# Patient Record
Sex: MALE | Race: WHITE | NOT HISPANIC OR LATINO | Employment: OTHER | URBAN - METROPOLITAN AREA
[De-identification: names, ages, dates, MRNs, and addresses within clinical notes are randomized per-mention and may not be internally consistent; named-entity substitution may affect disease eponyms.]

---

## 2017-05-16 ENCOUNTER — OFFICE VISIT (OUTPATIENT)
Dept: RADIATION THERAPY | Facility: OUTPATIENT CENTER | Age: 77
End: 2017-05-16

## 2017-05-16 VITALS
RESPIRATION RATE: 18 BRPM | WEIGHT: 162.6 LBS | SYSTOLIC BLOOD PRESSURE: 119 MMHG | DIASTOLIC BLOOD PRESSURE: 65 MMHG | HEART RATE: 57 BPM | BODY MASS INDEX: 23.33 KG/M2 | OXYGEN SATURATION: 96 %

## 2017-05-16 DIAGNOSIS — C01 CANCER OF BASE OF TONGUE (H): Primary | ICD-10-CM

## 2017-05-16 ASSESSMENT — PAIN SCALES - GENERAL: PAINLEVEL: NO PAIN (0)

## 2017-05-16 NOTE — MR AVS SNAPSHOT
After Visit Summary   5/16/2017    Hieu Hughes    MRN: [id number]           Patient Information     Date Of Birth          1940        Visit Information        Provider Department      5/16/2017 2:30 PM Amita Bolton MD [facility]         Follow-ups after your visit        Your next 10 appointments already scheduled     May 15, 2018  2:30 PM CDT   Return Visit with Amita Bolton MD   [facility] ([facility])    [address], [address]  [place] [facility] [place] [facility] [place] [address]   [phone]              Who to contact     Please call your clinic at [phone] to:    Ask questions about your health    Make or cancel appointments    Discuss your medicines    Learn about your test results    Speak to your doctor   If you have compliments or concerns about an experience at your clinic, or if you wish to file a complaint, please contact [facility] Patient Relations at [phone] or email us at [email or web address]         Additional Information About Your Visit        Care EveryWhere ID     This is your Care EveryWhere ID. This could be used by other organizations to access your [place] medical records  [id number]        Your Vitals Were     Pulse Respirations Pulse Oximetry BMI (Body Mass Index)          57 18 96% 23.33 kg/m2         Blood Pressure from Last 3 Encounters:   05/16/17 119/65   05/17/16 128/76   05/18/15 139/68    Weight from Last 3 Encounters:   05/16/17 73.8 kg (162 lb 9.6 oz)   05/17/16 75.3 kg (166 lb)   05/18/15 76.2 kg (168 lb)              Today, you had the following     No orders found for display         Today's Medication Changes          These changes are accurate as of: 5/16/17  2:59 PM.  If you have any questions, ask your nurse or doctor.               These medicines have changed or have updated prescriptions.        Dose/Directions    SYNTHROID PO   This may have changed:  Another medication with the same  name was removed. Continue taking this medication, and follow the directions you see here.   Changed by:  Amita Bolton MD        Dose:  100 mcg   Take 100 mcg by mouth daily   Refills:  0                Primary Care Provider Office Phone # Fax #    Chase Manning [phone]       [address] [address]        Thank you!     Thank you for choosing [facility]  for your care. Our goal is always to provide you with excellent care. Hearing back from our patients is one way we can continue to improve our services. Please take a few minutes to complete the written survey that you may receive in the mail after your visit with us. Thank you!             Your Updated Medication List - Protect others around you: Learn how to safely use, store and throw away your medicines at www.disposemymeds.org.          This list is accurate as of: 5/16/17  2:59 PM.  Always use your most recent med list.                   Brand Name Dispense Instructions for use    SYNTHROID PO      Take 100 mcg by mouth daily

## 2017-05-16 NOTE — NURSING NOTE
FOLLOW-UP VISIT    Patient Name: Hieu Hughes      : 1940     Age: 76 year old        ______________________________________________________________________________     Chief Complaint   Patient presents with     Radiation Therapy     follow up     /65  Pulse 57  Resp 18  Wt 73.8 kg (162 lb 9.6 oz)  SpO2 96%  BMI 23.33 kg/m2     Date Radiation Completed: 4/15/2009 7400 cGy in 37 fxns with concurrent cisplatin    Pain  Denies    Meds  Current Med List Reviewed: Yes  Medication Note:     Labs  TSH   Date Value Ref Range Status   2011 5.85 (H) 0.4 - 5.0 mU/L Final   ]    Imaging  None    Skin:Warm  Dry  Intact  Oral Products: water is enough per pt  Mucositis: No  Dry mouth: No, a little at night but does well during the day  Dental evaluation: No - has full dentures  PEG tube: No  Speech therapy: No  Lymphedema: No  Energy Level:low, wants to start exercising/walking more to regain some strength; feels he lost some through the winter months  Appetite: normal  Intake: can only do shakes/pureed/thin liquids - has found ways to keep calories up with this type of diet - ensure, supplements  Weight:  Wt Readings from Last 5 Encounters:   17 73.8 kg (162 lb 9.6 oz)   16 75.3 kg (166 lb)   05/18/15 76.2 kg (168 lb)   14 75.1 kg (165 lb 9.6 oz)   01/15/14 74.5 kg (164 lb 3 oz)       Appointments:     DATE  Oncologist: no follow up recently    ENT: no follow up recently    Primary:      Other Notes:

## 2017-05-16 NOTE — LETTER
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 He received a total dose of 7400 cGy in 37 fractions via an integrative boost technique with concurrent cisplatin, completing treatment on 4/15/2009. He tolerated radiation moderately well although did develop significant swallowing difficulties following treatment. He presents to clinic today for his 8 year follow-up visit.     Since his last visit with us, he reports that not much has changed in regards to his swallowing function. He continues to eat pureed foods, which is baseline for him now. He has some baseline xerostomia, but nothing too concerning. He has not followed up with Dr. Dunn in ENT since January 2014, nor has he followed up with any other ENT specialist. He had a diagnosis of prostate cancer (unsure of stage, but sounds like early stage) and was treated with a course of definitive external beam radiotherapy in [place], completing treatment on 03/27/2015 over the course of 43 fractions. He did not have any hormone therapy. He is doing well in regards to his urinary symptoms and denies any other pressing issues or complaints today.  He has been taking Synthroid and monitored by his PCP.     Objective:  /65  Pulse 57  Resp 18  Wt 73.8 kg (162 lb 9.6 oz)  SpO2 96%  BMI 23.33 kg/m2    Wt Readings from Last 4 Encounters:   05/16/17 73.8 kg (162 lb 9.6 oz)   05/17/16 75.3 kg (166 lb)   05/18/15 76.2 kg (168 lb)   02/03/14 75.1 kg (165 lb 9.6 oz)     Gen:  Alert, oriented, and in NAD  HEENT: NCAT, PERRL, EOMI, moderately MMM, no lesions or masses in oral cavity  Neck: Mild fibrosis, but relatively soft, no palpable lymphadenopathy or lymphedema  Pulm: CTAB, no wheezes/rales/rhonchi, good aeration  CV: RRR, no murmurs/rubs/gallops  Musculoskeletal: Normal bulk and tone   Skin: Normal color and turgor    Labs and Imaging:  No new imaging.    Assessment: 75yo gentleman with stage MARY, cT2 N2b M0, squamous cell carcinoma of the right base of tongue s/p definitive chemoradiation. He is 8 years out from the completion of treatment with no clinical evidence of disease recurrence. He does continue to have a moderate amount of treatment-related toxicity with persistent dysphagia likely due to a combination of his radiation-induced toxicities and sequelae of post-polio syndrome.    Plan:  1. RTC in 1 year  2. Continue routine oral and skin cares including, mouth and neck stretching exercises  3. Recommend continued followup with ENT and his PCP near his home town.      I spent approximately 30 minutes today with the patient and 80% time was used for counseling    Amita Bolton M.D., Ph.D.      Radiation Oncologist   [facility]   [facility]   Phone: [phone]    CC  Patient Care Team:  Chase Manning as PCP - General  Franco Aburto MD, Vidhya Lopez MD as MD (Hematology)  Liam Joya MD (ENT)

## 2017-05-16 NOTE — PROGRESS NOTES
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 He received a total dose of 7400 cGy in 37 fractions via an integrative boost technique with concurrent cisplatin, completing treatment on 4/15/2009. He tolerated radiation moderately well although did develop significant swallowing difficulties following treatment. He presents to clinic today for his 8 year follow-up visit.     Since his last visit with us, he reports that not much has changed in regards to his swallowing function. He continues to eat pureed foods, which is baseline for him now. He has some baseline xerostomia, but nothing too concerning. He has not followed up with Dr. Dunn in ENT since January 2014, nor has he followed up with any other ENT specialist. He had a diagnosis of prostate cancer (unsure of stage, but sounds like early stage) and was treated with a course of definitive external beam radiotherapy in [place], completing treatment on 03/27/2015 over the course of 43 fractions. He did not have any hormone therapy. He is doing well in regards to his urinary symptoms and denies any other pressing issues or complaints today.  He has been taking Synthroid and monitored by his PCP.     Objective:  /65  Pulse 57  Resp 18  Wt 73.8 kg (162 lb 9.6 oz)  SpO2 96%  BMI 23.33 kg/m2    Wt Readings from Last 4 Encounters:   05/16/17 73.8 kg (162 lb 9.6 oz)   05/17/16 75.3 kg (166 lb)   05/18/15 76.2 kg (168 lb)   02/03/14 75.1 kg (165 lb 9.6 oz)     Gen: Alert, oriented, and in NAD  HEENT: NCAT, PERRL, EOMI, moderately MMM, no  lesions or masses in oral cavity  Neck: Mild fibrosis, but relatively soft, no palpable lymphadenopathy or lymphedema  Pulm: CTAB, no wheezes/rales/rhonchi, good aeration  CV: RRR, no murmurs/rubs/gallops  Musculoskeletal: Normal bulk and tone   Skin: Normal color and turgor    Labs and Imaging:  No new imaging.    Assessment: 77yo gentleman with stage MARY, cT2 N2b M0, squamous cell carcinoma of the right base of tongue s/p definitive chemoradiation. He is 8 years out from the completion of treatment with no clinical evidence of disease recurrence. He does continue to have a moderate amount of treatment-related toxicity with persistent dysphagia likely due to a combination of his radiation-induced toxicities and sequelae of post-polio syndrome.    Plan:  1. RTC in 1 year  2. Continue routine oral and skin cares including, mouth and neck stretching exercises  3. Recommend continued followup with ENT and his PCP near his home town.      I spent approximately 30 minutes today with the patient and 80% time was used for counseling    Amita Bolton M.D., Ph.D.      Radiation Oncologist   [facility]   [facility]   Phone: [phone]    CC  Patient Care Team:  Chase Manning as PCP - General  Franco Aburto MD, Siddhartha Lopez MD as MD (Hematology)  Liam Joya MD (ENT)  Amita Bolton MD (Radiation Oncology)  SIDDHARTHA TAPIA

## 2018-05-15 ENCOUNTER — OFFICE VISIT (OUTPATIENT)
Dept: RADIATION THERAPY | Facility: OUTPATIENT CENTER | Age: 78
End: 2018-05-15
Payer: MEDICARE

## 2018-05-15 VITALS
BODY MASS INDEX: 23.82 KG/M2 | DIASTOLIC BLOOD PRESSURE: 73 MMHG | OXYGEN SATURATION: 95 % | SYSTOLIC BLOOD PRESSURE: 143 MMHG | HEART RATE: 72 BPM | RESPIRATION RATE: 18 BRPM | WEIGHT: 166 LBS

## 2018-05-15 DIAGNOSIS — C01 MALIGNANT NEOPLASM OF BASE OF TONGUE (H): Primary | ICD-10-CM

## 2018-05-15 DIAGNOSIS — T66.XXXS LATE EFFECT OF RADIATION: ICD-10-CM

## 2018-05-15 ASSESSMENT — PAIN SCALES - GENERAL: PAINLEVEL: NO PAIN (0)

## 2018-05-15 NOTE — NURSING NOTE
FOLLOW-UP VISIT    Patient Name: Hieu Hughes      : 1940     Age: 77 year old        ______________________________________________________________________________     Chief Complaint   Patient presents with     Radiation Therapy     follow up     /73  Pulse 72  Resp 18  Wt 75.3 kg (166 lb)  SpO2 95%  BMI 23.82 kg/m2     Date Radiation Completed: 4/15/2009  7400 cGy to H&N    Pain  Denies    Meds  Current Med List Reviewed: Yes  Medication Note:     Labs  TSH   Date Value Ref Range Status   2011 5.85 (H) 0.4 - 5.0 mU/L Final   ]    Imaging  None    Skin:Warm  Dry  Intact  Oral Products: water  Mucositis: No  Dry mouth: yes  Dental evaluation: has full denture  PEG tube: No  Speech therapy: reports he feels he is having more trouble with talking clearly/speech - consider speech therapy referal  Lymphedema: No  Energy Level:normal  Appetite: fair  Intake: all food needs to be blended to a certain consistency. No spices.  Weight:  Wt Readings from Last 5 Encounters:   05/15/18 75.3 kg (166 lb)   17 73.8 kg (162 lb 9.6 oz)   16 75.3 kg (166 lb)   05/18/15 76.2 kg (168 lb)   14 75.1 kg (165 lb 9.6 oz)       Appointments:     DATE  Oncologist:     ENT: Dr. Dunn    Primary: Chase Manning  annual visits     Other Notes: may need speech referral

## 2018-05-15 NOTE — LETTER
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 He received a total dose of 7400 cGy in 37 fractions via an integrative boost technique with concurrent cisplatin, completing treatment on 4/15/2009. He tolerated radiation moderately well although did develop significant swallowing difficulties following treatment. He presents to clinic today for his 9 year follow-up visit.      Since his last visit with us, he reports that not much has changed in regards to his swallowing function. He continues to eat pureed foods, which is baseline for him now. He has some baseline xerostomia, but nothing too concerning. He has not followed up with Dr. Dunn in ENT since January 2014, nor has he followed up with any other ENT specialist. He had a diagnosis of prostate cancer (stage not available to me at the time of visit) treated with EBRT in [place], completed on 03/27/2015 . He did not have any hormone therapy. He is doing well in regards to his urinary symptoms and denies any other pressing issues or complaints today. Weight is relatively stable. He does have hoarseness of voice which is chronic but stable. He denies any n/v/f/c/d/c/cp/sob/ha/new neuro symptoms/bone or joint pain.     Physical Exam:  /73  Pulse 72  Resp 18  Wt 166 lb  SpO2 95%  BMI 23.82 kg/m2    Wt Readings from Last 4 Encounters:   05/15/18 166 lb   05/16/17 162 lb 9.6 oz   05/17/16 166 lb   05/18/15 168 lb     Gen: Alert, oriented, and in  NAD  HEENT: NCAT, PERRL, EOMI, moderately MMM, no lesions or masses in oral cavity. Edentulous. BOT and FOM are soft.  Neck: Mild fibrosis, but relatively soft, no palpable lymphadenopathy or lymphedema  Lymph: No cervical, supraclavicular, or infraclavicular lymphadenopathy appreciated  Pulm: CTAB  CV: RRR, no murmurs/rubs/gallops  Musculoskeletal: Normal bulk and tone   Skin: Normal color and turgor  Neuro: CN II-XII intact. 5/5 strength in bilateral upper and lower extremities.     LABS/IMAGING:  10/22/17 chest CT   Lung Parenchyma:  There is infiltrate in the right posterior lung with both superior, middle lobe and inferior lobe involvement, scattered airspace disease.  No fluid collections.  No discrete associated masses. There is left posterior basilar lung consolidated lung infiltrate with no fluid collection to suggest abscess.  Air bronchograms with some calcifications likely granulomatous present.  The remainder of the lungs fields are relatively clear.    Mediastinum, Shae, Great Vessels:  Vascular calcification.  No enlarged lymph nodes or masses.  No pericardial effusion.    Chest Wall:  No enlarged axillary or subclavian lymph nodes.  No lytic or blastic bony changes.  No rib or vertebral body fractures.  Multi-endplate spurs.     10/22/17: TSH 1.91.    ASSESSMENT: 77yo gentleman with stage MARY, cT2 N2b M0, squamous cell carcinoma of the right base of tongue s/p definitive chemoradiation. He is 9 years out from the completion of treatment with no clinical evidence of disease recurrence. PE is unremarkable.  Last Fall he had pneumonia which warranted a chest CT (above) that showed no discrete masses or lymphadenopathy.  He does continue to have a moderate amount of treatment-related toxicity with persistent dysphagia likely due to a combination of his radiation-induced toxicities and sequelae of post-polio syndrome.  He had polio as a child and therefore his pretreatment swallowing function was already  compromised to some degree.  However XRT exacerbated these symptoms.  His weight however is relatively stable on pureed diet.  He also has hoarseness of voice which is stable but more pronounced when he doesn't talk for prolonged periods of time.     PLAN:  1. Dysphagia/PO intake: Pt given information to follow up with ENT again in order to be evaluated for esophageal dilatation.  He will call and make an appt on his own.     2. Refer back to Speech and Swallow to help with hoarseness of voice.  3. RTC in 6 months for follow up discussion regarding side effect management with [facility] provider.  4. Continue routine oral and skin cares including, mouth and neck stretching exercises  5. Thyroid function tests per PCP.     There was ample time for questions and all were answered to the patients satisfaction.      Shawanda Pacheco M.D.   Adjunct    Radiation Oncologist   [facility]

## 2018-05-15 NOTE — PROGRESS NOTES
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 He received a total dose of 7400 cGy in 37 fractions via an integrative boost technique with concurrent cisplatin, completing treatment on 4/15/2009. He tolerated radiation moderately well although did develop significant swallowing difficulties following treatment. He presents to clinic today for his 9 year follow-up visit.      Since his last visit with us, he reports that not much has changed in regards to his swallowing function. He continues to eat pureed foods, which is baseline for him now. He has some baseline xerostomia, but nothing too concerning. He has not followed up with Dr. Dunn in ENT since January 2014, nor has he followed up with any other ENT specialist. He had a diagnosis of prostate cancer (stage not available to me at the time of visit) treated with EBRT in [place], completed on 03/27/2015 . He did not have any hormone therapy. He is doing well in regards to his urinary symptoms and denies any other pressing issues or complaints today. Weight is relatively stable. He does have hoarseness of voice which is chronic but stable. He denies any n/v/f/c/d/c/cp/sob/ha/new neuro symptoms/bone or joint pain.     Physical Exam:  /73  Pulse 72  Resp 18  Wt 166 lb  SpO2 95%  BMI 23.82 kg/m2    Wt Readings from Last 4 Encounters:   05/15/18 166 lb   05/16/17 162 lb 9.6 oz   05/17/16 166 lb   05/18/15 168 lb     Gen: Alert, oriented, and in NAD  HEENT: NCAT, PERRL, EOMI, moderately MMM, no lesions or masses in oral cavity.  Edentulous. BOT and FOM are soft.  Neck: Mild fibrosis, but relatively soft, no palpable lymphadenopathy or lymphedema  Lymph: No cervical, supraclavicular, or infraclavicular lymphadenopathy appreciated  Pulm: CTAB  CV: RRR, no murmurs/rubs/gallops  Musculoskeletal: Normal bulk and tone   Skin: Normal color and turgor  Neuro: CN II-XII intact. 5/5 strength in bilateral upper and lower extremities.     LABS/IMAGING: 10/22/17 chest CT   Lung Parenchyma:  There is infiltrate in the right posterior lung with both superior, middle lobe and inferior lobe involvement, scattered airspace disease.  No fluid collections.  No discrete associated masses. There is left posterior basilar lung consolidated lung infiltrate with no fluid collection to suggest abscess.  Air bronchograms with some calcifications likely granulomatous present.  The remainder of the lungs fields are relatively clear.    Mediastinum, Shae, Great Vessels:  Vascular calcification.  No enlarged lymph nodes or masses.  No pericardial effusion.    Chest Wall:  No enlarged axillary or subclavian lymph nodes.  No lytic or blastic bony changes.  No rib or vertebral body fractures.  Multi-endplate spurs.     10/22/17: TSH 1.91.    ASSESSMENT: 75yo gentleman with stage MARY, cT2 N2b M0, squamous cell carcinoma of the right base of tongue s/p definitive chemoradiation. He is 9 years out from the completion of treatment with no clinical evidence of disease recurrence. PE is unremarkable.  Last Fall he had pneumonia which warranted a chest CT (above) that showed no discrete masses or lymphadenopathy.  He does continue to have a moderate amount of treatment-related toxicity with persistent dysphagia likely due to a combination of his radiation-induced toxicities and sequelae of post-polio syndrome.  He had polio as a child and therefore his pretreatment swallowing function was already compromised to some degree.  However XRT exacerbated these symptoms.  His weight  however is relatively stable on pureed diet.  He also has hoarseness of voice which is stable but more pronounced when he doesn't talk for prolonged periods of time.     PLAN:  1. Dysphagia/PO intake: Pt given information to follow up with ENT again in order to be evaluated for esophageal dilatation.  He will call and make an appt on his own.     2. Refer back to Speech and Swallow to help with hoarseness of voice.  3. RTC in 6 months for follow up discussion regarding side effect management with [facility] provider.  4. Continue routine oral and skin cares including, mouth and neck stretching exercises  5. Thyroid function tests per PCP.     There was ample time for questions and all were answered to the patients satisfaction.      Shawanda Pacheco M.D.   Adjunct    Radiation Oncologist   [facility]

## 2018-05-15 NOTE — MR AVS SNAPSHOT
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 *     [facility] provides Speech Therapy evaluation and treatment and many specialty services across the [place] system.  If requesting a specialty program, please choose from the list below.  If you have not heard from the scheduling office within 2 business days, please call [phone] for all locations, with the exception of [place], please call [phone] and [facility], please call [phone]      Treatment: Evaluation & Treatment  Speech Treatment Diagnosis: Problems With Voice Production  Special Instructions: please schedule at [facility].  of H&N radiation/chemo 9 yrs ago.  Special Programs: Voice     Please be aware that coverage of these services is subject to the terms and limitations of your health insurance plan.  Call member services at your health plan with any benefit or coverage questions.      **Note to Provider:  If you are referring outside of [place] for the therapy appointment, please list the name of the location in the \"special instructions\" above, print the referral and give to the patient to schedule the appointment.                  Your next 10 appointments already scheduled     Nov 05, 2018  3:00 PM CST   Return Visit with [facility] Provider   [facility] ([facility])    [address], [address]  [facility] [place] [address]   [phone]              Who to contact     Please call your clinic at [phone] to:    Ask questions about your health    Make or cancel appointments    Discuss your medicines    Learn about your test results    Speak to your doctor            Additional Information About Your Visit        Care EveryWhere ID     This is your Care EveryWhere ID. This could be used by other organizations to access your [place] medical records  [id number]        Your Vitals Were     Pulse Respirations Pulse Oximetry BMI (Body Mass Index)          72 18 95% 23.82 kg/m2         Blood Pressure from Last 3 Encounters:   05/15/18 143/73   05/16/17 119/65   05/17/16 128/76    Weight from Last 3 Encounters:   05/15/18 75.3 kg (166 lb)   05/16/17 73.8 kg (162 lb 9.6 oz)   05/17/16 75.3 kg (166 lb)              We Performed the Following     SPEECH THERAPY REFERRAL        Primary Care Provider Office Phone # Fax #    Chase MONTES Nigel [phone]       [place] [id number]        Equal Access to Services     HEIDI JEAN : Hadii noemí ku callieo Sorody, waaxda luqadaha, qaybta kaalmada adeguerlineyada, garrett johnson . [facility] [phone].    ATENCIÓN: Si habla español, tiene a henson disposición servicios gratuitos de asistencia lingüística. [facility] [phone].    We comply with applicable federal civil rights laws and [place] laws. We do not discriminate on the basis of race, color, national origin, age, disability, sex, sexual orientation, or gender identity.            Thank you!     Thank you for choosing [facility]  for your care. Our goal is always to provide you with excellent care. Hearing back from our patients is one way we can continue to improve our services. Please take a few minutes to complete the written survey that you may receive in the mail after your  visit with us. Thank you!             Your Updated Medication List - Protect others around you: Learn how to safely use, store and throw away your medicines at www.disposemymeds.org.          This list is accurate as of 5/15/18  3:21 PM.  Always use your most recent med list.                   Brand Name Dispense Instructions for use Diagnosis    SYNTHROID PO      Take 100 mcg by mouth daily

## 2018-05-23 ENCOUNTER — HOSPITAL ENCOUNTER (OUTPATIENT)
Dept: SPEECH THERAPY | Facility: CLINIC | Age: 78
Setting detail: THERAPIES SERIES
End: 2018-05-23
Attending: RADIOLOGY
Payer: MEDICARE

## 2018-05-23 PROCEDURE — 92524 BEHAVRAL QUALIT ANALYS VOICE: CPT | Mod: GN | Performed by: SPEECH-LANGUAGE PATHOLOGIST

## 2018-05-23 PROCEDURE — 92507 TX SP LANG VOICE COMM INDIV: CPT | Mod: GN | Performed by: SPEECH-LANGUAGE PATHOLOGIST

## 2018-05-23 PROCEDURE — 40000211 ZZHC STATISTIC SLP  DEPARTMENT VISIT: Performed by: SPEECH-LANGUAGE PATHOLOGIST

## 2018-05-23 PROCEDURE — G9172 VOICE GOAL STATUS: HCPCS | Mod: GN,CK | Performed by: SPEECH-LANGUAGE PATHOLOGIST

## 2018-05-23 PROCEDURE — G9171 VOICE CURRENT STATUS: HCPCS | Mod: GN,CL | Performed by: SPEECH-LANGUAGE PATHOLOGIST

## 2018-05-24 NOTE — PROGRESS NOTES
[facility]          OUTPATIENT SPEECH LANGUAGE PATHOLOGY VOICE EVALUATION  PLAN OF TREATMENT FOR OUTPATIENT REHABILITATION  (COMPLETE FOR INITIAL CLAIMS ONLY)    Patient's Last Name, First Name, M.I.  YOB: 1940  Hieu Hughes                        Provider s Name: [facility] Medical Record No.  [id number]     Onset Date:  5/15/18 order date    Start of Care Date:  5/23/18   Type:     ___PT  __OT   _X_SLP    Medical Diagnosis: Malignant neoplasm of base of tongue; late effects of radiation   Speech Language Pathology Diagnosis:   dysphonia    Visits from SOC: 1      _________________________________________________________________________________  Plan of Treatment/Functional Goals:  Voice         Goals     1. Goal Identifier: strengthening       Goal Description: Pt will perform vocal strengtheing exercises (ah, pitch, sustain 15 sec) with 90% acc 2x/day       Target Date: 06/22/18   2. Goal Identifier: volume       Goal Description: Pt will produce an appropriate volume for voice in words then phrases (70 dB) 85% of the time.       Target Date: 08/21/18   3. Goal Identifier: vocal quality       Goal Description: Pt will  voice 8/10  (10=normal) in phrases 75% of the time.       Target Date: 07/22/18   4. Goal Identifier: VHI       Goal Description: Pt will complete the voice handicapp index and inprove by 8 points by discharge       Target Date: 08/21/18   5. Goal Identifier: vocal hygiene       Goal Description: Pt will follow vocal hygiene/GERD strategies 85% of the time.       Target Date: 07/22/18                         Kathy Galvez MA,CCC/ SLP       I CERTIFY THE NEED FOR THESE SERVICES FURNISHED UNDER        THIS PLAN OF TREATMENT AND WHILE UNDER MY CARE     (Physician co-signature of this document indicates review and certification of the therapy plan).               Valeri Pacheco MD    Certification Date From:  05/23/18  Certification Date To:  08/21/18    ST 1x/wk x 12 week               Initial Assessment        See Epic Evaluation Start of Care

## 2018-05-29 NOTE — TELEPHONE ENCOUNTER
FUTURE VISIT INFORMATION      FUTURE VISIT INFORMATION:    Date: 6/13/18    Time: 730AM    Location: [facility]  REFERRAL INFORMATION:    Referring provider:  Valeri Pacheco MD    Referring providers clinic: [facility]    Reason for visit/diagnosis  to be evaluated for esophageal dilatation - BOT (H)    RECORDS REQUESTED FROM:       Clinic name Comments Records Status Imaging Status   [facility] 5/15/18 visit notes with Dr Tara RYAN    [facility] 1/15/14 visit notes with Dr Mona RYAN    [facility] 10/2012 Video Speech  8/1/12 NM PET  3/14/12 CT Neck   [facility] PACS                       RECORDS STATUS

## 2018-06-06 ENCOUNTER — OFFICE VISIT (OUTPATIENT)
Dept: OTOLARYNGOLOGY | Facility: CLINIC | Age: 78
End: 2018-06-06
Payer: MEDICARE

## 2018-06-06 VITALS
DIASTOLIC BLOOD PRESSURE: 72 MMHG | HEIGHT: 70 IN | WEIGHT: 160 LBS | SYSTOLIC BLOOD PRESSURE: 136 MMHG | BODY MASS INDEX: 22.9 KG/M2

## 2018-06-06 DIAGNOSIS — R13.10 DYSPHAGIA, UNSPECIFIED TYPE: ICD-10-CM

## 2018-06-06 DIAGNOSIS — R49.0 VOICE HOARSENESS: ICD-10-CM

## 2018-06-06 DIAGNOSIS — K22.2 ESOPHAGEAL STRICTURE: ICD-10-CM

## 2018-06-06 DIAGNOSIS — C01 CANCER OF BASE OF TONGUE (H): Primary | ICD-10-CM

## 2018-06-06 ASSESSMENT — PAIN SCALES - GENERAL: PAINLEVEL: NO PAIN (0)
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
## 2018-06-06 NOTE — NURSING NOTE
Chief Complaint   Patient presents with     RECHECK     wanted to have his swallowing evaluated. voice also having some troubles.      Odell Gaston, EMT
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
## 2018-06-06 NOTE — MR AVS SNAPSHOT
After Visit Summary   6/6/2018    Hieu Hughes    MRN: [id number]           Patient Information     Date Of Birth          1940        Visit Information        Provider Department      6/6/2018 10:00 AM Nahomy Adams PA-C [facility] Ear Nose and Throat        Today's Diagnoses     Cancer of base of tongue (H)    -  1    Esophageal stricture        Voice hoarseness        Dysphagia, unspecified type          Care Instructions    Please schedule a f/u appointment with Nahomy Adams PA-C in 3 months  Schedule CT Neck and chest same day as appointment  Schedule video swallow study at [place]           Follow-ups after your visit        Additional Services     Speech Therapy Referral [8710]       *This order will print in the [facility] Central Scheduling Office*    [facility] provides Speech Therapy evaluation and treatment and many specialty services across the [facility].  If requesting a specialty program, please choose from the list below.    Call [phone] to schedule [facility] at all locations, with the exception of [facility], please call [phone].     Treatment: Evaluation & Treatment  Speech Treatment Diagnosis: Dysphagia  Special Instructions: none  Special Programs: Video Swallow Study    Please be aware that coverage of these services is subject to the terms and limitations of your health insurance plan.  Call member services at your health plan with any benefit or coverage questions.      **Note to Provider** To refer patients to therapy outside of the location list, change the order class to External Referral in the order composer.                  Your next 10 appointments already scheduled     Sep 07, 2018  9:20 AM CDT   CT CHEST W CONTRAST with UCCT1   [facility] Imaging Center CT ([facility] Clinics [facility] Surgery Center)    [address]  [facility] [address]    [phone]           Please bring any scans or X-rays taken at other hospitals, if similar tests were done. Also bring a list of your medicines, including vitamins, minerals and over-the-counter drugs. It is safest to leave personal items at home.  Be sure to tell your doctor:   If you have any allergies.   If there s any chance you are pregnant.   If you are breastfeeding.    If you have diabetes as your medication may need to be adjusted for this exam.  You will have contrast for this exam. To prepare:   Do not eat or drink for 2 hours before your exam. If you need to take medicine, you may take it with small sips of water. (We may ask you to take liquid medicine as well.)   The day before your exam, drink extra fluids at least six 8-ounce glasses (unless your doctor tells you to restrict your fluids).  Patients over 70 or patients with diabetes or kidney problems:   If you haven t had a blood test (creatinine test) within the last 30 days, the Cardiologist/Radiologist may require you to get this test prior to your exam.  Please wear loose clothing, such as a sweat suit or jogging clothes. Avoid snaps, zippers and other metal. We may ask you to undress and put on a hospital gown.  If you have any questions, please call the Imaging Department where you will have your exam.            Sep 07, 2018  9:40 AM CDT   CT SOFT TISSUE NECK W CONTRAST with UCCT1   [facility] Imaging Center CT ([facility])    [address]  [facility] [address] [phone]           Please bring any scans or X-rays taken at other hospitals, if similar tests were done. Also bring a list of your medicines, including vitamins, minerals and over-the-counter drugs. It is safest to leave personal items at home.  Be sure to tell your doctor:   If you have any allergies.   If there s any chance you are pregnant.   If you are breastfeeding.    If you have diabetes as your medication may need to be  adjusted for this exam.  You will have contrast for this exam. To prepare:   Do not eat or drink for 2 hours before your exam. If you need to take medicine, you may take it with small sips of water. (We may ask you to take liquid medicine as well.)   The day before your exam, drink extra fluids at least six 8-ounce glasses (unless your doctor tells you to restrict your fluids).  Patients over 70 or patients with diabetes or kidney problems:   If you haven t had a blood test (creatinine test) within the last 30 days, the Cardiologist/Radiologist may require you to get this test prior to your exam.  Please wear loose clothing, such as a sweat suit or jogging clothes. Avoid snaps, zippers and other metal. We may ask you to undress and put on a hospital gown.  If you have any questions, please call the Imaging Department where you will have your exam.            Sep 07, 2018 11:30 AM CDT   (Arrive by 11:15 AM)   Return Visit with Nahomy Adams PA-C   [facility] Ear Nose and Throat ([facility])    [address]  [address] [facility] [address] [phone]            Nov 05, 2018  3:00 PM CST   Return Visit with [facility] Provider   Radiation Therapy Center ([facility])    [address] [place] [address], [facility] [address]  [facility] [address] [phone]              Future tests that were ordered for you today     Open Future Orders        Priority Expected Expires Ordered    XR Video Swallow w Esophagram - Order with Speech Therapy Referral Routine 6/6/2018 6/6/2019 6/6/2018    CT Chest w contrast* Routine  6/6/2019 6/6/2018    CT Soft tissue neck w contrast Routine  6/6/2019 6/6/2018            Who to contact     Please call your clinic at [phone] to:    Ask questions about your health    Make or cancel appointments    Discuss your medicines    Learn about your test results    Speak to your doctor            Additional Information About Your Visit        Care  "EveryWhere ID     This is your Care EveryWhere ID. This could be used by other organizations to access your [facility] medical records  [id number]        Your Vitals Were     Height BMI (Body Mass Index)                1.778 m (5' 10\") 22.96 kg/m2           Blood Pressure from Last 3 Encounters:   06/06/18 136/72   05/15/18 143/73   05/16/17 119/65    Weight from Last 3 Encounters:   06/06/18 72.6 kg (160 lb)   05/15/18 75.3 kg (166 lb)   05/16/17 73.8 kg (162 lb 9.6 oz)              We Performed the Following     IMAGESTREAM RECORDING ORDER     IMAGESTREAM RECORDING ORDER     Speech Therapy Referral [[id number]]        Primary Care Provider Office Phone # Fax #    Chase Manning [phone]       [address] [address]        Equal Access to Services     HUSSEIN JEAN : Hadii aad ku hadasho Soomaali, waaxda luqadaha, qaybta kaalmada adeegyada, waxay sara haytrentn cy johnson . So [facility] [phone].    ATENCIÓN: Si habla español, tiene a henson disposición servicios gratuitos de asistencia lingüística. [facility] al [phone].    We comply with applicable federal civil rights laws and [place] laws. We do not discriminate on the basis of race, color, national origin, age, disability, sex, sexual orientation, or gender identity.            Thank you!     Thank you for choosing [facility] EAR NOSE AND THROAT  for your care. Our goal is always to provide you with excellent care. Hearing back from our patients is one way we can continue to improve our services. Please take a few minutes to complete the written survey that you may receive in the mail after your visit with us. Thank you!             Your Updated Medication List - Protect others around you: Learn how to safely use, store and throw away your medicines at www.disposemymeds.org.          This list is accurate as of 6/6/18 10:43 AM.  Always use your most recent med list.                   Brand Name Dispense " Instructions for use Diagnosis    SYNTHROID PO      Take 100 mcg by mouth daily

## 2018-06-06 NOTE — PATIENT INSTRUCTIONS
Please schedule a f/u appointment with Nahomy Adams PA-C in 3 months  Schedule CT Neck and chest same day as appointment  Schedule video swallow study at [place]

## 2018-06-13 ENCOUNTER — PRE VISIT (OUTPATIENT)
Dept: OTOLARYNGOLOGY | Facility: CLINIC | Age: 78
End: 2018-06-13

## 2018-08-06 ENCOUNTER — HOSPITAL ENCOUNTER (OUTPATIENT)
Dept: GENERAL RADIOLOGY | Facility: CLINIC | Age: 78
Discharge: HOME | End: 2018-08-06
Attending: PHYSICIAN ASSISTANT | Admitting: PHYSICIAN ASSISTANT
Payer: MEDICARE

## 2018-08-06 ENCOUNTER — HOSPITAL ENCOUNTER (OUTPATIENT)
Dept: SPEECH THERAPY | Facility: CLINIC | Age: 78
Setting detail: THERAPIES SERIES
End: 2018-08-06
Attending: PHYSICIAN ASSISTANT
Payer: MEDICARE

## 2018-08-06 DIAGNOSIS — R13.10 DYSPHAGIA, UNSPECIFIED TYPE: ICD-10-CM

## 2018-08-06 PROCEDURE — 40000211 ZZHC STATISTIC SLP  DEPARTMENT VISIT

## 2018-08-06 PROCEDURE — G8997 SWALLOW GOAL STATUS: HCPCS | Mod: GN,CI

## 2018-08-06 PROCEDURE — G8996 SWALLOW CURRENT STATUS: HCPCS | Mod: GN,CJ

## 2018-08-06 PROCEDURE — 74230 X-RAY XM SWLNG FUNCJ C+: CPT

## 2018-08-06 PROCEDURE — 92611 MOTION FLUOROSCOPY/SWALLOW: CPT | Mod: GN

## 2018-08-13 NOTE — ADDENDUM NOTE
Encounter addended by: Arlin Hastings, SLP on: 8/13/2018 12:47 PM<BR>     Actions taken: Flowsheet accepted, Charge Capture section accepted

## 2018-08-13 NOTE — ADDENDUM NOTE
Encounter addended by: Arlin Hastings, SLP on: 8/13/2018 10:49 AM<BR>     Actions taken: Sign clinical note, Flowsheet accepted

## 2018-08-13 NOTE — ADDENDUM NOTE
Encounter addended by: Arlin Hastings, SLP on: 8/13/2018 12:44 PM<BR>     Actions taken: Sign clinical note, Document created

## 2018-08-13 NOTE — PROGRESS NOTES
Impressions: The patient demonstrated gross penetration with thin, nectar, and honey w/o use of strategies. With use of head turn to right the patient was able to tolerate nectar and honey without penetration. Pudding and solids had difficult AP transfer. The patient had residue in pharyngeal cavity across all trials. Recommended for therapy 1 time per week. Recommended for nectar w/ head turn to right. Recommended the patient trial solids in home and attempt to increase solid intake.      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 Patient reports he started noticing his symptoms 2-3 years ago. He continues to only eat a full liquid diet as this is all he can tolerate since his treatment. His weight has been stable. He reports that his hoarseness worsens with vocal rest and improves with talking. He was evaluated by speech therapy 3 weeks ago at another [facility] regarding these issues and was given various exercises and goals.   Respiratory Status Room air   Prior Level Of Function Swallowing   Patient Role/employment History Retired   Living Environment [place]   General Observations The patient is eager to discuss past medical hx. He is fearful of eating, and has increased anxiety when solids are discussed.    Patient/family Goals determine safety of diet.    Pain Assessment   Pain Reported No   Fall Risk Screen   Fall screen completed by SLP   Have you fallen 2 or more times in the past  year? No   Have you fallen and had an injury in the past year? No   Is patient a fall risk? No   Clinical Swallow Evaluation   Oral Musculature generally intact   Dentition upper and lower dentures  (Poorly fitting)   Mucosal Quality good   Mandibular Strength and Mobility impaired  (reduced ROM. Adequate for oral feeding. )   Oral Labial Strength and Mobility WFL   Lingual Strength and Mobility impaired left lateral movement;impaired right lateral movement;impaired protrusion   Velar Elevation intact   Buccal Strength and Mobility intact   Laryngeal Function Cough;Throat clear;Swallow;Voicing initiated;Dry swallow palpated   VFSS Evaluation   VFSS Additional Documentation Yes   VFSS Eval: Radiology   Radiologist NAE JOVEL MD   Views Taken left lateral   Physical Location of Procedure [facility]   VFSS Eval: Thin Liquid Texture Trial   Mode of Presentation, Thin Liquid cup;self-fed   Order of Presentation 1, 2, 5   Preparatory Phase WFL   Oral Phase, Thin Liquid WFL   Pharyngeal Phase, Thin Liquid Delayed swallow reflex;Residue in valleculae;Residue in pyriform sinus   Rosenbek's Penetration Aspiration Scale: Thin Liquid Trial Results 5 - contrast contacts vocal cords, visible residue remains (penetration)   Successful Strategies Trialed During Procedure, Thin Liquid head turn to the right   Diagnostic Statement The patient trialed thins with gross penetration with single sips. Chin tuck trialed with gross penetration and residue remaining. Head turn to right trialed with trace flash penetration or aspiration. Residue coating pharyngeal cavity across all trials.    VFSS Eval: Nectar Thick Liquid Texture Trial   Mode of Presentation, Nectar cup;self-fed   Order of Presentation 3, 4   Preparatory Phase WFL   Oral Phase, Nectar WFL   Pharyngeal Phase, Nectar Delayed swallow reflex;Pharyngeal wall coating;Residue in valleculae;Residue in pyriform sinus   Rosenbek's Penetration Aspiration Scale: Nectar-Thick  Liquid Trial Results 5 - contrast contacts vocal cords, visible residue remains (penetration)   Successful Strategies Trialed During Procedure, Nectar head turn to the right   Diagnostic Statement The patient trialed nectar w/ and w/o use of compensatory strategies. W/o strategies the patient presented with gross penetration with residue remaining. With head turn to right no penetration is noted. Pharyngeal residue coated entire pharyngeal cavity.    VFSS Eval: Honey Thick Texture Trial   Mode of Presentation, Honey cup;self-fed   Order of Presentation 5, 6   Preparatory Phase WFL   Oral Phase, Honey WFL   Pharyngeal Phase, Honey Delayed swallow reflex;Pharyngeal wall coating;Residue in valleculae;Residue in pyriform sinus   Rosenbek's Penetration Aspiration Scale: Honey Trial Results 5 - contrast contacts vocal cords, visible residue remains (penetration)   Successful Strategies Trialed During Procedure, Honey head turn to the right   Diagnostic Statement The patient trialed honey thick w/ and w/o use of compensatory strategies of head turn to right. W/o strategies the patient presented with mild penetration with residue remaining. With head turn to right no penetration is noted. Pharyngeal residue coated entire pharyngeal cavity.    VFSS Eval: Pudding Thick Liquid Texture Trial   Mode of Presentation, Pudding spoon   Order of Presentation 7   Preparatory Phase Insufficient mastication;Poor bolus control   Oral Phase, Pudding Premature pharyngeal entry   Pharyngeal Phase, Pudding Delayed swallow reflex;Residue in valleculae;Residue in pyriform sinus   Rosenbek's Penetration Aspiration Scale: Pudding-Thick Liquid Trial Results 1 - no aspiration, contrast does not enter airway   Diagnostic Statement The patient trialed pudding thick w/o use of compensatory strategy. No penetration noted. Pharyngeal residue coated entire pharyngeal cavity. The patient required 5 swallow attempts to clear bolus. Difficulty with AP  transfer in oral cavity.    VFSS Eval: Solid Food Texture Trial   Mode of Presentation, Solid self-fed   Order of Presentation 8   Preparatory Phase Poor bolus control   Oral Phase, Solid Poor AP movement   Pharyngeal Phase, Solid Delayed swallow reflex;Residue in valleculae;Residue in pyriform sinus;Pharyngeal wall coating   Rosenbek's Penetration Aspiration Scale: Solid Food Trial Results 1 - no aspiration, contrast does not enter airway   Diagnostic Statement The patient trialed pudding thick w/o use of compensatory strategy. No penetration noted. Pharyngeal residue coated entire pharyngeal cavity. The patient required multiple swallow attempts to clear very small bolus. Difficulty with AP transfer in oral cavity.    Esophageal Phase of Swallow   Patient reports or presents with symptoms of esophageal dysphagia Yes   Esophageal sweep performed during today s vidofluoroscopic exam  Yes;Please refer to radiologist's report for details   General Therapy Interventions   Planned Therapy Interventions Dysphagia Treatment   Dysphagia treatment Oropharyngeal exercise training;Modified diet education   Swallow Eval: Clinical Impressions   Skilled Criteria for Therapy Intervention Skilled criteria met.  Treatment indicated.   Functional Assessment Scale (FAS) 4   Dysphagia Outcome Severity Scale (ALEXA) Level 4 - ALEXA   Diet texture recommendations Nectar thick liquids;Dysphagia diet level 2   Recommended Feeding/Eating Techniques alternate between small bites and sips of food/liquid;no straws;turn head right during every swallow   Rehab Potential fair, will monitor progress closely   Therapy Frequency other (see comments)  (1 time per week. )   Predicted Duration of Therapy Intervention (days/wks) 6 weeks   Anticipated Discharge Disposition home   Risks and Benefits of Treatment have been explained. Yes   Patient, family and/or staff in agreement with Plan of Care Yes   Swallow Goals   SLP Swallow Goals 1;2   Swallow Goal  1   Goal Identifier diet/ strategies   Goal Description The patient will verbalize and demonstrate understanding of compensatory strategies and diet recommendations with 100% accuracy.    Target Date 09/17/18   Swallow Goal 2   Goal Identifier oropharyngeal exercises.   Goal Description The patient will be able to demonstrate understanding of oropharyngeal exercises with 100% accuracy with use of visual aid in structured setting.    Target Date 09/17/18   Total Session Time   Total Session Time 0   Total Evaluation Time 60   Therapy Certification   Certification date from 08/06/18   Certification date to 09/17/18   Medical Diagnosis Dysphagia   Certification I certify the need for these services furnished under this plan of treatment and while under my care.  (Physician co-signature of this document indicates review and certification of the therapy plan).

## 2018-09-07 ENCOUNTER — RADIANT APPOINTMENT (OUTPATIENT)
Dept: CT IMAGING | Facility: CLINIC | Age: 78
End: 2018-09-07
Payer: MEDICARE

## 2018-09-07 ENCOUNTER — OFFICE VISIT (OUTPATIENT)
Dept: OTOLARYNGOLOGY | Facility: CLINIC | Age: 78
End: 2018-09-07
Payer: MEDICARE

## 2018-09-07 VITALS — WEIGHT: 164.8 LBS | BODY MASS INDEX: 23.65 KG/M2

## 2018-09-07 DIAGNOSIS — C01 CANCER OF BASE OF TONGUE (H): ICD-10-CM

## 2018-09-07 DIAGNOSIS — R13.10 DYSPHAGIA, UNSPECIFIED TYPE: Primary | ICD-10-CM

## 2018-09-07 LAB
CREAT BLD-MCNC: 0.9 MG/DL (ref 0.66–1.25)
GFR SERPL CREATININE-BSD FRML MDRD: 82 ML/MIN/1.7M2

## 2018-09-07 RX ORDER — IOPAMIDOL 755 MG/ML
79 INJECTION, SOLUTION INTRAVASCULAR ONCE
Status: COMPLETED | OUTPATIENT
Start: 2018-09-07 | End: 2018-09-07

## 2018-09-07 RX ADMIN — IOPAMIDOL 79 ML: 755 INJECTION, SOLUTION INTRAVASCULAR at 09:31

## 2018-09-07 ASSESSMENT — PAIN SCALES - GENERAL: PAINLEVEL: NO PAIN (0)

## 2018-09-07 NOTE — MR AVS SNAPSHOT
After Visit Summary   9/7/2018    Hieu Hughes    MRN: [id number]           Patient Information     Date Of Birth          1940        Visit Information        Provider Department      9/7/2018 11:30 AM Nahomy Adams PA-C [facility]        Today's Diagnoses     Dysphagia, unspecified type    -  1       Follow-ups after your visit        Your next 10 appointments already scheduled     Nov 05, 2018  3:00 PM CST   Return Visit with [facility] Provider   [facility] ([facility])    [address], [address]  [place] [facility] [place] [address]   [phone]              Who to contact     Please call your clinic at [phone] to:    Ask questions about your health    Make or cancel appointments    Discuss your medicines    Learn about your test results    Speak to your doctor            Additional Information About Your Visit        Care EveryWhere ID     This is your Care EveryWhere ID. This could be used by other organizations to access your [facility] medical records  [id number]        Your Vitals Were     BMI (Body Mass Index)                   23.65 kg/m2            Blood Pressure from Last 3 Encounters:   06/06/18 136/72   05/15/18 143/73   05/16/17 119/65    Weight from Last 3 Encounters:   09/07/18 74.8 kg (164 lb 12.8 oz)   06/06/18 72.6 kg (160 lb)   05/15/18 75.3 kg (166 lb)              Today, you had the following     No orders found for display       Primary Care Provider Office Phone # Fax #    Chase Manning [phone]       [place] [address]        Equal Access to Services     HEIDI JEAN AH: Hadii aad ku hadasho Soomaali, waaxda luqadaha, qaybta kaalmada adeegyamaryann, garrett johnson . So [facility] [phone].    ATENCIÓN: Si habla español, tiene a henson disposición servicios gratuitos de asistencia lingüística. Llame al [phone].    We comply with applicable  federal civil rights laws and [place] laws. We do not discriminate on the basis of race, color, national origin, age, disability, sex, sexual orientation, or gender identity.            Thank you!     Thank you for choosing [facility]  for your care. Our goal is always to provide you with excellent care. Hearing back from our patients is one way we can continue to improve our services. Please take a few minutes to complete the written survey that you may receive in the mail after your visit with us. Thank you!             Your Updated Medication List - Protect others around you: Learn how to safely use, store and throw away your medicines at www.disposemymeds.org.          This list is accurate as of 9/7/18  5:29 PM.  Always use your most recent med list.                   Brand Name Dispense Instructions for use Diagnosis    SYNTHROID PO      Take 100 mcg by mouth daily

## 2018-09-07 NOTE — DISCHARGE INSTRUCTIONS

## 2018-09-07 NOTE — LETTER
"9/7/2018       RE: Hieu Hughes  [address]  [place] [address]     Dear Colleague,    Thank you for referring your patient, Hieu Hughes, to the [facility] at [facility]. Please see a copy of my visit note below.    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 Patient was treated with chemoradiation therapy (7400 cGy with concurrent cisplatin) which he completed 4/15/2009. Patient has done well and has had no recurrence since 2009.     HISTORY OF PRESENT ILLNESS:  Mr. Hughes has returned for follow-up regarding his dysphagia. Although patient has been doing well oncologically, he has a history of multiple difficulties since his treatment back in 2009. 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 In addition, patient has a history of polio which could have had some component due to residual symptoms that limits his ability to breath. Second was he developed esophageal strictures and under went a series of dilations by Dr. Dexter Dunn from [id number].    Mr. Hughes has been doing well and reports his dysphagia has improved since our last visit. He had his swallow study done at [facility] on 8/6/18. Study showed \"gross penetration with thin, nectar, and honey without use of strategies. Pudding and solids had difficult AP transfer. The patient has residue in pharyngeal cavity across all trials.\" Patient did not follow-up with speech therapy as he thought they only dealt with voice/speech and not swallowing. He has been turning his head to the right when he swallows per speech therapy recommendations and this does help. He continues to have fluctuating hoarseness that can be worse with vocal rest, worse with talking, and better with talking. Patient is still on a liquid puree " diet and weight is stable. He denies any unexpected weight loss, referred otalgia, odynophagia, dysphagia, hemoptysis, pain, bleeding, and recurrent lumps or bumps.    REVIEW OF SYSTEMS:   ENT ROS 9/7/2018   Ears, Nose, Throat Trouble swallowing   10 point ROS neg other than the symptoms noted above in the HPI.    PHYSICAL EXAMINATION:    Wt 74.8 kg (164 lb 12.8 oz)  BMI 23.65 kg/m2. 4 lbs up from last visit.  Constitutional:  The patient was accompanied by his wife Petty, well-groomed, and in no acute distress.      Communication:  Hoarse.   Respiratory: Breathing comfortably without stridor or exertion of accessory muscles.    Nose: Anterior rhinoscopy revealed deviated septum to the left and absence of purulence or polyps. Right vestibule had some dried blood along the septum.     Oral Cavity: Edentulous. Normal tongue, floor of mouth, buccal mucosa, and palate.  No lesions or masses on inspection or palpation. Anterior alveolar ridge along the posterior surface has a 3 mm exophytic mass that was soft and non-tender to palpation. Not concerning.   Oropharynx: Normal mucosa, palate symmetric with normal elevation. No abnormal lymph tissue in the oropharynx.    Neck: Firm secondary to radiation changes.   Lymphatic: There is no palpable lymphadenopathy in the neck.      IMAGING:  CT chest:  FINDINGS:  Small eccentric filling defect within the bronchus intermedius on the  right. Bronchial wall thickening in both lung bases. Mild scattered  peribronchovascular groundglass opacity in both lung bases with airway  distribution.     Calcified granulomas left lung base. 4 mm subpleural pulmonary nodule  in the right upper lobe on series 5 image 84, also seen on 8/1/2012. 4  mm pulmonary nodule in the right lung apex does not appear  significantly changed from 2012. Other scattered tiny centrilobular  part solid/groundglass nodules in the lung apices related to smoking.     Mild pleural thickening of the lung bases  bilaterally, as well as over  the lingula with associated regions of mild subjacent parenchymal  scarring in the lung.     The heart size is normal. No pericardial effusion. No large or central  pulmonary embolism. Normal sized thoracic aorta. Mild calcifications  of the coronary arteries and aortic annulus. No suspiciously enlarged  lymph nodes in the mediastinum.     10 mm lymph node at the GE junction. Fluid distention of the stomach.  Ossified granulomas spleen. Stable hypoattenuating lesions in the dome  of the liver since thousand 12 favoring cysts. Normal adrenal glands.  Symmetric enhancement of both kidneys. Decompressed gallbladder.  Possible stone in the gallbladder fundus.         IMPRESSION:   1. Evidence of aspiration including filling defect in the bronchus  intermedius and left mainstem bronchus. Scattered mild groundglass  opacity in the lower lobes, favored to be due to aspiration. Bronchial  wall thickening which may be seen with bronchitis and/or aspiration.  2. No new suspicious pulmonary nodules since 8/1/2012.  3. Part solid/groundglass centrilobular nodules in the lung apices are  smoking-related.    CT neck:  Findings:      Visualized brain parenchyma is normal. Scattered inflammatory changes  affecting bilateral ethmoid air cells. Maxillary sinuses, frontal  sinus, sphenoid sinuses and mastoid air cells are clear.  There is atrophy of the tonsillar tissue, right parotid gland, right  submandibular gland reflecting prior postradiation changes. Thyroid  gland is atrophic. Left submandibular gland is small in size without  mass. Left parotid gland is small in size without mass.   There is no lymphadenopathy.   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 At the most affected level in the right common carotid artery,  the stenosis is about 50%.   There are degenerative changes throughout the  cervical spine without  aggressive bone lesions.   Lung apices are clear.         IMPRESSION: Extensive postradiation changes in the neck with no  evidence of recurrence.    ASSESSMENT AND PLAN:  Hieu Hughes is a 78 year old male with a history of a cT2 N2b basaloid SCC of the right base of tongue s/p chemoradiation completed in 2009 who presents today for a re-evaluation of his dysphagia. Oncologically patient has been doing well and there is WERO. CT scans were done today for oncologic surveillance. CT neck was clean with no signs of recurrence or metastasis. CT chest revealed evidence of aspiration in the bronchus but no new pulmonary nodules compared to 8/2012. The patient's swallow study was reviewed by Paige Jefferson, SLP with me today. She noted penetration during all trials, posterior pharyngeal weakness, base of tongue weakness, and no movement of the epiglottis. Patient is silently aspirating. No obvious strictures noted. Cause of dysphagia and silent aspiration most likely due to radiation changes as well as piror history of polio. Advised patient to pursue swallow therapy with speech for 6 weeks at [facility]. Educated the patient about his risk for developing aspiration pneumonia and strongly emphasized continued diligent swallowing exercises, use of swallowing strategies, as well as good oral hygiene. I will follow-up with the patient in 2 months to see how he feels about his progress. If at that time he chooses to pursue another dilation, I will schedule him to follow-up with Dr. Dunn. Patient and his wife were very agreeable to the above stated plan.    Nahomy Adams PA-C  Otolaryngology - Head & Neck Surgery  [phone]    CC:                   Dexter Dunn MD  Otolaryngology/Head & Neck Surgery  [facility] [address]     Tone Aguilar DDS   [facility]   [address], [address]   [place]   [address]       Raymundo Meredith MD   [address] [place]   [address]       Billie Cooper MD   [facility]   [address] [place]   [address]       Vidhya Pulido MD   [facility]   [address]   [place]   [address]       Liam Joya MD   [facility]   [address] [place]  [address]        Amita Bolton MD    [facility]   [address], [address]   [place]   [address]      Shawanda Pacheco MD  [facility]   Radiation Oncologist  [facility]

## 2018-09-07 NOTE — DISCHARGE INSTRUCTIONS

## 2018-09-07 NOTE — PROGRESS NOTES
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 Patient was treated with chemoradiation therapy (7400 cGy with concurrent cisplatin) which he completed 4/15/2009. Patient has done well and has had no recurrence since 2009.     HISTORY OF PRESENT ILLNESS:  Mr. Hughes has returned for follow-up regarding his dysphagia. Although patient has been doing well oncologically, he has a history of multiple difficulties since his treatment back in 2009. 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 In addition, patient has a history of polio which could have had some component due to residual symptoms that limits his ability to breath. Second was he developed esophageal strictures and under went a series of dilations by Dr. Dexter Dunn from [id number].    Mr. Hughes has been doing well and reports his dysphagia has improved since our last visit. He had his swallow study done at [facility] on 8/6/18. Study showed \"gross penetration with thin, nectar, and honey without use of strategies. Pudding and solids had difficult AP transfer. The patient has residue in pharyngeal cavity across all trials.\" Patient did not follow-up with speech therapy as he thought they only dealt with voice/speech and not swallowing. He has been turning his head to the right when he swallows per speech therapy recommendations and this does help. He continues to have fluctuating hoarseness that can be worse with vocal rest, worse with talking, and better with talking. Patient is still on a liquid puree diet and weight is stable. He denies any unexpected weight loss, referred otalgia, odynophagia, dysphagia, hemoptysis, pain, bleeding, and recurrent lumps or bumps.    REVIEW OF SYSTEMS:   ENT ROS 9/7/2018   Ears, Nose, Throat Trouble swallowing   10 point ROS neg other than " the symptoms noted above in the HPI.    PHYSICAL EXAMINATION:    Wt 74.8 kg (164 lb 12.8 oz)  BMI 23.65 kg/m2. 4 lbs up from last visit.  Constitutional:  The patient was accompanied by his wife Petty, well-groomed, and in no acute distress.      Communication:  Hoarse.   Respiratory: Breathing comfortably without stridor or exertion of accessory muscles.    Nose: Anterior rhinoscopy revealed deviated septum to the left and absence of purulence or polyps. Right vestibule had some dried blood along the septum.     Oral Cavity: Edentulous. Normal tongue, floor of mouth, buccal mucosa, and palate.  No lesions or masses on inspection or palpation. Anterior alveolar ridge along the posterior surface has a 3 mm exophytic mass that was soft and non-tender to palpation. Not concerning.   Oropharynx: Normal mucosa, palate symmetric with normal elevation. No abnormal lymph tissue in the oropharynx.    Neck: Firm secondary to radiation changes.   Lymphatic: There is no palpable lymphadenopathy in the neck.      IMAGING:  CT chest:  FINDINGS:  Small eccentric filling defect within the bronchus intermedius on the  right. Bronchial wall thickening in both lung bases. Mild scattered  peribronchovascular groundglass opacity in both lung bases with airway  distribution.     Calcified granulomas left lung base. 4 mm subpleural pulmonary nodule  in the right upper lobe on series 5 image 84, also seen on 8/1/2012. 4  mm pulmonary nodule in the right lung apex does not appear  significantly changed from 2012. Other scattered tiny centrilobular  part solid/groundglass nodules in the lung apices related to smoking.     Mild pleural thickening of the lung bases bilaterally, as well as over  the lingula with associated regions of mild subjacent parenchymal  scarring in the lung.     The heart size is normal. No pericardial effusion. No large or central  pulmonary embolism. Normal sized thoracic aorta. Mild calcifications  of the coronary  arteries and aortic annulus. No suspiciously enlarged  lymph nodes in the mediastinum.     10 mm lymph node at the GE junction. Fluid distention of the stomach.  Ossified granulomas spleen. Stable hypoattenuating lesions in the dome  of the liver since thousand 12 favoring cysts. Normal adrenal glands.  Symmetric enhancement of both kidneys. Decompressed gallbladder.  Possible stone in the gallbladder fundus.         IMPRESSION:   1. Evidence of aspiration including filling defect in the bronchus  intermedius and left mainstem bronchus. Scattered mild groundglass  opacity in the lower lobes, favored to be due to aspiration. Bronchial  wall thickening which may be seen with bronchitis and/or aspiration.  2. No new suspicious pulmonary nodules since 8/1/2012.  3. Part solid/groundglass centrilobular nodules in the lung apices are  smoking-related.    CT neck:  Findings:      Visualized brain parenchyma is normal. Scattered inflammatory changes  affecting bilateral ethmoid air cells. Maxillary sinuses, frontal  sinus, sphenoid sinuses and mastoid air cells are clear.  There is atrophy of the tonsillar tissue, right parotid gland, right  submandibular gland reflecting prior postradiation changes. Thyroid  gland is atrophic. Left submandibular gland is small in size without  mass. Left parotid gland is small in size without mass.   There is no lymphadenopathy.   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 At the most affected level in the right common carotid artery,  the stenosis is about 50%.   There are degenerative changes throughout the cervical spine without  aggressive bone lesions.   Lung apices are clear.         IMPRESSION: Extensive postradiation changes in the neck with no  evidence of recurrence.    ASSESSMENT AND PLAN:  Hieu Hughes is a 78 year old male with a history of a cT2 N2b basaloid SCC of the right  base of tongue s/p chemoradiation completed in 2009 who presents today for a re-evaluation of his dysphagia. Oncologically patient has been doing well and there is WERO. CT scans were done today for oncologic surveillance. CT neck was clean with no signs of recurrence or metastasis. CT chest revealed evidence of aspiration in the bronchus but no new pulmonary nodules compared to 8/2012. The patient's swallow study was reviewed by Paige Jefferson, SLP with me today. She noted penetration during all trials, posterior pharyngeal weakness, base of tongue weakness, and no movement of the epiglottis. Patient is silently aspirating. No obvious strictures noted. Cause of dysphagia and silent aspiration most likely due to radiation changes as well as piror history of polio. Advised patient to pursue swallow therapy with speech for 6 weeks at [facility]. Educated the patient about his risk for developing aspiration pneumonia and strongly emphasized continued diligent swallowing exercises, use of swallowing strategies, as well as good oral hygiene. I will follow-up with the patient in 2 months to see how he feels about his progress. If at that time he chooses to pursue another dilation, I will schedule him to follow-up with Dr. Dunn. Patient and his wife were very agreeable to the above stated plan.        Nahomy Adams PA-C  Otolaryngology - Head & Neck Surgery  [phone]        CC:                   Dexter Dunn MD  Otolaryngology/Head & Neck Surgery  [facility] [address]     Tone Aguilar DDS   [facility]   [address], [address]   [place]   [address]       Raymundo Meredith MD   [facility]   [address] [place]   [address]       Billie Cooper MD   [facility]   [address] [place]   [address]       Vidhya Pulido MD   [facility]   [address], [address]   [place]   [address]        Liam Joya MD   [facility]   [address] [place]  [address]        Amita Bolton MD    [facility]   [address], [address]   [place]   [address]      Shawanda Pacheco MD  Adjust   Radiation Oncologist  [facility]

## 2018-09-17 ENCOUNTER — HOSPITAL ENCOUNTER (OUTPATIENT)
Dept: SPEECH THERAPY | Facility: CLINIC | Age: 78
Setting detail: THERAPIES SERIES
End: 2018-09-17
Attending: PHYSICIAN ASSISTANT
Payer: MEDICARE

## 2018-09-17 PROCEDURE — 92526 ORAL FUNCTION THERAPY: CPT | Mod: GN | Performed by: SPEECH-LANGUAGE PATHOLOGIST

## 2018-09-17 PROCEDURE — 40000828 ZZHC SLP CANCER REHAB VISIT: Performed by: SPEECH-LANGUAGE PATHOLOGIST

## 2018-09-17 PROCEDURE — G8997 SWALLOW GOAL STATUS: HCPCS | Mod: GN,CI | Performed by: SPEECH-LANGUAGE PATHOLOGIST

## 2018-09-17 PROCEDURE — G8996 SWALLOW CURRENT STATUS: HCPCS | Mod: GN,CJ | Performed by: SPEECH-LANGUAGE PATHOLOGIST

## 2018-09-24 ENCOUNTER — HOSPITAL ENCOUNTER (OUTPATIENT)
Dept: SPEECH THERAPY | Facility: CLINIC | Age: 78
Setting detail: THERAPIES SERIES
End: 2018-09-24
Attending: PHYSICIAN ASSISTANT
Payer: MEDICARE

## 2018-09-24 PROCEDURE — 40000211 ZZHC STATISTIC SLP  DEPARTMENT VISIT

## 2018-09-24 PROCEDURE — 92526 ORAL FUNCTION THERAPY: CPT | Mod: GN

## 2018-09-24 NOTE — PROGRESS NOTES
Outpatient Speech Language Pathology Progress Note     Patient: Hieu Hughes  : 1940    Beginning/End Dates of Reporting Period:  18 to 18    Pt only attended one visit due to forgot he could be seen in [place] location.    Referring Provider: Nahomy Adams PA-C    Therapy Diagnosis: Dysphagia    Client Self Report: Pt said he forgot to come back for therapy until reminded at ENT visit.          Goals:  Goal Identifier diet/ strategies   Goal Description The patient will verbalize and demonstrate understanding of compenstory strategies and diet recommendations with 100% accuracy.    Target Date 18   Date Met   (pureed/thin diet.  turning head to right)   Progress:     Goal Identifier oropharyngeal exercises.   Goal Description The patient will be able to demonstrate understadning of orophryangeal exercises with 100% accuracy with use of visual aid in strucutred setting.    Target Date 18   Date Met   (started 5 exercises; demonstrated back to SLP.)   Progress:           Progress Toward Goals:    Progress limited due to did not return for therapy.  States would like to re-start plan and attend treatment sessions.    Plan:  Continue therapy per current plan of care.    Discharge:  No    RECERTIFICATION    Hieu Hughes  1940    1 visits completed since start of care.    Reasons for Continuing Treatment:   Goals not met due to pt did not attend therapy but would like to address goals now.    Frequency/Duration  1 times every 2 weeks for 8 weeks for a total of 4 visits.    Recertification Period  18 - 18    Physician Signature:    Date:    X_______________________________________________________    Physician Name: Nahomy Adams PA-C  I certify the need for these services furnished under this plan of treatment and while under my care. Physician co-signature of this document indicates review and certification of the therapy plan.  This signature may  be written on paper, or electronically signed within EPIC.

## 2018-09-24 NOTE — ADDENDUM NOTE
Encounter addended by: Kathy Galvez, SLP on: 9/24/2018  2:47 PM<BR>     Actions taken: Charge Capture section accepted, Flowsheet data copied forward, Flowsheet accepted, Sign clinical note, Document created

## 2018-09-27 NOTE — ADDENDUM NOTE
Encounter addended by: Arlin Hastings, SLP on: 9/27/2018  3:39 PM<BR>     Actions taken: Flowsheet data copied forward, Flowsheet accepted

## 2018-09-27 NOTE — ADDENDUM NOTE
Encounter addended by: Arlin Hastings, SLP on: 9/27/2018  2:42 PM<BR>     Actions taken: Flowsheet accepted

## 2018-11-05 ENCOUNTER — OFFICE VISIT (OUTPATIENT)
Dept: RADIATION THERAPY | Facility: OUTPATIENT CENTER | Age: 78
End: 2018-11-05
Payer: MEDICARE

## 2018-11-05 VITALS
WEIGHT: 165.6 LBS | OXYGEN SATURATION: 98 % | DIASTOLIC BLOOD PRESSURE: 79 MMHG | BODY MASS INDEX: 23.76 KG/M2 | SYSTOLIC BLOOD PRESSURE: 143 MMHG | RESPIRATION RATE: 14 BRPM | HEART RATE: 75 BPM

## 2018-11-05 DIAGNOSIS — C01 CANCER OF BASE OF TONGUE (H): Primary | ICD-10-CM

## 2018-11-05 PROBLEM — E03.9 HYPOTHYROIDISM: Status: ACTIVE | Noted: 2017-10-21
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
## 2018-11-05 PROBLEM — H26.492 LEFT POSTERIOR CAPSULAR OPACIFICATION: Status: ACTIVE | Noted: 2017-09-27

## 2018-11-05 PROBLEM — Z85.46 H/O PROSTATE CANCER: Status: ACTIVE | Noted: 2017-10-21

## 2018-11-05 PROBLEM — M27.2 OSTEORADIONECROSIS OF JAW: Status: ACTIVE | Noted: 2018-11-05

## 2018-11-05 PROBLEM — Z96.1 PSEUDOPHAKIA, BOTH EYES: Status: ACTIVE | Noted: 2017-09-27

## 2018-11-05 RX ORDER — FERROUS GLUCONATE 324(38)MG
324 TABLET ORAL DAILY
COMMUNITY
Start: 2018-10-03 | End: 2020-01-08

## 2018-11-05 RX ORDER — SILDENAFIL CITRATE 20 MG/1
TABLET ORAL
Status: ON HOLD | COMMUNITY
Start: 2018-10-03 | End: 2021-06-04

## 2018-11-05 ASSESSMENT — PAIN SCALES - GENERAL: PAINLEVEL: NO PAIN (0)

## 2018-11-05 NOTE — MR AVS SNAPSHOT
After Visit Summary   11/5/2018    Hieu Hughes    MRN: [id number]           Patient Information     Date Of Birth          1940        Visit Information        Provider Department      11/5/2018 3:00 PM Álvaro Garza MD [facility]         Follow-ups after your visit        Who to contact     Please call your clinic at [phone] to:    Ask questions about your health    Make or cancel appointments    Discuss your medicines    Learn about your test results    Speak to your doctor            Additional Information About Your Visit        Care EveryWhere ID     This is your Care EveryWhere ID. This could be used by other organizations to access your [place] medical records  [id number]        Your Vitals Were     Pulse Respirations Pulse Oximetry BMI (Body Mass Index)          75 14 98% 23.76 kg/m2         Blood Pressure from Last 3 Encounters:   11/05/18 143/79   06/06/18 136/72   05/15/18 143/73    Weight from Last 3 Encounters:   11/05/18 75.1 kg (165 lb 9.6 oz)   09/07/18 74.8 kg (164 lb 12.8 oz)   06/06/18 72.6 kg (160 lb)              Today, you had the following     No orders found for display       Primary Care Provider Office Phone # Fax #    Chase Manning [phone]       [place] [address]        Equal Access to Services     HEIDI JEAN : Hadii noemí ku hadasho Soomaali, waaxda luqadaha, qaybta kaalmada adeegyada, garrett johnson . So [facility] [phone].    ATENCIÓN: Si habla español, tiene a henson disposición servicios gratuitos de asistencia lingüística. [facility] [phone].    We comply with applicable federal civil rights laws and [place] laws. We do not discriminate on the basis of race, color, national origin, age, disability, sex, sexual orientation, or gender identity.            Thank you!     Thank you for choosing [facility]  for your care. Our goal is always  to provide you with excellent care. Hearing back from our patients is one way we can continue to improve our services. Please take a few minutes to complete the written survey that you may receive in the mail after your visit with us. Thank you!             Your Updated Medication List - Protect others around you: Learn how to safely use, store and throw away your medicines at www.disposemymeds.org.          This list is accurate as of 11/5/18  3:29 PM.  Always use your most recent med list.                   Brand Name Dispense Instructions for use Diagnosis    ferrous gluconate 324 (38 Fe) MG tablet    FERGON     Take 324 mg by mouth daily        sildenafil 20 MG tablet    REVATIO     Take 2-5 pills 1 hour prior to intercourse.        SYNTHROID PO      Take 100 mcg by mouth daily

## 2018-11-05 NOTE — LETTER
11/5/2018      RE: Hieu Hughes  [address]  [facility] [address]       RADIATION ONCOLOGY FOLLOW UP  November 5th, 2018     DIAGNOSIS: cT2 N2b M0 squamous cell carcinoma of the right base of tongue     Dose and Therapy: 7400 cGy in 37 fractions via an integrative boost technique with concurrent cisplatin, completed 4/15/2009     Interval since completion of radiation therapy: Approximately 9.5 years     INTERVAL HISTORY: Mr. Hieu Hughes is a 79yo gentleman with cT2 N2b M0 squamous cell carcinoma of the right base of tongue s/p definitive chemoradiation. He received a total dose of 7400 cGy in 37 fractions via an integrative boost technique with concurrent cisplatin, completing treatment on 4/15/2009. He tolerated radiation moderately well although did develop significant swallowing difficulties following treatment. He presents to clinic today for his 9.5 year follow-up visit.      The patient returns for follow-up today.  In the interval the patient underwent a CT of the chest and neck on 9/7/2018.  Imaging did not demonstrate any evidence of recurrent or metastatic disease.  CT chest did demonstrate evidence of aspiration the bronchus.  The patient underwent swallow study in August 2018.  Review with ENT and our speech and swallow team demonstrated posterior pharyngeal weakness, base of tongue weakness, and no movement of the epiglottis concerning for silent aspiration.  No obvious strictures were noted.  The patient was advised to continue aggressive swallow therapy.    He continues to eat pureed foods, which is baseline for him now. He has some baseline xerostomia and reports about 60% recovery, which has been stable.  Mild taste change about 50% recovery, stable.  Weight is relatively stable.  He denies any n/v/f/c/d/c/cp/sob/ha/new neuro symptoms/bone or joint pain.     Physical Exam:  /79 (Cuff Size: Adult Large)  Pulse 75  Resp 14  Wt 75.1 kg (165 lb 9.6 oz)  SpO2 98%  BMI 23.76 kg/m2    Wt  Readings from Last 5 Encounters:   18 75.1 kg (165 lb 9.6 oz)   18 74.8 kg (164 lb 12.8 oz)   18 72.6 kg (160 lb)   05/15/18 75.3 kg (166 lb)   17 73.8 kg (162 lb 9.6 oz)         Gen: Alert, oriented, and in NAD  HEENT: NCAT, PERRL, EOMI, moderately MMM, no lesions or masses in oral cavity. Edentulous. BOT and FOM are soft.  Neck: Mild fibrosis, but relatively soft, no palpable lymphadenopathy or lymphedema  Lymph: No cervical, supraclavicular, or infraclavicular lymphadenopathy appreciated  Pulm: CTAB  CV: RRR, no murmurs/rubs/gallops  Musculoskeletal: Normal bulk and tone   Skin: Normal color and turgor  Neuro: CN II-XII intact. 5/5 strength in bilateral upper and lower extremities.     LABS/IMAGIN/7/28  CTC and Neck  IMPRESSION:   1. Evidence of aspiration including filling defect in the bronchus  intermedius and left mainstem bronchus. Scattered mild groundglass  opacity in the lower lobes, favored to be due to aspiration. Bronchial  wall thickening which may be seen with bronchitis and/or aspiration.  2. No new suspicious pulmonary nodules since 2012.  3. Part solid/groundglass centrilobular nodules in the lung apices are  smoking-related.      10/22/17: TSH 1.91.    ASSESSMENT: [age]yo gentleman with stage MARY, cT2 N2b M0, squamous cell carcinoma of the right base of tongue s/p definitive chemoradiation, completed in 2009.        PLAN:  1. He remains clinically and radiographically WERO.     2. Continues to have late toxicity, particularly with concern for silent aspiration wihth persistent dysphagia likely due to a combination of his radiation-induced toxicities and sequelae of post-polio syndrome.  He had polio as a child and therefore his pretreatment swallowing function was already compromised to some degree.  However XRT exacerbated these symptoms.  He has started to work with the speech and swallow team and wishes to be more regimented with his swallowing exercises.  We encouraged to be aggressive with regard to his speech therapy.     3. He will follow up with ENT in the next 2-3 months, per patient. He has cancelled recent appointment as he wishes to work on his speech therapy prior to next visit.    4 . RTC in 6 months for follow up discussion regarding side effect management with [facility] provider.    5. Continue routine oral and skin cares including, mouth and neck stretching exercises    6. Thyroid function tests per PCP.        Álvaro Garza M.D.  Department of Radiation Oncology  [facility]

## 2018-11-05 NOTE — NURSING NOTE
FOLLOW-UP VISIT    Patient Name: Hieu Hughes      : 1940     Age: 78 year old        ______________________________________________________________________________     Chief Complaint   Patient presents with     Radiation Therapy     Return visit, head/neck cancer     /79 (Cuff Size: Adult Large)  Pulse 75  Resp 14  Wt 75.1 kg (165 lb 9.6 oz)  SpO2 98%  BMI 23.76 kg/m2     Pain  Denies    Meds  Current Med List Reviewed: Yes  Medication Note:     Labs  TSH   Date Value Ref Range Status   2011 5.85 (H) 0.4 - 5.0 mU/L Final   TSH/synthroid monitored by PCP- Renetta Valderrama    Imaging  CT: neck and chest 18    Dry mouth: Yes:   Dental evaluation: N/A, full dentures  Speech therapy: Yes: sees regularly, doing exercises. Admits to not doing exercises/stretches on a regular basis.   Lymphedema: No  Appetite: fair, taste getting better, weight stable  Intake: all blended food  Weight:  Wt Readings from Last 5 Encounters:   18 75.1 kg (165 lb 9.6 oz)   18 74.8 kg (164 lb 12.8 oz)   18 72.6 kg (160 lb)   05/15/18 75.3 kg (166 lb)   17 73.8 kg (162 lb 9.6 oz)       Appointments:     DATE  Oncologist:     ENT: Dr. Dunn Will plan to see in Dec. or .   Primary: Renetta Valderrama Sees regularly     Other Notes:

## 2018-11-05 NOTE — PROGRESS NOTES
RADIATION ONCOLOGY FOLLOW UP  November 5th, 2018     DIAGNOSIS: cT2 N2b M0 squamous cell carcinoma of the right base of tongue     Dose and Therapy: 7400 cGy in 37 fractions via an integrative boost technique with concurrent cisplatin, completed 4/15/2009     Interval since completion of radiation therapy: Approximately 9.5 years     INTERVAL HISTORY: Mr. Hieu Hughes is a 79yo gentleman with cT2 N2b M0 squamous cell carcinoma of the right base of tongue s/p definitive chemoradiation. He received a total dose of 7400 cGy in 37 fractions via an integrative boost technique with concurrent cisplatin, completing treatment on 4/15/2009. He tolerated radiation moderately well although did develop significant swallowing difficulties following treatment. He presents to clinic today for his 9.5 year follow-up visit.      The patient returns for follow-up today.  In the interval the patient underwent a CT of the chest and neck on 9/7/2018.  Imaging did not demonstrate any evidence of recurrent or metastatic disease.  CT chest did demonstrate evidence of aspiration the bronchus.  The patient underwent swallow study in August 2018.  Review with ENT and our speech and swallow team demonstrated posterior pharyngeal weakness, base of tongue weakness, and no movement of the epiglottis concerning for silent aspiration.  No obvious strictures were noted.  The patient was advised to continue aggressive swallow therapy.    He continues to eat pureed foods, which is baseline for him now. He has some baseline xerostomia and reports about 60% recovery, which has been stable.  Mild taste change about 50% recovery, stable.  Weight is relatively stable.  He denies any n/v/f/c/d/c/cp/sob/ha/new neuro symptoms/bone or joint pain.     Physical Exam:  /79 (Cuff Size: Adult Large)  Pulse 75  Resp 14  Wt 75.1 kg (165 lb 9.6 oz)  SpO2 98%  BMI 23.76 kg/m2    Wt Readings from Last 5 Encounters:   11/05/18 75.1 kg (165 lb 9.6 oz)   09/07/18  74.8 kg (164 lb 12.8 oz)   18 72.6 kg (160 lb)   05/15/18 75.3 kg (166 lb)   17 73.8 kg (162 lb 9.6 oz)         Gen: Alert, oriented, and in NAD  HEENT: NCAT, PERRL, EOMI, moderately MMM, no lesions or masses in oral cavity. Edentulous. BOT and FOM are soft.  Neck: Mild fibrosis, but relatively soft, no palpable lymphadenopathy or lymphedema  Lymph: No cervical, supraclavicular, or infraclavicular lymphadenopathy appreciated  Pulm: CTAB  CV: RRR, no murmurs/rubs/gallops  Musculoskeletal: Normal bulk and tone   Skin: Normal color and turgor  Neuro: CN II-XII intact. 5/5 strength in bilateral upper and lower extremities.     LABS/IMAGIN/7/28  CTC and Neck  IMPRESSION:   1. Evidence of aspiration including filling defect in the bronchus  intermedius and left mainstem bronchus. Scattered mild groundglass  opacity in the lower lobes, favored to be due to aspiration. Bronchial  wall thickening which may be seen with bronchitis and/or aspiration.  2. No new suspicious pulmonary nodules since 2012.  3. Part solid/groundglass centrilobular nodules in the lung apices are  smoking-related.      10/22/17: TSH 1.91.    ASSESSMENT: 77yo gentleman with stage MARY, cT2 N2b M0, squamous cell carcinoma of the right base of tongue s/p definitive chemoradiation, completed in 2009.        PLAN:  1. He remains clinically and radiographically WERO.     2. Continues to have late toxicity, particularly with concern for silent aspiration wihth persistent dysphagia likely due to a combination of his radiation-induced toxicities and sequelae of post-polio syndrome.  He had polio as a child and therefore his pretreatment swallowing function was already compromised to some degree.  However XRT exacerbated these symptoms.  He has started to work with the speech and swallow team and wishes to be more regimented with his swallowing exercises. We encouraged to be aggressive with regard to his speech therapy.     3. He will  follow up with ENT in the next 2-3 months, per patient. He has cancelled recent appointment as he wishes to work on his speech therapy prior to next visit.    4 . RTC in 6 months for follow up discussion regarding side effect management with [facility] provider.    5. Continue routine oral and skin cares including, mouth and neck stretching exercises    6. Thyroid function tests per PCP.        Álvaro Garza M.D.  Department of Radiation Oncology  [facility]

## 2019-05-21 ENCOUNTER — TELEPHONE (OUTPATIENT)
Dept: OTOLARYNGOLOGY | Facility: CLINIC | Age: 79
End: 2019-05-21

## 2019-05-21 ENCOUNTER — OFFICE VISIT (OUTPATIENT)
Dept: RADIATION THERAPY | Facility: OUTPATIENT CENTER | Age: 79
End: 2019-05-21
Payer: MEDICARE

## 2019-05-21 VITALS
BODY MASS INDEX: 24.16 KG/M2 | HEART RATE: 70 BPM | DIASTOLIC BLOOD PRESSURE: 67 MMHG | SYSTOLIC BLOOD PRESSURE: 118 MMHG | WEIGHT: 168.4 LBS | RESPIRATION RATE: 16 BRPM

## 2019-05-21 DIAGNOSIS — C01 MALIGNANT NEOPLASM OF BASE OF TONGUE (H): Primary | ICD-10-CM

## 2019-05-21 RX ORDER — ALBUTEROL SULFATE 90 UG/1
2 AEROSOL, METERED RESPIRATORY (INHALATION) EVERY 6 HOURS PRN
Status: ON HOLD | COMMUNITY
Start: 2017-08-03 | End: 2021-06-08

## 2019-05-21 NOTE — NURSING NOTE
FOLLOW-UP VISIT    Patient Name: Hieu Hughes      : 1940     Age: 78 year old        ______________________________________________________________________________     Chief Complaint   Patient presents with     Radiation Therapy     Return visit, h/n cancer     /67 (BP Location: Left arm, Cuff Size: Adult Large)   Pulse 70   Resp 16   Wt 76.4 kg (168 lb 6.4 oz)   BMI 24.16 kg/m       Pain  Denies    Meds  Current Med List Reviewed: Yes  Medication Note:     Labs  TSH   Date Value Ref Range Status   2011 5.85 (H) 0.4 - 5.0 mU/L Final       Speech therapy: yes, has been working with speech therapy, swallow no worse, no better  Lymphedema: No  Energy Level:normal  Appetite: fair  Intake: takes a long time to eat, but no new issues  Weight:  Wt Readings from Last 5 Encounters:   19 76.4 kg (168 lb 6.4 oz)   18 75.1 kg (165 lb 9.6 oz)   18 74.8 kg (164 lb 12.8 oz)   18 72.6 kg (160 lb)   05/15/18 75.3 kg (166 lb)       Appointments:     DATE  Oncologist:     ENT:     Primary:      Other Notes: has been in the ER x2 in the last several days with hemoptysis. Has not been recently seen by ENT. Sees PCP at [facility]. Notes in care everywhere.

## 2019-05-21 NOTE — TELEPHONE ENCOUNTER
LVM with patient letting him know Date/Time of appt with Dr. Dunn.  Left call center number in case he needs to reschedule.

## 2019-05-21 NOTE — PROGRESS NOTES
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 He received a total dose of 7400 cGy in 37 fractions via an integrative boost technique with concurrent cisplatin, completing treatment on 4/15/2009. He tolerated radiation moderately well although did develop significant swallowing difficulties following treatment. He presents to clinic today for his 10 year follow-up visit.      The patient returns for follow-up today.     In the interval, the patient was seen in the ED for new onset hemoptysis vs. Hematemesis. He was seen at an OSH where CT neck on 5/19/19 demonstrated no discrete mass in the BOT region or otherwise. No laryngoscopy or EGD performed.     Today, the patient reports he first noticed the episodes of bleeding in January 2019. He states he noticed episodes of bleeding from the oral cavity area with blood-tinged sputum and intermittent clots.  There is since resolved and reoccurred this past weekend starting on Saturday when he presented to the emergency department.  The patient reports noticing intermittent clots and initial dark and blood cell transition to more pink-tinged sputum.  He denies any current active bleeding at this time.  Patient was last seen by ENT on 9/7/2018 when scope was negative for recurrent disease.  The patient was initially scheduled for follow-up with ENT, however, patient be scheduled as he wished to work on his swallowing exercises prior to the next visit.    The patient last underwent swallow study in August 2018.  Review with ENT and our speech and  swallow team demonstrated posterior pharyngeal weakness, base of tongue weakness, and no movement of the epiglottis concerning for silent aspiration.  No obvious strictures were noted.  The patient was advised to continue aggressive swallow therapy. He has intermittently performed his swallowing exercises with minimal improvement in his swallowing function sine last visit 6 months ago.    He continues to eat pureed foods, which is baseline for him now. He has some baseline xerostomia and reports about 60% recovery, which has been stable.  Mild taste change about 50% recovery, stable.  Weight is relatively stable.  He denies any n/v/f/c/d/c/cp/sob/ha/new neuro symptoms/bone or joint pain.     Physical Exam:  /67 (BP Location: Left arm, Cuff Size: Adult Large)   Pulse 70   Resp 16   Wt 76.4 kg (168 lb 6.4 oz)   BMI 24.16 kg/m          Gen: Alert, oriented, and in NAD  HEENT: NCAT, PERRL, EOMI, moderately MMM, no lesions or masses in oral cavity. Edentulous. BOT and FOM are soft. No areas of clot or blood visualized in oral cavity or OPX region, although exam was limited.   Neck: Mild fibrosis, but relatively soft, no palpable lymphadenopathy or lymphedema  Lymph: No cervical, supraclavicular, or infraclavicular lymphadenopathy appreciated  Pulm: CTAB  CV: RRR, no murmurs/rubs/gallops  Musculoskeletal: Normal bulk and tone   Skin: Normal color and turgor  Neuro: CN II-XII intact. 5/5 strength in bilateral upper and lower extremities.     LABS/IMAGIN19  IMPRESSION:  1.  No abnormal neck masses or collections. No cervical lymphadenopathy. Specifically, no abnormal tongue masses are identified. If prior studies can be provided for comparison, a comparison will be made and an addendum issued.    2.  Mild prominence of the false cords and aryepiglottic folds, nonspecific but likely relating to provided history of radiation therapy.    3.  2 mm right upper lobe nodule, stable compared to CT chest  10/22/2017.      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        PLAN:  1. He remains radiographically WERO. Last ENT scope in September 2018 was WERO. The patient now presents with hemoptysis vs. Hematemesis of unclear origin. Bleeding currently stabilized and resolved at this visit.     2. Recommend further evaluation by ENT for scope and EGD to clarify potential source of bleed. Will reach out to our ENT colleagues to schedule.     3. Continues to otherwise have late toxicity, particularly with concern for silent aspiration wihth persistent dysphagia likely due to a combination of his radiation-induced toxicities and sequelae of post-polio syndrome.  He had polio as a child and therefore his pretreatment swallowing function was already compromised to some degree.  However XRT exacerbated these symptoms.  He has started to work with the speech and swallow team and wishes to be more regimented with his swallowing exercises. We encouraged to be aggressive with regard to his speech therapy.     4 . RTC after ENT scope and EGD performed.     5. Continue routine oral and skin cares including, mouth and neck stretching exercises    6. Thyroid function tests per PCP.        Álvaro Garza M.D.  Department of Radiation Oncology  [facility]

## 2019-05-21 NOTE — LETTER
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 He received a total dose of 7400 cGy in 37 fractions via an integrative boost technique with concurrent cisplatin, completing treatment on 4/15/2009. He tolerated radiation moderately well although did develop significant swallowing difficulties following treatment. He presents to clinic today for his 10 year follow-up visit.      The patient returns for follow-up today.     In the interval, the patient was seen in the ED for new onset hemoptysis vs. Hematemesis. He was seen at an OSH where CT neck on 5/19/19 demonstrated no discrete mass in the BOT region or otherwise. No laryngoscopy or EGD performed.     Today, the patient reports he first noticed the episodes of bleeding in January 2019. He states he noticed episodes of bleeding from the oral cavity area with blood-tinged sputum and intermittent clots.  There is since resolved and reoccurred this past weekend starting on Saturday when he presented to the emergency department.  The patient reports noticing intermittent clots and initial dark and blood cell transition to more pink-tinged sputum.  He denies any current active bleeding at this time.  Patient was last seen by ENT on 9/7/2018 when scope was negative for recurrent disease.  The patient was initially scheduled for follow-up with ENT, however, patient be scheduled as he wished to work on his swallowing exercises prior to the next visit.    The patient last  underwent swallow study in 2018.  Review with ENT and our speech and swallow team demonstrated posterior pharyngeal weakness, base of tongue weakness, and no movement of the epiglottis concerning for silent aspiration.  No obvious strictures were noted.  The patient was advised to continue aggressive swallow therapy. He has intermittently performed his swallowing exercises with minimal improvement in his swallowing function sine last visit 6 months ago.    He continues to eat pureed foods, which is baseline for him now. He has some baseline xerostomia and reports about 60% recovery, which has been stable.  Mild taste change about 50% recovery, stable.  Weight is relatively stable.  He denies any n/v/f/c/d/c/cp/sob/ha/new neuro symptoms/bone or joint pain.     Physical Exam:  /67 (BP Location: Left arm, Cuff Size: Adult Large)   Pulse 70   Resp 16   Wt 76.4 kg (168 lb 6.4 oz)   BMI 24.16 kg/m           Gen: Alert, oriented, and in NAD  HEENT: NCAT, PERRL, EOMI, moderately MMM, no lesions or masses in oral cavity. Edentulous. BOT and FOM are soft. No areas of clot or blood visualized in oral cavity or OPX region, although exam was limited.   Neck: Mild fibrosis, but relatively soft, no palpable lymphadenopathy or lymphedema  Lymph: No cervical, supraclavicular, or infraclavicular lymphadenopathy appreciated  Pulm: CTAB  CV: RRR, no murmurs/rubs/gallops  Musculoskeletal: Normal bulk and tone   Skin: Normal color and turgor  Neuro: CN II-XII intact. 5/5 strength in bilateral upper and lower extremities.     LABS/IMAGIN19  IMPRESSION:  1.  No abnormal neck masses or collections. No cervical lymphadenopathy. Specifically, no abnormal tongue masses are identified. If prior studies can be provided for comparison, a comparison will be made and an addendum issued.    2.  Mild prominence of the false cords and aryepiglottic folds, nonspecific but likely relating to provided history of radiation  therapy.    3.  2 mm right upper lobe nodule, stable compared to CT chest 10/22/2017.      ASSESSMENT: 77yo gentleman with stage MARY, cT2 N2b M0, squamous cell carcinoma of the right base of tongue s/p definitive chemoradiation, completed in April 2009.        PLAN:  1. He remains radiographically WEOR. Last ENT scope in September 2018 was WERO. The patient now presents with hemoptysis vs. Hematemesis of unclear origin. Bleeding currently stabilized and resolved at this visit.     2. Recommend further evaluation by ENT for scope and EGD to clarify potential source of bleed. Will reach out to our ENT colleagues to schedule.     3. Continues to otherwise have late toxicity, particularly with concern for silent aspiration wihth persistent dysphagia likely due to a combination of his radiation-induced toxicities and sequelae of post-polio syndrome.  He had polio as a child and therefore his pretreatment swallowing function was already compromised to some degree.  However XRT exacerbated these symptoms.  He has started to work with the speech and swallow team and wishes to be more regimented with his swallowing exercises. We encouraged to be aggressive with regard to his speech therapy.     4 . RTC after ENT scope and EGD performed.     5. Continue routine oral and skin cares including, mouth and neck stretching exercises    6. Thyroid function tests per PCP.        Álvaro Garza M.D.  Department of Radiation Oncology  [facility]

## 2019-05-29 ENCOUNTER — OFFICE VISIT (OUTPATIENT)
Dept: OTOLARYNGOLOGY | Facility: CLINIC | Age: 79
End: 2019-05-29
Payer: MEDICARE

## 2019-05-29 ENCOUNTER — ANCILLARY PROCEDURE (OUTPATIENT)
Dept: CT IMAGING | Facility: CLINIC | Age: 79
End: 2019-05-29
Attending: OTOLARYNGOLOGY
Payer: MEDICARE

## 2019-05-29 VITALS
BODY MASS INDEX: 23.48 KG/M2 | OXYGEN SATURATION: 98 % | WEIGHT: 164 LBS | DIASTOLIC BLOOD PRESSURE: 72 MMHG | HEART RATE: 53 BPM | TEMPERATURE: 98.1 F | HEIGHT: 70 IN | SYSTOLIC BLOOD PRESSURE: 117 MMHG

## 2019-05-29 DIAGNOSIS — R13.10 DYSPHAGIA, UNSPECIFIED TYPE: Primary | ICD-10-CM

## 2019-05-29 DIAGNOSIS — C01 CANCER OF BASE OF TONGUE (H): ICD-10-CM

## 2019-05-29 DIAGNOSIS — R04.2 HEMOPTYSIS: ICD-10-CM

## 2019-05-29 DIAGNOSIS — C01 CANCER OF BASE OF TONGUE (H): Primary | ICD-10-CM

## 2019-05-29 DIAGNOSIS — K22.2 ESOPHAGEAL STRICTURE: ICD-10-CM

## 2019-05-29 LAB
CREAT BLD-MCNC: 0.8 MG/DL (ref 0.66–1.25)
GFR SERPL CREATININE-BSD FRML MDRD: >90 ML/MIN/{1.73_M2}

## 2019-05-29 RX ORDER — IOPAMIDOL 755 MG/ML
91 INJECTION, SOLUTION INTRAVASCULAR ONCE
Status: COMPLETED | OUTPATIENT
Start: 2019-05-29 | End: 2019-05-29

## 2019-05-29 RX ADMIN — IOPAMIDOL 91 ML: 755 INJECTION, SOLUTION INTRAVASCULAR at 11:28

## 2019-05-29 ASSESSMENT — MIFFLIN-ST. JEOR: SCORE: 1470.15

## 2019-05-29 ASSESSMENT — PAIN SCALES - GENERAL: PAINLEVEL: NO PAIN (0)

## 2019-05-29 NOTE — DISCHARGE INSTRUCTIONS

## 2019-05-29 NOTE — PATIENT INSTRUCTIONS
1. You were seen in the ENT Clinic today by Dr. Dunn.  If you have any questions or concerns after your appointment, please call   - Option 1: ENT Clinic: [phone]  - Option 2: [facility] (Dr. Dunn's Nurse): [phone]    2. We will be looking for the result of your EGD scheduled 6/6; if not conclusive will schedule for Bronch with PAC appointment prior to assess airway.     Natice Schwab, RN  [facility] Otolaryngology  [phone]

## 2019-05-29 NOTE — NURSING NOTE
"Chief Complaint   Patient presents with     RECHECK     hemoptysis      Blood pressure 117/72, pulse 53, temperature 98.1  F (36.7  C), height 1.77 m (5' 9.69\"), weight 74.4 kg (164 lb), SpO2 98 %.    Javy Isbell LPN    "

## 2019-05-29 NOTE — DISCHARGE INSTRUCTIONS

## 2019-05-29 NOTE — PROGRESS NOTES
May 29, 2019      PRIOR ONCOLOGIC HISTORY:  Mr. Hughes is over 10 years status post chemoradiation therapy for base of tongue cancer. He finished treatment in April 2009.  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  This was compounded by residual polio symptoms that limited his ability to breathe.      HISTORY OF PRESENT ILLNESS:   Mr. Hughes presents after five years of being away with episodes of hemoptysis.  He tells me that he had a couple of episodes in January that went away.  Most recently on Saturday, he started having coughing up of clots with some fresh blood.  This happened every day for four days, but by Tuesday, it was quite diminished.  He comes in today Wednesday) for evaluation.  He had a CT scan earlier this morning, and the results are still pending.      He denies SOB or air hunger.     He tells me that he lost his wife 10 weeks ago.     PHYSICAL EXAMINATION:  He looks well.  He looks unchanged, but sad.  His voice is still a little bit wavery.  However, it is strong.  There is no shortness of breath.  No stridor.  The nasal septum was clear of bleeding.  There is no obvious bleeding in the mouth at this time.  The oral cavity and oropharynx look quite good.  Palpation of the base of tongue, epiglottis and posterior pharyngeal walls are all clean.  The neck has no evidence of adenopathy.      Fiberoptic Endoscopy:  Consent for fiberoptic laryngoscopy was obtained, and we confirmed correctness of procedure and identity of patient.  Fiberoptic laryngoscopy was indicated due to the need for careful surveillance.  The nose was topically decongested and anesthetized.  The fiberoptic laryngoscope was passed under endoscopic vision.  The turbinates were normal.  The inferior and middle meati were clear bilaterally without purulence, masses, or polyps.   The  nasopharynx has marked scarring in the bilateral Rosenmuller's fossa that is quite impressive.  The eustachian tubes are clear.  The nasopharynx is otherwise clear.  The soft palate appeared normal with good mobility.  The epiglottis was sharp and the visualized portion of the vallecula was clear. On inspection of the oropharynx, there is a small AVM in the left mid base of tongue that is not actively bleeding.  The vallecula is difficult to see as it is completely effaced.  However, the larynx is clear.  There is still adduction of the cord posteriorly where it is held together presumably by an interarytenoid band.  There is an adequate airway but not a great one.  The piriform sinuses are clear.         IMPRESSION AND PLAN:  Mr. Hughes has new onset hemoptysis.  I don't see an obvious source.       He is already scheduled for EGD next week at [facility] in [place].  If negative, he needs bronchoscopy but this could be challenging with the laryngeal stenosis.  I asked Dr. Juarez from the Anesthesia Service to come look at the airway with us since he has impressive posterior adduction.  It is possible that this area is supple enough to admit an 8.5 tube which is needed for an adult bronchoscope.  However, I suspect that we may have to be limited to a 6 or 7 endotracheal tube.  In that case, Dr Juarez and I agreed a bronchoscopy would be best done with the pediatric scope.  I will try and get in touch with whatever pulmonary doctor is assigned to do the procedure before so we can talk over the airway management.     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  However, the major issue is where the bleeding is from, and we will get that sorted out first.  We will have his films read at the Tumor Board so that we can make sure that there is not a lesion seen on the chest CT.  Presuming it is negative, and the EGD is negative, we will make a referral to one of our  pulmonary doctors for bronchoscopy.      Dexter Dunn M.D.  Otolaryngology- Head & Neck Surgery  [phone]           cc: Tone Aguilar DDS    [facility]    [address], [address]    [place]   [address]       Rayumndo Meredith MD    [facility]    [address] [place]   [address]       Billie Cooper MD    [facility]    [address] [place]   [address]       Vidhya Pulido MD    [facility]    [address], [address]    [place]   [address]       Liam Joya MD    [facility]    [address] [place]  [address]        Amita Bolton MD     [facility]    [address], [address]    [place]   [address]

## 2019-05-29 NOTE — LETTER
5/29/2019       RE: Hieu Hughes  [address] [facility] [address]     Dear Colleague,    Thank you for referring your patient, Hieu Hughes, to the [facility] EAR [facility]SE AND THROAT at [facility]. Please see a copy of my visit note below.    May 29, 2019      PRIOR ONCOLOGIC HISTORY:  Mr. Hughes is over 10 years status post chemoradiation therapy for base of tongue cancer. He finished treatment in April 2009.  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  This was compounded by residual polio symptoms that limited his ability to breathe.      HISTORY OF PRESENT ILLNESS:   Mr. Hughes presents after five years of being away with episodes of hemoptysis.  He tells me that he had a couple of episodes in January that went away.  Most recently on Saturday, he started having coughing up of clots with some fresh blood.  This happened every day for four days, but by Tuesday, it was quite diminished.  He comes in today Wednesday) for evaluation.  He had a CT scan earlier this morning, and the results are still pending.      He denies SOB or air hunger.     He tells me that he lost his wife 10 weeks ago.     PHYSICAL EXAMINATION:  He looks well.  He looks unchanged, but sad.  His voice is still a little bit wavery.  However, it is strong.  There is no shortness of breath.  No stridor.  The nasal septum was clear of bleeding.  There is no obvious bleeding in the mouth at this time.  The oral cavity and oropharynx look quite good.  Palpation of the base of tongue, epiglottis and posterior pharyngeal walls are all clean.  The neck has no evidence of adenopathy.      Fiberoptic Endoscopy:  Consent for fiberoptic laryngoscopy was obtained, and we confirmed correctness of procedure and identity of patient.  Fiberoptic laryngoscopy was indicated due to the  need for careful surveillance.  The nose was topically decongested and anesthetized.  The fiberoptic laryngoscope was passed under endoscopic vision.  The turbinates were normal.  The inferior and middle meati were clear bilaterally without purulence, masses, or polyps.   The nasopharynx has marked scarring in the bilateral Rosenmuller's fossa that is quite impressive.  The eustachian tubes are clear.  The nasopharynx is otherwise clear.  The soft palate appeared normal with good mobility.  The epiglottis was sharp and the visualized portion of the vallecula was clear. On inspection of the oropharynx, there is a small AVM in the left mid base of tongue that is not actively bleeding.  The vallecula is difficult to see as it is completely effaced.  However, the larynx is clear.  There is still adduction of the cord posteriorly where it is held together presumably by an interarytenoid band.  There is an adequate airway but not a great one.  The piriform sinuses are clear.         IMPRESSION AND PLAN:  Mr. Hughes has new onset hemoptysis.  I don't see an obvious source.       He is already scheduled for EGD next week at [facility] in [place].  If negative, he needs bronchoscopy but this could be challenging with the laryngeal stenosis.  I asked Dr. Juarez from the Anesthesia Service to come look at the airway with us since he has impressive posterior adduction.  It is possible that this area is supple enough to admit an 8.5 tube which is needed for an adult bronchoscope.  However, I suspect that we may have to be limited to a 6 or 7 endotracheal tube.  In that case, Dr Juarez and I agreed a bronchoscopy would be best done with the pediatric scope.  I will try and get in touch with whatever pulmonary doctor is assigned to do the procedure before so we can talk over the airway management.     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  However, the major issue is  where the bleeding is from, and we will get that sorted out first.  We will have his films read at the Tumor Board so that we can make sure that there is not a lesion seen on the chest CT.  Presuming it is negative, and the EGD is negative, we will make a referral to one of our pulmonary doctors for bronchoscopy.      Dexter Dunn M.D.  Otolaryngology- Head & Neck Surgery  [phone]           cc: Tone Aguilar DDS    [facility]    [address], [address]    [place]   [address]       Raymundo Meredith MD    [facility]    [address] [place]   [address]       Billie Cooper MD    [facility]    [address] [place]   [address]       Vidhya Pulido MD    [facility]    [address], [address]    [place]   [address]       Liam Joya MD    [facility]    [address] [place]  [address]        Amita Bolton MD     [facility]    [address], [address]    [place]   [address]

## 2019-06-03 ENCOUNTER — ANESTHESIA EVENT (OUTPATIENT)
Dept: GASTROENTEROLOGY | Facility: CLINIC | Age: 79
End: 2019-06-03
Payer: MEDICARE

## 2019-06-03 ASSESSMENT — LIFESTYLE VARIABLES: TOBACCO_USE: 1
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
## 2019-06-06 ENCOUNTER — ANESTHESIA (OUTPATIENT)
Dept: GASTROENTEROLOGY | Facility: CLINIC | Age: 79
End: 2019-06-06
Payer: MEDICARE

## 2019-06-06 ENCOUNTER — HOSPITAL ENCOUNTER (OUTPATIENT)
Facility: CLINIC | Age: 79
Discharge: HOME | End: 2019-06-06
Attending: SURGERY | Admitting: SURGERY
Payer: MEDICARE

## 2019-06-06 VITALS
BODY MASS INDEX: 23.48 KG/M2 | RESPIRATION RATE: 16 BRPM | OXYGEN SATURATION: 99 % | HEART RATE: 66 BPM | WEIGHT: 164 LBS | DIASTOLIC BLOOD PRESSURE: 71 MMHG | SYSTOLIC BLOOD PRESSURE: 121 MMHG | HEIGHT: 70 IN | TEMPERATURE: 98.5 F

## 2019-06-06 LAB — UPPER GI ENDOSCOPY: NORMAL

## 2019-06-06 PROCEDURE — 43249 ESOPH EGD DILATION <30 MM: CPT | Performed by: SURGERY

## 2019-06-06 PROCEDURE — A9270 NON-COVERED ITEM OR SERVICE: HCPCS | Mod: GY | Performed by: SURGERY

## 2019-06-06 PROCEDURE — 25800030 ZZH RX IP 258 OP 636: Performed by: NURSE ANESTHETIST, CERTIFIED REGISTERED

## 2019-06-06 PROCEDURE — 25000132 ZZH RX MED GY IP 250 OP 250 PS 637: Mod: GY | Performed by: SURGERY

## 2019-06-06 PROCEDURE — 25000125 ZZHC RX 250: Performed by: SURGERY

## 2019-06-06 PROCEDURE — 25000128 H RX IP 250 OP 636: Performed by: NURSE ANESTHETIST, CERTIFIED REGISTERED

## 2019-06-06 PROCEDURE — 37000008 ZZH ANESTHESIA TECHNICAL FEE, 1ST 30 MIN: Performed by: SURGERY

## 2019-06-06 PROCEDURE — 25000125 ZZHC RX 250: Performed by: NURSE ANESTHETIST, CERTIFIED REGISTERED

## 2019-06-06 PROCEDURE — 43235 EGD DIAGNOSTIC BRUSH WASH: CPT | Performed by: SURGERY

## 2019-06-06 RX ORDER — LIDOCAINE 40 MG/G
CREAM TOPICAL
Status: DISCONTINUED | OUTPATIENT
Start: 2019-06-06 | End: 2019-06-06 | Stop reason: HOSPADM

## 2019-06-06 RX ORDER — LIDOCAINE HYDROCHLORIDE 10 MG/ML
INJECTION, SOLUTION INFILTRATION; PERINEURAL PRN
Status: DISCONTINUED | OUTPATIENT
Start: 2019-06-06 | End: 2019-06-06

## 2019-06-06 RX ORDER — SIMETHICONE 40MG/0.6ML
SUSPENSION, DROPS(FINAL DOSAGE FORM)(ML) ORAL PRN
Status: DISCONTINUED | OUTPATIENT
Start: 2019-06-06 | End: 2019-06-06 | Stop reason: HOSPADM

## 2019-06-06 RX ORDER — ONDANSETRON 2 MG/ML
4 INJECTION INTRAMUSCULAR; INTRAVENOUS
Status: DISCONTINUED | OUTPATIENT
Start: 2019-06-06 | End: 2019-06-06 | Stop reason: HOSPADM

## 2019-06-06 RX ORDER — PROPOFOL 10 MG/ML
INJECTION, EMULSION INTRAVENOUS PRN
Status: DISCONTINUED | OUTPATIENT
Start: 2019-06-06 | End: 2019-06-06

## 2019-06-06 RX ORDER — GLYCOPYRROLATE 0.2 MG/ML
INJECTION, SOLUTION INTRAMUSCULAR; INTRAVENOUS PRN
Status: DISCONTINUED | OUTPATIENT
Start: 2019-06-06 | End: 2019-06-06

## 2019-06-06 RX ORDER — SODIUM CHLORIDE, SODIUM LACTATE, POTASSIUM CHLORIDE, CALCIUM CHLORIDE 600; 310; 30; 20 MG/100ML; MG/100ML; MG/100ML; MG/100ML
INJECTION, SOLUTION INTRAVENOUS CONTINUOUS
Status: DISCONTINUED | OUTPATIENT
Start: 2019-06-06 | End: 2019-06-06 | Stop reason: HOSPADM

## 2019-06-06 RX ORDER — PROPOFOL 10 MG/ML
INJECTION, EMULSION INTRAVENOUS CONTINUOUS PRN
Status: DISCONTINUED | OUTPATIENT
Start: 2019-06-06 | End: 2019-06-06

## 2019-06-06 RX ADMIN — GLYCOPYRROLATE 0.3 MG: 0.2 INJECTION, SOLUTION INTRAMUSCULAR; INTRAVENOUS at 12:52

## 2019-06-06 RX ADMIN — SODIUM CHLORIDE, POTASSIUM CHLORIDE, SODIUM LACTATE AND CALCIUM CHLORIDE: 600; 310; 30; 20 INJECTION, SOLUTION INTRAVENOUS at 12:51

## 2019-06-06 RX ADMIN — PROPOFOL 25 MCG/KG/MIN: 10 INJECTION, EMULSION INTRAVENOUS at 12:52

## 2019-06-06 RX ADMIN — LIDOCAINE HYDROCHLORIDE 0.2 ML: 10 INJECTION, SOLUTION EPIDURAL; INFILTRATION; INTRACAUDAL; PERINEURAL at 12:50

## 2019-06-06 RX ADMIN — PROPOFOL 50 MG: 10 INJECTION, EMULSION INTRAVENOUS at 12:52

## 2019-06-06 RX ADMIN — LIDOCAINE HYDROCHLORIDE 100 MG: 10 INJECTION, SOLUTION INFILTRATION; PERINEURAL at 12:52

## 2019-06-06 RX ADMIN — SODIUM CHLORIDE, POTASSIUM CHLORIDE, SODIUM LACTATE AND CALCIUM CHLORIDE: 600; 310; 30; 20 INJECTION, SOLUTION INTRAVENOUS at 12:49

## 2019-06-06 ASSESSMENT — MIFFLIN-ST. JEOR: SCORE: 1470.07

## 2019-06-06 NOTE — OP NOTE
EGD - Stenosis at GE junction with friable tissue (spontaneous bleeding). This was dilated with 15mm balloon.  Stomach and duodenum normal. Mild stenosis of upper esophagus dilated with 15mm balloon. No friable tissue in upper esophagus.
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
## 2019-06-06 NOTE — ANESTHESIA POSTPROCEDURE EVALUATION
Patient: Hieu Hughes    Procedure(s):  ESOPHAGOGASTRODUODENOSCOPY (EGD)    Diagnosis:malignant neoplasm of base of tongue  diagnostic  Diagnosis Additional Information: No value filed.    Anesthesia Type:  MAC    Note:  Anesthesia Post Evaluation    Patient location during evaluation: Bedside  Patient participation: Able to fully participate in evaluation  Level of consciousness: awake and alert  Pain management: adequate  Airway patency: patent  Cardiovascular status: acceptable  Respiratory status: acceptable  Hydration status: acceptable  PONV: none     Anesthetic complications: None          Last vitals:  Vitals:    06/06/19 1159   BP: 159/74   Resp: 16   Temp: 36.9  C (98.5  F)   SpO2: 97%         Electronically Signed By: Johnie Hanna CRNA, APRN CRNA  June 6, 2019  1:04 PM

## 2019-06-06 NOTE — ANESTHESIA CARE TRANSFER NOTE
Patient: Hieu Hughes    Procedure(s):  ESOPHAGOGASTRODUODENOSCOPY (EGD)    Diagnosis: malignant neoplasm of base of tongue  diagnostic  Diagnosis Additional Information: No value filed.    Anesthesia Type:   MAC     Note:    Patient transferred to:Phase II  Handoff Report: Identifed the Patient, Identified the Reponsible Provider, Reviewed the pertinent medical history, Discussed the surgical course, Reviewed Intra-OP anesthesia mangement and issues during anesthesia, Set expectations for post-procedure period and Allowed opportunity for questions and acknowledgement of understanding      Vitals: (Last set prior to Anesthesia Care Transfer)    CRNA VITALS  6/6/2019 1234 - 6/6/2019 1304      6/6/2019             EKG:  NSR                Electronically Signed By: Johnie Hanna CRNA, APRN CRNA  June 6, 2019  1:04 PM

## 2019-06-13 ENCOUNTER — PATIENT OUTREACH (OUTPATIENT)
Dept: OTOLARYNGOLOGY | Facility: CLINIC | Age: 79
End: 2019-06-13

## 2019-06-13 NOTE — PROGRESS NOTES
Multiple messages left for patient to touch base following his EGD procedure and to update him that Dr. Julien had reached out to Dr. Dunn and felt that he was able to find the source of his hemoptysis and there is no longer a need to proceed with a Bronchoscopy.     Left direct line for patient to return call.     He is scheduled for follow-up with Dr. Dunn Currently on 6/26.     Kenia Bhakta, RN, BSN

## 2019-06-26 ENCOUNTER — OFFICE VISIT (OUTPATIENT)
Dept: OTOLARYNGOLOGY | Facility: CLINIC | Age: 79
End: 2019-06-26
Payer: MEDICARE

## 2019-06-26 ENCOUNTER — ALLIED HEALTH/NURSE VISIT (OUTPATIENT)
Dept: SPEECH THERAPY | Facility: CLINIC | Age: 79
End: 2019-06-26

## 2019-06-26 VITALS
RESPIRATION RATE: 21 BRPM | WEIGHT: 165 LBS | TEMPERATURE: 98.4 F | HEART RATE: 65 BPM | BODY MASS INDEX: 24.44 KG/M2 | DIASTOLIC BLOOD PRESSURE: 65 MMHG | HEIGHT: 69 IN | SYSTOLIC BLOOD PRESSURE: 125 MMHG

## 2019-06-26 DIAGNOSIS — C01 CANCER OF BASE OF TONGUE (H): Primary | ICD-10-CM

## 2019-06-26 ASSESSMENT — PAIN SCALES - GENERAL: PAINLEVEL: NO PAIN (0)

## 2019-06-26 ASSESSMENT — MIFFLIN-ST. JEOR: SCORE: 1468.43

## 2019-06-26 NOTE — PATIENT INSTRUCTIONS
1. You were seen in the ENT Clinic today by Dr. Dunn.  If you have any questions or concerns after your appointment, please call   - Option 1: ENT Clinic: [phone]  - Option 2: [facility] (Dr. Dunn's Nurse): [phone]    2. Plan to return to clinic in 1-2 years unless symptoms arise.     Natice Schwab, RN  [facility] Otolaryngology  [phone]

## 2019-06-26 NOTE — PROGRESS NOTES
Jun 26, 2019      PRIOR ONCOLOGIC HISTORY:  Mr. Hughes is over 10 years status post chemoradiation therapy for base of tongue cancer. He finished treatment in April 2009.  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  This was compounded by residual polio symptoms that limited his ability to breathe.      HISTORY OF PRESENT ILLNESS:   Mr. Hughes returns after his EGD which identified the site of bleeding in the upper GI tract.  He has not had any bleeding since that time.  He is otherwise doing very well.      He was seen by Imani today for some continued difficulty with swallowing.  She is managing that.  Otherwise, he is doing well.      PHYSICAL EXAMINATION:  He looks terrific.  The oral cavity and oropharynx are clear.  The voice is still a bit wavery.  There is no stridor.  There are no lesions seen in the mouth.  The neck is clear.         Fiberoptic Endoscopy:  Consent for fiberoptic laryngoscopy was obtained, and we confirmed correctness of procedure and identity of patient.  Fiberoptic laryngoscopy was indicated due to the need for careful surveillance.  The nose was topically decongested and anesthetized.  The fiberoptic laryngoscope was passed under endoscopic vision.  The turbinates were normal.  The inferior and middle meati were clear bilaterally without purulence, masses, or polyps.  The nasopharynx was clear.  The Eustachian tubes were clear.  The soft palate appeared normal with good mobility.  The epiglottis was sharp and the visualized portion of the vallecula was clear.  The larynx was clear with mobile cords.  The arytenoids were clear and there was no pooling in the hypopharynx. He continues to have very limited adduction of the cords with about a 2 mm airway.  However, he abducts very well.      IMPRESSION AND PLAN:  Mr. Hughes is now 10 years out and  WERO.  We will see him back every two years or so.  He has Natice's card so that he can contact us if he experiences any worrisome symptoms or recurrent hemoptysis.     I also introduced him to Elayne Valerio, our new PA, today.     Dexter Dunn M.D.  Otolaryngology- Head & Neck Surgery  [phone]           cc: Tone Aguilar DDS    [facility]    [address], [address]    [place]   [address]       Raymundo Meredith MD    [facility]    [address] [place]   [address]       Billie Cooper MD    [facility]    [address] [place]   [address]       Vidhya Pulido MD    [facility]    [address], [address]    [place]   [address]       Liam Joya MD    [facility]    [address] [place]  [address]        Amita Bolton MD     [facility]    [address], [address]    [place]   [address]

## 2019-06-26 NOTE — LETTER
6/26/2019       RE: Hieu Hughes  [address] [place] [address] [place] [id number]     Dear Colleague,    Thank you for referring your patient, Hieu Hughes, to the [facility] EAR NOSE AND THROAT at [facility]. Please see a copy of my visit note below.    Jun 26, 2019      PRIOR ONCOLOGIC HISTORY:  Mr. Hughes is over 10 years status post chemoradiation therapy for base of tongue cancer. He finished treatment in April 2009.  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  This was compounded by residual polio symptoms that limited his ability to breathe.      HISTORY OF PRESENT ILLNESS:   Mr. Hughes returns after his EGD which identified the site of bleeding in the upper GI tract.  He has not had any bleeding since that time.  He is otherwise doing very well.      He was seen by Imani today for some continued difficulty with swallowing.  She is managing that.  Otherwise, he is doing well.      PHYSICAL EXAMINATION:  He looks terrific.  The oral cavity and oropharynx are clear.  The voice is still a bit wavery.  There is no stridor.  There are no lesions seen in the mouth.  The neck is clear.         Fiberoptic Endoscopy:  Consent for fiberoptic laryngoscopy was obtained, and we confirmed correctness of procedure and identity of patient.  Fiberoptic laryngoscopy was indicated due to the need for careful surveillance.  The nose was topically decongested and anesthetized.  The fiberoptic laryngoscope was passed under endoscopic vision.  The turbinates were normal.  The inferior and middle meati were clear bilaterally without purulence, masses, or polyps.  The nasopharynx was clear.  The Eustachian tubes were clear.  The soft palate appeared normal with good mobility.  The epiglottis was sharp and the visualized portion of the vallecula was clear.  The  larynx was clear with mobile cords.  The arytenoids were clear and there was no pooling in the hypopharynx. He continues to have very limited adduction of the cords with about a 2 mm airway.  However, he abducts very well.      IMPRESSION AND PLAN:  Mr. Hughes is now 10 years out and WERO.  We will see him back every two years or so.  He has Natice's card so that he can contact us if he experiences any worrisome symptoms or recurrent hemoptysis.     I also introduced him to Elayne Valerio, our new PA, today.     Dexter Dunn M.D.  Otolaryngology- Head & Neck Surgery  [phone]      cc: Tone Aguilar DDS    [facility]    [address], [address]    [place]   [address]       Raymundo Meredith MD    [facility]    [address] [place]   [address]       Billie Cooper MD    [facility]    [address] [place]   [address]       Vidhya Pulido MD    [facility]    [address]    [place]   [address]       Liam Joya MD    [facility]    [address] [place]  [address]        Amita Bolton MD     [facility]    [address], [address]    [place]   [address]

## 2019-06-26 NOTE — NURSING NOTE
"Chief Complaint   Patient presents with     RECHECK     hemoptysis . HX cancer      Blood pressure 125/65, pulse 65, temperature 98.4  F (36.9  C), resp. rate 21, height 1.76 m (5' 9.29\"), weight 74.8 kg (165 lb).    Javy Isbell LPN    "

## 2019-06-26 NOTE — PROGRESS NOTES
Patient seen for Allied health care provider visit.  He reports his swallow seems stable since last spring when he had his video swallow study completed.  He continues to adhere to the strategies provided and complete occasional exercises.  Discussed long-term trajectory of swallow function after radiation.  Patient will notify Dr. Dunn if he has any increase in difficulties with swallowing and needs a repeat evaluation.  Time spent with patient 5 minutes.

## 2020-01-02 ENCOUNTER — TELEPHONE (OUTPATIENT)
Dept: OTOLARYNGOLOGY | Facility: CLINIC | Age: 80
End: 2020-01-02

## 2020-01-02 NOTE — TELEPHONE ENCOUNTER
Nahomy vieira/harley [facility] called. At the request of pt's PCP, f/o appointment with Elayne Valerio scheduled 1/6 at 1130 for increased dysphagia and shortness of breath.     Nahomy plans to reach out to pt with appointment date/time.     Direct line given for additional questions/concerns.     Natice Schwab, RN BSN

## 2020-01-02 NOTE — TELEPHONE ENCOUNTER
"[facility]    Phone Message    May a detailed message be left on voicemail: yes    Reason for Call:   Patient's clinic in [place] called to schedule a follow up appointment with Dr. Dunn, per guidelines \"Do not schedule any appointments without his care team's permission - all requests for appointments (new and return) should be sent to the clinic pool for determination\"     Please review and call Nahomy at the clinic @ [phone] as it is hard for patient to speak on the phone     Action Taken: Message routed to:  [facility] ENT    "

## 2020-01-06 ENCOUNTER — HOSPITAL ENCOUNTER (INPATIENT)
Facility: CLINIC | Age: 80
Setting detail: SURGERY ADMIT
End: 2020-01-06
Attending: OTOLARYNGOLOGY | Admitting: OTOLARYNGOLOGY
Payer: MEDICARE

## 2020-01-06 ENCOUNTER — OFFICE VISIT (OUTPATIENT)
Dept: OTOLARYNGOLOGY | Facility: CLINIC | Age: 80
End: 2020-01-06
Payer: MEDICARE

## 2020-01-06 ENCOUNTER — PREP FOR PROCEDURE (OUTPATIENT)
Dept: OTOLARYNGOLOGY | Facility: CLINIC | Age: 80
End: 2020-01-06

## 2020-01-06 VITALS — BODY MASS INDEX: 24.16 KG/M2 | HEIGHT: 69 IN

## 2020-01-06 DIAGNOSIS — C01 CANCER OF BASE OF TONGUE (H): Primary | ICD-10-CM

## 2020-01-06 DIAGNOSIS — R13.10 DYSPHAGIA, UNSPECIFIED TYPE: ICD-10-CM

## 2020-01-06 DIAGNOSIS — R06.02 SOB (SHORTNESS OF BREATH): ICD-10-CM

## 2020-01-06 DIAGNOSIS — J38.02 BILATERAL VOCAL CORD PARALYSIS: Primary | ICD-10-CM

## 2020-01-06 DIAGNOSIS — J38.02 BILATERAL VOCAL CORD PARALYSIS: ICD-10-CM

## 2020-01-06 DIAGNOSIS — R05.9 COUGH: ICD-10-CM

## 2020-01-06 ASSESSMENT — PAIN SCALES - GENERAL: PAINLEVEL: NO PAIN (0)

## 2020-01-06 NOTE — LETTER
1/6/2020       RE: Hieu Hughes  [address] [address]     Dear Colleague,    Thank you for referring your patient, Hieu Hughes, to the [facility] EAR [facility] at [facility]. Please see a copy of my visit note below.    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 Patient was treated with chemoradiation therapy (7400 cGy with concurrent cisplatin) which he completed 4/15/2009.    Mr. Hughes also has a history of multiple difficulties since his treatment back in 2009. 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 In addition, patient has a history of polio which could have had some component due to residual symptoms that limits his ability to breath. Second was he developed esophageal strictures and under went a series of dilations by Dr. Dexter Dunn from 1651-5132.    CT chest and soft tissue neck from 5/29/2019 showed WERO. Last swallow study in August 2018.        HPI:  Mr. Hughes presents today at the request of his primary care provider.  Patient has been having ongoing issues with coughing and shortness of breath and his primary care provider wanted us to evaluate this further.    The patient reports that he has had longstanding issues with his inability to swallow.  His wife, who unfortunately passed away in March 2019, used to make him meals that included meats that were cooked very softly.  He was able to blend those and then drink the thickened solution of his food.  Unfortunately after she passed, he has stopped eating foods like this.  He has been using Ensure supplements as his only form of nutrition.  He states that at this point, he cannot eat or drink anything thicker than the  "Ensure.  Even yogurt or applesauce is something he chokes on.    He does not feel that the dysphasia has changed at all and that is not his main concern today.  His bigger concern is that he has been having episodes of coughing and shortness of breath.  He states that when he first wakes up in the morning, or sometimes in the early hours of the morning (around 3 or 4 AM) he will wake from sleep coughing.  He feels like there is a lot of mucus in his airway and that he is gurgling.  He coughs to try to get it up and it takes him quite a bit of time to get the mucus up.  When he does eventually cough it up, it is like a thick yellow type of mucus.  Once he gets is coughed up, it goes away.    Unfortunately, this recurs throughout the day, usually 3 to 4 hours after he eats.  He does report a little bit of shortness of breath when he walks out to get the mail but nothing severe.  He does not have stairs in his home so he does not go up and down stairs.  He is able to lay flat but is finding that he does better at night if he props himself up.  He seems to have less mucus.    He is very nervous about what is going on with his breathing [place]  He is afraid that if he has trouble with his breathing and cannot clear his throat, he will not be able to get help and he may die.  He is interested in longevity of his life and is willing to do whatever it takes to get that better.    In regards to his dysphagia, he is not doing regular swallow exercises despite recommendations from speech language pathology team.        Physical Exam:  Ht 1.76 m (5' 9.29\")   BMI 24.16 kg/m       Constitutional: The patient is unaccompanied, well-groomed, and in no acute distress.    Head: Normocephalic and atraumatic.  Atrophy of the neck and the cheeks bilaterally  Eyes: Pupils were equal and reactive.  Extraocular movement intact.    Ears: Pinnae and tragus non-tender.  EACs and TMs " were clear.     Nose: Sinuses were non-tender.  Anterior rhinoscopy revealed midline septum and absence of purulence or polyps.    Oral Cavity: Trismus present, normal tongue, floor of mouth, buccal mucosa, and palate.  No lesions or masses on inspection or palpation.    Oropharynx: Normal mucosa, palate symmetric with normal elevation. No abnormal lymph tissue in the oropharynx.  Pterygoid region non-tender.   Hypopharynx/Larynx: Deferred for fiberoptic endoscopic exam  Neck: atrophied with normal laryngeal and tracheal landmarks.  The parotid beds were without masses.  No palpable thyroid.  Normal range of motion  Lymphatic: There is no palpable lymphadenopathy in the neck.   Respiratory: Breathing comfortably without stridor or exertion of accessory muscles.   Skin: Normal:  warm and pink without rash  Neurologic: Alert and oriented x 3.  CN's III-XII within normal limits.  Voice muffled and difficult to understand.   Psychiatric: The patient's affect was calm, cooperative, and appropriate.      Fiberoptic Endoscopy:  Consent for fiberoptic laryngoscopy was obtained, and we confirmed correctness of procedure and identity of patient.  Fiberoptic laryngoscopy was indicated due to history of squamous cell carcinoma of the base of tongue, previous radiation therapy and difficulty breathing..  The nose was topically decongested and anesthetized.  The fiberoptic laryngoscope was passed under endoscopic vision.  The turbinates were normal.  The inferior and middle meati were clear bilaterally without purulence, masses, or polyps.  The nasopharynx was clear.  The Eustachian tubes were clear.  The soft palate with scarring and a scar band noted on the right, good mobility.  The epiglottis was sharp and the visualized portion of the vallecula was clear.  The larynx was clear. Both cords fairly immobile with minimal opening of the cords with inhalation or speaking, very poor muscular effort when swallowing.  The arytenoids  were clear and there was no pooling in the hypopharynx.  No concerning lesions, masses, or signs of recurrence.    See Imagestream recording in the [facility] Orders section      IMPRESSION AND PLAN:    1. 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   Patient is status post chemoradiation therapy from 2009.  Has longstanding issues with dysphasia.  That seems to be slightly worse in the last year as he is only been able to do thin liquids. He   Discussed with Imani Winslow, CCC-SLP.  At some point, she thinks we should get a swallow study with esophagram done for more formal evaluation.    Unfortunately, today I think the bigger issue is his vocal cord paralysis bilaterally.  He is having very minimal opening of the vocal cords with inspiration.  I think his episodes of shortness of breath and the coughing up with mucus are related to this.  My concern is potential airway problem if they would stop moving completely.  [place] would be very difficult for him to get access to emergency help in a timely fashion if needed.      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  I discussed the case with Dr. Seo and he feels the patient would be appropriate for tracheostomy.     Patient and I discussed tracheostomy.  He does want one placed due to his symptoms and concerns about where he lives.  He is very interested in any procedures that might give him more longevity of life as well as more comfort.  Dr. Seo briefly met with him today and discussed a tracheostomy.  The patient is agreeable to this and he was placed on the OR schedule for this Thursday, 1/9/2020.  Patient will see his primary care provider for an H&P prior to that  visit.        Elayne Valerio PA-C  Otolaryngology  Head & Neck Surgery  [phone]         cc:       Tone Aguilar DDS               [facility]               [address], [address]               [place]   [address]                  Raymundo Meredith MD               [facility]               [address] [place]   [address]                  Billie Cooper MD               [facility]               [address] [place]   [address]                  Vidhya Pulido MD               [facility]               [address]               [place]   [address]                  Liam Joya MD               [facility]               [address] [place]  [address]                   Amita Bolton MD                [facility]               [address], [address]               [place]   [address]       Perfecto Seo MD   Otolaryngology/Head & Neck Surgery        Dexter Dunn MD   Otolaryngology/Head & Neck Surgery

## 2020-01-06 NOTE — PATIENT INSTRUCTIONS
Hieu Hughes,    It was a pleasure to see you today.    1. You were seen in the ENT Clinic today by Elayne Valerio PA-C.  If you have any questions or concerns after your appointment, please call   - Option 1: ENT Clinic: [phone]  - Option 2: [facility] (Dr. Dunn's Nurse): [phone]    2. Please set up the video swallow study with esophagram    3. Dr. Seo's nurse will call you to set up the OR time.      Thank you,  Elayne Valerio PA-C  Otolaryngology  Head & Neck Surgery  [phone]

## 2020-01-06 NOTE — PROGRESS NOTES
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 Patient was treated with chemoradiation therapy (7400 cGy with concurrent cisplatin) which he completed 4/15/2009.    Mr. Hughes also has a history of multiple difficulties since his treatment back in 2009. 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 In addition, patient has a history of polio which could have had some component due to residual symptoms that limits his ability to breath. Second was he developed esophageal strictures and under went a series of dilations by Dr. Dexter Dunn from [id number].    CT chest and soft tissue neck from 5/29/2019 showed WERO. Last swallow study in August 2018.        HPI:  Mr. Hughes presents today at the request of his primary care provider.  Patient has been having ongoing issues with coughing and shortness of breath and his primary care provider wanted us to evaluate this further.    The patient reports that he has had longstanding issues with his inability to swallow.  His wife, who unfortunately passed away in March 2019, used to make him meals that included meats that were cooked very softly.  He was able to blend those and then drink the thickened solution of his food.  Unfortunately after she passed, he has stopped eating foods like this.  He has been using Ensure supplements as his only form of nutrition.  He states that at this point, he cannot eat or drink anything thicker than the Ensure.  Even yogurt or applesauce is something he chokes on.    He does not feel that the dysphasia has changed at all and that is not his main concern today.  His bigger concern is that he has been having episodes of coughing and shortness of breath.  He states that when he  "first wakes up in the morning, or sometimes in the early hours of the morning (around 3 or 4 AM) he will wake from sleep coughing.  He feels like there is a lot of mucus in his airway and that he is gurgling.  He coughs to try to get it up and it takes him quite a bit of time to get the mucus up.  When he does eventually cough it up, it is like a thick yellow type of mucus.  Once he gets is coughed up, it goes away.    Unfortunately, this recurs throughout the day, usually 3 to 4 hours after he eats.  He does report a little bit of shortness of breath when he walks out to get the mail but nothing severe.  He does not have stairs in his home so he does not go up and down stairs.  He is able to lay flat but is finding that he does better at night if he props himself up.  He seems to have less mucus.    He is very nervous about what is going on with his breathing because [place]  He is afraid that if he has trouble with his breathing and cannot clear his throat, he will not be able to get help and he may die.  He is interested in longevity of his life and is willing to do whatever it takes to get that better.    In regards to his dysphagia, he is not doing regular swallow exercises despite recommendations from speech language pathology team.        Physical Exam:  Ht 1.76 m (5' 9.29\")   BMI 24.16 kg/m      Constitutional: The patient is unaccompanied, well-groomed, and in no acute distress.    Head: Normocephalic and atraumatic.  Atrophy of the neck and the cheeks bilaterally  Eyes: Pupils were equal and reactive.  Extraocular movement intact.    Ears: Pinnae and tragus non-tender.  EACs and TMs were clear.     Nose: Sinuses were non-tender.  Anterior rhinoscopy revealed midline septum and absence of purulence or polyps.    Oral Cavity: Trismus present, normal tongue, floor of mouth, buccal mucosa, and palate.  No lesions or masses on inspection or palpation.  "   Oropharynx: Normal mucosa, palate symmetric with normal elevation. No abnormal lymph tissue in the oropharynx.  Pterygoid region non-tender.   Hypopharynx/Larynx: Deferred for fiberoptic endoscopic exam  Neck: atrophied with normal laryngeal and tracheal landmarks.  The parotid beds were without masses.  No palpable thyroid.  Normal range of motion  Lymphatic: There is no palpable lymphadenopathy in the neck.   Respiratory: Breathing comfortably without stridor or exertion of accessory muscles.   Skin: Normal:  warm and pink without rash  Neurologic: Alert and oriented x 3.  CN's III-XII within normal limits.  Voice muffled and difficult to understand.   Psychiatric: The patient's affect was calm, cooperative, and appropriate.      Fiberoptic Endoscopy:  Consent for fiberoptic laryngoscopy was obtained, and we confirmed correctness of procedure and identity of patient.  Fiberoptic laryngoscopy was indicated due to history of squamous cell carcinoma of the base of tongue, previous radiation therapy and difficulty breathing..  The nose was topically decongested and anesthetized.  The fiberoptic laryngoscope was passed under endoscopic vision.  The turbinates were normal.  The inferior and middle meati were clear bilaterally without purulence, masses, or polyps.  The nasopharynx was clear.  The Eustachian tubes were clear.  The soft palate with scarring and a scar band noted on the right, good mobility.  The epiglottis was sharp and the visualized portion of the vallecula was clear.  The larynx was clear. Both cords fairly immobile with minimal opening of the cords with inhalation or speaking, very poor muscular effort when swallowing.  The arytenoids were clear and there was no pooling in the hypopharynx.  No concerning lesions, masses, or signs of recurrence.    See Imagestream recording in the [facility] Orders section      IMPRESSION AND PLAN:    1. cT2 N2b M0 basaloid squamous cell carcinoma of the right base of  tongue.   Patient is status post chemoradiation therapy from 2009.  Has longstanding issues with dysphasia.  That seems to be slightly worse in the last year as he is only been able to do thin liquids. He   Discussed with Imani Winslow, CCC-SLP.  At some point, she thinks we should get a swallow study with esophagram done for more formal evaluation.    Unfortunately, today I think the bigger issue is his vocal cord paralysis bilaterally.  He is having very minimal opening of the vocal cords with inspiration.  I think his episodes of shortness of breath and the coughing up with mucus are related to this.  My concern is potential airway problem if they would stop moving completely.  [place] would be very difficult for him to get access to emergency help in a timely fashion if needed.      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  I discussed the case with Dr. Seo and he feels the patient would be appropriate for tracheostomy.     Patient and I discussed tracheostomy.  He does want one placed due to his symptoms and concerns about where he lives.  He is very interested in any procedures that might give him more longevity of life as well as more comfort.  Dr. Seo briefly met with him today and discussed a tracheostomy.  The patient is agreeable to this and he was placed on the OR schedule for this Thursday, 1/9/2020.  Patient will see his primary care provider for an H&P prior to that visit.        Elayne Valerio PA-C  Otolaryngology  Head & Neck Surgery  [phone]         cc:       Tone Aguilar DDS               [facility]               [address]               [place]   [address]                  Raymundo Meredith MD                [facility]               [address] [place]   [address]                  Billie Cooper MD               [facility]               [address] [place]   [address]                  Vidhya Pulido MD               [facility]               [address]               [place]   [address]                  Liam Joya MD               [facility]               [address] [place]  [address]                   Amita Bolton MD                [facility]               [address]               [place]   [address]      Perfecto Seo MD   Otolaryngology/Head & Neck Surgery        Dexter Dunn MD   Otolaryngology/Head & Neck Surgery

## 2020-01-07 ENCOUNTER — TELEPHONE (OUTPATIENT)
Dept: OTOLARYNGOLOGY | Facility: CLINIC | Age: 80
End: 2020-01-07

## 2020-01-07 NOTE — TELEPHONE ENCOUNTER
Voicemail received from Nahomy at [facility] stating Hieu is coming in today for his pre-op physical and they are wondering what needs to be completed.     Phone call returned. Nahomy is out today. Fax number obtained and pre-op form faxed to outline what is needed at pre-op physical and the number we would like the form faxed to after completion.     Direct line given for additional questions/concerns.     Natice Schwab, RN BSN

## 2020-01-08 ENCOUNTER — TELEPHONE (OUTPATIENT)
Dept: OTOLARYNGOLOGY | Facility: CLINIC | Age: 80
End: 2020-01-08

## 2020-01-08 DIAGNOSIS — R13.10 DYSPHAGIA, UNSPECIFIED TYPE: Primary | ICD-10-CM

## 2020-01-08 RX ORDER — ALBUTEROL SULFATE 0.83 MG/ML
2.5 SOLUTION RESPIRATORY (INHALATION) EVERY 6 HOURS PRN
COMMUNITY
End: 2020-01-08 | Stop reason: HOSPADM

## 2020-01-09 ENCOUNTER — THERAPY VISIT (OUTPATIENT)
Dept: OTOLARYNGOLOGY | Facility: CLINIC | Age: 80
End: 2020-01-09
Payer: MEDICARE

## 2020-01-09 ENCOUNTER — OFFICE VISIT (OUTPATIENT)
Dept: OTOLARYNGOLOGY | Facility: CLINIC | Age: 80
End: 2020-01-09
Payer: MEDICARE

## 2020-01-09 VITALS — BODY MASS INDEX: 24.44 KG/M2 | WEIGHT: 165 LBS | HEIGHT: 69 IN

## 2020-01-09 DIAGNOSIS — C01 CANCER OF BASE OF TONGUE (H): Primary | ICD-10-CM

## 2020-01-09 DIAGNOSIS — J38.02 BILATERAL VOCAL CORD PARALYSIS: ICD-10-CM

## 2020-01-09 DIAGNOSIS — R49.0 DYSPHONIA: Primary | ICD-10-CM

## 2020-01-09 DIAGNOSIS — R13.12 OROPHARYNGEAL DYSPHAGIA: ICD-10-CM

## 2020-01-09 DIAGNOSIS — R13.14 PHARYNGOESOPHAGEAL DYSPHAGIA: ICD-10-CM

## 2020-01-09 PROBLEM — Z86.0101 H/O ADENOMATOUS POLYP OF COLON: Status: ACTIVE | Noted: 2018-11-26

## 2020-01-09 ASSESSMENT — MIFFLIN-ST. JEOR: SCORE: 1463.43

## 2020-01-09 ASSESSMENT — PAIN SCALES - GENERAL: PAINLEVEL: NO PAIN (0)
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
## 2020-01-09 NOTE — NURSING NOTE
"Chief Complaint   Patient presents with     Consult     Possible trach     Height 1.76 m (5' 9.29\"), weight 74.8 kg (165 lb).    Cornelia Hawk, EMT  "

## 2020-01-10 ENCOUNTER — ANCILLARY PROCEDURE (OUTPATIENT)
Dept: GENERAL RADIOLOGY | Facility: CLINIC | Age: 80
End: 2020-01-10
Attending: OTOLARYNGOLOGY
Payer: MEDICARE

## 2020-01-10 DIAGNOSIS — R05.9 COUGH: ICD-10-CM

## 2020-01-10 DIAGNOSIS — R13.10 DYSPHAGIA, UNSPECIFIED TYPE: ICD-10-CM

## 2020-01-10 DIAGNOSIS — R06.02 SOB (SHORTNESS OF BREATH): ICD-10-CM

## 2020-01-10 DIAGNOSIS — C01 CANCER OF BASE OF TONGUE (H): ICD-10-CM

## 2020-01-10 DIAGNOSIS — R13.12 OROPHARYNGEAL DYSPHAGIA: Primary | ICD-10-CM

## 2020-01-10 RX ORDER — BARIUM SULFATE 400 MG/ML
25 SUSPENSION ORAL ONCE
Status: COMPLETED | OUTPATIENT
Start: 2020-01-10 | End: 2020-01-10

## 2020-01-10 RX ADMIN — BARIUM SULFATE 25 ML: 400 SUSPENSION ORAL at 15:28

## 2020-01-13 ENCOUNTER — TELEPHONE (OUTPATIENT)
Dept: OTOLARYNGOLOGY | Facility: CLINIC | Age: 80
End: 2020-01-13

## 2020-01-13 NOTE — TELEPHONE ENCOUNTER
Spoke with patient  Breathing is better now   No further episodes  Has been very careful with his eating  Will keep a log of his weight and will consider PEG if he can't keep the weight. He has no problem with a PEG  Discussed that he needs a look at his esophagus. Gave him options of TNE vs EGD in the OR   Will go ahead with TNE given no need for anesthesia  Will hold off on trach for now - will send abx and steroid to his pharmacy to have in case he gets a cold again

## 2020-01-13 NOTE — TELEPHONE ENCOUNTER
LVM with patient informing that we will schedule him for the procedure with Dr Holliday at 12:30pm on this Friday, January 17th, 2020. Informed patient to call clinic back and reschedule if the time does not work.    Cornelia Hawk, EMT

## 2020-01-14 DIAGNOSIS — R13.10 DYSPHAGIA, UNSPECIFIED TYPE: Primary | ICD-10-CM

## 2020-01-14 NOTE — PROGRESS NOTES
OUTPATIENT SWALLOW  EVALUATION  PLAN OF TREATMENT FOR OUTPATIENT REHABILITATION  (COMPLETE FOR INITIAL CLAIMS ONLY)  Patient's Last Name, First Name, M.I.  YOB: 1940  Hieu Hughes     Provider's Name   MAXIMUS Thompson   Medical Record No.  [id number]     Start of Care Date:  01/10/20   Onset Date:  01/10/20   Type:     ___PT   ____OT  ___X_SLP Medical Diagnosis:  Moderately severe oropharyngeal dysphagia     Treatment Diagnosis:  Moderately severe oropharyngeal dysphagia Visits from SOC:  1     _________________________________________________________________________________  Plan of Treatment/Functional Goals:  Planned Therapy Interventions: Dysphagia Treatment  Dysphagia treatment: Modified diet education, Instruction of safe swallow strategies, Compensatory strategies for swallowing, Oropharyngeal exercise training           Goals   1. Goal Identifier: Diet       Goal Description: 1. Pt will tolerate a full thin liquid diet with no overt clinical s/sx of penetration/aspiration in 100% of PO trials and with no adverse pulmonary events over a 6 month period.       Target Date: 04/13/20           2. Goal Identifier: Exercises       Goal Description: 2. Pt will complete 10 repetitions 3x/day of 5/5 oropharyngeal and jaw strengthening/ROM exercises with minimal written and/or verbal cues.        Target Date: 04/13/20                    Therapy Frequency: other (see comments)(1x/month for 8 months)  Predicted Duration of Therapy Intervention (days/wks): 8 months    MAXIMUS Thompson       I CERTIFY THE NEED FOR THESE SERVICES FURNISHED UNDER        THIS PLAN OF TREATMENT AND WHILE UNDER MY CARE     (Physician attestation of this document indicates review and certification of the therapy plan).                  Certification date from: 01/10/20 Certification date to: 04/09/20          Referring Physician: Dr. Grove  Gabbi    Initial Assessment        See Epic Evaluation Start Of Care Date: 01/10/20

## 2020-01-14 NOTE — TELEPHONE ENCOUNTER
Informed patient that they will not need a  for their appointment on Friday. Patient expressed understanding and had no further questions.    Cornelia Hawk, EMT

## 2020-01-14 NOTE — PROGRESS NOTES
01/10/20 1400   General Information   Type Of Visit Initial   Start Of Care Date 01/10/20   Referring Physician Dr. 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  He then developed esophageal strictures and underwent multiple dilations with Dr. Dunn from [id number].  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  He reports new onset of dyspnea episodes following a recent URI.  Episodes appear to happen 30 minutes to an hour after eating.  He reports his wife  last March, and since that time he has only been consuming thin to nectar thick liquids.  He drinks ensures daily and states it sometimes takes him many hours to get a full bottle down.  He dissolves his pills or crushes them and puts them in the smoothie.  Denies coughing while drinking.  Last pneumonia was 2-1/2 years ago.  He endorses recent weight loss.  States he finds it easier to breathe when sitting up and so he typically sleeps in a chair.  He presents for video swallow study evaluation today.  Reports he had a dyspnea episode on his way here in which 30 minutes after drinking his Ensure he began to breathe heavy.  He then coughed some liquid up and stated he felt that this helped.  Reports he does not complete regular oral cares.  He has upper and lower dentures that he does take out and clean daily.   Respiratory Status Room air   Prior Level Of Function Swallowing   Prior Level Of Function Comment Full thin liquid  diet   Patient Role/employment History Retired   Living Environment Other, Comments  ([place])   General Observations Pt is alert and pleasant; however, he does appear anxious.   Patient/family Goals To minimize dyspnea associated with eating.   Clinical Swallow Evaluation   Oral Musculature generally intact   Structural Abnormalities none present   Dentition upper and lower dentures;uses dentures to eat   Mucosal Quality dry   Mandibular Strength and Mobility impaired   Oral Labial Strength and Mobility WFL   Lingual Strength and Mobility other (see comments)  (mild generalized weakness and coordination)   Velar Elevation impaired   Buccal Strength and Mobility impaired   Laryngeal Function Cough;Voicing initiated;Swallow   Oral Musculature Comments Trimus, impaired lingual, buccal and palatal function. Upper and lower dentures present. Weak cough. Hypernasal voice.   VFSS Evaluation   VFSS Additional Documentation Yes   VFSS Eval: Radiology   Radiologist Resident   Views Taken left lateral;A/P   Physical Location of Procedure [facility]   VFSS Eval: Thin Liquid Texture Trial   Mode of Presentation, Thin Liquid cup;straw;self-fed   Order of Presentation 1,2,3,4,6,8,10,11,12,13,14  (12,13,14-AP, 13-still)   Preparatory Phase WFL   Oral Phase, Thin Liquid WFL   Pharyngeal Phase, Thin Liquid Pharyngeal wall coating;Residue in valleculae;Residue in pyriform sinus   Rosenbek's Penetration Aspiration Scale: Thin Liquid Trial Results 8 - contrast passes glottis, visible subglottic residue remains, absent patient response (aspiration)   Response to Aspiration absent response, silent aspiration   Successful Strategies Trialed During Procedure, Thin Liquid head turn to the right   Diagnostic Statement Consistent penetration to the cords with residuals remaining in laryngeal vestibule after the swallow; however, some residuals do clear with the force of the swallow. A few instances of silent aspiration  from residuals. A head turn right is helpful. In AP, a narrowing below the level of the UES is observed on the right.    VFSS Eval: Nectar Thick Liquid Texture Trial   Mode of Presentation, Nectar cup;self-fed   Order of Presentation 5   Preparatory Phase WFL   Oral Phase, Nectar WFL   Pharyngeal Phase, Nectar Residue in valleculae;Residue in pyriform sinus;Pharyngeal wall coating   Rosenbek's Penetration Aspiration Scale: Nectar-Thick Liquid Trial Results 8 - contrast passes glottis, visible subglottic residue remains, absent patient response (aspiration)   Response to Aspiration, Nectar absent response, silent aspiration   Diagnostic Statement Penetration to the cords during the swallow. Passive aspiration observed on next series from residuals. No response to aspiration. Increased residuals in valleculae and pyriforms compared to thin liquids.   VFSS Eval: Puree Solid Texture Trial   Mode of Presentation, Puree spoon;self-fed   Order of Presentation 7,9   Preparatory Phase WFL   Oral Phase, Puree Poor AP movement   Pharyngeal Phase, Puree Residue in pyriform sinus;Residue in valleculae   Rosenbek's Penetration Aspiration Scale: Puree Food Trial Results 2 - contrast enters airway, remains above the vocal cords, no residue remains (penetration)   Diagnostic Statement Penetration with no residuals remaining in laryngeal vestibule. No aspiration. Little to no BOT retraction and pharyngeal squeeze. Moderately severe to severe vallecular and pyriform sinus residue.    Swallow Compensations   Swallow Compensations Head turn right;Pacing;Reduce amounts;Multiple swallow;Alternate viscosity of consistencies   Results Other (see comments)  (Small amounts of intermittent silent aspiration)   Educational Assessment   Barriers to Learning No barriers   Esophageal Phase of Swallow   Esophageal sweep performed during today s vidofluoroscopic exam  Yes;Please refer to radiologist's report for details   General Therapy  Interventions   Planned Therapy Interventions Dysphagia Treatment   Dysphagia treatment Modified diet education;Instruction of safe swallow strategies;Compensatory strategies for swallowing;Oropharyngeal exercise training   Swallow Eval: Clinical Impressions   Skilled Criteria for Therapy Intervention Skilled criteria met.  Treatment indicated.   Dysphagia Outcome Severity Scale (ALEXA) Level 2 - ALEXA   Treatment Diagnosis Moderately severe oropharyngeal dysphagia   Diet texture recommendations   (Full thin liquid diet)   Recommended Feeding/Eating Techniques turn head right during every swallow;small sips/bites;maintain upright posture during/after eating for 30 mins;hard swallow w/ each bite or sip;alternate between small bites and sips of food/liquid   Rehab Potential fair, will monitor progress closely   Therapy Frequency other (see comments)  (1x/month for 8 months)   Predicted Duration of Therapy Intervention (days/wks) 8 months   Anticipated Discharge Disposition home w/ outpatient services   Risks and Benefits of Treatment have been explained. Yes   Patient, family and/or staff in agreement with Plan of Care Yes   Clinical Impression Comments Hieu Hughes presents today with moderately severe dysphagia characterized by severe pharyngeal weakness. Oral motor examination reveals trismus, impaired lingual, buccal and palatal function. Upper and lower dentures present. Weak cough. Hypernasal voice in the setting of of known bilateral vocal fold paralysis.  Overall patient demonstrates severe pharyngeal weakness with very weak base of tongue retraction, little to no pharyngeal squeeze, and minimal epiglottic inversion.  Patient demonstrates consistent deep penetration to the cords with thin liquids.  Some residuals remain in the laryngeal vestibule; however, some residuals are cleared with the force of the swallow.  Small amounts of silent aspiration is observed from residuals after the swallow.  Patient has no  reflexive cough response.  Suspect that patient's known bilateral vocal cord paralysis aids and protecting the airway as residuals do sit on top of the cords after the swallow.  Some of these residuals are then cleared with the force of his attempted secondary swallow.  Head turn right was helpful with thin liquids.  In AP a narrowing below the level of the UES was seen on the right.  Recommend direct visualization in clinic.  Albee thick liquid trials were unsuccessful resulting in silent aspiration and an increase in residuals.  Penetration of purée consistency was observed with no residuals remaining in the laryngeal vestibule.  Moderately severe to severe vallecular and piriform sinus residue was observed.  This is cleared with a sip of thin liquid.  Based on results of today's study patient is at high risk of aspiration before during and after the swallow.  Risk can be reduced by strict adherence to strategies.  Recommend full thin liquid diet with a right head turn for every swallow.  Trained patient on results of today's study, anatomy and physiology of swallowing, and diet recommendations.  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   Swallow Goals   SLP Swallow Goals 1;2   Swallow Goal 1   Goal Identifier Diet   Goal Description 1. Pt will tolerate a full thin liquid diet with no overt clinical s/sx of penetration/aspiration in 100% of PO trials and with no adverse pulmonary events over a 6 month period.   Target Date 04/13/20   Swallow Goal 2   Goal Identifier Exercises   Goal Description 2. Pt will complete 10 repetitions 3x/day of 5/5 oropharyngeal and jaw strengthening/ROM exercises with minimal written and/or verbal cues.    Target Date 04/13/20   Total Session  "Time   SLP Eval: VideoFluoroscopic Swallow function Minutes (03298) 35   Total Evaluation Time 35   Therapy Certification   Certification date from 01/10/20   Certification date to 04/09/20   Medical Diagnosis Moderately severe oropharyngeal dysphagia   Certification I certify the need for these services furnished under this plan of treatment and while under my care.  (Physician co-signature of this document indicates review and certification of the therapy plan).     Thank you for the referral of Hieu SCHMIDT [place]. If you have any questions about this report, please contact me using the information below.     ILANA Thompson ([email or web address]), M.A., CFY-SLP  Speech Language Pathology Clinical Fellow  [facility] Trained Vocologist   [facility]   [phone]  [email or web address]      SAME DAY TREATMENT     01/10/20 1400   Signing Clinician's Name / Credentials   Signing clinician's name /credentials Izabela Vela MA, CFY-SLP   Session Number   Session Number 1   Subjective Report   Subjective Report Please see evaluation report from today's visit for subjective report.  Patient participated in treatment immediately following video swallow study evaluation.  During treatment session, patient appeared particularly distressed about his breathing episodes and their correlation with eating and drinking.  Patient stated many times throughout session he was \" terrified to eat\" and discussed many times how he was afraid he may have to trach himself if he was having difficulty breathing at home as he lives in [facility].  Discussed recommendations with patient and informed him we would reach out to referring MDs today and work as quickly as we could to help create a plan in a timely fashion.   Swallow Goals   SLP Swallow Goals 1;2   Swallow Goal 1   Goal Identifier Diet   Goal Description 1. Pt will tolerate a full thin liquid diet with no overt clinical s/sx of penetration/aspiration in 100% of PO trials and with " no adverse pulmonary events over a 6 month period.   Target Date 04/13/20   Swallow Goal 2   Goal Identifier Exercises   Goal Description 2. Pt will complete 10 repetitions 3x/day of 5/5 oropharyngeal and jaw strengthening/ROM exercises with minimal written and/or verbal cues.    Target Date 04/13/20   Treatment Interventions    Treatment Interventions Treatment Swallow/Oral dysfunction   Treatment Swallow/Oral dysfunction   Treatment of Swallowing Dysfunction &/or Oral Function for Feeding Minutes (46241) 40 Minutes   Skilled Intervention Provided written and verbal information on diet modifications.;Educated patient on swallowing strategies.;Educated patient on risks of aspiration;Cued swallowing strategies (auditory, visual, tactile);Assessed oral intake trials;Other  (Trained exercises)   Patient Response Patient verbalized understanding of material provided.   Treatment Detail 1.  Trained patient on full thin liquid diet with right head turn for every swallow.  Patient stated he felt his Ensure was slightly thicker than water.  Trained patient to thin out ensure by adding water to make this a true thin liquid consistency.  Trained patient to begin completing thorough oral cares brushing his tongue and gums at least 2-3 times a day and especially before meals.  Trained patient on safe swallowing strategies: Small sips, slow pace, sitting upright during and for 30 minutes after all p.o. intake.  Trained patient he may benefit from a feeding tube to help supplement caloric intake as he is currently losing weight.  Recommend nutrition consult to determine need for supplemental PEG.  Patient amenable to recommendation.  Trained patient on signs and symptoms of aspiration and risk of aspiration. 2.  Trained patient on 5/5 oropharyngeal and jaw strengthening and range of motion exercises.  Trained patient to complete 10 repetitions of each 3 times a day.  Handout given.  Patient amenable to recommendation.   Progress  Fair   Assessments Completed   Assessments Completed Video swallow study   Education   Learner Patient   Readiness Acceptance   Method Booklet/handout;Explanation;Video;Demonstration   Response Verbalizes understanding;Demonstrates understanding   Communication with other professionals   Communication with other professionals Updated MD   Plan   Home program Home exercise program   Plan for next session Discuss with MDs regarding treatment plan moving forward. Clinic will contact patient.    Total Session Time   Total Treatment Time (sum of timed and untimed services) 75   AMBULATORY CLINICS ONLY-MEDICAL AND TREATMENT DIAGNOSIS   Medical Diagnosis Dysphagia, unspecified   Treatment Diagnosis Moderately severe oropharyngeal dysphagia     ILANA Thompson M.A. (music), CFY-SLP  Speech Language Pathology Clinical Fellow  [facility] Trained Vocologist   [facility]   [phone]  [email or web address]

## 2020-01-17 ENCOUNTER — OFFICE VISIT (OUTPATIENT)
Dept: OTOLARYNGOLOGY | Facility: CLINIC | Age: 80
End: 2020-01-17
Payer: MEDICARE

## 2020-01-17 ENCOUNTER — THERAPY VISIT (OUTPATIENT)
Dept: OTOLARYNGOLOGY | Facility: CLINIC | Age: 80
End: 2020-01-17
Payer: MEDICARE

## 2020-01-17 VITALS — BODY MASS INDEX: 24.88 KG/M2 | HEIGHT: 69 IN | WEIGHT: 168 LBS

## 2020-01-17 DIAGNOSIS — R06.02 SOB (SHORTNESS OF BREATH): ICD-10-CM

## 2020-01-17 DIAGNOSIS — C01 CANCER OF BASE OF TONGUE (H): ICD-10-CM

## 2020-01-17 DIAGNOSIS — R13.10 DYSPHAGIA, UNSPECIFIED TYPE: ICD-10-CM

## 2020-01-17 DIAGNOSIS — J38.02 BILATERAL VOCAL CORD PARALYSIS: ICD-10-CM

## 2020-01-17 DIAGNOSIS — R13.12 OROPHARYNGEAL DYSPHAGIA: ICD-10-CM

## 2020-01-17 DIAGNOSIS — R49.0 DYSPHONIA: Primary | ICD-10-CM

## 2020-01-17 DIAGNOSIS — R13.12 OROPHARYNGEAL DYSPHAGIA: Primary | ICD-10-CM

## 2020-01-17 RX ORDER — PREDNISONE 5 MG/ML
20 SOLUTION ORAL DAILY
Qty: 120 ML | Refills: 1 | Status: SHIPPED | OUTPATIENT
Start: 2020-01-17 | End: 2020-02-28

## 2020-01-17 RX ORDER — CLINDAMYCIN PALMITATE HYDROCHLORIDE 75 MG/5ML
300 SOLUTION ORAL 3 TIMES DAILY
Qty: 600 ML | Refills: 1 | Status: SHIPPED | OUTPATIENT
Start: 2020-01-17 | End: 2020-02-28

## 2020-01-17 ASSESSMENT — MIFFLIN-ST. JEOR: SCORE: 1477.03

## 2020-01-17 ASSESSMENT — PAIN SCALES - GENERAL: PAINLEVEL: NO PAIN (0)

## 2020-01-17 NOTE — PROGRESS NOTES
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  He then developed esophageal strictures and underwent multiple dilations with Dr. Dunn from 4935-4423. VFSS resulted in a recommendation for a full thin liquid diet. He is seen in conjunction in ENT clinic visit today per MD request. Reports overall he is doing better since the weekend, and has not had any more breathing episodes.                                                                                                                                                                                                                                                                                                                                                                                                                                                                  Swallow Goals   SLP Swallow Goals 1;2   Swallow Goal 1   Goal Identifier Diet   Goal Description 1. Pt will tolerate a full thin liquid diet with no overt clinical s/sx of penetration/aspiration in 100% of PO trials and with no adverse pulmonary events over a 6 month period.   Target Date 04/13/20   Swallow Goal 2   Goal Identifier Exercises   Goal Description 2. Pt will complete 10 repetitions 3x/day of 5/5 oropharyngeal and jaw strengthening/ROM exercises with minimal written and/or verbal cues.    Target Date 04/13/20   Treatment Interventions    Treatment Interventions Treatment Swallow/Oral dysfunction   Treatment Swallow/Oral dysfunction   Treatment of Swallowing Dysfunction &/or Oral Function for Feeding Minutes (21342) 10 Minutes   Skilled Intervention Provided written and  verbal information on diet modifications.;Educated patient on swallowing strategies.;Educated patient on risks of aspiration   Patient Response Patient verbalized understanding of material provided.   Treatment Detail 1. Pt reported he is using full thin liquid diet. Reports he is remaining upright during and for 45 minutes after PO intake. He is also sleeping sitting up. States he is not coughing up liquid as much as he used to and he has not had any breathing episodes. Reports he is consuming 2820-7755 hira/day and weight is holding steady. Trained pt to continue monitoring his weight; if weight begins to drop may need to consider PEG to supplement. He has an appt scheduled in a few weeks to meet with a nutritionist. Reports he is completing oral cares 1x/day. Trained pt to increase oral cares to at least 2-3x/day preferably prior to drinking his shakes/Ensures. Pt amenable. 2. Pt reports he is completing exercises 1-2x/day. Retrained pt to increase exercises to at least 2-3x/day. Trained pt on ways to incorporate exercises into daily routines such as driving, watching tv or working outside in his yard. Pt reported he will try to increase exercise frequency.    Progress Fair   Education   Learner Patient   Readiness Acceptance   Method Booklet/handout;Explanation;Video;Demonstration   Response Verbalizes understanding;Demonstrates understanding   Communication with other professionals   Communication with other professionals Updated MD   Plan   Home program Home exercise program   Plan for next session f/u in ENT clinic to reassess PO intake and tolerance   Total Session Time   Total Treatment Time (sum of timed and untimed services) 10   AMBULATORY CLINICS ONLY-MEDICAL AND TREATMENT DIAGNOSIS   Medical Diagnosis Dysphagia, unspecified   Treatment Diagnosis Moderately severe oropharyngeal dysphagia     ILANA Thompson (CORIN kay, KEITH-SLP  Speech Language Pathology Clinical Fellow  [facility] Trained Vocologist    [facility]   [phone]  [email or web address]

## 2020-01-17 NOTE — NURSING NOTE
"Chief Complaint   Patient presents with     RECHECK     TNE     Height 1.76 m (5' 9.29\"), weight 76.2 kg (168 lb).    Cornelia Hawk, EMT  "
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
## 2020-01-17 NOTE — PATIENT INSTRUCTIONS
You were seen in the ENT Clinic today by Dr. Holliday  If you have any questions or concerns after your appointment, please call   -  ENT Clinic: [phone] scheduling option 1 and nurse advice option 3.  Clement Campoverde RN Nurse Coordinator. [phone]     -  Recommendations: If symptoms of cold take antibiotics and steroids and call to get in to be seen.   -  Please follow up with Dr. Holliday on 2/28/2020, obstain from eating 2 hrs prior to appointment        Thank you for choosing [facility] and allowing us to be apart of your care team!  Kori Garcia LPN
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
## 2020-01-24 ENCOUNTER — TELEPHONE (OUTPATIENT)
Dept: OTOLARYNGOLOGY | Facility: CLINIC | Age: 80
End: 2020-01-24

## 2020-01-24 NOTE — TELEPHONE ENCOUNTER
[facility] Call Center    Phone Message    May a detailed message be left on voicemail: yes    Reason for Call: Other: Pt calling in to speak with a provider. Pt stated something about his medication only being good for two weeks, the Clindamyacin. Pt is hard to understand. Please follow up       Action Taken: Message routed to:  [facility] ([facility] ENT

## 2020-01-24 NOTE — TELEPHONE ENCOUNTER
Left  for pt regarding medication question. Advised pt he was prescribed enough medication for 20 days as he has 1 refill and this is usually the amount of days an antibiotic is prescribed for.advised pt to take medication as prescribed and if symptoms are still occurring after, then he should reach out to provider's team for further recommendations. Call back number was provided on  in case of further questions or concerns.

## 2020-02-28 ENCOUNTER — OFFICE VISIT (OUTPATIENT)
Dept: OTOLARYNGOLOGY | Facility: CLINIC | Age: 80
End: 2020-02-28
Payer: MEDICARE

## 2020-02-28 VITALS — BODY MASS INDEX: 24.44 KG/M2 | HEIGHT: 69 IN | WEIGHT: 165 LBS

## 2020-02-28 DIAGNOSIS — R06.02 SOB (SHORTNESS OF BREATH): ICD-10-CM

## 2020-02-28 DIAGNOSIS — J38.02 BILATERAL VOCAL CORD PARALYSIS: ICD-10-CM

## 2020-02-28 DIAGNOSIS — R49.0 DYSPHONIA: Primary | ICD-10-CM

## 2020-02-28 RX ORDER — CLINDAMYCIN PALMITATE HYDROCHLORIDE 75 MG/5ML
300 SOLUTION ORAL 3 TIMES DAILY
Qty: 600 ML | Refills: 1 | Status: ON HOLD | OUTPATIENT
Start: 2020-02-28 | End: 2021-06-04

## 2020-02-28 RX ORDER — PREDNISONE 5 MG/ML
20 SOLUTION ORAL DAILY
Qty: 120 ML | Refills: 1 | Status: ON HOLD | OUTPATIENT
Start: 2020-02-28 | End: 2021-06-04

## 2020-02-28 ASSESSMENT — PAIN SCALES - GENERAL: PAINLEVEL: NO PAIN (0)

## 2020-02-28 ASSESSMENT — MIFFLIN-ST. JEOR: SCORE: 1458.82

## 2020-02-28 NOTE — PATIENT INSTRUCTIONS
Thank you for choosing  Physicians.    SAMUEL Eldridge [phone] appointment scheduling option 1 and nurse advice option 3.

## 2020-02-28 NOTE — NURSING NOTE
"Chief Complaint   Patient presents with     RECHECK     Follow up - TNE     Height 1.753 m (5' 9\"), weight 74.8 kg (165 lb).    Cornelia Hawk, EMT  "

## 2020-02-28 NOTE — LETTER
2/28/2020       RE: Hieu Hughes  [address] [facility] [address]     Dear Colleague,    Thank you for referring your patient, Hieu Hughes, to the [facility] VOICE at [facility]. Please see a copy of my visit note below.    [facility] VOICE [facility]INIC    Clinician: ILANA Thompson M.A. (music), CFY-SLP  Seen in conjunction with: Dr. Holliday  Patient: Hieu Hughes  Date of Visit: 2/28/2020    HISTORY  PATIENT INFORMATION  Hieu Hughes was seen for brief consultation today.  Please refer to Dr. Holliday s dictation for a more complete history and impressions.      Patient reports he has not had many difficulties breathing since his last visit. States he only has issues when he has a cold. Reviewed rescue breathing strategies pt was taught at his last ENT clinic visit to help with dyspnea.     Reports he is maintaining his weight. Reports more coughing with eating in the past few weeks. He has not seen the dietician.    Patient was scoped wit no change in laryngoscopy examination compared to last visit. Please see Dr. Holliday's note for further details.     Discussed options moving forward. Pt would like to proceed with close observations. Our clinic will plan to follow up with Mr. Hughes in 3 months.     No skilled service provided today.     DIAGNOSIS/REASON FOR REFERRAL  Dysphonia/Dyspnea/ Evaluate, perform laryngeal exam, treat as appropriate    No charge for today s session; charges will be billed at the completion of the evaluation  NO CHARGE FACILITY FEE (18454)    ICD-10 code (s): R49.0 (Dysphonia) R06.02 (Shortness of Breath)    ILANA Thompson M.A. (music), CFY-SLP  Speech Language Pathology Clinical Fellow  [facility] Trained Vocologist   [facility]   [phone]  [email or web address]
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
## 2020-03-03 NOTE — PROGRESS NOTES
[facility] VOICE CLINIC    Clinician: ILANA Thompson M.A. (music), CFY-SLP  Seen in conjunction with: Dr. Holliday  Patient: Hieu Hughes  Date of Visit: 2/28/2020    HISTORY  PATIENT INFORMATION  Hieu Hughes was seen for brief consultation today.  Please refer to Dr. Holliday s dictation for a more complete history and impressions.      Patient reports he has not had many difficulties breathing since his last visit. States he only has issues when he has a cold. Reviewed rescue breathing strategies pt was taught at his last ENT clinic visit to help with dyspnea.     Reports he is maintaining his weight. Reports more coughing with eating in the past few weeks. He has not seen the dietician.    Patient was scoped wit no change in laryngoscopy examination compared to last visit. Please see Dr. Holliday's note for further details.     Discussed options moving forward. Pt would like to proceed with close observations. Our clinic will plan to follow up with Mr. Hughes in 3 months.     No skilled service provided today.     DIAGNOSIS/REASON FOR REFERRAL  Dysphonia/Dyspnea/ Evaluate, perform laryngeal exam, treat as appropriate    No charge for today s session; charges will be billed at the completion of the evaluation  NO CHARGE FACILITY FEE (08007)    ICD-10 code (s): R49.0 (Dysphonia) R06.02 (Shortness of Breath)    ILANA Thompson M.A. (music), CFY-SLP  Speech Language Pathology Clinical Fellow  [facility] Trained Vocologist   [facility]   [phone]  [email or web address]

## 2020-03-16 ENCOUNTER — TELEPHONE (OUTPATIENT)
Dept: OTOLARYNGOLOGY | Facility: CLINIC | Age: 80
End: 2020-03-16

## 2020-03-16 NOTE — TELEPHONE ENCOUNTER
Spoke with patient. Swallowing is harder. He is worried if he gets sick and can't swallow what to do. Discussed that he needs to come to our emergency department  He asks if he should go ahead and have a feeding tube. Discussed that is a possibility but would require hospital admission + stay in the hospital.   Would be difficult to do near home given narrowed airway  He is 79 so would prefer to limit exposure to coronavirus so a hospital surgery and admission will defeat the purpose    Will discuss with PEG placement surgeon.   Will check in again in a few days.  His swallowing is now stable.   His breathing is safe  He is speaking in full sentences    Discussed need to stay home and wash hands at this time

## 2020-03-18 ENCOUNTER — TELEPHONE (OUTPATIENT)
Dept: OTOLARYNGOLOGY | Facility: CLINIC | Age: 80
End: 2020-03-18

## 2020-03-18 ENCOUNTER — TELEPHONE (OUTPATIENT)
Dept: ONCOLOGY | Facility: CLINIC | Age: 80
End: 2020-03-18

## 2020-03-18 NOTE — TELEPHONE ENCOUNTER
Pt called to inquire on PEG tube placement. I explained that currently, the hospital is only operating on an urgent bases to limit exposure to COVID-19. Pt stated understanding and explains that he is getting by for now; he has his groceries being delivered and is drinking equate shakes 2-3 times daily.     No breathing concerns. I have expressed to pt if breathing becomes an issue at any time he is to call 911 or present to his local emergency room. Pt stated understanding.     I will have my co-worker Kenia reach out next week.     Direct line given for additional questions/concerns.     Natice Schwab, RN BSN

## 2020-03-18 NOTE — TELEPHONE ENCOUNTER
Pt was advised by his PCP to self quarantine for 2 weeks. He called in to cancel the appt. Please review and advise.

## 2020-03-25 ENCOUNTER — TELEPHONE (OUTPATIENT)
Dept: SPEECH THERAPY | Facility: CLINIC | Age: 80
End: 2020-03-25

## 2020-03-25 ENCOUNTER — TELEPHONE (OUTPATIENT)
Dept: NURSING | Facility: CLINIC | Age: 80
End: 2020-03-25

## 2020-03-25 ENCOUNTER — TELEPHONE (OUTPATIENT)
Dept: OTOLARYNGOLOGY | Facility: CLINIC | Age: 80
End: 2020-03-25

## 2020-03-25 NOTE — TELEPHONE ENCOUNTER
"Called and spoke to pt per Dr. Holliday request due to report of increased difficulties swallowing. Pt stated he had increased difficulties swallowing last week which improved between Friday and Monday with use of strategies including head turn to right and modified supraglottic swallow strategy. Endorsed increased difficulties yesterday with swallowing and breathing. Stated breathing difficulties seem to mostly occur a few minutes after drinking liquids; stated walking around helps to resolve this. Endorsed \"mini\" belching after drinking liquids. Stated his swallow seems to be worse when he has phlegm in his throat but is improved if he can clear this. Endorsed phlegm has been present inconsistently for a number of months but is currently a white color or the color of his protein drinks. Stated he is concerned about losing more weight; reported he typically drinks 5-6 Essential shakes/day and today has been able to drink 1.5 shakes. Reviewed results of VFSS from 1/2020 and swallowing strategies with patient including head turn to right, supraglottic swallow, small sips, remain upright at least 60 min after drinking liquids, slow pace. Pt has follow-up phone visit with [employer] tomorrow to re-assess swallow/train swallowing strategies as well as train strategies for PVFM. Pt in agreement with plan.     Discussed telephone call with Dr. Holliday after speaking to patient and SLP is in agreement pt may need a feeding tube if he continues to have continued difficulties swallowing liquids. [employer] [facility] will further discuss with Dr. Holliday tomorrow after telephone visit with patient.       Paige Jefferson MA, CCC-SLP  "

## 2020-03-25 NOTE — TELEPHONE ENCOUNTER
Patient complains of increased difficulty with swallowing protein supplement (shakes) causing shortness of breath, yesterday was able to only tolerate 1 shake, unable to drink water and take medications.  Son is with patient at this time denies shortness of breath at this time.  Instructed patient to wait for call from writer or once writer consults with MD Dr Holliday.  Patient gave verbal understanding.  Dr Holliday contacted and will contact patient.  Dafne Parekh RN

## 2020-03-25 NOTE — TELEPHONE ENCOUNTER
Spoke with patient  Reports new and more trouble with food  When he sits down to eat he has anxiety about food  After he swallows he has SOB  This resolves once he walks around  He went to the ER twice - he was discharged both times because his breathing was fine at that point.  He reports he initially started holding his breath with eating and that helped  He lost weight  He is worried about the next plan    Discussed with SLP team and will start therapy for boh swallow and PVFM. I do believe he has an anxiety element which exacerbates his breathing around food and given that he has bilateral vocal fold paralysis with limitation of airway this creates SOB. I do worry about his ability to keep his weight. Will need close monitoring and if he still cannot take any liquid will need a feeding tube.

## 2020-03-25 NOTE — TELEPHONE ENCOUNTER
.  [facility]: Nurse Triage Note  SITUATION/BACKGROUND                                                      Hieu Hughes is a 79 year old male who calls to report having problems with swallowing over the past weekend. Hx of CA. Inquired about PEG insertion.   Denies any breathing problems at this time. Able to swallow his saliva.   This nurse contacted ENT and warm transfer given to Dafne Parekh (SAMUEL) for further assistance.

## 2020-03-26 ENCOUNTER — VIRTUAL VISIT (OUTPATIENT)
Dept: OTOLARYNGOLOGY | Facility: CLINIC | Age: 80
End: 2020-03-26
Payer: COMMERCIAL

## 2020-03-26 ENCOUNTER — TELEPHONE (OUTPATIENT)
Dept: OTOLARYNGOLOGY | Facility: CLINIC | Age: 80
End: 2020-03-26

## 2020-03-26 DIAGNOSIS — J38.02 BILATERAL VOCAL CORD PARALYSIS: ICD-10-CM

## 2020-03-26 DIAGNOSIS — E46 PROTEIN-CALORIE MALNUTRITION, UNSPECIFIED SEVERITY (H): ICD-10-CM

## 2020-03-26 DIAGNOSIS — R49.0 DYSPHONIA: ICD-10-CM

## 2020-03-26 DIAGNOSIS — J38.02 BILATERAL VOCAL FOLD PARALYSIS: ICD-10-CM

## 2020-03-26 DIAGNOSIS — R13.12 OROPHARYNGEAL DYSPHAGIA: Primary | ICD-10-CM

## 2020-03-26 DIAGNOSIS — R13.14 PHARYNGOESOPHAGEAL DYSPHAGIA: Primary | ICD-10-CM

## 2020-03-26 NOTE — TELEPHONE ENCOUNTER
[facility] Call Center    Phone Message    May a detailed message be left on voicemail: yes     Reason for Call: Other:     Hieu is calling in asking for Dr Holliday to call him back.     His family dr at the [facility] they can put in a feeding tube for him.     Please call Hieu back to discuss.       Action Taken: Message routed to:  [facility] ([facility] ENT    Travel Screening: Not Applicable

## 2020-03-26 NOTE — PROGRESS NOTES
"Hieu Hughes is a 79 year old male who is being evaluated via a billable telephone visit.      The patient has been notified of following:     \"This telephone visit will be conducted via a call between you and your physician/provider. We have found that certain health care needs can be provided without the need for a physical exam.  This service lets us provide the care you need with a short phone conversation.  If a prescription is necessary we can send it directly to your pharmacy.  If lab work is needed we can place an order for that and you can then stop by our lab to have the test done at a later time.    If during the course of the call the physician/provider feels a telephone visit is not appropriate, you will not be charged for this service.\"     Hieu Hughes complains of    Chief Complaint   Patient presents with     Dysphagia   H/o Right BOT SCC s/p CRT 2009  Bilateral vocal fold paralysis  Dysphagia    I have reviewed and updated the patient's Past Medical History, Social History, Family History and Medication List.    ALLERGIES  Mold; Molds & smuts; No clinical screening - see comments; Aspirin; and Penicillins    Additional provider notes: Mr. Hughes is a 79 year old male is being followed for dyspnea and dysphagia in the setting of h/o right BOT SCC s/p CRT 2009 with bilateral vocal fold paralysis with worsening in breathing during episodes of URI. He was initially evaluated on 1/9/2020 and there was consideration for placement of a tracheotomy. However since there was improvement in his breathing symptoms he was monitored closely and expectantly instead. He was then re-evaluated on 1/17/20 and he underwent a TNE at the time which showed stasis of liquid in the stomach. He was then seen on 2/28/20 at which point he was keeping his weight and tolerating a full liquid diet.    Over the past week he has been having more trouble swallowing and he feels SOB after eating. This happened twice and prompted him " to have 2 ER visits. Symptoms resolve with walking around. He has been worried about eating now. He lost close to 10lbs this week. Yesterday he was able to take 5 of his 6 cans. He also talked to me and SLP.     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  - alternatively, can continue expectant management or temporary NGtube placement in clinic    Dyspnea  - has long standing history of bilateral vocal fold paralysis  - tolerated MAC anesthesia for EGD in 2019  - was intubated with cmac in 2013 for esophageal dilation   - breathing is stable and episodes of dyspnea are due to anxiety + PVFM  - PVFM therapy today with SLP    Phone call duration: 10 minutes    Kaykay Holliday MD    of Otolaryngology - Head and Neck Surgery   Voice, Airway, and Swallowing Disorders   [facility] at the [facility]     Clinics & Surgery [address] [address]  Phone: [phone]  Fax: [phone]     [address] [address]  Phone: [phone]  Fax: [phone]

## 2020-03-26 NOTE — TELEPHONE ENCOUNTER
Spoke with dietician - able to initiate feedings at home, will take a few days for all supplies to arrive    Spoke with patient - ate 5 of 6 required cans yesterday. No breathing difficulty. Worried about how long meals take. Reported that his PCP could arrange for PEG tube at [facility]      Spoke with PCP & general surgeon at [facility]. Will fax our notes with pictures. Needs clearance from anesthesia. Awaiting anesthesia call.     Updated patient on progress. Has SLP therapy set up today for breathing as well.

## 2020-03-26 NOTE — PROGRESS NOTES
"Hieu Hughes is a 79 year old male who is being evaluated via a billable Telephone visit.      The patient has been notified and verbally consented to the following:     \"This Telephone visit will be conducted between you and your provider.\"     \"Patient has opted to conduct today's visit via telephone vs an in-person appointment, and is not able to attend due to possible exposure to COVID-19\"    If during the course of the call the provider feels a Telephone visit is not appropriate, you will not be charged for this service.\"     Call Initiated at: 1319  Called Ended at: 1330      [facility]  TELEPHONE VISIT    Patient: Hieu Hughes  Date of Service: 3/26/2020  Referring physician: Dr. Holliday    SUBJECTIVE:  Since the patient's last session, they report the following:   Overall symptoms are better  Able to drink his shakes with minimal difficulty  Using swallowing strategies Paigescarlet Jefferson, SLP called and went over with him; this is helpful  When he does feel short of breath, he walks outside to his deck and tries to relax and breathe deeply   Has a consult soon about potentially getting a PEG; Dr. Holliday is helping to coordinate this      THERAPEUTIC ACTIVITIES    Counseling and Education:  Asked many questions about the nature of his symptoms, and I answered all of these thoroughly.    Reviewed safe and compensatory swallowing strategies:    Head turn to the right when swallowing    Supraglottic swallow    Small sips    Remain upright during PO intake and for at least 60 minutes after    Slow pace      Reviewed paradoxical vocal fold motion breathing strategies:    Rescue breathing strategies and rationale/importance of their use    Breathing in through rounded lips and out with a \"sh\"    Breathing in through the nose and out with \"sh\"    Repeated sniffs/inhales through rounded lips and out with a \"sh\"    Low abdominal breathing, thinking about breath going all the way down to the belly button     Slow " inhalation and exhalation    Trying to stay relaxed during episodes       A regimen for home practice was instructed.      ASSESSMENT/PLAN  PROGRESS TOWARD LONG TERM GOALS:   Adequate progress; please see above    IMPRESSIONS: R49.0 (Dysphonia) and R13.12 (Moderately Severe Oropharyngeal Dysphagia)  in the context of J38.02 (Bilateral vocal cord paralysis) and hx of chemoXRT for a BOT cancer ~10 years ago. Mr. Hughes was able to independently recite safe and compensatory swallowing strategies, as well as PVFM breathing strategies. He stated he was eager to learn more about potentially getting a PEG since he has lost additional weight this week. Encouraged pt to continue using strategies. Informed him Dr. Holliday would be in touch. Encouraged pt to call with further questions.     PLAN: I will see Mr. Hughes as Dr. Holliday deems necessary moving forward pending PEG placement.   For practice goals see AVS.             Certification:           Certification date from: 01/10/20           Certification date to: 04/09/20                                                                                                 TOTAL SERVICE TIME: 11 minutes  Call Initiated at: 1319  Called Ended at: 1330            CPT Billing Codes:   NO CHARGE FACILITY FEE (60502)    Telephone Visit CPT Code:  52746 11-20 minutes of medical discussion        ILANA Thompson ([email or web address]), M.A., CFY-SLP  Speech Language Pathology Clinical Fellow  [facility] Trained Vocologist   [facility] Voice [facility]   [phone]  [email or web address]      *This report was created in part through the use of computerized dictation software, and though reviewed following completion, some typographic errors may persist.  If there is confusion regarding any of this notes contents, please contact me for clarification.

## 2020-04-02 ENCOUNTER — VIRTUAL VISIT (OUTPATIENT)
Dept: ONCOLOGY | Facility: CLINIC | Age: 80
End: 2020-04-02
Attending: OTOLARYNGOLOGY
Payer: COMMERCIAL

## 2020-04-02 ENCOUNTER — VIRTUAL VISIT (OUTPATIENT)
Dept: OTOLARYNGOLOGY | Facility: CLINIC | Age: 80
End: 2020-04-02
Payer: COMMERCIAL

## 2020-04-02 DIAGNOSIS — C01 CANCER OF BASE OF TONGUE (H): ICD-10-CM

## 2020-04-02 DIAGNOSIS — R13.12 OROPHARYNGEAL DYSPHAGIA: Primary | ICD-10-CM

## 2020-04-02 DIAGNOSIS — R13.10 DYSPHAGIA: ICD-10-CM

## 2020-04-02 DIAGNOSIS — E46 PROTEIN-CALORIE MALNUTRITION, UNSPECIFIED SEVERITY (H): ICD-10-CM

## 2020-04-02 DIAGNOSIS — C01 CANCER OF BASE OF TONGUE (H): Primary | ICD-10-CM

## 2020-04-02 PROCEDURE — 97802 MEDICAL NUTRITION INDIV IN: CPT | Mod: 95,ZF | Performed by: DIETITIAN, REGISTERED
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
## 2020-04-02 NOTE — PROGRESS NOTES
"Hieu Hughes is a 79 year old male who is being evaluated via a billable telephone visit.      The patient has been notified of following:     \"This telephone visit will be conducted via a call between you and your physician/provider. We have found that certain health care needs can be provided without the need for a physical exam.  This service lets us provide the care you need with a short phone conversation.  If a prescription is necessary we can send it directly to your pharmacy.  If lab work is needed we can place an order for that and you can then stop by our lab to have the test done at a later time.    If during the course of the call the physician/provider feels a telephone visit is not appropriate, you will not be charged for this service.\"     Patient has given verbal consent for Telephone visit?  Yes    Hieu Hughes complains of    Chief Complaint   Patient presents with     Dysphagia       I have reviewed and updated the patient's Past Medical History, Social History, Family History and Medication List.    ALLERGIES  Mold; Molds & smuts; No clinical screening - see comments; Aspirin; and Penicillins    Additional provider notes:  Mr. Hughes is a 79 year old male is being followed for dyspnea and dysphagia in the setting of h/o right BOT SCC s/p CRT 2009 with bilateral vocal fold paralysis with worsening in breathing during episodes of URI. He was initially evaluated on 1/9/2020 and there was consideration for placement of a tracheotomy. However since there was improvement in his breathing symptoms he was monitored closely and expectantly instead. He was then re-evaluated on 1/17/20 and he underwent a TNE at the time which showed stasis of liquid in the stomach. He was then seen on 2/28/20 at which point he was keeping his weight and tolerating a full liquid diet.     The last evaluation happened on 3/26/20 at which point he reported more trouble swallowing which resulted in a 10lb weight loss and dyspnea " with 2 visits to the emergency department. After discussion with his primary care doctor and the general surgeons near his home the decision was made to proceed with PEG placement. He had this placed on Tuesday. He has had water through it and will start feeds today. He was told to use the boost he was using for PO food. He is wondering if he can get isosource since he had that with his initial PEG years ago and he tolerated it well.    He reports his breathing is improved.He still has mucus but he is able to clear it out and thus breath. No episodes of dyspnea.     On exam: breathing comfortably, no stridor.     Assessment/Plan:  Dysphagia   - radiation induced dysphagia from history of BOT SCC s/p CRT in 2009  - he had a PEG at the time but was removed shortly thereafter  - he has been tolerating a full liquid diet until recently, however, now even with speech and language swallow therapy interventions he has not been able to keep his weight and has had episodes of dyspnea  - therefore he is s/p PEG placement on 3/31/20 and needs 100% of his nutrition enterally. He will need this going forward without any hope of reversing his dysphagia and tolerating a PO diet.    Dyspnea   - has long standing history of bilateral vocal fold paralysis  - tolerated MAC anesthesia for PEG placement on 3/31/20 per his report  - he was also intubated with cmac in 2013 for esophageal dilation   - breathing is stable and episodes of dyspnea are due to anxiety + PVFM from PO diet  - now that is improved since no PO diet    Phone call duration: 6 minutes    Kaykay Holliday MD    of Otolaryngology - Head and Neck Surgery   Voice, Airway, and Swallowing Disorders   [facility] at the [facility] [address] [address]  Phone: [phone]  Fax: [phone]     [address] [address]  Phone:  [phone]  Fax: [phone]

## 2020-04-13 ENCOUNTER — VIRTUAL VISIT (OUTPATIENT)
Dept: ONCOLOGY | Facility: CLINIC | Age: 80
End: 2020-04-13
Attending: DIETITIAN, REGISTERED
Payer: COMMERCIAL

## 2020-04-13 DIAGNOSIS — C01 CANCER OF BASE OF TONGUE (H): Primary | ICD-10-CM

## 2020-04-13 DIAGNOSIS — R13.12 OROPHARYNGEAL DYSPHAGIA: ICD-10-CM

## 2020-04-13 PROCEDURE — 97803 MED NUTRITION INDIV SUBSEQ: CPT | Mod: 95,GZ | Performed by: DIETITIAN, REGISTERED

## 2020-04-13 NOTE — PROGRESS NOTES
"Hieu Hughes is a 79 year old male who is being evaluated via a billable telephone visit.      The patient has been notified of following:     \"This telephone visit will be conducted via a call between you and your physician/provider. We have found that certain health care needs can be provided without the need for a physical exam.  This service lets us provide the care you need with a short phone conversation.  If a prescription is necessary we can send it directly to your pharmacy.  If lab work is needed we can place an order for that and you can then stop by our lab to have the test done at a later time.    Telephone visits are billed at different rates depending on your insurance coverage. During this emergency period, for some insurers they may be billed the same as an in-person visit.  Please reach out to your insurance provider with any questions.    CLINICAL NUTRITION SERVICES - REASSESSMENT NOTE   EVALUATION OF PREVIOUS PLAN OF CARE:   Referring Physician: Mg  Time spent with patient: 15 minutes.  PEG Tube: Yes, placed 3/31 in [place]      Monitoring from previous assessment:   -EN intake - Hieu has been taking 6 cartons of Isosource 1.5 hira/day.   He will also occasionally take 1 cartons of Equate brand shake.    He reports good tolerance.  He denies gas, bloating, nausea, diarrhea.   He takes 2 cartons TID via syringe feedings.   Formula: Isosource 1.5 hira  Volume: 6 cartons/day (1500mL, 1140ml free water)  Provisions:  2250kcal (35kcal/kg), 102g protein (1.5g/kg), 264g CHO, 22g fiber  -Weight trends - he reports weight stability, but has not been to clinic to weigh.      Previous Goals:   1.  EN to meet 100% of nutrition needs  2. Weight maintenance/weight gain towards 170 lbs per pt desire  Evaluation: Met   Previous Nutrition Diagnosis:   Inadequate oral intake related to dysphagia as evidenced by~10 lb wt loss x past 1-2 weeks.  Evaluation: Improving   NEW FINDINGS:   PEG tube dependent since 4/1 "   CURRENT NUTRITION DIAGNOSIS   Inadequate oral intake related to dysphagia as evidenced by pt dependent upon EN via PEG tube to meet 100% of estimated nutrition needs.    INTERVENTIONS   EN Composition, EN Schedule and Feeding Tube Flush - reviewed current regimen.  Will remain the same per Hieu's preference.  Reviewed s/sx to watch for for intolerance   Goals  1.  EN to meet 100% of nutrition needs  2. Weight maintenance/weight gain towards 170 lbs per pt desire        Follow-Up Plans: Pt has RD contact information for questions.    Patient followed by [facility] - Dietitians Cierra and/or Ez    MONITORING AND EVALUATION:  -EN intake/tolerance  -Weight trends     Madison Bejarano RDN, LD

## 2020-07-03 ENCOUNTER — TELEPHONE (OUTPATIENT)
Dept: OTOLARYNGOLOGY | Facility: CLINIC | Age: 80
End: 2020-07-03

## 2020-07-03 NOTE — TELEPHONE ENCOUNTER
Doing well after PEG  He has gained 13-15 lbs  Adding 1lb every 3-4/days  Taking some liquids by mouth    No further breathing problem  Can't run pretty far  Walking 2 miles a day  Had 1-2 minor episodes that lasts for 5 minutes in the last 2 months    September 17th - 10AM follow up appt   Comes from [place]
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
## 2020-09-17 ENCOUNTER — DOCUMENTATION ONLY (OUTPATIENT)
Dept: OTOLARYNGOLOGY | Facility: CLINIC | Age: 80
End: 2020-09-17

## 2020-09-17 ENCOUNTER — OFFICE VISIT (OUTPATIENT)
Dept: OTOLARYNGOLOGY | Facility: CLINIC | Age: 80
End: 2020-09-17
Payer: COMMERCIAL

## 2020-09-17 ENCOUNTER — ALLIED HEALTH/NURSE VISIT (OUTPATIENT)
Dept: SPEECH THERAPY | Facility: CLINIC | Age: 80
End: 2020-09-17

## 2020-09-17 VITALS
BODY MASS INDEX: 24.88 KG/M2 | OXYGEN SATURATION: 96 % | HEART RATE: 69 BPM | HEIGHT: 69 IN | TEMPERATURE: 98.8 F | WEIGHT: 168 LBS

## 2020-09-17 DIAGNOSIS — J38.02 BILATERAL VOCAL CORD PARALYSIS: ICD-10-CM

## 2020-09-17 DIAGNOSIS — R13.12 OROPHARYNGEAL DYSPHAGIA: Primary | ICD-10-CM

## 2020-09-17 ASSESSMENT — PAIN SCALES - GENERAL: PAINLEVEL: NO PAIN (0)

## 2020-09-17 ASSESSMENT — MIFFLIN-ST. JEOR: SCORE: 1467.42

## 2020-09-17 NOTE — PROGRESS NOTES
Pt seen for brief allied health visit today per MD request. States he has gained 15+lbs since getting his PEG. He only has a few episodes of breathing difficulty now that never last longer than 10 seconds. He is pleased with this. He is taking coke and water by mouth during the day, but nothing else PO. This clinician helped Dr. Holliday with the scope examination. No skilled service provided today.

## 2020-09-17 NOTE — NURSING NOTE
"Chief Complaint   Patient presents with     RECHECK     Follow up       Pulse 69, temperature 98.8  F (37.1  C), temperature source Temporal, height 1.753 m (5' 9\"), weight 76.2 kg (168 lb), SpO2 96 %.    Cornelia Hawk, EMT  "
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
## 2020-09-17 NOTE — Clinical Note
Hi  Can you help write a dietician referral and send it to his PCP who he is seeing in 2 weeks  Put in comments to help optimize his hydration with PEG  Thank you  Kaykay

## 2021-03-23 ENCOUNTER — TELEPHONE (OUTPATIENT)
Dept: OTOLARYNGOLOGY | Facility: CLINIC | Age: 81
End: 2021-03-23

## 2021-03-23 NOTE — TELEPHONE ENCOUNTER
Spoke to patient to reschedule appointment from last week. He had forgotten about the appointment. Patient rescheduled for end of April and given the clinic number to call back if needing to change the appointment.    Cornelia Hawk, EMT

## 2021-04-13 ENCOUNTER — TELEPHONE (OUTPATIENT)
Dept: OTOLARYNGOLOGY | Facility: CLINIC | Age: 81
End: 2021-04-13

## 2021-04-13 NOTE — TELEPHONE ENCOUNTER
[facility] Call Center    Phone Message    May a detailed message be left on voicemail: yes     Reason for Call: Other: Edilia from [facility] calling in to check on the letter of medical necessity sent on 04/01/21 please call back to update      Action Taken: Message routed to:  [facility] ([facility] ENT     Travel Screening: Not Applicable

## 2021-04-14 NOTE — TELEPHONE ENCOUNTER
Left vm message for Edilia carreon [place] in regards to the letter of medical necessity and to get more information as to why it is needed. Writers call back number provided on voicemail.

## 2021-04-16 ENCOUNTER — TELEPHONE (OUTPATIENT)
Dept: OTOLARYNGOLOGY | Facility: CLINIC | Age: 81
End: 2021-04-16

## 2021-04-16 NOTE — TELEPHONE ENCOUNTER
[facility] Call Center    Phone Message    May a detailed message be left on voicemail: yes     Reason for Call: Other:   Edilia is returning Mai's phone call. Edilia says that Mai stated that the form has not been received. Edilia is refaxing the form to the clinic fax number (# was verified). Please keep an eye out for that.     Edilia says that this is a medical necessity form for the ISOSOURCE 1.5 and the SYRINGE supply that Mg ordered. There was a medical necessity form that was filled out on 2020 but it is only good for 1 year and has . Form would need to be completed and sent back in order for [facility] to continue shipment to pt.     [facility] fax #: [phone]     Please follow-up.     Action Taken: Other:      Travel Screening: Not Applicable

## 2021-04-16 NOTE — TELEPHONE ENCOUNTER
Left vm page [place] advising that form needs to be completed by the patients dietician who ordered these supplies back in April of 2020. Name of Dietician, Madison Bejarano and her dept fax number were provided on [employer]. Writers direct dial number also provided in event of further questions.

## 2021-04-21 ENCOUNTER — DOCUMENTATION ONLY (OUTPATIENT)
Dept: ONCOLOGY | Facility: CLINIC | Age: 81
End: 2021-04-21

## 2021-04-21 NOTE — PROGRESS NOTES
Nutrition services EN update:    Received message from [facility] regarding renewal of EN for Hieu.    Patient remains on EN via PEG tube for 100% of his estimated nutrition needs due to oropharyngeal dysphagia from cancer at base of tongue and cancer treatment.       Previous EN regimen via PEG tube from 1 year ago 4/2/2020:  Formula: Isource 1.5 hira  Volume: 6 cartons/day  MOA: Gravity syringe/bolus - 2 cartons TID, 60ml water flush before and after each feeding with add'l water flushes throughout the day.     Patient remains on 6 cartons of Isosource 1.5 hira formula via PEG tube due to dysphagia from previous head/neck cancer treatment     Past medical history:  - radiation induced dysphagia from history of BOT SCC s/p CRT in 2009  - with speech and language swallow therapy interventions he has not been able to keep his weight and has had episodes of dyspnea  - therefore he is s/p PEG placement on 3/31/20 and needs 100% of his nutrition enterally. He will need this going forward without any hope of reversing his dysphagia and tolerating a PO diet.     With inability to take PO due to dysphagia, recommend pt continue to rely on EN to meet 100% of his nutrition needs.  He will be dependent upon EN indefinitely or until perish.    Recommendations:  Renewal of Enteral Nutrition through [facility]:  Formula: Isosource 1.5 hira  Volume: 6 cartons/day (1500mL, 1140ml free water)  Provisions:  2250kcal (30kcal/kg), 102g protein (1.3g/kg), 264g CHO, 22g fiber  MOA: gravity syringe AND gravity bag/bolus 2 cartons TID  Dosing wt: 75kg     Interventions:   Enteral Rx request form for letter of medical necessity completed and emailed to Santos Bejarano RD, [facility]  [phone]
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
## 2021-04-29 ENCOUNTER — OFFICE VISIT (OUTPATIENT)
Dept: OTOLARYNGOLOGY | Facility: CLINIC | Age: 81
End: 2021-04-29
Payer: COMMERCIAL

## 2021-04-29 ENCOUNTER — THERAPY VISIT (OUTPATIENT)
Dept: SPEECH THERAPY | Facility: CLINIC | Age: 81
End: 2021-04-29
Payer: COMMERCIAL

## 2021-04-29 VITALS
HEIGHT: 69 IN | OXYGEN SATURATION: 94 % | BODY MASS INDEX: 25.33 KG/M2 | HEART RATE: 60 BPM | WEIGHT: 171 LBS | TEMPERATURE: 99 F

## 2021-04-29 DIAGNOSIS — J38.02 BILATERAL VOCAL CORD PARALYSIS: Primary | ICD-10-CM

## 2021-04-29 DIAGNOSIS — J38.02 BILATERAL VOCAL CORD PARALYSIS: ICD-10-CM

## 2021-04-29 DIAGNOSIS — R49.0 DYSPHONIA: Primary | ICD-10-CM

## 2021-04-29 DIAGNOSIS — R06.02 SHORTNESS OF BREATH: ICD-10-CM

## 2021-04-29 PROCEDURE — 92524 BEHAVRAL QUALIT ANALYS VOICE: CPT | Mod: GN | Performed by: SPEECH-LANGUAGE PATHOLOGIST

## 2021-04-29 PROCEDURE — 99214 OFFICE O/P EST MOD 30 MIN: CPT | Mod: 25 | Performed by: OTOLARYNGOLOGY

## 2021-04-29 PROCEDURE — 31575 DIAGNOSTIC LARYNGOSCOPY: CPT | Performed by: OTOLARYNGOLOGY

## 2021-04-29 ASSESSMENT — PAIN SCALES - GENERAL: PAINLEVEL: NO PAIN (0)

## 2021-04-29 ASSESSMENT — MIFFLIN-ST. JEOR: SCORE: 1481.03

## 2021-04-29 NOTE — NURSING NOTE
"Chief Complaint   Patient presents with     RECHECK     scope with swallow SLP       Pulse 60, temperature 99  F (37.2  C), temperature source Temporal, height 1.753 m (5' 9\"), weight 77.6 kg (171 lb), SpO2 94 %.    Ani Wilcox, EMT    "
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
## 2021-04-29 NOTE — PATIENT INSTRUCTIONS
1.  You were seen in the ENT Clinic today by . If you have any questions or concerns after your appointment, please call [phone]. Press option #1 for scheduling related needs. Press option #3 for Nurse advice.    2.   has recommended  the following:   - Follow up with SAMUEL Hewitt when you decide if you want to proceed with trach.    3.  Plan is to return to clinic dependent on above.      Mai Martínez LPN  [phone]  [facility] - Otolaryngology

## 2021-04-30 ENCOUNTER — PATIENT OUTREACH (OUTPATIENT)
Dept: OTOLARYNGOLOGY | Facility: CLINIC | Age: 81
End: 2021-04-30

## 2021-04-30 NOTE — PROGRESS NOTES
Returned patient call. Patient wanted to clarify instructions / what was talked about at appointment yesterday.    Discussed potential tracheostomy. Need for hospital stay following placement and need to have a family member stay with patient as well.    Patient would like to discuss with family/friends and confirm when someone can stay with him. Patient will call writer back when ready to schedule surgery.    Marcelina Chávez RN on 4/30/2021 at 12:25 PM

## 2021-05-06 NOTE — PROGRESS NOTES
Rehabilitation Services          OUTPATIENT SPEECH LANGUAGE PATHOLOGY VOICE EVALUATION  PLAN OF TREATMENT FOR OUTPATIENT REHABILITATION  (COMPLETE FOR INITIAL CLAIMS ONLY)    Patient's Last Name, First Name, M.I.  YOB: 1940  Hieu Hughes                        Provider s Name: MAXIMUS Thompson Medical Record No.  [id number]     Onset Date:      Start of Care Date: 04/29/21   Type:     ___PT  __OT   _X_SLP    Medical Diagnosis: Dyspnea and Dysphonia in the setting of bilateral vocal fold paralysis   Speech Language Pathology Diagnosis:  Dyspnea and Dysphonia in the setting of bilateral vocal fold paralysis    Visits from SOC: 1      _________________________________________________________________________________  Plan of Treatment/Functional Goals:  Voice         Goals     1. Goal Identifier: Tracheostomy Education       Goal Description: 1.  Patient will demonstrate and verbalize understanding of preop education regarding tracheostomy placement and functional/anatomical changes in voice, breathing, and swallowing.       Target Date: 07/28/21      Frequency and Duration: 2x/month for 6 months  MAXIMUS Thompson       I CERTIFY THE NEED FOR THESE SERVICES FURNISHED UNDER        THIS PLAN OF TREATMENT AND WHILE UNDER MY CARE     (Physician attestation of this document indicates review and certification of the therapy plan).                Certification Date From:  04/29/21  Certification Date To:  07/28/21    Referring Physician: Dr. Kaykay Holliday            Initial Assessment        See Epic Evaluation Start of Care 04/29/21

## 2021-05-06 NOTE — PROGRESS NOTES
"     Speech-Language Pathology Department   EVALUATION  [facility]  Voice/Breathing Evaluation Under Endoscopy Completed by MD    04/29/21 1200   General Information   Type Of Visit Initial   Start Of Care Date 04/29/21   Referring Physician Dr. 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  Consideration of tracheotomy was deferred given improvement in his breathing symptoms.  Patient elected to monitor closely.  He underwent PEG placement on 3/31/2020 and takes minimal p.o.  He presents today in conjunction with ENT clinic visit and is seen per MD request.  Today he reports since his last visit he said multiple ED and urgent care visits for dyspnea.  Reports as the weather started to warm up his breathing got really bad again and he was making a lot of \"noises\" when he was breathing.  He had multiple work-ups done for his lungs and his heart which were normal.  Reports they finally put him on Robitussin and albuterol inhaler 3-4 times a day and this is significantly helped.  He has not had any breathing episodes in the last 10 days.   Prior Level of Functioning Same problem relapsing/remitting.;Previous therapy for this problem.   General Observations Pt is pleasant and cooperative throughout evaluation   Patient/family Goals To breathe easier   Voice Profile during conversation, 1 min monologue and paragraph reading "   Voice Quality Rough;Breathy   Voice quality comments Moderate, consistent roughness; minimal breathiness   Voice quality severity rating continuum (1=Severe, 7=WNL) 3-4   Breath control Irregular   Breath Control comments Poor coordination of respiration and phonation   Breath control severity rating continuum (1=Severe, 7=WNL) 4   Voice Use / Effort WNL   Pitch /Frequency Description WNL   Volume comments WNL   Neuromuscular Control WNL   Resonance Hypernasal   Resonance severity rating continuum (1=Severe, 7=WNL) Marked, consistent hypernasality   Resonance comments 3   Videostroboscopy / Endoscopy   Tissue description Dry tissue surface   Anatomical description Abnormal    Laryngeal movements Immobile right;Immobile left  (bilateral vocal fold paralysis)   Vocal fold edges Smooth   Other observations Rescue breathing/ABduction probes not helpful    General Therapy Interventions   Planned Therapy Interventions Voice   Voice   (Sally-op tracheostomy education)   Impressions and Recommendations   Communication Diagnosis Dyspnea and Dysphonia in the setting of bilateral vocal fold paralysis   Summary Pt presents today with Dyspnea and Dysphonia in the setting of bilateral vocal fold paralysis. Perceptually, patient's voice is marked by moderate consistent roughness and marked hypernasality.  Intermittent, mild inspiratory stridor is appreciated.  Patient is noted to cough a few times during initial interview which appeared to trigger a PVFM episode.  Laryngoscopy completed by MD today reveals no pooling of secretions.  However, there is minimal to mild thick yellow mucus in the oropharynx.  Patient continues to demonstrate bilateral vocal fold paralysis with minimal glottic opening.  Rescue breathing/abduction probes were not effective in increasing glottic opening during inspiration.  Discussed with patient and MD regarding increase in dyspnea episodes due to bilateral vocal fold paralysis and PVFM episodes. MD  states the next step is to consider tracheostomy.  With MD trained patient on general anatomical and functional changes status post tracheostomy in regards to swallow, voice, and breathing function.  Patient asked many questions all of which were within scope of practice and answered to the patient satisfaction.  Questions that were not within scope of practice were deferred to MD.  Patient states he would like to think about whether he wants to proceed with a tracheostomy.  Provided patient with clinician contact info should he have further questions or concerns.   Recommendations Patient will benefit from a skilled course of speech therapy in the perioperative period should he wish to proceed with a tracheostomy to provide further education regarding functional and anatomical changes, voice/swallow function, and daily care and maintenance postop.   Frequency and Duration 2x/month for 6 months   Prognosis  Fair due to pending medical interventions   Prognosis comments Pending tracheostomy placement   Risks and Benefits of Treatment have been explained. Yes   Patient & /or Caregiver  in agreement with plan of care Yes   Educational Assessment   Barriers to Learning No barriers   Voice Goals   Voice Goals 1   Voice Goal 1   Goal Identifier Tracheostomy Education   Goal Description 1.  Patient will demonstrate and verbalize understanding of preop education regarding tracheostomy placement and functional/anatomical changes in voice, breathing, and swallowing.   Target Date 07/28/21   Total Session Time   Voice Minutes (71027) 25   Total Evaluation Time 25   Therapy Certification   Certification date from 04/29/21   Certification date to 07/28/21   Medical Diagnosis Dyspnea and Dysphonia in the setting of bilateral vocal fold paralysis   Certification I certify the need for these services furnished under this plan of treatment and while under my care.  (Physician co-signature of this document indicates review and  certification of the therapy plan).     Thank you for the referral of Hieu Hughes. If you have any questions about this report, please contact me using the information below.     ILANA Thompson MA (music), CCC-SLP   Speech Language Pathologist  [place] Trained Vocologist   [facility]  Dept. of Otolaryngology  Department of Rehabilitation Services  [address] [place] [id number]  Email: [email or web address]   Pronouns: she/her/hers

## 2021-05-17 NOTE — PROGRESS NOTES
Returned patient call regarding scheduling a tracheostomy placement surgery    Left direct line for call back

## 2021-05-18 ENCOUNTER — PREP FOR PROCEDURE (OUTPATIENT)
Dept: OTOLARYNGOLOGY | Facility: CLINIC | Age: 81
End: 2021-05-18

## 2021-05-18 DIAGNOSIS — J38.02 BILATERAL VOCAL FOLD PARALYSIS: Primary | ICD-10-CM

## 2021-05-18 RX ORDER — CLINDAMYCIN PHOSPHATE 900 MG/50ML
900 INJECTION, SOLUTION INTRAVENOUS SEE ADMIN INSTRUCTIONS
Status: CANCELLED | OUTPATIENT
Start: 2021-05-18

## 2021-05-18 RX ORDER — CLINDAMYCIN PHOSPHATE 900 MG/50ML
900 INJECTION, SOLUTION INTRAVENOUS
Status: CANCELLED | OUTPATIENT
Start: 2021-05-18

## 2021-05-18 NOTE — PROGRESS NOTES
Spoke to patient regarding tracheostomy. Patient would like to get this scheduled. Is thinking June, his daughter plans to come in to be with him for after the surgery.     Informed patient that I will let Dr. Holliday know he is ready and we will begin the process. Once the request is in, our surgery scheduler will reach out to finalize the date.    Marcelina Chávez RN on 5/18/2021 at 11:54 AM

## 2021-05-19 ENCOUNTER — PREP FOR PROCEDURE (OUTPATIENT)
Dept: OTOLARYNGOLOGY | Facility: CLINIC | Age: 81
End: 2021-05-19

## 2021-05-20 ENCOUNTER — TELEPHONE (OUTPATIENT)
Dept: OTOLARYNGOLOGY | Facility: CLINIC | Age: 81
End: 2021-05-20

## 2021-05-20 DIAGNOSIS — Z11.59 ENCOUNTER FOR SCREENING FOR OTHER VIRAL DISEASES: ICD-10-CM

## 2021-05-20 PROBLEM — J38.02 BILATERAL VOCAL FOLD PARALYSIS: Status: ACTIVE | Noted: 2021-05-20

## 2021-05-20 NOTE — TELEPHONE ENCOUNTER
Called patient to schedule surgery    Surgeon: Dr. Holliday   Date of Surgery: 06/04/2021  Location of surgery: [place] OR  Pre-Op H&P: PCP- Patient will arrange   Post-Op Appt Date: 2-3 weeks    Imaging needed:  No  Discussed COVID-19 testing:  Yes  Pre-cert/Authorization completed:  No  Packet sent out: Yes 05/20/21    Patient aware that he should review packet and call with any questions or concerns after review. He understands pre-op within 30 days and covid-19 test within 4. Mailed information on how to get this completed locally. He also mentioned that he has a feeding tube and wanted to make sure they were aware. Reassured patient that they will be aware of this at this hospital and they will be able to care for his nutritional needs. He will contact us with any questions or concerns. SAMUEL Hewitt number provided.

## 2021-05-28 ENCOUNTER — TELEPHONE (OUTPATIENT)
Dept: OTOLARYNGOLOGY | Facility: CLINIC | Age: 81
End: 2021-05-28

## 2021-05-28 NOTE — TELEPHONE ENCOUNTER
Spoke to patient's daughter, Cindy, in regards to preparing for surgery    Discussed details of procedure, time of procedure, visitor restrictions, and what to expect post-op    Patient's daughter is planning to stay in town 3-5 days after patient gets home from the hospital    Encouraged to call back with any further questions or concerns    Marcelina Chávez RN on 5/28/2021 at 4:55 PM

## 2021-06-04 ENCOUNTER — ANESTHESIA EVENT (OUTPATIENT)
Dept: SURGERY | Facility: CLINIC | Age: 81
DRG: 012 | End: 2021-06-04
Payer: COMMERCIAL

## 2021-06-04 ENCOUNTER — ANESTHESIA (OUTPATIENT)
Dept: SURGERY | Facility: CLINIC | Age: 81
DRG: 012 | End: 2021-06-04
Payer: COMMERCIAL

## 2021-06-04 ENCOUNTER — APPOINTMENT (OUTPATIENT)
Dept: GENERAL RADIOLOGY | Facility: CLINIC | Age: 81
DRG: 012 | End: 2021-06-04
Attending: OTOLARYNGOLOGY
Payer: COMMERCIAL

## 2021-06-04 ENCOUNTER — HOSPITAL ENCOUNTER (INPATIENT)
Facility: CLINIC | Age: 81
LOS: 7 days | Discharge: HOME | DRG: 012 | End: 2021-06-11
Attending: OTOLARYNGOLOGY | Admitting: OTOLARYNGOLOGY
Payer: COMMERCIAL

## 2021-06-04 DIAGNOSIS — J38.02 BILATERAL VOCAL FOLD PARALYSIS: ICD-10-CM

## 2021-06-04 DIAGNOSIS — Z43.0 TRACHEOSTOMY CARE (H): ICD-10-CM

## 2021-06-04 DIAGNOSIS — Z98.890 POSTOPERATIVE STATE: Primary | ICD-10-CM

## 2021-06-04 LAB
CREAT SERPL-MCNC: 0.65 MG/DL (ref 0.66–1.25)
CREAT SERPL-MCNC: 0.72 MG/DL (ref 0.66–1.25)
GFR SERPL CREATININE-BSD FRML MDRD: 88 ML/MIN/{1.73_M2}
GFR SERPL CREATININE-BSD FRML MDRD: >90 ML/MIN/{1.73_M2}
GLUCOSE BLDC GLUCOMTR-MCNC: 101 MG/DL (ref 70–99)
HGB BLD-MCNC: 11.3 G/DL (ref 13.3–17.7)
INR PPP: 1.15 (ref 0.86–1.14)
PLATELET # BLD AUTO: 191 10E9/L (ref 150–450)
POTASSIUM SERPL-SCNC: 4.1 MMOL/L (ref 3.4–5.3)

## 2021-06-04 PROCEDURE — 250N000009 HC RX 250: Performed by: NURSE ANESTHETIST, CERTIFIED REGISTERED

## 2021-06-04 PROCEDURE — 71045 X-RAY EXAM CHEST 1 VIEW: CPT | Mod: 26 | Performed by: RADIOLOGY

## 2021-06-04 PROCEDURE — 250N000009 HC RX 250: Performed by: OTOLARYNGOLOGY

## 2021-06-04 PROCEDURE — 93010 ELECTROCARDIOGRAM REPORT: CPT | Mod: 59 | Performed by: INTERNAL MEDICINE

## 2021-06-04 PROCEDURE — 250N000025 HC SEVOFLURANE, PER MIN: Performed by: OTOLARYNGOLOGY

## 2021-06-04 PROCEDURE — 0B110F4 BYPASS TRACHEA TO CUTANEOUS WITH TRACHEOSTOMY DEVICE, OPEN APPROACH: ICD-10-PCS | Performed by: OTOLARYNGOLOGY

## 2021-06-04 PROCEDURE — 999N000141 HC STATISTIC PRE-PROCEDURE NURSING ASSESSMENT: Performed by: OTOLARYNGOLOGY

## 2021-06-04 PROCEDURE — 250N000013 HC RX MED GY IP 250 OP 250 PS 637: Performed by: STUDENT IN AN ORGANIZED HEALTH CARE EDUCATION/TRAINING PROGRAM

## 2021-06-04 PROCEDURE — 360N000076 HC SURGERY LEVEL 3, PER MIN: Performed by: OTOLARYNGOLOGY

## 2021-06-04 PROCEDURE — 999N000054 HC STATISTIC EKG NON-CHARGEABLE

## 2021-06-04 PROCEDURE — 272N000001 HC OR GENERAL SUPPLY STERILE: Performed by: OTOLARYNGOLOGY

## 2021-06-04 PROCEDURE — 85610 PROTHROMBIN TIME: CPT | Performed by: ANESTHESIOLOGY

## 2021-06-04 PROCEDURE — 82565 ASSAY OF CREATININE: CPT | Performed by: ANESTHESIOLOGY

## 2021-06-04 PROCEDURE — 82565 ASSAY OF CREATININE: CPT | Performed by: STUDENT IN AN ORGANIZED HEALTH CARE EDUCATION/TRAINING PROGRAM

## 2021-06-04 PROCEDURE — 250N000011 HC RX IP 250 OP 636: Performed by: NURSE ANESTHETIST, CERTIFIED REGISTERED

## 2021-06-04 PROCEDURE — 0BJ08ZZ INSPECTION OF TRACHEOBRONCHIAL TREE, VIA NATURAL OR ARTIFICIAL OPENING ENDOSCOPIC: ICD-10-PCS | Performed by: OTOLARYNGOLOGY

## 2021-06-04 PROCEDURE — 84132 ASSAY OF SERUM POTASSIUM: CPT | Performed by: ANESTHESIOLOGY

## 2021-06-04 PROCEDURE — 258N000003 HC RX IP 258 OP 636: Performed by: NURSE ANESTHETIST, CERTIFIED REGISTERED

## 2021-06-04 PROCEDURE — 36415 COLL VENOUS BLD VENIPUNCTURE: CPT | Performed by: ANESTHESIOLOGY

## 2021-06-04 PROCEDURE — 999N000157 HC STATISTIC RCP TIME EA 10 MIN

## 2021-06-04 PROCEDURE — 999N000065 XR CHEST PORT 1 VIEW

## 2021-06-04 PROCEDURE — 370N000017 HC ANESTHESIA TECHNICAL FEE, PER MIN: Performed by: OTOLARYNGOLOGY

## 2021-06-04 PROCEDURE — 120N000003 HC R&B IMCU UMMC

## 2021-06-04 PROCEDURE — 710N000010 HC RECOVERY PHASE 1, LEVEL 2, PER MIN: Performed by: OTOLARYNGOLOGY

## 2021-06-04 PROCEDURE — 999N001017 HC STATISTIC GLUCOSE BY METER IP

## 2021-06-04 PROCEDURE — 85018 HEMOGLOBIN: CPT | Performed by: ANESTHESIOLOGY

## 2021-06-04 PROCEDURE — 36415 COLL VENOUS BLD VENIPUNCTURE: CPT | Performed by: STUDENT IN AN ORGANIZED HEALTH CARE EDUCATION/TRAINING PROGRAM

## 2021-06-04 PROCEDURE — 0CJS8ZZ INSPECTION OF LARYNX, VIA NATURAL OR ARTIFICIAL OPENING ENDOSCOPIC: ICD-10-PCS | Performed by: OTOLARYNGOLOGY

## 2021-06-04 PROCEDURE — 85049 AUTOMATED PLATELET COUNT: CPT | Performed by: STUDENT IN AN ORGANIZED HEALTH CARE EDUCATION/TRAINING PROGRAM

## 2021-06-04 RX ORDER — LIDOCAINE 40 MG/G
CREAM TOPICAL
Status: DISCONTINUED | OUTPATIENT
Start: 2021-06-04 | End: 2021-06-11 | Stop reason: HOSPADM

## 2021-06-04 RX ORDER — NALOXONE HYDROCHLORIDE 0.4 MG/ML
0.4 INJECTION, SOLUTION INTRAMUSCULAR; INTRAVENOUS; SUBCUTANEOUS
Status: DISCONTINUED | OUTPATIENT
Start: 2021-06-04 | End: 2021-06-11 | Stop reason: HOSPADM

## 2021-06-04 RX ORDER — NALOXONE HYDROCHLORIDE 0.4 MG/ML
0.4 INJECTION, SOLUTION INTRAMUSCULAR; INTRAVENOUS; SUBCUTANEOUS
Status: DISCONTINUED | OUTPATIENT
Start: 2021-06-04 | End: 2021-06-04 | Stop reason: HOSPADM

## 2021-06-04 RX ORDER — OXYCODONE HYDROCHLORIDE 10 MG/1
10 TABLET ORAL EVERY 4 HOURS PRN
Status: DISCONTINUED | OUTPATIENT
Start: 2021-06-04 | End: 2021-06-11 | Stop reason: HOSPADM

## 2021-06-04 RX ORDER — ONDANSETRON 2 MG/ML
4 INJECTION INTRAMUSCULAR; INTRAVENOUS EVERY 30 MIN PRN
Status: DISCONTINUED | OUTPATIENT
Start: 2021-06-04 | End: 2021-06-04 | Stop reason: HOSPADM

## 2021-06-04 RX ORDER — AMOXICILLIN 250 MG
1 CAPSULE ORAL 2 TIMES DAILY
Status: DISCONTINUED | OUTPATIENT
Start: 2021-06-04 | End: 2021-06-11 | Stop reason: HOSPADM

## 2021-06-04 RX ORDER — EPHEDRINE SULFATE 50 MG/ML
INJECTION, SOLUTION INTRAMUSCULAR; INTRAVENOUS; SUBCUTANEOUS PRN
Status: DISCONTINUED | OUTPATIENT
Start: 2021-06-04 | End: 2021-06-04

## 2021-06-04 RX ORDER — DOCUSATE SODIUM 100 MG/1
100 CAPSULE, LIQUID FILLED ORAL 2 TIMES DAILY
Status: DISCONTINUED | OUTPATIENT
Start: 2021-06-04 | End: 2021-06-05

## 2021-06-04 RX ORDER — FENTANYL CITRATE 50 UG/ML
25-50 INJECTION, SOLUTION INTRAMUSCULAR; INTRAVENOUS EVERY 5 MIN PRN
Status: DISCONTINUED | OUTPATIENT
Start: 2021-06-04 | End: 2021-06-04 | Stop reason: HOSPADM

## 2021-06-04 RX ORDER — SODIUM CHLORIDE, SODIUM LACTATE, POTASSIUM CHLORIDE, CALCIUM CHLORIDE 600; 310; 30; 20 MG/100ML; MG/100ML; MG/100ML; MG/100ML
INJECTION, SOLUTION INTRAVENOUS CONTINUOUS
Status: DISCONTINUED | OUTPATIENT
Start: 2021-06-04 | End: 2021-06-04 | Stop reason: HOSPADM

## 2021-06-04 RX ORDER — NALOXONE HYDROCHLORIDE 0.4 MG/ML
0.2 INJECTION, SOLUTION INTRAMUSCULAR; INTRAVENOUS; SUBCUTANEOUS
Status: DISCONTINUED | OUTPATIENT
Start: 2021-06-04 | End: 2021-06-11 | Stop reason: HOSPADM

## 2021-06-04 RX ORDER — BISACODYL 10 MG
10 SUPPOSITORY, RECTAL RECTAL DAILY PRN
Status: DISCONTINUED | OUTPATIENT
Start: 2021-06-04 | End: 2021-06-11 | Stop reason: HOSPADM

## 2021-06-04 RX ORDER — POLYETHYLENE GLYCOL 3350 17 G/17G
17 POWDER, FOR SOLUTION ORAL DAILY
Status: DISCONTINUED | OUTPATIENT
Start: 2021-06-05 | End: 2021-06-11 | Stop reason: HOSPADM

## 2021-06-04 RX ORDER — MEPERIDINE HYDROCHLORIDE 25 MG/ML
12.5 INJECTION INTRAMUSCULAR; INTRAVENOUS; SUBCUTANEOUS
Status: DISCONTINUED | OUTPATIENT
Start: 2021-06-04 | End: 2021-06-04 | Stop reason: HOSPADM

## 2021-06-04 RX ORDER — OXYMETAZOLINE HYDROCHLORIDE 0.05 G/100ML
SPRAY NASAL PRN
Status: DISCONTINUED | OUTPATIENT
Start: 2021-06-04 | End: 2021-06-04 | Stop reason: HOSPADM

## 2021-06-04 RX ORDER — LIDOCAINE HYDROCHLORIDE 20 MG/ML
INJECTION, SOLUTION INFILTRATION; PERINEURAL PRN
Status: DISCONTINUED | OUTPATIENT
Start: 2021-06-04 | End: 2021-06-04

## 2021-06-04 RX ORDER — ACETAMINOPHEN 325 MG/1
975 TABLET ORAL EVERY 8 HOURS
Status: DISPENSED | OUTPATIENT
Start: 2021-06-04 | End: 2021-06-07

## 2021-06-04 RX ORDER — PROCHLORPERAZINE MALEATE 5 MG
5 TABLET ORAL EVERY 6 HOURS PRN
Status: DISCONTINUED | OUTPATIENT
Start: 2021-06-04 | End: 2021-06-11 | Stop reason: HOSPADM

## 2021-06-04 RX ORDER — LIDOCAINE 40 MG/G
CREAM TOPICAL
Status: DISCONTINUED | OUTPATIENT
Start: 2021-06-04 | End: 2021-06-04 | Stop reason: HOSPADM

## 2021-06-04 RX ORDER — ONDANSETRON 4 MG/1
4 TABLET, ORALLY DISINTEGRATING ORAL EVERY 6 HOURS PRN
Status: DISCONTINUED | OUTPATIENT
Start: 2021-06-04 | End: 2021-06-11 | Stop reason: HOSPADM

## 2021-06-04 RX ORDER — LEVOTHYROXINE SODIUM 100 UG/1
100 TABLET ORAL DAILY
Status: DISCONTINUED | OUTPATIENT
Start: 2021-06-05 | End: 2021-06-11 | Stop reason: HOSPADM

## 2021-06-04 RX ORDER — CLINDAMYCIN PHOSPHATE 900 MG/50ML
900 INJECTION, SOLUTION INTRAVENOUS SEE ADMIN INSTRUCTIONS
Status: DISCONTINUED | OUTPATIENT
Start: 2021-06-04 | End: 2021-06-04 | Stop reason: HOSPADM

## 2021-06-04 RX ORDER — HYDROMORPHONE HCL IN WATER/PF 6 MG/30 ML
0.2 PATIENT CONTROLLED ANALGESIA SYRINGE INTRAVENOUS
Status: DISCONTINUED | OUTPATIENT
Start: 2021-06-04 | End: 2021-06-05

## 2021-06-04 RX ORDER — ALBUTEROL SULFATE 90 UG/1
2 AEROSOL, METERED RESPIRATORY (INHALATION) EVERY 6 HOURS PRN
Status: DISCONTINUED | OUTPATIENT
Start: 2021-06-04 | End: 2021-06-11 | Stop reason: HOSPADM

## 2021-06-04 RX ORDER — HYDROXYZINE HYDROCHLORIDE 10 MG/1
10 TABLET, FILM COATED ORAL EVERY 6 HOURS PRN
Status: DISCONTINUED | OUTPATIENT
Start: 2021-06-04 | End: 2021-06-11 | Stop reason: HOSPADM

## 2021-06-04 RX ORDER — ONDANSETRON 4 MG/1
4 TABLET, ORALLY DISINTEGRATING ORAL EVERY 30 MIN PRN
Status: DISCONTINUED | OUTPATIENT
Start: 2021-06-04 | End: 2021-06-04 | Stop reason: HOSPADM

## 2021-06-04 RX ORDER — ONDANSETRON 2 MG/ML
4 INJECTION INTRAMUSCULAR; INTRAVENOUS EVERY 6 HOURS PRN
Status: DISCONTINUED | OUTPATIENT
Start: 2021-06-04 | End: 2021-06-11 | Stop reason: HOSPADM

## 2021-06-04 RX ORDER — NALOXONE HYDROCHLORIDE 0.4 MG/ML
0.2 INJECTION, SOLUTION INTRAMUSCULAR; INTRAVENOUS; SUBCUTANEOUS
Status: DISCONTINUED | OUTPATIENT
Start: 2021-06-04 | End: 2021-06-04 | Stop reason: HOSPADM

## 2021-06-04 RX ORDER — DEXMEDETOMIDINE HYDROCHLORIDE 4 UG/ML
0.2-1.2 INJECTION, SOLUTION INTRAVENOUS CONTINUOUS
Status: DISCONTINUED | OUTPATIENT
Start: 2021-06-04 | End: 2021-06-04

## 2021-06-04 RX ORDER — PROPOFOL 10 MG/ML
INJECTION, EMULSION INTRAVENOUS PRN
Status: DISCONTINUED | OUTPATIENT
Start: 2021-06-04 | End: 2021-06-04

## 2021-06-04 RX ORDER — ONDANSETRON 2 MG/ML
INJECTION INTRAMUSCULAR; INTRAVENOUS PRN
Status: DISCONTINUED | OUTPATIENT
Start: 2021-06-04 | End: 2021-06-04

## 2021-06-04 RX ORDER — CLINDAMYCIN PHOSPHATE 900 MG/50ML
900 INJECTION, SOLUTION INTRAVENOUS
Status: COMPLETED | OUTPATIENT
Start: 2021-06-04 | End: 2021-06-04

## 2021-06-04 RX ORDER — OXYCODONE HYDROCHLORIDE 5 MG/1
5 TABLET ORAL EVERY 4 HOURS PRN
Status: DISCONTINUED | OUTPATIENT
Start: 2021-06-04 | End: 2021-06-11 | Stop reason: HOSPADM

## 2021-06-04 RX ORDER — LIDOCAINE HYDROCHLORIDE AND EPINEPHRINE 10; 10 MG/ML; UG/ML
INJECTION, SOLUTION INFILTRATION; PERINEURAL PRN
Status: DISCONTINUED | OUTPATIENT
Start: 2021-06-04 | End: 2021-06-04 | Stop reason: HOSPADM

## 2021-06-04 RX ORDER — SODIUM CHLORIDE, SODIUM LACTATE, POTASSIUM CHLORIDE, CALCIUM CHLORIDE 600; 310; 30; 20 MG/100ML; MG/100ML; MG/100ML; MG/100ML
INJECTION, SOLUTION INTRAVENOUS CONTINUOUS
Status: DISCONTINUED | OUTPATIENT
Start: 2021-06-04 | End: 2021-06-07

## 2021-06-04 RX ORDER — HYDROMORPHONE HYDROCHLORIDE 1 MG/ML
0.4 INJECTION, SOLUTION INTRAMUSCULAR; INTRAVENOUS; SUBCUTANEOUS
Status: DISCONTINUED | OUTPATIENT
Start: 2021-06-04 | End: 2021-06-05

## 2021-06-04 RX ORDER — GLYCOPYRROLATE 0.2 MG/ML
INJECTION, SOLUTION INTRAMUSCULAR; INTRAVENOUS PRN
Status: DISCONTINUED | OUTPATIENT
Start: 2021-06-04 | End: 2021-06-04

## 2021-06-04 RX ORDER — SODIUM CHLORIDE, SODIUM LACTATE, POTASSIUM CHLORIDE, CALCIUM CHLORIDE 600; 310; 30; 20 MG/100ML; MG/100ML; MG/100ML; MG/100ML
INJECTION, SOLUTION INTRAVENOUS CONTINUOUS PRN
Status: DISCONTINUED | OUTPATIENT
Start: 2021-06-04 | End: 2021-06-04

## 2021-06-04 RX ORDER — SULFAMETHOXAZOLE AND TRIMETHOPRIM 400; 80 MG/1; MG/1
1 TABLET ORAL 2 TIMES DAILY
Status: COMPLETED | OUTPATIENT
Start: 2021-06-04 | End: 2021-06-09

## 2021-06-04 RX ORDER — ACETAMINOPHEN 325 MG/1
650 TABLET ORAL EVERY 4 HOURS PRN
Status: DISCONTINUED | OUTPATIENT
Start: 2021-06-07 | End: 2021-06-11 | Stop reason: HOSPADM

## 2021-06-04 RX ADMIN — DEXMEDETOMIDINE 0.5 MCG/KG/HR: 100 INJECTION, SOLUTION, CONCENTRATE INTRAVENOUS at 10:09

## 2021-06-04 RX ADMIN — DEXMEDETOMIDINE HYDROCHLORIDE 12 MCG: 100 INJECTION, SOLUTION INTRAVENOUS at 10:09

## 2021-06-04 RX ADMIN — GLYCOPYRROLATE 0.2 MG: 0.2 INJECTION, SOLUTION INTRAMUSCULAR; INTRAVENOUS at 10:45

## 2021-06-04 RX ADMIN — CLINDAMYCIN PHOSPHATE 900 MG: 900 INJECTION, SOLUTION INTRAVENOUS at 10:13

## 2021-06-04 RX ADMIN — Medication 10 MG: at 10:54

## 2021-06-04 RX ADMIN — ONDANSETRON 4 MG: 2 INJECTION INTRAMUSCULAR; INTRAVENOUS at 10:57

## 2021-06-04 RX ADMIN — LIDOCAINE HYDROCHLORIDE 100 MG: 20 INJECTION, SOLUTION INFILTRATION; PERINEURAL at 10:42

## 2021-06-04 RX ADMIN — SODIUM CHLORIDE, POTASSIUM CHLORIDE, SODIUM LACTATE AND CALCIUM CHLORIDE: 600; 310; 30; 20 INJECTION, SOLUTION INTRAVENOUS at 10:04

## 2021-06-04 RX ADMIN — DOCUSATE SODIUM 50 MG AND SENNOSIDES 8.6 MG 1 TABLET: 8.6; 5 TABLET, FILM COATED ORAL at 22:33

## 2021-06-04 RX ADMIN — SULFAMETHOXAZOLE AND TRIMETHOPRIM 1 TABLET: 400; 80 TABLET ORAL at 22:33

## 2021-06-04 RX ADMIN — Medication 60 MG: at 10:52

## 2021-06-04 RX ADMIN — ACETAMINOPHEN 975 MG: 325 TABLET, FILM COATED ORAL at 22:33

## 2021-06-04 RX ADMIN — PROPOFOL 110 MG: 10 INJECTION, EMULSION INTRAVENOUS at 10:42

## 2021-06-04 ASSESSMENT — LIFESTYLE VARIABLES: TOBACCO_USE: 1

## 2021-06-04 ASSESSMENT — MIFFLIN-ST. JEOR: SCORE: 1471.38

## 2021-06-04 NOTE — ANESTHESIA CARE TRANSFER NOTE
Patient: Hieu Hughes    Procedure(s):  awake tracheotomy  Direct LARYNGOSCOPY, WITH BRONCHOSCOPY    Diagnosis: Bilateral vocal fold paralysis [J38.02]  Diagnosis Additional Information: No value filed.    Anesthesia Type:   MAC     Note:    Oropharynx: oropharynx clear of all foreign objects and spontaneously breathing (Trach secure, Strong productive couch.)  Level of Consciousness: awake  Patient oxygen source: Tpiece to trach.  Level of Supplemental Oxygen (L/min / FiO2): 4  Independent Airway: airway patency satisfactory and stable    Vital Signs Stable: post-procedure vital signs reviewed and stable  Report to RN Given: handoff report given  Patient transferred to: PACU    Handoff Report: Identifed the Patient, Identified the Reponsible Provider, Reviewed the pertinent medical history, Discussed the surgical course, Reviewed Intra-OP anesthesia mangement and issues during anesthesia, Set expectations for post-procedure period and Allowed opportunity for questions and acknowledgement of understanding      Vitals: (Last set prior to Anesthesia Care Transfer)  CRNA VITALS  6/4/2021 1043 - 6/4/2021 1122      6/4/2021             Pulse:  67    SpO2:  98 %    Resp Rate (observed):  12        Electronically Signed By: SHIRA Zheng CRNA  June 4, 2021  11:22 AM

## 2021-06-04 NOTE — BRIEF OP NOTE
[facility]    Brief Operative Note    Pre-operative diagnosis: Bilateral vocal fold paralysis [J38.02]  Post-operative diagnosis Same as pre-operative diagnosis    Procedure: Procedure(s):  awake tracheotomy  Direct LARYNGOSCOPY, WITH BRONCHOSCOPY  Surgeon: Surgeon(s) and Role:     * Kaykay Holliday MD - Primary     * Ricky Marshall MD - Resident - Assisting  Anesthesia: General   Estimated blood loss: Minimal  Drains: None  Specimens: * No specimens in log *  Findings:   see op note, procedure as expected..  Complications: None.  Implants: * No implants in log *      ENT Plan    A/P: 80M with hypothyroidism, G-tube, hx base of tongue cancer s/p chemoradiation now with b/l vocal cord paralysis s/p tracheostomy 6/4 with 6 cuffed Shiley.     N:euro tylenol, oxycodone, dilaudid   HEENT: trach change to likely 6 cuffless Shiley POD5. Normal trach cares. Leave cuff up today- may put down tomorrow.   CV: HARMEET  Resp: HARMEET  FEN/GI: AM labs. Nutrition & SLP consulted as patient was tolerating full liquids until recently and has needed to have all feeds enterally. Wait on PMSV until after trach change POD5.   : voiding spontaneously.   Heme: HARMEET  Endo: PTA levothyroxine restarted.   ID: Bactrim for 5 days for periop prophylaxis.   Consults: Nutrition, SLP, PLC  Ppx: SCDs, IS, lovenox  Dispo: POD5 (6/9) to home

## 2021-06-04 NOTE — ANESTHESIA POSTPROCEDURE EVALUATION
Patient: Hieu Hughes    Procedure(s):  awake tracheotomy  Direct LARYNGOSCOPY, WITH BRONCHOSCOPY    Diagnosis:Bilateral vocal fold paralysis [J38.02]  Diagnosis Additional Information: No value filed.    Anesthesia Type:  MAC    Note:  Disposition: Admission   Postop Pain Control: Uneventful            Sign Out: Well controlled pain   PONV: No   Neuro/Psych: Uneventful            Sign Out: Acceptable/Baseline neuro status   Airway/Respiratory: Uneventful            Sign Out: AIRWAY IN SITU/Resp. Support               Airway in situ/Resp. Support: Tracheostomy                 Reason: Planned Pre-op   CV/Hemodynamics: Uneventful            Sign Out: Acceptable CV status; No obvious hypovolemia; No obvious fluid overload   Other NRE: NONE   DID A NON-ROUTINE EVENT OCCUR? No           Last vitals:  Vitals:    06/04/21 0925 06/04/21 1115 06/04/21 1130   BP:  125/69 124/64   Pulse:  66 66   Resp:  16 14   Temp:  36.3  C (97.3  F) 36.3  C (97.3  F)   SpO2: 98% 99% 98%       Last vitals prior to Anesthesia Care Transfer:  CRNA VITALS  6/4/2021 1043 - 6/4/2021 1142      6/4/2021             Pulse:  67    SpO2:  98 %    Resp Rate (observed):  12          Electronically Signed By: Ana M Park MD  June 4, 2021  11:42 AM

## 2021-06-04 NOTE — PROGRESS NOTES
"Otolaryngology Progress Note 6/5/21     S: Bleeding overnight with some surgicel down at the distal tip of the trachea on scope exam evaluated and removed in OR this AM.     O: BP (!) 113/90   Pulse 66   Temp 98.4  F (36.9  C) (Core)   Resp 20   Ht 1.753 m (5' 9\")   Wt 76.6 kg (168 lb 14 oz)   SpO2 97%   BMI 24.94 kg/m    General: Alert and oriented x 3, No acute distress  HEENT: EOMI. HB 1/6. Trach site clean with surgicel around the stoma that was removed in the OR and packing was placed. No crepitus or swelling.   Pulmonary: Breathing non-labored, no stridor, no accessory muscle use.    LABS: BMP normal. Mg/Phos normal. Covid negative.     A/P: 80M with hypothyroidism, G-tube, hx base of tongue cancer s/p chemoradiation now with b/l vocal cord paralysis s/p tracheostomy 6/4 with 6 cuffed Shiley who had some bleeding overnight and went to OR- clot removed and no source of hemorrhage was found. .      Neuro: tylenol, oxycodone  HEENT: trach change to likely 6 cuffless Shiley POD5. Normal trach cares. Leave cuff up today- may put down tomorrow by ENT (6/6)  CV: HARMEET  Resp: HARMEET  FEN/GI: AM labs. Nutrition & SLP consulted as patient was tolerating full liquids until recently and has needed to have all feeds enterally. Wait on PMSV until after trach change POD5.   : voiding spontaneously.   Heme: HARMEET  Endo: PTA levothyroxine restarted.   ID: Bactrim for 5 days for periop prophylaxis.   Consults: Nutrition, SLP, PLC  Ppx: SCDs, IS, lovenox  Dispo: POD5 (6/9) to home    -- Patient and above plan will be discussed with Dr. Mg Marshall MD  Otolaryngology-Head & Neck Surgery PGY3  Please contact ENT with questions by dialing * [phone] and entering job code [phone] when prompted.    " 55 yo scheduled for      1. Labs as per surgeon  2. EKG  3. Medical clearance with  4. discussed EZ sponges & day of procedure instructions 55 yo scheduled for  hysteroscopy, D&C & related procedures with Dr Clay on 3/10/17.     1. Labs as per surgeon  2. EKG  3. Medical clearance with PCP Dr Vieyra  4. discussed day of procedure instructions

## 2021-06-04 NOTE — OR NURSING
ENT team (4 doctors) at bedside in PACU, explained procedure, follow up and plan of cares to patient.   Answered all questions.   No concerns noted, continue to monitor.
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
## 2021-06-04 NOTE — OR NURSING
ENT Resident MD (first name Cortez) at bedside; assessed trach; and dressing.  Amount of drainage is within limits, no concerns noted.    Continue to closely monitor and assess.

## 2021-06-05 ENCOUNTER — APPOINTMENT (OUTPATIENT)
Dept: GENERAL RADIOLOGY | Facility: CLINIC | Age: 81
DRG: 012 | End: 2021-06-05
Attending: STUDENT IN AN ORGANIZED HEALTH CARE EDUCATION/TRAINING PROGRAM
Payer: COMMERCIAL

## 2021-06-05 ENCOUNTER — ANESTHESIA (OUTPATIENT)
Dept: SURGERY | Facility: CLINIC | Age: 81
DRG: 012 | End: 2021-06-05
Payer: COMMERCIAL

## 2021-06-05 ENCOUNTER — ANESTHESIA EVENT (OUTPATIENT)
Dept: SURGERY | Facility: CLINIC | Age: 81
DRG: 012 | End: 2021-06-05
Payer: COMMERCIAL

## 2021-06-05 LAB
ANION GAP SERPL CALCULATED.3IONS-SCNC: 7 MMOL/L (ref 3–14)
BUN SERPL-MCNC: 25 MG/DL (ref 7–30)
CALCIUM SERPL-MCNC: 8.7 MG/DL (ref 8.5–10.1)
CHLORIDE SERPL-SCNC: 104 MMOL/L (ref 94–109)
CO2 SERPL-SCNC: 28 MMOL/L (ref 20–32)
CREAT SERPL-MCNC: 0.69 MG/DL (ref 0.66–1.25)
GFR SERPL CREATININE-BSD FRML MDRD: 90 ML/MIN/{1.73_M2}
GLUCOSE SERPL-MCNC: 101 MG/DL (ref 70–99)
LABORATORY COMMENT REPORT: NORMAL
MAGNESIUM SERPL-MCNC: 2.2 MG/DL (ref 1.6–2.3)
PHOSPHATE SERPL-MCNC: 4.2 MG/DL (ref 2.5–4.5)
PLATELET # BLD AUTO: 179 10E9/L (ref 150–450)
POTASSIUM SERPL-SCNC: 4 MMOL/L (ref 3.4–5.3)
SARS-COV-2 RNA RESP QL NAA+PROBE: NEGATIVE
SODIUM SERPL-SCNC: 140 MMOL/L (ref 133–144)
SPECIMEN SOURCE: NORMAL

## 2021-06-05 PROCEDURE — 250N000013 HC RX MED GY IP 250 OP 250 PS 637: Performed by: STUDENT IN AN ORGANIZED HEALTH CARE EDUCATION/TRAINING PROGRAM

## 2021-06-05 PROCEDURE — 84100 ASSAY OF PHOSPHORUS: CPT | Performed by: STUDENT IN AN ORGANIZED HEALTH CARE EDUCATION/TRAINING PROGRAM

## 2021-06-05 PROCEDURE — 80048 BASIC METABOLIC PNL TOTAL CA: CPT | Performed by: STUDENT IN AN ORGANIZED HEALTH CARE EDUCATION/TRAINING PROGRAM

## 2021-06-05 PROCEDURE — 36415 COLL VENOUS BLD VENIPUNCTURE: CPT | Performed by: STUDENT IN AN ORGANIZED HEALTH CARE EDUCATION/TRAINING PROGRAM

## 2021-06-05 PROCEDURE — 250N000011 HC RX IP 250 OP 636: Performed by: STUDENT IN AN ORGANIZED HEALTH CARE EDUCATION/TRAINING PROGRAM

## 2021-06-05 PROCEDURE — 0W3Q0ZZ CONTROL BLEEDING IN RESPIRATORY TRACT, OPEN APPROACH: ICD-10-PCS | Performed by: OTOLARYNGOLOGY

## 2021-06-05 PROCEDURE — 258N000003 HC RX IP 258 OP 636: Performed by: STUDENT IN AN ORGANIZED HEALTH CARE EDUCATION/TRAINING PROGRAM

## 2021-06-05 PROCEDURE — 250N000009 HC RX 250: Performed by: STUDENT IN AN ORGANIZED HEALTH CARE EDUCATION/TRAINING PROGRAM

## 2021-06-05 PROCEDURE — 250N000011 HC RX IP 250 OP 636: Performed by: OTOLARYNGOLOGY

## 2021-06-05 PROCEDURE — 272N000001 HC OR GENERAL SUPPLY STERILE: Performed by: OTOLARYNGOLOGY

## 2021-06-05 PROCEDURE — 0BP1XFZ REMOVAL OF TRACHEOSTOMY DEVICE FROM TRACHEA, EXTERNAL APPROACH: ICD-10-PCS | Performed by: OTOLARYNGOLOGY

## 2021-06-05 PROCEDURE — 120N000003 HC R&B IMCU UMMC

## 2021-06-05 PROCEDURE — 360N000076 HC SURGERY LEVEL 3, PER MIN: Performed by: OTOLARYNGOLOGY

## 2021-06-05 PROCEDURE — 250N000009 HC RX 250: Performed by: OTOLARYNGOLOGY

## 2021-06-05 PROCEDURE — 71045 X-RAY EXAM CHEST 1 VIEW: CPT

## 2021-06-05 PROCEDURE — 71045 X-RAY EXAM CHEST 1 VIEW: CPT | Mod: 26 | Performed by: RADIOLOGY

## 2021-06-05 PROCEDURE — 250N000013 HC RX MED GY IP 250 OP 250 PS 637: Performed by: OTOLARYNGOLOGY

## 2021-06-05 PROCEDURE — 999N000157 HC STATISTIC RCP TIME EA 10 MIN

## 2021-06-05 PROCEDURE — 250N000025 HC SEVOFLURANE, PER MIN: Performed by: OTOLARYNGOLOGY

## 2021-06-05 PROCEDURE — 0BC18ZZ EXTIRPATION OF MATTER FROM TRACHEA, VIA NATURAL OR ARTIFICIAL OPENING ENDOSCOPIC: ICD-10-PCS | Performed by: OTOLARYNGOLOGY

## 2021-06-05 PROCEDURE — 0B21XFZ CHANGE TRACHEOSTOMY DEVICE IN TRACHEA, EXTERNAL APPROACH: ICD-10-PCS | Performed by: OTOLARYNGOLOGY

## 2021-06-05 PROCEDURE — 370N000017 HC ANESTHESIA TECHNICAL FEE, PER MIN: Performed by: OTOLARYNGOLOGY

## 2021-06-05 PROCEDURE — 83735 ASSAY OF MAGNESIUM: CPT | Performed by: STUDENT IN AN ORGANIZED HEALTH CARE EDUCATION/TRAINING PROGRAM

## 2021-06-05 PROCEDURE — 710N000010 HC RECOVERY PHASE 1, LEVEL 2, PER MIN: Performed by: OTOLARYNGOLOGY

## 2021-06-05 PROCEDURE — U0003 INFECTIOUS AGENT DETECTION BY NUCLEIC ACID (DNA OR RNA); SEVERE ACUTE RESPIRATORY SYNDROME CORONAVIRUS 2 (SARS-COV-2) (CORONAVIRUS DISEASE [COVID-19]), AMPLIFIED PROBE TECHNIQUE, MAKING USE OF HIGH THROUGHPUT TECHNOLOGIES AS DESCRIBED BY CMS-2020-01-R: HCPCS | Performed by: STUDENT IN AN ORGANIZED HEALTH CARE EDUCATION/TRAINING PROGRAM

## 2021-06-05 PROCEDURE — U0005 INFEC AGEN DETEC AMPLI PROBE: HCPCS | Performed by: STUDENT IN AN ORGANIZED HEALTH CARE EDUCATION/TRAINING PROGRAM

## 2021-06-05 PROCEDURE — 85049 AUTOMATED PLATELET COUNT: CPT | Performed by: STUDENT IN AN ORGANIZED HEALTH CARE EDUCATION/TRAINING PROGRAM

## 2021-06-05 PROCEDURE — 999N000215 HC STATISTIC HFNC ADULT NON-CPAP

## 2021-06-05 RX ORDER — OXYMETAZOLINE HYDROCHLORIDE 0.05 G/100ML
SPRAY NASAL PRN
Status: DISCONTINUED | OUTPATIENT
Start: 2021-06-05 | End: 2021-06-05 | Stop reason: HOSPADM

## 2021-06-05 RX ORDER — DEXTROSE MONOHYDRATE 100 MG/ML
INJECTION, SOLUTION INTRAVENOUS CONTINUOUS PRN
Status: DISCONTINUED | OUTPATIENT
Start: 2021-06-05 | End: 2021-06-11 | Stop reason: HOSPADM

## 2021-06-05 RX ORDER — LIDOCAINE HYDROCHLORIDE 40 MG/ML
SOLUTION TOPICAL PRN
Status: DISCONTINUED | OUTPATIENT
Start: 2021-06-05 | End: 2021-06-05 | Stop reason: HOSPADM

## 2021-06-05 RX ORDER — SODIUM CHLORIDE, SODIUM LACTATE, POTASSIUM CHLORIDE, CALCIUM CHLORIDE 600; 310; 30; 20 MG/100ML; MG/100ML; MG/100ML; MG/100ML
INJECTION, SOLUTION INTRAVENOUS CONTINUOUS PRN
Status: DISCONTINUED | OUTPATIENT
Start: 2021-06-05 | End: 2021-06-05

## 2021-06-05 RX ORDER — ONDANSETRON 4 MG/1
4 TABLET, ORALLY DISINTEGRATING ORAL EVERY 30 MIN PRN
Status: DISCONTINUED | OUTPATIENT
Start: 2021-06-05 | End: 2021-06-05 | Stop reason: HOSPADM

## 2021-06-05 RX ORDER — GLYCOPYRROLATE 0.2 MG/ML
INJECTION, SOLUTION INTRAMUSCULAR; INTRAVENOUS PRN
Status: DISCONTINUED | OUTPATIENT
Start: 2021-06-05 | End: 2021-06-05

## 2021-06-05 RX ORDER — ONDANSETRON 2 MG/ML
4 INJECTION INTRAMUSCULAR; INTRAVENOUS EVERY 30 MIN PRN
Status: DISCONTINUED | OUTPATIENT
Start: 2021-06-05 | End: 2021-06-05 | Stop reason: HOSPADM

## 2021-06-05 RX ORDER — LABETALOL HYDROCHLORIDE 5 MG/ML
10 INJECTION, SOLUTION INTRAVENOUS
Status: DISCONTINUED | OUTPATIENT
Start: 2021-06-05 | End: 2021-06-05 | Stop reason: HOSPADM

## 2021-06-05 RX ORDER — SODIUM CHLORIDE, SODIUM LACTATE, POTASSIUM CHLORIDE, CALCIUM CHLORIDE 600; 310; 30; 20 MG/100ML; MG/100ML; MG/100ML; MG/100ML
INJECTION, SOLUTION INTRAVENOUS CONTINUOUS
Status: DISCONTINUED | OUTPATIENT
Start: 2021-06-05 | End: 2021-06-05 | Stop reason: HOSPADM

## 2021-06-05 RX ORDER — KETAMINE HYDROCHLORIDE 10 MG/ML
INJECTION INTRAMUSCULAR; INTRAVENOUS PRN
Status: DISCONTINUED | OUTPATIENT
Start: 2021-06-05 | End: 2021-06-05

## 2021-06-05 RX ORDER — FENTANYL CITRATE 50 UG/ML
25-50 INJECTION, SOLUTION INTRAMUSCULAR; INTRAVENOUS
Status: DISCONTINUED | OUTPATIENT
Start: 2021-06-05 | End: 2021-06-05 | Stop reason: HOSPADM

## 2021-06-05 RX ORDER — DEXAMETHASONE SODIUM PHOSPHATE 4 MG/ML
INJECTION, SOLUTION INTRA-ARTICULAR; INTRALESIONAL; INTRAMUSCULAR; INTRAVENOUS; SOFT TISSUE PRN
Status: DISCONTINUED | OUTPATIENT
Start: 2021-06-05 | End: 2021-06-05

## 2021-06-05 RX ADMIN — DEXMEDETOMIDINE HYDROCHLORIDE 8 MCG: 100 INJECTION, SOLUTION INTRAVENOUS at 07:46

## 2021-06-05 RX ADMIN — ACETAMINOPHEN 975 MG: 325 TABLET, FILM COATED ORAL at 13:41

## 2021-06-05 RX ADMIN — SODIUM CHLORIDE, POTASSIUM CHLORIDE, SODIUM LACTATE AND CALCIUM CHLORIDE: 600; 310; 30; 20 INJECTION, SOLUTION INTRAVENOUS at 07:36

## 2021-06-05 RX ADMIN — DEXMEDETOMIDINE HYDROCHLORIDE 0.5 MCG/KG/HR: 100 INJECTION, SOLUTION INTRAVENOUS at 07:39

## 2021-06-05 RX ADMIN — SULFAMETHOXAZOLE AND TRIMETHOPRIM 1 TABLET: 400; 80 TABLET ORAL at 13:42

## 2021-06-05 RX ADMIN — ACETAMINOPHEN 975 MG: 325 TABLET, FILM COATED ORAL at 04:45

## 2021-06-05 RX ADMIN — SODIUM CHLORIDE, POTASSIUM CHLORIDE, SODIUM LACTATE AND CALCIUM CHLORIDE: 600; 310; 30; 20 INJECTION, SOLUTION INTRAVENOUS at 10:30

## 2021-06-05 RX ADMIN — DOCUSATE SODIUM 100 MG: 50 LIQUID ORAL at 20:25

## 2021-06-05 RX ADMIN — HYDROXYZINE HYDROCHLORIDE 10 MG: 10 TABLET ORAL at 02:47

## 2021-06-05 RX ADMIN — LEVOTHYROXINE SODIUM 100 MCG: 100 TABLET ORAL at 13:46

## 2021-06-05 RX ADMIN — GLYCOPYRROLATE 0.1 MG: 0.2 INJECTION, SOLUTION INTRAMUSCULAR; INTRAVENOUS at 07:46

## 2021-06-05 RX ADMIN — DEXAMETHASONE SODIUM PHOSPHATE 4 MG: 4 INJECTION, SOLUTION INTRA-ARTICULAR; INTRALESIONAL; INTRAMUSCULAR; INTRAVENOUS; SOFT TISSUE at 08:10

## 2021-06-05 RX ADMIN — SODIUM CHLORIDE, POTASSIUM CHLORIDE, SODIUM LACTATE AND CALCIUM CHLORIDE: 600; 310; 30; 20 INJECTION, SOLUTION INTRAVENOUS at 00:42

## 2021-06-05 RX ADMIN — ACETAMINOPHEN 975 MG: 325 TABLET, FILM COATED ORAL at 20:26

## 2021-06-05 RX ADMIN — SULFAMETHOXAZOLE AND TRIMETHOPRIM 1 TABLET: 400; 80 TABLET ORAL at 22:25

## 2021-06-05 RX ADMIN — Medication 25 MG: at 07:46

## 2021-06-05 RX ADMIN — SODIUM CHLORIDE, POTASSIUM CHLORIDE, SODIUM LACTATE AND CALCIUM CHLORIDE: 600; 310; 30; 20 INJECTION, SOLUTION INTRAVENOUS at 22:29

## 2021-06-05 ASSESSMENT — ACTIVITIES OF DAILY LIVING (ADL)
ADLS_ACUITY_SCORE: 16
ADLS_ACUITY_SCORE: 15
ADLS_ACUITY_SCORE: 16
ADLS_ACUITY_SCORE: 16
ADLS_ACUITY_SCORE: 17

## 2021-06-05 ASSESSMENT — LIFESTYLE VARIABLES: TOBACCO_USE: 1

## 2021-06-05 ASSESSMENT — MIFFLIN-ST. JEOR: SCORE: 1469.38

## 2021-06-05 NOTE — BRIEF OP NOTE
[facility]    Brief Operative Note    Pre-operative diagnosis:  1. Airway foreign body  2. Tracheostomy bleeding.   Post-operative diagnosis Same as pre-operative diagnosis    Procedure: Procedure(s):  TRACHEOSTOMY EXCHANGE, Control of bleeding  FLEXIBLE BRONCHOSCOPY  Surgeon: Surgeon(s) and Role:     * Kaykay Holliday MD - Primary  Anesthesia: General   Estimated blood loss: Less than 10 ml  Drains: None  Specimens: * No specimens in log *  Findings: Distal to the trach tip we could see what appeared to be clot/surgicel that we attempted to suction our and remove with the microcup grasper using the operating bronchoscope but were unable.  Significant clot with surgicel removed from stoma once trach was pulled out. He had minor oozing from subcutaneous tissue contorlled with bipolar cautery but no obvious arterial bleeding or major venous bleeding. 1/4 inch Nugauze was used to pack the wound superficially and the end was taped to the chest using a Tegaderm.   Complications: None.  Implants: * No implants in log *

## 2021-06-05 NOTE — PROVIDER NOTIFICATION
6B: 6219-1: Hieu Hughes: pt is having some bright red bloody drainage from trach area, very minimal in comparison to overnight and this morning  Sophia BAKER [phone]    Orders: minimal bleeding is normal after procedure continue to monitor

## 2021-06-05 NOTE — PLAN OF CARE
SLP: Cancel - SLP orders received for swallow eval. Pt at OR this AM, will reschedule for 6/6 as appropriate.

## 2021-06-05 NOTE — PROGRESS NOTES
Transfer  Transferred from: PACU  Via:bed  Reason for transfer: Pt appropriate for 6B- New trach  Family: Aware of transfer  Belongings: Received with pt  Chart: Received with pt  Medications: Meds received from old unit with pt  Code Status verified on armband: yes  2 RN Skin Assessment Completed By: Needs to still be completed  Med rec completed: yes  Bed surface reassessed with algorithm and charted: yes  New bed surface ordered: no  Suction/Ambu bag/Flowmeter at bedside: yes    Report received from: Laura BAKER  Pt status: alert and oriented x4, O2 98% on trach dome

## 2021-06-05 NOTE — ANESTHESIA CARE TRANSFER NOTE
Patient: Hieu Hughes    Procedure(s):  TRACHEOSTOMY EXCHANGE, Control of bleeding  FLEXIBLE BRONCHOSCOPY    Diagnosis: * No pre-op diagnosis entered *  Diagnosis Additional Information: No value filed.    Anesthesia Type:   General     Note:    Oropharynx: spontaneously breathing  Level of Consciousness: awake and drowsy  Oxygen Supplementation: blow-by O2  Level of Supplemental Oxygen (L/min / FiO2): 6  Independent Airway: airway patency satisfactory and stable  Dentition: dentition unchanged  Vital Signs Stable: post-procedure vital signs reviewed and stable  Report to RN Given: handoff report given  Patient transferred to: PACU  Comments: Airway :Face Mask  Patient transferred to:PACU  Comments: pt was spontaneously breathing and responding to name prior to leaving the OR. Transported with oxymetry monitoring on 6L O2 blow by.  VSS upon arrival to PACU.  Patient denies nausea or pain at this time.   Care transfer plan communicated, appropriate time for questions was given and patient care transferred to PACU RN       Bashir Shore MD      Handoff Report: Identifed the Patient, Identified the Reponsible Provider, Reviewed the pertinent medical history, Discussed the surgical course, Reviewed Intra-OP anesthesia mangement and issues during anesthesia, Set expectations for post-procedure period and Allowed opportunity for questions and acknowledgement of understanding      Vitals: (Last set prior to Anesthesia Care Transfer)  CRNA VITALS  6/5/2021 0814 - 6/5/2021 0850      6/5/2021             NIBP:  96/57    Ht Rate:  57    SpO2:  99 %    EKG:  Sinus bradycardia        Electronically Signed By: Bashir Shore MD  June 5, 2021  8:50 AM

## 2021-06-05 NOTE — PROVIDER NOTIFICATION
~12:30am ENT paged regarding bloody secretions around trach and pt report of increased difficulty breathing. Discussed trying atarax and educating pt on new trach.    ~4:35am ENT paged to bedside regarding continued bloody an clotted secretions around trach site and increased difficulty breathing. Pt feeling like he needs to cough a lot of secretions out, scant output when suctioned, good productive cough.     ~5am MD to bedside to clean trach, plan to go to OR early this AM.

## 2021-06-05 NOTE — PROVIDER NOTIFICATION
6B: 6219-1: Hieu Hughes  Multiple order questions, please call. Questions on PEG orders (Existing on admission, can we use or would you like xray), Diet orders, pt svitlana into the high 40's.    ask for kasandra BAKER [phone]    Orders:  -Will enter orders for PEG tub (addendum: dietary ended up entering as well)  -Will enter order for okay to use PEG tube for meds and gravity feeds  - Will patient care order to use pts home regimen for gravity feeds and flushes  -Continue to monitor HR  - Will enter diet order (addendum: no diet order entered by end of shift- TF orders entered)

## 2021-06-05 NOTE — PROGRESS NOTES
"CLINICAL NUTRITION SERVICES - ASSESSMENT NOTE     Nutrition Prescription    RECOMMENDATIONS FOR MDs/PROVIDERS TO ORDER:  -At discourage pt will resume his usual formula of Isosource 1.5.  - Recommend pt receive 770 to 1150 ml from water flushes throughout the day once IVF stopped.    Malnutrition Status:    Patient does not meet two of the established criteria necessary for diagnosing malnutrition    Recommendations already ordered by Registered Dietitian (RD):  - Adjust TF orders to Jevity 1.5 (formulary equivalent of Isosource 1.5) and add note to adjust first bolus to try 1 can and then wait 1 hr before giving second can. RN can adjust how quickly to do future feedings based his tolerance of prior feeding and discussion with him.  Will provide: (1422 ml), 2133 hira (28 kcal/kg), 91 gm protein (1.2 gm/kg), 307 gm CHO, 30 gm fiber, and 1081 ml free water.   - Add water flushes of 50 ml before and after each feeding and as needed for hydration per pt throughout the day once IVF discontinued.       Future/Additional Recommendations:  - Follow tolerance of Jevity 1.5 formula.  If has increased gas/bloating with higher fiber content of Jevity 1.5 can switch to Osmolite 1.5 (fiber free formula).   - Check on condition of FT (unclear if needs to be replaced while here).        REASON FOR ASSESSMENT  Hieu Hughes is a/an 80 year old male assessed by the dietitian for Provider Order - Registered Dietitian to Assess and Order TF per Medical Nutrition Therapy Protocol.    Chart reviewed. 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       Noted s/p tracheostomy 6/4 and return to OR for bleed today.     Noted per preop H&P, primary MD noted pt's PEG cap attachment recently broke off but that it will \"still plug.\"    NUTRITION HISTORY  Pt sleeping.  Spoke with daughter who has been with pt for past couple of days PTA. Pt was not taking anything by mouth since his PEG " "was placed other than sips of things like Coke for the taste.  Took 6 cans Isosource 1.5 over 3 feedings. Provides (1500 ml), 2250 kcal (29 kcal/kg), 102 gm protein (1.3 gm/kg), 264 gm CHO, 23 gm fiber, 1140 ml free water.  Per RN he explained he took just 50 ml water flush before and after feedings with few Rx requiring additional fluids. This would provide < 20 ml/kg of free water.       CURRENT NUTRITION ORDERS  Diet: no diet--pt takes only sips at baseline  Nutrition Support:MD ordered Isosource 1.5--2 cans bolus 3x/day.  (kitchen will send formulary equivalent Jevity 1.5).   No free water orders entered at present.     Currently receiving 75 ml/hr of LR.    LABS  Labs reviewed.    MEDICATIONS  Medications reviewed  Levothyroxine.    ANTHROPOMETRICS  Height: 175.3 cm (5' 9\")  Most Recent Weight: 76.4 kg (168 lb 6.9 oz)    IBW:72.7 kg (105% IBW)  BMI: Normal BMI  Weight History: Wt stable over past year.   Wt Readings from Last 20 Encounters:   06/05/21 76.4 kg (168 lb 6.9 oz)   04/29/21 77.6 kg (171 lb)   09/17/20 76.2 kg (168 lb)   02/28/20 74.8 kg (165 lb)   01/17/20 76.2 kg (168 lb)   01/09/20 74.8 kg (165 lb)   06/26/19 74.8 kg (165 lb)   06/06/19 74.4 kg (164 lb)   05/29/19 74.4 kg (164 lb)   05/21/19 76.4 kg (168 lb 6.4 oz)   11/05/18 75.1 kg (165 lb 9.6 oz)   09/07/18 74.8 kg (164 lb 12.8 oz)   06/06/18 72.6 kg (160 lb)   05/15/18 75.3 kg (166 lb)   05/16/17 73.8 kg (162 lb 9.6 oz)   05/17/16 75.3 kg (166 lb)   05/18/15 76.2 kg (168 lb)   02/03/14 75.1 kg (165 lb 9.6 oz)   01/15/14 74.5 kg (164 lb 3 oz)   12/09/13 73.4 kg (161 lb 13.1 oz)       Dosing Weight: 76.4 kg    ASSESSED NUTRITION NEEDS  Estimated Energy Needs: 0206-7598+ kcals/day (25 - 30 kcals/kg)  Justification: Maintenance  Estimated Protein Needs:  grams protein/day (1.2 - 1.4 grams of pro/kg)  Justification: Maintenance and post op  Estimated Fluid Needs: 1758-4198 mL/day (25 - 30 mL/kg)   Justification: Maintenance and Per provider " pending fluid status    PHYSICAL FINDINGS  See malnutrition section below.  Pt sleeping so unable to full NFPE.  Only able to see face, neck and shoulders.    MALNUTRITION  % Intake: No decreased intake noted  % Weight Loss: None noted  Subcutaneous Fat Loss: None observed  Muscle Loss: Thoracic region (clavicle, acromium bone:  Severe (unclear if pt just has prominent clavicles)   Fluid Accumulation/Edema: None noted  Malnutrition Diagnosis: Patient does not meet two of the established criteria necessary for diagnosing malnutrition    NUTRITION DIAGNOSIS  Swallowing difficulty related to SCC of tongue as evidenced by TF to meeting 100% of needs.       INTERVENTIONS  Implementation  Nutrition Education: Discussed plan with daughter to have RN to a slow bolus feeds until they can establish that pt is able to tolerate his usual full volume (s/p anesthesia).   Enteral Nutrition - Modify composition and schedule     Goals  Total avg nutritional intake to meet a minimum of 25 kcal/kg and 1.2 g PRO/kg daily (per dosing wt 76.4 kg).     Monitoring/Evaluation  Progress toward goals will be monitored and evaluated per protocol.    Vida Carranza RD, LD   6B (M-F) Pager: [phone]  RD Weekend Pager: [phone]

## 2021-06-05 NOTE — PHARMACY-PHARMACOTHERAPY NOTE
Pharmacy Tube Feeding Consult    Medication reviewed for administration by feeding tube and for potential food/drug interactions.    Recommendation: Hold TF 1h before and 1h after administration of levothyroxine.     Pharmacy will continue to follow as new medications are ordered.

## 2021-06-05 NOTE — PLAN OF CARE
Neuro: A&Ox4.   Cardiac: SR. VSS.   Respiratory: Sating >95% on Trach dome 8L at 28%   GI/: Adequate urine output using urinal. No BM this shift   Diet/appetite: CLD . Minimal appetite  Activity:  Assist of 1   Pain: Denies pain   Skin: New Trach site  LDA's: Shiley 6 cuffed trach, PEG tube, Left PIV with LR running at 75ml/hr    Suction PRN, q4 hr inner cannula change    Plan: Continue with POC. Notify primary team with changes.

## 2021-06-05 NOTE — PROGRESS NOTES
Brief ENT note  6/5/2021      Patient has had postoperative tracheostomy bleeding and some airway obstruction. I evaluated him and flexible tracheoscopy demonstrates some obstruction at the tip of the tracheostomy tube, possibly surgicel. I attempted to lavage and suction and the tissue will not budge. We discussed revision tracheostomy to control bleeding and remove the occlusion. Discussed this with the patient and Dr Holliday.      Arvind Nino MD  ENT resident
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
## 2021-06-05 NOTE — ANESTHESIA POSTPROCEDURE EVALUATION
Patient: Hieu Hughes    Procedure(s):  TRACHEOSTOMY EXCHANGE, Control of bleeding  FLEXIBLE BRONCHOSCOPY    Diagnosis:* No pre-op diagnosis entered *  Diagnosis Additional Information: No value filed.    Anesthesia Type:  General    Note:  Disposition: Inpatient   Postop Pain Control: Uneventful            Sign Out: Well controlled pain   PONV: No   Neuro/Psych: Uneventful            Sign Out: Acceptable/Baseline neuro status   Airway/Respiratory: Uneventful            Sign Out: AIRWAY IN SITU/Resp. Support               Airway in situ/Resp. Support: Tracheostomy                 Reason: Planned Pre-op   CV/Hemodynamics: Uneventful            Sign Out: Acceptable CV status; No obvious hypovolemia; No obvious fluid overload   Other NRE: NONE   DID A NON-ROUTINE EVENT OCCUR? No           Last vitals:  Vitals:    06/05/21 0855 06/05/21 0900 06/05/21 0915   BP: 99/55 95/55 99/51   Pulse: 55 56 56   Resp: 20 20 20   Temp: 36.3  C (97.4  F)  36.7  C (98  F)   SpO2: 99% 99% 97%       Last vitals prior to Anesthesia Care Transfer:  CRNA VITALS  6/5/2021 0814 - 6/5/2021 0914      6/5/2021             NIBP:  96/57    Ht Rate:  57    SpO2:  99 %    EKG:  Sinus bradycardia          Electronically Signed By: Darrell Cuevas MD  June 5, 2021  9:30 AM

## 2021-06-05 NOTE — INTERVAL H&P NOTE
I have reviewed the surgical (or preoperative) H&P that is linked to this encounter, and examined the patient. There are no significant changes

## 2021-06-05 NOTE — PLAN OF CARE
Neuro: A&Ox4. Neuros intact. Pleasant, able to make needs known. Anxious at times, prn atarax given x1.   Cardiac: SR 60-70s. VSS.   Respiratory: Weaned from 7L trach dome to 3L, sating >94%. Shiley 6, cuffed. Trach cares completed q4, suctioning [id number], scant output. Pt stated he was working harder to breathe, ENT to bedside this AM to assess.  GI/: Adequate urine output via urinal. No BM this shift.  Diet/appetite: Clears diet ordered; NPO this shift, pt states he takes all PO intake and meds through PEG tube. No nausea.  Activity:  Assist of 1 up to edge of bed, independently repositioning.  Pain: At acceptable level on current regimen.   Skin: Difficult to assess under sutured trachplate, moderate to large bloody secretions.  LDA's:  -R PIV: LR at 75ml/hr  -Kayla 6    2 RN Skin assessment completed: Arlin Kiser    Plan: To OR to clean out trach, continue with POC. Notify primary team with changes.

## 2021-06-05 NOTE — OP NOTE
Operative Note   Otolaryngology - Head and Neck Surgery       DATE OF OPERATION:   June 4, 2021    PREOPERATIVE DIAGNOSIS:   H/o Right BOT SCC s/p CRT 2009  Bilateral vocal fold paralysis  PEG dependence  Glottic obstruction     POSTOPERATIVE DIAGNOSIS:   Same    NAME OF OPERATION:   1. Microdirect suspension laryngoscopy  2. Flexible bronchoscopy  3. Awake tracheotomy      ANESTHESIA  Awake - general     SURGEON:   Kaykay Holliday MD    RESIDENT SURGEON(S):   Arvind Colmenares MD    INDICATIONS FOR PROCEDURE:   The patient is a 80 year old male with history of dyspnea in the setting of h/o right BOT SCC s/p CRT 2009 with bilateral vocal fold paralysis resulting in severe airway limitation and glottic obstruction. The patient comes in for awake trach. The risks, benefits and alternatives were discussed. The patient wishes to proceed with surgery and has signed an informed consent.     FINDINGS:   1. Calcified trachea, 1st tracheal ring removed  2. Exposure achieved with Ossof laryngoscope  3. Tethering of posterior glottis, no actual scar band however vocal folds do not open   4. 6 cuffed shiley DCT placed      DESCRIPTION OF PROCEDURE:   The patient was brought into the operating room and placed supine on the operating room table. A time-out was performed.     A shoulder roll was placed to provide neck extension. Then a 2cm horizontal neck incision between the cricoid and sternal notch was outlined. Then 7cc of 1% lidocaine with 1:100,000 epinephrine was injected. Then a horizontal incision was made through the skin down through the subcutaneous tissue. Then the midline raphe was identified. Then retractors were used to lateralize the strap muscles. The cricoid was then identified. The fascial plane between the thyroid isthmus and the anterior tracheal wall was identified. The thyroid isthmus was split with the use of a bovie and bipolar. Hemostasis was confirmed. Then the trachea came into view. A cricoid hook was used  to provide superior retraction. Then, an incision was made between the 1st and 2nd tracheal rings with an 11 blade. Then the incision was extended with a mets scissors. The trachea was calcified and the first tracheal ring broke therefore the anterior cartilage was removed.  Then a 6 cuffed shiley trach was placed and CO2 was confirmed.  Then the patient was placed on the ventilator and anesthesia reported they could not ventilated. Therefore, a flexible bronchoscopy was performed with a 1.7 bronchoscope through the trach. This proved good positioning and clear trachea down to timi. Therefore the patient was hand ventilated rather than placed on the vent. Surgicel was placed bilaterally and a the trach was secured with 2.0 prolene stitches and a trach tie.     The head was drapped in the usual fashion. Then the dentition and mucosa were inspected prior to start. A reinforced tooth guard was placed on the upper dentition. The Ossoff laryngoscope was carefully introduced into the oral cavity and gently passed to the oropharynx. Here the larynx was exposed and the patient was placed in suspension.     This revealed a tethering of the posterior glottis. No scar band however there was limited opening. Photodocumentation was performed.     This was the end of our procedure. Afrin soaked pledgets were applied to the surgical site for hemostasis.  The surgical site was then inspected and no residual bleeding was seen. The patient was taken out of suspension. The instrumentation was carefully removed, examining the mucosa and dentition on the way it. The dental guard was removed, and the mucosa and dentition were seen to be in their preoperative state at the conclusion of the procedure. The patient was then handed back to the care of anesthesia who awoke the patient without complication.    COMPLICATIONS:   None.  .   ESTIMATED BLOOD LOSS:   Less than 10cc    DISPOSITION:   PACU.    SPECIMENS:  * No specimens in log *        PHOTODOCUMENTATION:

## 2021-06-06 LAB
GLUCOSE BLDC GLUCOMTR-MCNC: 127 MG/DL (ref 70–99)
GLUCOSE BLDC GLUCOMTR-MCNC: 94 MG/DL (ref 70–99)
GLUCOSE BLDC GLUCOMTR-MCNC: 99 MG/DL (ref 70–99)

## 2021-06-06 PROCEDURE — 250N000013 HC RX MED GY IP 250 OP 250 PS 637: Performed by: OTOLARYNGOLOGY

## 2021-06-06 PROCEDURE — 999N001017 HC STATISTIC GLUCOSE BY METER IP

## 2021-06-06 PROCEDURE — 250N000013 HC RX MED GY IP 250 OP 250 PS 637: Performed by: STUDENT IN AN ORGANIZED HEALTH CARE EDUCATION/TRAINING PROGRAM

## 2021-06-06 PROCEDURE — 999N000157 HC STATISTIC RCP TIME EA 10 MIN

## 2021-06-06 PROCEDURE — 999N000043 HC STATISTIC CTO2 CONT OXYGEN TECH TIME EA 90 MIN

## 2021-06-06 PROCEDURE — 258N000003 HC RX IP 258 OP 636: Performed by: STUDENT IN AN ORGANIZED HEALTH CARE EDUCATION/TRAINING PROGRAM

## 2021-06-06 PROCEDURE — 120N000002 HC R&B MED SURG/OB UMMC

## 2021-06-06 PROCEDURE — 250N000011 HC RX IP 250 OP 636: Performed by: OTOLARYNGOLOGY

## 2021-06-06 RX ADMIN — DOCUSATE SODIUM 100 MG: 50 LIQUID ORAL at 19:51

## 2021-06-06 RX ADMIN — SULFAMETHOXAZOLE AND TRIMETHOPRIM 1 TABLET: 400; 80 TABLET ORAL at 08:30

## 2021-06-06 RX ADMIN — ACETAMINOPHEN 975 MG: 325 TABLET, FILM COATED ORAL at 12:05

## 2021-06-06 RX ADMIN — DOCUSATE SODIUM 50 MG AND SENNOSIDES 8.6 MG 1 TABLET: 8.6; 5 TABLET, FILM COATED ORAL at 19:51

## 2021-06-06 RX ADMIN — LEVOTHYROXINE SODIUM 100 MCG: 100 TABLET ORAL at 08:29

## 2021-06-06 RX ADMIN — SULFAMETHOXAZOLE AND TRIMETHOPRIM 1 TABLET: 400; 80 TABLET ORAL at 19:52

## 2021-06-06 RX ADMIN — SODIUM CHLORIDE, POTASSIUM CHLORIDE, SODIUM LACTATE AND CALCIUM CHLORIDE: 600; 310; 30; 20 INJECTION, SOLUTION INTRAVENOUS at 12:37

## 2021-06-06 RX ADMIN — POLYETHYLENE GLYCOL 3350 17 G: 17 POWDER, FOR SOLUTION ORAL at 08:31

## 2021-06-06 RX ADMIN — ACETAMINOPHEN 975 MG: 325 TABLET, FILM COATED ORAL at 19:51

## 2021-06-06 RX ADMIN — DOCUSATE SODIUM 100 MG: 50 LIQUID ORAL at 08:27

## 2021-06-06 RX ADMIN — ENOXAPARIN SODIUM 40 MG: 40 INJECTION SUBCUTANEOUS at 06:00

## 2021-06-06 ASSESSMENT — MIFFLIN-ST. JEOR: SCORE: 1455.38

## 2021-06-06 ASSESSMENT — ACTIVITIES OF DAILY LIVING (ADL)
ADLS_ACUITY_SCORE: 17
ADLS_ACUITY_SCORE: 17
ADLS_ACUITY_SCORE: 16
ADLS_ACUITY_SCORE: 16
ADLS_ACUITY_SCORE: 17
ADLS_ACUITY_SCORE: 16

## 2021-06-06 NOTE — PLAN OF CARE
"SLP: Hold - Attempted to see pt swallow eval. Pt pleasant and agreeable, however declining all PO trials, stating he can only drink \"regular Coca-Cola or Pepsi.\" Pt declined trialing water, juice, or diet sodas. Pt reports he does not eat/drink anything at home aside from small sips of partially flat Coca-Cola. Pt stated he will ask his daughter to bring in some \"coke\" and is agreeable to completing swallow eval later today. Will reschedule.  "

## 2021-06-06 NOTE — PROGRESS NOTES
"Otolaryngology Progress Note 6/6/21      S: No events since OR yesterday. Eager to start learning cares for his trach.      O: /69 (BP Location: Left arm)   Pulse 57   Temp 98.5  F (36.9  C) (Oral)   Resp 16   Ht 1.753 m (5' 9\")   Wt 75 kg (165 lb 5.5 oz)   SpO2 98%   BMI 24.42 kg/m     General: Alert and oriented x 3, No acute distress  HEENT: EOMI. HB 1/6. Trach site clean with packing taped to chest that was removed. Cuff was taken down. No crepitus or swelling.   Pulmonary: Breathing non-labored, no stridor, no accessory muscle use.     LABS: none     A/P: 80M with hypothyroidism, G-tube, hx base of tongue cancer s/p chemoradiation now with b/l vocal cord paralysis s/p tracheostomy 6/4 with 6 cuffed Shiley who had some bleeding overnight and went to OR- clot removed and no source of hemorrhage was found. .     Neuro: tylenol, oxycodone  HEENT: trach change to likely 6 cuffless Shiley POD5. Normal trach cares. Cuff taken down today, leave down unless needing positive pressure.   CV: HARMEET  Resp: HARMEET  FEN/GI: Nutrition & SLP consulted. Nutrition added TF orders- may use home regimen. SLP to see, ADAT. Wait on PMSV until after trach change POD5.   : voiding spontaneously.   Heme: HARMEET  Endo: PTA levothyroxine restarted.   ID: Bactrim for 5 days for periop prophylaxis.   Consults: Nutrition, SLP, PLC- not scheduled yet.   Ppx: SCDs, IS, lovenox  Dispo: POD5 (6/9) to home     -- Patient and above plan will be discussed with Dr. Mg Marshall MD  Otolaryngology-Head & Neck Surgery PGY3  Please contact ENT with questions by dialing [phone] and entering job code [phone] when prompted.  "

## 2021-06-06 NOTE — OP NOTE
Procedure Date: 06/05/2021    ATTENDING SURGEON:  Kaykay Holliday MD.    RESIDENT SURGEON:    Araceli Parnell MD.  Arvind Nino MD.    PREOPERATIVE DIAGNOSES:   1. H/o Right BOT SCC s/p CRT 2009  2. Bilateral vocal fold paralysis  3. PEG dependence  4. Glottic obstruction  5. Post-operative tracheotomy bleeding    POSTOPERATIVE DIAGNOSES:   Same    PROCEDURE PERFORMED:   1.  Flexible bronchoscopy.  2.  Removal of airway foreign body.  3.  Tracheostomy exchange.  4.  Control of bleeding from tracheostomy.    ESTIMATED BLOOD LOSS:  10 mL.    ANESTHESIA:  General.    COMPLICATIONS:  None apparent.    INDICATIONS FOR PROCEDURE:      The patient is a 80 year old male with history of dyspnea in the setting of h/o right BOT SCC s/p CRT 2009 with bilateral vocal fold paralysis resulting in severe airway limitation and glottic obstruction. The patient had an awake trach on 6/4/21. Overnight, he had difficulty breathing and was found to have bloody secretions over his neck and chest and blood distally at the tip of the trach. The risks, benefits and alternatives were discussed. The patient wishes to proceed with emergent surgery and has signed an informed consent.     INTRAOPERATIVE FINDINGS:  Distal to the tracheal tip, we could see what appeared to be clot and Surgicel that we attempted to suction out and removed with a microcup grasper.  Using the operating bronchoscope, we were unable to do so.  The trach was removed was removed from the stoma once the trach was pulled out a large clot was seen in th airway about 5-6cm in max size. This was removed then a 6.0 reinforced ETT was placed. The stoma was evaluated and only minor oozing from subcutaneous tissue circumferentially that was controlled with bipolar cautery.  We did not see any obvious arterial bleeding or major venous bleeding. At the end of the case, we secured the 6-0 cuffed Shiley trach with Prolene sutures.  Quarter-inch plain gauze was used to pack  the trach wound superficially and was taped to the chest using a Tegaderm.    DESCRIPTION OF PROCEDURE:  The patient was taken to the operating room and transferred to the operating room table.  Our Anesthesia colleagues provided some conscious sedation for the procedure in order to keep him awake and maintain his airway.  The patient was prepped and draped in the usual fashion.  An institutional timeout was performed and all were in agreement.  We used several different bronchoscopes throughout the case including the 2.8, 2.0, and 1.7 bronchoscopes.  We were able to see some clot/Surgicel distal to the trach tube tip that we attempted to suction out as well was removed using the micro cups grasper.  We failed to do so, so we removed the tracheostomy tube.  Immediately, we saw a large clot that had some Surgicel in it that we removed using the 2.8 flex bronch and [place]akey.  It helped the patient was able to cough and keep the clot in the trachea rather than have it lodged at the timi or mainstem bronchi.  Once we were able to confirm that the airway was patent  we put back the 6-0 cuffed Shiley.  We did a visual inspection of the airway.  The timi was clear.  The right main bronchus was clear as well as its subdivisions.  On the left side; however, there was significant blood.  An LTA was used to anesthetize this left side and the area was suctioned with doing some therapeutic suctioning.  We then confirmed that this was clear.  At this point, we were satisfied with the airway and we turned our attention to the bleeding from the tracheal stoma.  General anesthesia was induced.  We switched the 6-0 Shiley trached for a wire reinforced endotracheal tube.  We then carefully inspected the whole wound and performed several Valsalvas.  We were able to see some subcutaneous tissue.  Bleeding circumferentially was controlled with bipolar cautery; however, we did not see any arterial bleeding or major venous bleeding that  could have been the source of the big clot.  We were satisfied with this, so we exchanged the endotracheal tube for the 6-0 cuffed Shiley.  Quarter-inch plain gauze was used to pack the wound superficially.  The trach was then secured in 4 points using Prolene suture material in an interrupted fashion.  The trach was then further secured using 2 ties  around the neck, ensuring there were 2 fingerbreadths left between the skin and the ties.  The end of the gauze strip was then taped to the chest.  This concluded our procedure.  The patient tolerated well.  There were no apparent complications.  Dr. Holliday was present for the entire case.    Kaykay Holliday M.D.    As Dictated by EYAL JAY MD        D: 2021   T: 2021   MT: vivienne/DOMINAG    Name:     RADHA MERRITT  MRN:      [id number]        Account:        [id number]   :      1940           Procedure Date: 2021     Document: [id number]    Photodocumentation

## 2021-06-06 NOTE — PROGRESS NOTES
Transfer  Transferred to: 6A room 3  Via:bed  Reason for transfer:Pt no longer appropriate for 6B- improved patient condition.  Family: Daughter notified  Belongings: Packed and sent with pt  Chart: Delivered with pt to next unit  Medications: Meds sent to new unit with pt  Report given to: SAMUEL Chew  Pt status: Stable

## 2021-06-06 NOTE — PLAN OF CARE
Care Provided: 6/6/2021 from 0700 till patient transferred to     Temp: 98.6  F (37  C) Temp src: Oral BP: 120/73 Pulse: 60   Resp: 20 SpO2: 94 % O2 Device: Trach dome Oxygen Delivery: 30 LPM    Neuro: A&Ox4.   Cardiac: Sinus Wojciech  Respiratory: Sating >90 on RA with humidification through trach.   GI: PEG tube. Bolus feeds TID.  : Adequate urine output.   Activity: SBA  Pain: None  Skin: Trach site surrounding skin improving, less erythremic. Area of erythema on the back of his left arm from a tick bite. NST found tick during bath on 6/6. Removed by recorder and Stacey RIVER RN. Appears to have head attached, however there is a little black spot in the center of the erythema. ENT team aware of tick, notified via text page.      Lab: No labs drawn today, verified with ENT this AM that nothing was needed.    LDAs:    - 6 shiley trach. Deflated. Hooked to high flow humidification. Suctioning q2-3hr. Recently coughed up large, thick, yellow and bloody chunk. Patient writes on his notebook that after this came up his airway has felt the most clear it has since getting the trach.     Shift Events: Will transfer to  this evening. His daughter was here. Notified that plan is to have patient learning about trach Tuesday at 0900. Recorder did notify 6A nurse that this time wasn't the best for the daughter and she was wondering if they could change it. Recorder tried to call patient learning with no answer.     Plan: Will transfer to . Will transition to cuffless trach and discharge in 3-4 days.

## 2021-06-06 NOTE — PLAN OF CARE
"/56 (BP Location: Left arm)   Pulse 56   Temp 98.7  F (37.1  C) (Oral)   Resp 17   Ht 1.753 m (5' 9\")   Wt 76.4 kg (168 lb 6.9 oz)   SpO2 95%   BMI 24.87 kg/m      Neuro: A&Ox4, able to make needs know, calls appropriately, trached, unable to vocalize needs verbally so writes on paper.    Cardiac: SR-SB high 40's-80's team aware BP's 890's-120's/50's-60's, afebrile  Respiratory:  Maintaining adequate O2 sats via 21% trach done, trach cares complete. OR today for trach exchange and to assess bleeding from trach site, clot removed and cauterized. Sutures and trach ties in place. Scant to minimal red drainage from trach site after procedure. suctioned x3 small thin red secretions  GI/: adequate UOP via urinal and up to bathroom, No BM today, per pt loose stools prior to admission so help morning bowl meds- per pt will take night time bowel meds  Diet/appetite:  Paged for diet orders- no official orders put in prior to shift ending, bolus tube feedings 2 cans TID- pt likes to administer himself (does at home), 50ml FWF before and after, meds through PEG, tolerating well  Activity:  Assist 1 to bathroom and up to chair for line management, once up ambulate SBA  Pain: Denies pain  Skin: No new deficits noted, minimal drainage from trach site after OR   LDA's: trach- 21% trach dome  L- PIV: LR @75ml/hr     Plan: ENT may deflate cuff tomorrow 6/6, continue to monitor. Nursing will continue to follow the POC and update the MD team with concerns.    Sophia Ellison RN  6B Intermediate Care Unit    "

## 2021-06-07 ENCOUNTER — APPOINTMENT (OUTPATIENT)
Dept: SPEECH THERAPY | Facility: CLINIC | Age: 81
DRG: 012 | End: 2021-06-07
Attending: OTOLARYNGOLOGY
Payer: COMMERCIAL

## 2021-06-07 LAB
GLUCOSE BLDC GLUCOMTR-MCNC: 102 MG/DL (ref 70–99)
GLUCOSE BLDC GLUCOMTR-MCNC: 108 MG/DL (ref 70–99)
INTERPRETATION ECG - MUSE: NORMAL
PLATELET # BLD AUTO: 119 10E9/L (ref 150–450)

## 2021-06-07 PROCEDURE — 36415 COLL VENOUS BLD VENIPUNCTURE: CPT | Performed by: OTOLARYNGOLOGY

## 2021-06-07 PROCEDURE — 250N000011 HC RX IP 250 OP 636: Performed by: OTOLARYNGOLOGY

## 2021-06-07 PROCEDURE — 250N000013 HC RX MED GY IP 250 OP 250 PS 637: Performed by: STUDENT IN AN ORGANIZED HEALTH CARE EDUCATION/TRAINING PROGRAM

## 2021-06-07 PROCEDURE — 999N000157 HC STATISTIC RCP TIME EA 10 MIN

## 2021-06-07 PROCEDURE — 85049 AUTOMATED PLATELET COUNT: CPT | Performed by: OTOLARYNGOLOGY

## 2021-06-07 PROCEDURE — 92526 ORAL FUNCTION THERAPY: CPT | Mod: GN

## 2021-06-07 PROCEDURE — 999N001017 HC STATISTIC GLUCOSE BY METER IP

## 2021-06-07 PROCEDURE — 92610 EVALUATE SWALLOWING FUNCTION: CPT | Mod: GN

## 2021-06-07 PROCEDURE — 120N000002 HC R&B MED SURG/OB UMMC

## 2021-06-07 PROCEDURE — 250N000013 HC RX MED GY IP 250 OP 250 PS 637: Performed by: OTOLARYNGOLOGY

## 2021-06-07 RX ADMIN — SULFAMETHOXAZOLE AND TRIMETHOPRIM 1 TABLET: 400; 80 TABLET ORAL at 19:11

## 2021-06-07 RX ADMIN — ENOXAPARIN SODIUM 40 MG: 40 INJECTION SUBCUTANEOUS at 05:08

## 2021-06-07 RX ADMIN — DOCUSATE SODIUM 50 MG AND SENNOSIDES 8.6 MG 1 TABLET: 8.6; 5 TABLET, FILM COATED ORAL at 19:11

## 2021-06-07 RX ADMIN — ACETAMINOPHEN 975 MG: 325 TABLET, FILM COATED ORAL at 05:05

## 2021-06-07 RX ADMIN — LEVOTHYROXINE SODIUM 100 MCG: 100 TABLET ORAL at 08:07

## 2021-06-07 RX ADMIN — ACETAMINOPHEN 975 MG: 325 TABLET, FILM COATED ORAL at 12:54

## 2021-06-07 RX ADMIN — SULFAMETHOXAZOLE AND TRIMETHOPRIM 1 TABLET: 400; 80 TABLET ORAL at 08:07

## 2021-06-07 RX ADMIN — DOCUSATE SODIUM 100 MG: 50 LIQUID ORAL at 08:08

## 2021-06-07 RX ADMIN — DOCUSATE SODIUM 50 MG AND SENNOSIDES 8.6 MG 1 TABLET: 8.6; 5 TABLET, FILM COATED ORAL at 08:07

## 2021-06-07 RX ADMIN — DOCUSATE SODIUM 100 MG: 50 LIQUID ORAL at 19:11

## 2021-06-07 ASSESSMENT — ACTIVITIES OF DAILY LIVING (ADL)
ADLS_ACUITY_SCORE: 17

## 2021-06-07 ASSESSMENT — VISUAL ACUITY
OU: GLASSES;NORMAL ACUITY
OU: NORMAL ACUITY;GLASSES

## 2021-06-07 NOTE — PLAN OF CARE
Status: s/p tracheostomy 6/4 due to bilateral vocal chord paralysis. Had some bleeding after procedure and went back to OR to have a clot removed, no source of hemorrhage found. PMhx of tongue cancer and long-term g-tube.   Vitals: VSS on 21% FiO2 trach dome HTD. On continuous pulse ox   Neuros: Intact. Some baseline N/T pt reports is not new.   IV: PIV infusing LR @ 75mL.hr  Resp/trach: #6 shiley cuff down. Good cough. Lavaged x1, suctioned x3 for thick red streaked secretions. Inner cannula changed x2. Lung sounds coarse.   Diet: Clears. No PO intake. See SLP note. Bolus TF via PEG TID. 6/6 cans completed. Patient mostly self manages/wants to do his tube feedings.   Bowel status: BS+ Passing gas. Small smear this shift. Has not had a BM in a few days. Bowel meds given   : Voiding without difficulty via bedside urinal   Skin: L posterior arm reddened where tick bite was. Circled to monitor changes in size/color. Blanchable redness to chest below trach plate, mepilex lite in place. Preventative mepilex to bottom.   Pain: Denies pain. Scheduled tylenol given x1   Activity: SBA, gb. Walked halls x1.   Plan: Continue to monitor and follow POC.   Updates this shift: PLC scheduled for 0900 on Tuesday. This time does not work for the daughter. Staff should contact PLC and see if there is a possibility to reschedule.

## 2021-06-07 NOTE — PROGRESS NOTES
Arrived from:  6B  Belongings/meds:  Remain with patient.   2 RN Skin Assessment Completed by: Naina and Ovidio RN   Non-intact findings documented (yes/no/NA): Yes. L posterior arm reddened where tick bite was. Circled to monitor changes in size/color. Blanchable redness to chest below trach plate, mepilex lite in place. Preventative mepilex to bottom.

## 2021-06-07 NOTE — PLAN OF CARE
Status: Pt s/p tracheostomy on 6/4 for bilateral vocal chord paralysis. PMhx of tongue cancer and long-term g-tube  Vitals: VSS on RA, bradycardic while resting in high 50s  Neuros: AO4, strength 5/5. Denied numbness/tingling to RUE. Writes to communicate  IV: PIV SL  Resp/trach: #6 Shiley, cuff deflated. Suctioned x 3 this shift. Red/yellow streaked secretions, thick. Pt able to cough up most secretions. Inner cannula changed x 2  Diet: Clear liquid diet. TF via PEG with goal of 6 cans/day, received 4 cans today   Bowel status: Reports last substantial BM 6/3, smear 6/5. Scheduled meds given   : Voiding spontaneously  Skin: Blanchable redness under trach, mepilex light intact. LUE posterior tick bite circled  Pain: Well controlled with scheduled Tylenol  Activity: Up SBA. Ambulated halls x 1  Social: Daughter to visit this evening  Plan: Trach PLC scheduled for tomorrow AM at 0900. Plan for trach changed tomorrow morning before PLC by ENT. Waiting until POD#5 for PMSV. Continue to monitor and follow POC

## 2021-06-07 NOTE — PROGRESS NOTES
"Otolaryngology Progress Note 6/7/21      S: No events since OR yesterday. Eager to start learning cares for his trach.      O: /54 (BP Location: Right arm)   Pulse 57   Temp 98.3  F (36.8  C) (Oral)   Resp 14   Ht 1.753 m (5' 9\")   Wt 75 kg (165 lb 5.5 oz)   SpO2 95%   BMI 24.42 kg/m     General: Alert and oriented x 3, No acute distress  HEENT: EOMI. HB 1/6. Trach site clean, cuff down. No crepitus or swelling.   Pulmonary: Breathing non-labored, no stridor, no accessory muscle use.     LABS: none     A/P: 80M with hypothyroidism, G-tube, hx base of tongue cancer s/p chemoradiation now with b/l vocal cord paralysis s/p tracheostomy 6/4 with 6 cuffed Shiley who had some bleeding overnight and went to OR- clot removed and no source of hemorrhage was found. .      Neuro: tylenol, oxycodone  HEENT: trach changed to likely 6 cuffless Shiley POD5. Normal trach cares. Cuff taken down, leave down unless needing positive pressure.   CV: HARMEET  Resp: HARMEET  FEN/GI: Nutrition & SLP consulted. Nutrition added TF orders- may use home regimen. SLP- takes sips of coke. Wait on PMSV until after trach change POD5.   : voiding spontaneously.   Heme: HARMEET  Endo: PTA levothyroxine restarted.   ID: Bactrim for 5 days for periop prophylaxis.   Consults: Nutrition, SLP, PLC- 9am tomorrow.  Ppx: SCDs, IS, lovenox  Dispo: POD5 (6/9) to home. DME orders will be placed.      -- Patient and above plan will be discussed with Dr. Mg Marshall MD  Otolaryngology-Head & Neck Surgery PGY3  Please contact ENT with questions by dialing [phone] and entering job code [phone] when prompted.  "

## 2021-06-07 NOTE — PROGRESS NOTES
"   06/07/21 1216   General Information   Onset of Illness/Injury or Date of Surgery 06/04/21   Referring Physician Ricky Marshall MD   Patient/Family Therapy Goal Statement (SLP) None stated   Pertinent History of Current Problem 80M with hypothyroidism, G-tube, hx base of tongue cancer s/p chemoradiation now with b/l vocal cord paralysis s/p tracheostomy 6/4 with 6 cuffed Shiley who had some bleeding overnight and went to OR- clot removed and no source of hemorrhage was found. .    General Observations SLP order recieved for swallow evaluation. PMSV to wait until trach change and as directed by ENT. Pt has had a hx of voice and swallow therapy in the past. PTA  pt  was eating and drinking. He stated he would liquidize everything and have to use a right head turn and he was still just \"getting by.\"    Type of Evaluation   Type of Evaluation Swallow Evaluation   Oral Motor   Oral Musculature generally intact   Structural Abnormalities none present   Mucosal Quality adequate   Facial Symmetry (Oral Motor)   Facial Symmetry (Oral Motor) WNL   Lip Function (Oral Motor)   Lip Range of Motion (Oral Motor) WNL   Tongue Function (Oral Motor)   Tongue ROM (Oral Motor) WNL   Vocal Quality/Secretion Management (Oral Motor)   Vocal Quality (Oral Motor) aphonia  (tracheostomy)   General Swallowing Observations   Current Diet/Method of Nutritional Intake (General Swallowing Observations, NIS) clear liquid diet;thin liquids   Respiratory Support (General Swallowing Observations)   (HFTD)   Swallowing Evaluation Clinical swallow evaluation   Clinical Swallow Evaluation   Feeding Assistance no assistance needed   Clinical Swallow Evaluation Textures Trialed Thin Liquids   Clinical Swallow Eval: Thin Liquid Texture Trial   Mode of Presentation, Thin Liquids cup;self-fed   Volume of Liquid or Food Presented 1 sip    Oral Phase of Swallow WFL   Pharyngeal Phase of Swallow impaired;coughing/choking;reduction in laryngeal " movement;repeated swallows;throat clearing  (congested breathing )   Diagnostic Statement Suspect aspiration, pt started gasping and taking deep breaths. He reported feeling things went down the wrong way. He declined any thickened liquid trials or any additional PO. Pt used a right head turn which he was previously recommended to do, and suspet aspiration    Esophageal Phase of Swallow   Patient reports or presents with symptoms of esophageal dysphagia No   Swallowing Recommendations   Diet Consistency Recommendations NPO;ice chips only   Supervision Level for Intake patient independent   Mode of Delivery Recommendations bolus size, small;slow rate of intake   Swallowing Maneuver Recommendations effortful (hard) swallow;extra swallow   Monitoring/Assistance Required (Eating/Swallowing) stop eating activities when fatigue is present;monitor for cough or change in vocal quality with intake   Medication Administration Recommendations, Swallowing (SLP) Through feeding tube    General Therapy Interventions   Planned Therapy Interventions Dysphagia Treatment   Dysphagia treatment Oropharyngeal exercise training   SLP Therapy Assessment/Plan   Criteria for Skilled Therapeutic Interventions Met (SLP Eval) yes;treatment indicated   SLP Diagnosis Oropharyngeal dysphagia    Rehab Potential (SLP Eval) good, to achieve stated therapy goals   Therapy Frequency (SLP Eval) 5 times/wk   Predicted Duration of Therapy Intervention (SLP Eval) 2 weeks   Comment, Therapy Assessment/Plan (SLP) Pt seen for clinical swallow evaluation per provider's order. Pt trialed one sip of thin liquids using a right head turn as previously recommended. This resulted in aspiration episode. Pt was gasping for air, attempting to cough, and notable increase work of breathing. Congested breath sounds as well and pt acknowledged feeling the sip was aspirated. He declined additional trials. Recommend NPO status, ice chips ok. Pt not intereseted in swallowing  at this time. SLP will continue to follow for exercises, PO trials as appropriate, and PMSV trials when directed by ENT.   Therapy Plan Review/Discharge Plan (SLP)   Therapy Plan Review (SLP) evaluation/treatment results reviewed;care plan/treatment goals reviewed;risks/benefits reviewed;current/potential barriers reviewed;participants voiced agreement with care plan;participants included;patient   Demonstrates Need for Referral to Another Service (SLP) clinical nutrition services/dietitian   SLP Discharge Planning    SLP Discharge Recommendation (DC Rec) home with outpatient speech therapy   SLP Rationale for DC Rec Dysphagia and aphonia    SLP Brief overview of current status  Recommend NPO status, ice chips ok. Pt not intereseted in swallowing at this time. SLP will continue to follow for exercises, PO trials as appropriate, and PMSV trials when directed by ENT.    Total Evaluation Time   Total Evaluation Time (Minutes) 10

## 2021-06-07 NOTE — PLAN OF CARE
Status: s/p tracheostomy 6/4 due to bilateral vocal chord paralysis. Had some bleeding after procedure and went back to OR to have a clot removed, no source of hemorrhage found. PMhx of tongue cancer and long-term g-tube.   Vitals: VSS on 21% HTD with continuous pulse ox in mid-high 90's. Bradycardia while resting (50-60bpm)  Neuros: A&Ox4, writes/mouths to communicate. Baseline RUE N/T, denied this shift.  IV: PIV infusing LR @ 75mL.hr  Resp/trach: #6 shiley cuff down. Able to clear majority of thick red streaked secretions independently with strong cough, suction x1 this shift. Inner cannula changed q4hrs. Lung sounds clear.   Diet: Clears + Bolus TF via PEG 2 TID for total of 6 cans, 6/6 cans completed yesterday. Patient mostly self manages/wants to do his tube feedings.   Bowel status: No BM this shift, had small smear BM 6/6 but last significant BM 6/3 per report. BS+ Passing gas. Taking scheduled bowel meds.   : Voiding without difficulty via bedside urinal   Skin: L posterior upper arm reddened where tick bite was, circled with no extension. Blanchable redness to chest below trach plate, mepilex lite in place. Preventative mepilex to bottom.   Pain: Denies pain. Scheduled tylenol given x1   Activity: SBA/GB  Plan: Trach PLC 6/8 at 0900; this time does not work for the daughter so staff should contact PLC and see if there is a possibility to reschedule. Wait on PMSV until after trach change POD5. Anticipated discharge POD5 (6/9) to home. Continue to monitor and follow POC.

## 2021-06-08 ENCOUNTER — APPOINTMENT (OUTPATIENT)
Dept: SPEECH THERAPY | Facility: CLINIC | Age: 81
DRG: 012 | End: 2021-06-08
Attending: OTOLARYNGOLOGY
Payer: COMMERCIAL

## 2021-06-08 ENCOUNTER — APPOINTMENT (OUTPATIENT)
Dept: EDUCATION SERVICES | Facility: CLINIC | Age: 81
End: 2021-06-08
Payer: COMMERCIAL

## 2021-06-08 PROCEDURE — 120N000002 HC R&B MED SURG/OB UMMC

## 2021-06-08 PROCEDURE — 92526 ORAL FUNCTION THERAPY: CPT | Mod: GN

## 2021-06-08 PROCEDURE — 999N000157 HC STATISTIC RCP TIME EA 10 MIN

## 2021-06-08 PROCEDURE — 250N000013 HC RX MED GY IP 250 OP 250 PS 637: Performed by: STUDENT IN AN ORGANIZED HEALTH CARE EDUCATION/TRAINING PROGRAM

## 2021-06-08 PROCEDURE — 250N000011 HC RX IP 250 OP 636: Performed by: OTOLARYNGOLOGY

## 2021-06-08 RX ORDER — ACETAMINOPHEN 325 MG/1
650 TABLET ORAL EVERY 4 HOURS PRN
Qty: 100 TABLET | Refills: 0 | Status: ON HOLD | OUTPATIENT
Start: 2021-06-08 | End: 2024-07-22

## 2021-06-08 RX ORDER — POLYETHYLENE GLYCOL 3350 17 G/17G
17 POWDER, FOR SOLUTION ORAL DAILY
Qty: 510 G | Refills: 0 | Status: SHIPPED | OUTPATIENT
Start: 2021-06-08 | End: 2024-09-01

## 2021-06-08 RX ORDER — OXYCODONE HYDROCHLORIDE 5 MG/1
5 TABLET ORAL EVERY 4 HOURS PRN
Qty: 10 TABLET | Refills: 0 | Status: SHIPPED | OUTPATIENT
Start: 2021-06-08 | End: 2024-09-01

## 2021-06-08 RX ADMIN — SULFAMETHOXAZOLE AND TRIMETHOPRIM 1 TABLET: 400; 80 TABLET ORAL at 20:05

## 2021-06-08 RX ADMIN — SULFAMETHOXAZOLE AND TRIMETHOPRIM 1 TABLET: 400; 80 TABLET ORAL at 08:05

## 2021-06-08 RX ADMIN — ENOXAPARIN SODIUM 40 MG: 40 INJECTION SUBCUTANEOUS at 08:05

## 2021-06-08 RX ADMIN — LEVOTHYROXINE SODIUM 100 MCG: 100 TABLET ORAL at 08:05

## 2021-06-08 ASSESSMENT — VISUAL ACUITY
OU: BASELINE;GLASSES
OU: BASELINE;GLASSES

## 2021-06-08 ASSESSMENT — ACTIVITIES OF DAILY LIVING (ADL)
ADLS_ACUITY_SCORE: 17

## 2021-06-08 NOTE — PLAN OF CARE
Status: Pt on 6A s/p trach for bilateral vocal cord paralysis on 6/4. Hx of tongue cancer and long-term PEG.   Vitals: VSS on 21% HTD, intermittent bradycardia.   Neuros: A&O x4. Denies N/T. Strengths 5/5. Writes and mouth words to communicate. No new neuro changes  IV: PIV SL.   Labs/Electrolytes: WDL.   Resp/trach: #6 Shiley cuffless Suctioned x1 this shift for small amount of yellow, red-streaked, thick secretions. Good, productive cough. Inner cannula changed x1.   Diet: Clear liquids, which pt declined. TF via PEG, received goal of 1/6 cans this morning. Pt able to do TF and meds independently.   Bowel status: BS+. Last BM 6/3,  Passing gas.   : Voids spontaneously via bedside urinal.   Skin: Blanchable redness around trach, Mepilex light in place. Skin cleansed and trach cares completed q4h.   Pain: Denies.   Activity: SBA and GB. Encourage walks  Social: Daughter will be present for PLC at 0900.   Plan: Trach PLC with daughter at 0900 today. Trach changed this morning to #6 Shiley cuff less. PMSV POD #6.   Updates this shift: Pt needs encouragement to complete trach cares independently.

## 2021-06-08 NOTE — PLAN OF CARE
Vitals: VSS on 21% HTD  Neuros: Trached, mouths words and writes to communicate. Alert and oriented x4. All extremties 5/5. Slight right tongue deviation noted, ENT updated.   IV: PIV SL'd  Resp/trach: #6 shiley uncuffed. Good, productive cough. No suction needed this shift. Coughing up creamy blood tinged secretions. Coughed up bloody secretions x1, FYI sent to ENT, continue to monitor and page if continues. Trach was changed this am. Inner cannula cleansed x1.  Diet: Bolus TF via PEG, 4/6 cans completed. Pt independent with administering TF. Clear liquid diet, no oral intake today.  Bowel status: Bs+x4, last BM 6/7 per pt.  : Voiding via urinal.  Skin: Blanchable redness around trach. Mepilex lite in place.   Pain: Denied.  Activity: Up with SBA, GB  Social: Daughter here for PLC today.  Plan: Trach PLC completed today. Please encourage patient to complete trach cares with supervision this evening. Plan to discharge to home 6/9.

## 2021-06-08 NOTE — CONSULTS
Hieu and his daughter Cindy were seen bedside for trach cares. They were both very attentive , asked good questions and Hieu took notes throughout class. They watched all cares on model but Hieu preferred to not practice on model but work with his nurse doing his own cares.. Please have Hieu do as nmany cares as possible prior to discharge .    Literature given: Handwashing and Skin Care,Caring for Your Tracheostomy.

## 2021-06-08 NOTE — PROGRESS NOTES
06/08/21 1417   General Information   Onset of Illness/Injury or Date of Surgery 06/04/21   Referring Physician Raquel Wagner, LUBA   Patient/Family Therapy Goal Statement (SLP) None stated   Pertinent History of Current Problem 80M with hypothyroidism, G-tube, hx base of tongue cancer s/p chemoradiation now with b/l vocal cord paralysis s/p tracheostomy 6/4 with 6 cuffed Shiley who had some bleeding and went to OR POD1. No bleeding since then. Pt now with cuffless Shiley #6. Communication eval completed per MD orders.    Type of Evaluation   Type of Evaluation Artificial Airway (Speaking Valve)   Tracheostomy Assessment (Speaking Valve)   Date of Tracheostomy 06/04/21  (switched to cuffless Shiley #6 6/8)   Type, Tracheostomy Tube Shiley   Tube Size, Tracheostomy 6   Cuff, Tracheostomy Tube cuffless   Participation Ability (Speaking Valve) awake/alert;attempts to communicate, mouthing words;follows simple commands   Respiratory Status (Speaking Valve)   Oxygen Supply Humidity  (via HFTD)   Oral/Tracheal Secretions (Speaking Valve)   Oral Secretions (Speaking Valve Assessment) minimal secretions   Tracheal Secretions (Speaking Valve Assessment) minimal secretions   Speaking Valve Trials (Speaking Valve)   Cuff Inflated at Onset of Evaluation   (N/A cuffless trach)   Oxygen saturation after cuff deflation 97 %  (at baseline- cuffless trach)   Respiratory rate after cuff deflation 20 Per Minute   Airflow/Phonation Able to phonate with occlusion of trach  (breathy voicing)   Speaking Valve placed on tracheostomy tube   Oxygen saturation with PMSV placement 96 %   Respiratory Rate with PMSV placement 22 Per Minute   Breath Support (Speaking Valve Trial) coordinates speech with breath support;exhales through mouth   Voice Production (Speaking Valve Trial) voicing achieved;fair strength/quality   Cough Production (Speaking Valve Trial) strong   Secretions During Valve Use (Speaking Valve Trial) secretions managed  well during valve use   Outcome of Trial (Speaking Valve) tolerance is good   Total amount of time with PMSV placement: 35 minutes   Recommendations (Speaking Valve Trials) speaking valve use recommended   General Therapy Interventions   Planned Therapy Interventions Communication   Communication Speaking valve instruction;Improve speech intelligibility   SLP Therapy Assessment/Plan   Criteria for Skilled Therapeutic Interventions Met (SLP Eval) yes;treatment indicated   SLP Diagnosis aphonia d/t tracheostomy   Rehab Potential (SLP Eval) good, to achieve stated therapy goals   Therapy Frequency (SLP Eval) 5 times/wk   Predicted Duration of Therapy Intervention (SLP Eval) 2 weeks   Comment, Therapy Assessment/Plan (SLP) Communication/PMSV eval completed per MD orders. Pt with Shiley #6 cuffless trach. Pt with baseline O2 sats 97% while on humidified room air via HFTD. Pt was able to expectorate a small amount of blood-tinged secretions from trach tube. Pt was able to achieve breathy voicing during finger occlusion trials, so PMSV placed by SLP. Pt tolerated PMSV for 35 minutes with VSS and breathy/harsh voicing. After initial training, pt was able to don/doff PMSV independently. At end of session, pt left with PMSV in place; RN notified. Recommend PMSV use as tolerated. Please remove PMSV if pt's SOB, fatigued, or sleeping. ST to continue to follow targeting PMSV tolerance and dysphagia tx as appropriate. Pt would benefit from OP SLP follow up at ENT clinic upon discharge.    Therapy Plan Review/Discharge Plan (SLP)   Therapy Plan Review (SLP) evaluation/treatment results reviewed;care plan/treatment goals reviewed;risks/benefits reviewed;current/potential barriers reviewed;participants voiced agreement with care plan;participants included;patient   Demonstrates Need for Referral to Another Service (SLP) physical therapist;respiratory therapist;clinical nutrition services/dietitian   SLP Discharge Planning    SLP  Discharge Recommendation (DC Rec) home with outpatient speech therapy   SLP Rationale for DC Rec Dysphagia and aphonia    SLP Brief overview of current status  Recommend PMSV use as tolerated. Please remove PMSV if pt's SOB, fatigued, or sleeping. Recommend pt continue NPO with ice chips for comfort. Pt expressed desire to hold off on swallowing tx until trach site healed.     Total Evaluation Time   Total Evaluation Time (Minutes) 30

## 2021-06-08 NOTE — PROGRESS NOTES
"Otolaryngology Progress Note 6/8/21      S: No events overnight. Patient indicates he is doing well. 21% on HTD. SLP saw patient and recommended NPO.     O: /52 (BP Location: Right arm)   Pulse 57   Temp 98.3  F (36.8  C) (Oral)   Resp 16   Ht 1.753 m (5' 9\")   Wt 75 kg (165 lb 5.5 oz)   SpO2 96%   BMI 24.42 kg/m     General: Alert and oriented x 3, No acute distress  HEENT: EOMI. HB 1/6. Trach site clean, cuff down. No crepitus or swelling.   Pulmonary: Breathing non-labored, no stridor, no accessory muscle use.     LABS: none     PROCEDURE  All supplies were gathered. The patient was positioned supine. The sutures were removed and the trach twill ties were removed. The cuff was checked and was already down. The pulse ox had a good reading and was 96%. The trach was removed, the tract was suctioned with a flexible 14 Telugu suction. The 6-0 cuffless shiley was passed easily with an obturator, which was removed and replaced with an inner cannula. The straps were connected and the patient was sat back up. He was doing very well and reconnected on trach dome.    A/P: 80M with hypothyroidism, G-tube, hx base of tongue cancer s/p chemoradiation now with b/l vocal cord paralysis s/p tracheostomy 6/4 with 6 cuffed Shiley who had some bleeding and went to OR POD1. No bleeding since then.      Neuro: tylenol, oxycodone prn. Only taking tylenol.   HEENT: trach changed to 6 cuffless Shiley today. Normal trach cares.   CV: HARMEET  Resp: HARMEET  FEN/GI: Nutrition & SLP consulted. Nutrition added TF orders- may use home regimen. SLP- takes sips of coke, but patient is not interested in other PO at this time, SLP to see patient today for PMV.   : voiding spontaneously.   Heme: HARMEET  Endo: PTA levothyroxine.   ID: Bactrim for 5 days for periop prophylaxis  Consults: Nutrition, SLP, PLC- 9am today.  Ppx: SCDs, IS, lovenox  Dispo: POD5 (6/9) to home. DME orders will be placed.      -- Patient and above plan will be discussed " with Dr. Mg Thurston MD  Otolaryngology-Head & Neck Surgery PGY2

## 2021-06-08 NOTE — PLAN OF CARE
Status: Pt on 6A s/p trach for bilateral vocal cord paralysis on 6/4. Hx of tongue cancer and long-term PEG.   Vitals: VSS on 21% HTD, intermittent bradycardia.   Neuros: A&O x4. Denies N/T. Strengths 5/5. Writes and mouth words to communicate.   IV: PIV SL.   Labs/Electrolytes: WDL.   Resp/trach: #6 Shiley cuff deflated. Suctioned x2 this shift for small amount of yellow, red-streaked, thick secretions. Good, productive cough. Inner cannula changed x2.   Diet: Clear liquids, which pt declined. TF via PEG, received goal of 6/6 cans today. Pt able to do TF and meds independently.   Bowel status: BS+. Last BM 6/3, but smear BMs since then. Passing gas.   : Voids spontaneously via bedside urinal.   Skin: Blanchable redness around trach, Mepilex light in place. Skin cleansed and trach cares completed q4h.   Pain: Denies.   Activity: SBA and GB. Went for walk in the halls x1 this shift.   Social: Daughter visited tonight.   Plan: Trach PLC with daughter tomorrow at 0900. Trach change tomorrow before PLC by ENT. PMSV POD #5. Continue to monitor and follow plan of care.   Updates this shift: Pt needs encouragement to complete trach cares independently.

## 2021-06-09 ENCOUNTER — APPOINTMENT (OUTPATIENT)
Dept: SPEECH THERAPY | Facility: CLINIC | Age: 81
DRG: 012 | End: 2021-06-09
Attending: OTOLARYNGOLOGY
Payer: COMMERCIAL

## 2021-06-09 PROCEDURE — 999N000157 HC STATISTIC RCP TIME EA 10 MIN

## 2021-06-09 PROCEDURE — 92507 TX SP LANG VOICE COMM INDIV: CPT | Mod: GN

## 2021-06-09 PROCEDURE — 120N000002 HC R&B MED SURG/OB UMMC

## 2021-06-09 PROCEDURE — 999N000215 HC STATISTIC HFNC ADULT NON-CPAP

## 2021-06-09 PROCEDURE — 250N000011 HC RX IP 250 OP 636: Performed by: OTOLARYNGOLOGY

## 2021-06-09 PROCEDURE — 250N000013 HC RX MED GY IP 250 OP 250 PS 637: Performed by: STUDENT IN AN ORGANIZED HEALTH CARE EDUCATION/TRAINING PROGRAM

## 2021-06-09 RX ADMIN — SULFAMETHOXAZOLE AND TRIMETHOPRIM 1 TABLET: 400; 80 TABLET ORAL at 09:02

## 2021-06-09 RX ADMIN — ENOXAPARIN SODIUM 40 MG: 40 INJECTION SUBCUTANEOUS at 09:01

## 2021-06-09 RX ADMIN — LEVOTHYROXINE SODIUM 100 MCG: 100 TABLET ORAL at 09:01

## 2021-06-09 ASSESSMENT — ACTIVITIES OF DAILY LIVING (ADL)
ADLS_ACUITY_SCORE: 17

## 2021-06-09 ASSESSMENT — MIFFLIN-ST. JEOR: SCORE: 1448.83

## 2021-06-09 NOTE — PROGRESS NOTES
"Otolaryngology Progress Note 6/9/21      S: No events overnight. PLC competed yesterday with his daughter and he is working on his cares.      O: /48   Pulse 62   Temp 98.2  F (36.8  C) (Oral)   Resp 16   Ht 1.753 m (5' 9\")   Wt 75 kg (165 lb 5.5 oz)   SpO2 95%   BMI 24.42 kg/m   General: Alert and oriented x 3, No acute distress  HEENT: EOMI. HB 1/6. Trach site clean. No crepitus or swelling.   Pulmonary: Breathing non-labored, no stridor, no accessory muscle use.     LABS: none     A/P: 80M with hypothyroidism, G-tube, hx base of tongue cancer s/p chemoradiation now with b/l vocal cord paralysis s/p tracheostomy 6/4 with 6 cuffed Shiley who had some bleeding and went to OR POD1. No bleeding since then.      Neuro: tylenol, oxycodone prn. Only taking tylenol.   HEENT: trach changed to 6 cuffless Shiley. Normal trach cares.   CV: HARMEET  Resp: HARMEET  FEN/GI: Nutrition & SLP consulted. 6/6 TF yesterday. Nutrition added TF orders- may use home regimen. SLP- takes sips of coke, but patient is not interested in other PO at this time & SLP recommended NPO. Working on PMSV. LBM 6/8.   : voiding spontaneously.   Heme: HARMEET  Endo: PTA levothyroxine.   ID: Bactrim for 5 days for periop prophylaxis- ends today.   Consults: Nutrition, SLP, PLC.  Ppx: SCDs, IS, lovenox  Dispo: to home today.     -- Patient and above plan will be discussed with Dr. Mg Thurston MD PGY2    "

## 2021-06-09 NOTE — PLAN OF CARE
Status: Pt on 6A s/p trach for bilateral vocal cord paralysis on 6/4. Hx of tongue cancer and long-term PEG.   Vitals: VSS on 21%, intermittent bradycardia.   Neuros: A&Ox4, denies N/T strength 5/5, writes to mouth to communicate. Slight right tongue deviation noted, ENT made aware on day shift.   IV: PIV SL.   Labs/Electrolytes: WDL.  Resp/trach: #6 uncuffed Shiley in place. Good productive cough. Required suctioning x1 this shift. Able to clear secretions, secretions are blood tinged at times, creamy. ENT notified x1 this shift regarding blood tinged secretions. Site care/cannula changed with pt this shift, pt able to complete without staff interventions, responded well to instruction. Pt reports they will use speaking valve when daughter comes tomorrow. Emergency supplies in room, on continuous pulse ox.  Diet: Bolus TF via PEG, 6/6 cans completed. Pt independently administers cans when reminded. Clear liquid diet, pt declined PO intake this shift.   Bowel status: BS+, normoactive all quadrants. BM yesterday per pt, refused Bowel meds this shift.   : Voiding spontaneously without difficulty via urinal.  Skin: Blanchable redness around trach, mepilex in place at this time.  Pain: Declined  Activity: Up SBA, GB. Walked x2 this shift at 1700 and 2200 respectively. Pt steady.   Social: Daughter unable to come visit pt tonight.  Plan: Pt to discharge tomorrow, continue to encourage independence with trach cares with supervision.

## 2021-06-09 NOTE — PROGRESS NOTES
Care Management Follow Up    Length of Stay (days): 5    Concerns to be Addressed: discharge planning     Patient plan of care discussed at interdisciplinary rounds: Yes    Anticipated Discharge Disposition:  Home     Anticipated Discharge DME:  Trach supplies    Patient/family educated on Medicare website which has current facility and service quality ratings: yes  Education Provided on the Discharge Plan:  Yes  Patient/Family in Agreement with the Plan: yes    Referrals Placed by CM/SW:  [facility] for trach supplies (cancelled). [facility] in [facility].     Additional Information:  In 6A Discharge Rounds REDDY Aguilar, reported pt's trach was changed today. Anticipate pt will be able to discharge tomorrow. Informed her the medical supplier has not been confirmed yet.   Spoke with pt's nurse, Jane this morning. She reported pt having some difficulty removing the inner cannula due to numbness & tingling in his fingers. She will speak with ENT. (the trach has the non-disposable inner cannulas). Jane said pt's daughter is coming at 7:30pm today for teaching and pt was told he could discharge after that. Informed Jane the delivery of the trach supplies has not been confirmed yet.     Received a call from Naina with [facility] in [facility]. They are continuing to work on the trach supply order and will update me later. They will fax forms for provider signature. They do not carry Normal Saline (bottles or lavages) or HMEs.   Received a call this afternoon from [facility] at [facility] [employer] asking for pt's neck measurements so they can order the correct size of velcro trach tube holders. NurseJane reported pt's neck measures 15 inches. Called [facility] and informed him.   Forms completed & signed by REDDY Aguilar. Faxed forms back to [facility].   Received call from [facility]. They are not able to deliver trach supplies until Friday, 6-11.  One of their trucks is broke down. I will update them with the discharge time so their staff can plan to meet pt at home on Friday. [facility] aware pt is ready for discharge but they are not able to deliver until 6-11.   [facility] requested we send pt with extra velcro trach tube holders and suction catheter kits to last thru the weekend, until about 6-15 (they hope to have those products in by then).   Called [facility] and cancelled order for trach supplies (I never heard back from them).   Informed pt's nurses, Jane and Ovidio, the medical supplier cannot deliver until Friday, 6-11. Jane reported pt was able to remove the inner cannula this afternoon. Ovidio said he would call pt's daughter and clarify if she is coming in for teaching and that pt is not able to discharge dina. Ovidio later said he spoke with daughter; she does not feel ready to take pt home, feels the need for more teaching.     --[facility] will order Normal Saline supplies/lavages from another supplier.       Melissa Hernandez RN Care Coordinator  [address], [facility] (trach supplies)  Phone:  [phone]  Fax:  [phone]    [facility] (tube feeding supplies prior to admission)

## 2021-06-09 NOTE — PLAN OF CARE
Status: Pt on 6A s/p trach for bilateral vocal cord paralysis on 6/4. Hx of tongue cancer and long-term PEG.   Vitals: VSS on 21%, intermittent bradycardia.   Neuros: A&Ox4, denies N/T strength 5/5, writes to mouth to communicate. Slight right tongue deviation noted, ENT made aware  IV: PIV SL.   Labs/Electrolytes: WDL.  Resp/trach: #6 uncuffed Shiley in place. Good productive cough. Required suctioning x0 this shift. Able to clear secretions, secretions are blood tinged at times, creamy. Cleaned cannula once. Team aware. . Pt reports they will use speaking valve when daughter comes today. Emergency supplies in room, on continuous pulse ox.  Diet: Bolus TF via PEG, 6 cans goal. Pt independently administers cans when reminded. Clear liquid diet, pt declined PO intake this shift.   Bowel status: BS+, normoactive all quadrants.  : Voiding spontaneously without difficulty via urinal.  Skin: Blanchable redness around trach, mepilex in place at this time.  Pain: Declined  Activity: Up SBA, GB.  Pt steady.   Plan: Possible discharge today, continue to encourage independence with trach cares with supervision.

## 2021-06-09 NOTE — DISCHARGE SUMMARY
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 The patient had several recent emergency department and Urgent Care visits for respiratory distress. He also had stable dysphonia and dysphagia at that time.     It was recommended that he undergo operative intervention and the patient consented to the above procedure after detailed explanation of the risks and benefits of said procedure.    Hospital Course: The patient was admitted to the hospital and underwent the above mentioned procedure. He tolerated the procedure without any intra- or jesse-operative complications. Please see the operative report for full details of the procedure. The patient was admitted for post-operative monitoring. On post-op day 1, the patient was taken back to the operating room for control of bleeding around the tracheal stoma. This was electrocauterized and a new trach tube was placed. There was no further bleeding throughout the remainder of his hospitalization. The trach tube was changed from a 6-0 cuffed Shiley to a 6-0 cuffless Shiley on 6/8 (POD4). Patient required continued hospitalization to work on home trach cares. At discharge, the patient's pain was well controlled, the patient was voiding on his own, was ambulating and tolerating his home tube feeds.     Discharge Exam:  Vitals:    06/08/21 1721 06/08/21 2049  06/08/21 2300 06/09/21 0034   BP:   107/48    BP Location:       Pulse:   62    Resp:   16    Temp:   98.2  F (36.8  C)    TempSrc:   Oral    SpO2: 98% 98% 94% 95%   Weight:       Height:         General: A&O x 3, No acute distress  HEENT: EOMI. HB 1/6. Trach site clean. No crepitus or swelling.   Pulmonary: Breathing non-labored, no stridor, no accessory muscle use.    Discharge Medications:   Hieu Hughes   Home Medication Instructions ABBI:[id number]    Printed on:06/09/21 0715   Medication Information                      acetaminophen (TYLENOL) 325 MG tablet  Take 2 tablets (650 mg) by mouth every 4 hours as needed for mild pain             docusate (COLACE) 50 MG/5ML liquid  Take 10 mLs (100 mg) by mouth 2 times daily             Levothyroxine Sodium (SYNTHROID PO)  Take 100 mcg by mouth daily Crushes to take in water             oxyCODONE (ROXICODONE) 5 MG tablet  Take 1 tablet (5 mg) by mouth every 4 hours as needed for moderate to severe pain             polyethylene glycol (MIRALAX) 17 GM/Dose powder  Take 17 g by mouth daily                 Discharge Procedure Orders   Reason for your hospital stay   Order Comments: Post-operative care     Adult [facility] Follow-up and recommended labs and tests   Order Comments: Follow up in ENT clinic with Dr. Holliday on 6/24/21 at 3:00PM. Please call the clinic with questions/concerns: [phone].    Otolaryngology/ENT Clinic:  [facility]  [address] [place] [address]      Appointments on [place] and/or [facility] (with [facility] or [facility] provider or service). Call [phone] if you haven't heard regarding these appointments within 7 days of discharge.     Activity   Order Comments: Your activity upon discharge: No heavy lifting greater than 10 lbs and no strenuous exercise for 2 weeks or until follow up appointment. No driving while taking narcotic pain medications.     Order Specific Question  "Answer Comments   Is discharge order? Yes      When to contact your care team   Order Comments: Please notify your doctor if you experience wound breakdown, sustained bleeding from the wound site, or increasing redness, swelling, and/or purulent malorodorous discharge from the wound site which may indicate infection. If you feel it is acute, or experience sudden changes in breathing, chest pain, or excessive sleepiness/somnolence please return to the emergency department or call 911. If you have questions or concerns during the day please call ENT clinic and [phone]. If at night you can call [facility] at [phone] and ask for the \"ENT resident on call\".     Discharge Instructions   Order Comments: Please refer to tracheostomy care handout from patient learning center for tracheostomy care instructions     Full Code     Order Specific Question Answer Comments   Code status determined by: Discussion with patient/ legal decision maker      Trach Supplies Order   Order Comments: Equipment being ordered: Tracheostomy supplies    0.9% sodium chloride 1000 mL bottles  Box split 4x4 gauze sponges  Trach kits with brushes  Velcro trach ties  3 cc 0.9% sodium chloride lavages for trach suctioning  Large gloves  Cool mist humidity via trach dome  Trach HMEs  Portable suction machine  14 french suction catheters    Diagnosis: bilateral vocal cord paresis     Trach/Supplies Documentation:   Patient has an open surgical tracheostomy. The patient requires the ordered tracheostomy supplies to provide appropriate routine tracheostomy care.     I, the undersigned, certify that the above prescribed supplies are medically necessary for this patient and is both reasonable and necessary in reference to accepted standards of medical and necessary in reference to accepted standards of medical practice in the treatment of this patient's condition and is not prescribed as a convenience.     Order Specific Question Answer " Comments   Trach Supply Type: Suction Kit    Trach Supply Type: Tracheostomy Replacement Tube    Trach Supply Type: Tracheostomy care kit (trach older than 2 weeks)    Trach Supply Type: Trach Valve/Speaking Valve    Trach Supply Type: Tracheostomy [place] (trach tie)    Suction Supplies Machine    Suction Supplies Replacement Supplies    Tracheostomy Replacment Tube Uncuffed, Size (in comments) 6 cuffless shiley   Heated Humidity Needed: Yes OK for cool mist humidity   The face to face evaluation was performed on: 6/7/2021      Diet   Order Comments: Follow this diet upon discharge: Clear liquid diet by mouth. Bolus tube feeding via gastrostomy tube, formula: Isosource 1.5 ( may substitute Jevity 1.5), 6 cans per day. Give 2 cans per feeding, 3 feedings per day. Separate feedings by 3-4  Hours. Give 50 mL of water before and after each feeding and 30 mL of water before and after medications.     Order Specific Question Answer Comments   Is discharge order? Yes        Dispo: To home in good condition. All of the patient's questions/concerns have been addressed at this time.     Paige Chino, MS4  Otolaryngology-Head & Neck Surgery  Please contact ENT by dialing * [phone] and entering job code [phone].

## 2021-06-09 NOTE — PLAN OF CARE
Vitals: VSS on 21% HTD  Neuros: Trached, mouths words and writes to communicate. Alert and oriented x4. All extremties 5/5. Slight right tongue deviation noted, ENT updated. Numbness to right thumb and pointer finger-ENT aware.  IV: PIV SL'd  Resp/trach: #6 shiley uncuffed. Good, productive cough. Pt suctioned self x1 this shift. Coughing up creamy blood streaked secretions. Inner cannula cleansed x1 by patient. Pt completed trach care independently with nurse supervision. Pt had difficulty with putting inner cannula in independently this am, has successfully taken out and put back in x2 this shift with nurse supervision.  Diet: Bolus TF via PEG, 4/6 cans completed. Pt independent with administering TF. NPO.  Bowel status: Bs+x4, last BM 6/7 per pt.  : Voiding via urinal.  Skin: Blanchable redness around trach. Mepilex lite in place.   Pain: Denied.  Activity: Up with SBA, GB. Pt ambulated in halls x1 and sat up in chair this afternoon.  Social: Daughter coming to visit at 1930. Per ENT please have daughter complete all trach cares and suction with nurse supervision.  Plan: Trach PLC completed yesterday. Please encourage patient to complete trach cares with supervision this evening.

## 2021-06-10 LAB — PLATELET # BLD AUTO: 184 10E9/L (ref 150–450)

## 2021-06-10 PROCEDURE — 36415 COLL VENOUS BLD VENIPUNCTURE: CPT | Performed by: OTOLARYNGOLOGY

## 2021-06-10 PROCEDURE — 85049 AUTOMATED PLATELET COUNT: CPT | Performed by: OTOLARYNGOLOGY

## 2021-06-10 PROCEDURE — 120N000002 HC R&B MED SURG/OB UMMC

## 2021-06-10 PROCEDURE — 250N000011 HC RX IP 250 OP 636: Performed by: OTOLARYNGOLOGY

## 2021-06-10 PROCEDURE — 250N000013 HC RX MED GY IP 250 OP 250 PS 637: Performed by: OTOLARYNGOLOGY

## 2021-06-10 PROCEDURE — 999N000157 HC STATISTIC RCP TIME EA 10 MIN

## 2021-06-10 PROCEDURE — 999N000043 HC STATISTIC CTO2 CONT OXYGEN TECH TIME EA 90 MIN

## 2021-06-10 PROCEDURE — 250N000013 HC RX MED GY IP 250 OP 250 PS 637: Performed by: STUDENT IN AN ORGANIZED HEALTH CARE EDUCATION/TRAINING PROGRAM

## 2021-06-10 RX ORDER — SODIUM CHLORIDE FOR INHALATION 0.9 %
3 VIAL, NEBULIZER (ML) INHALATION PRN
Qty: 180 ML | Refills: 11 | Status: SHIPPED | OUTPATIENT
Start: 2021-06-10 | End: 2021-07-01

## 2021-06-10 RX ORDER — MAGNESIUM HYDROXIDE 1200 MG/15ML
30 LIQUID ORAL 3 TIMES DAILY
Qty: 250 ML | Refills: 11 | Status: SHIPPED | OUTPATIENT
Start: 2021-06-10 | End: 2021-06-18

## 2021-06-10 RX ADMIN — ENOXAPARIN SODIUM 40 MG: 40 INJECTION SUBCUTANEOUS at 08:36

## 2021-06-10 RX ADMIN — DOCUSATE SODIUM 100 MG: 50 LIQUID ORAL at 08:36

## 2021-06-10 RX ADMIN — LEVOTHYROXINE SODIUM 100 MCG: 100 TABLET ORAL at 08:35

## 2021-06-10 ASSESSMENT — ACTIVITIES OF DAILY LIVING (ADL)
ADLS_ACUITY_SCORE: 17

## 2021-06-10 ASSESSMENT — MIFFLIN-ST. JEOR: SCORE: 1447.46

## 2021-06-10 NOTE — PLAN OF CARE
Vitals: VSS on 21% HTD  Neuros: Trached, mouths words and writes to communicate. Alert and oriented x4. All extremties 5/5. Slight right tongue deviation noted, ENT aware. Numbness to right thumb and pointer finger-ENT aware.  IV: PIV SL'd  Resp/trach: #6 shiley uncuffed. Good, productive cough. Pt suctioned self x1 with nurse supervision. Secretions creamy/clear. Inner cannula cleansed x1 by patient. Pt completed trach care independently with nurse supervision.   Diet: Bolus TF via PEG, 4/6 cans completed. Pt independent with administering TF. NPO.  Bowel status: Bs+x4, last BM 6/7 per pt.  : Voiding via urinal.  Skin: Blanchable redness around trach. Mepilex lite in place.   Pain: Denied.  Activity: Up with SBA, GB. Pt ambulated in halls x1  Social: Daughter visited this shift.  Plan: Please encourage patient to complete trach cares with supervision this evening. Plan to discharge home tomorrow, daughter will be here at 1200.

## 2021-06-10 NOTE — PLAN OF CARE
Status: Pt on 6A s/p trach for bilateral vocal cord paralysis on 6/4. Hx of tongue cancer and long-term PEG.   Vitals: VSS on HTD 21% with continuous pulse ox 92-96%, bradycardia while resting (50-60bpm), slightly hypotensive within parameters   Neuros: A&Ox4, writes/mouths to communicate. Slight right tongue deviation noted, ENT aware. 5/5 t/o with generalized weakness. Denied N/T.  IV: PIV SL'd.  Resp/trach: #6 uncuffed Shiley in place. Did not require suction overnight, strong productive cough with small amount of clear/creamy secretions. Inner cannula cleansed q4hrs. BUL with crackles, diminished in bases. Speaking valve at bedside.  Diet: NPO ex/ ice chips + Bolus TF via PEG, 6/6 cans completed yesterday.  Bowel status: No BM this shift, LBM 6/7  : Voiding spontaneously without difficulty via urinal.  Skin: Blanchable redness around trach, mepilex in place  Pain: Declined  Activity: Up SBA, GB.  Plan: Pt to discharge Friday once supplies are delivered to pt house. Continue to monitor and follow POC

## 2021-06-10 NOTE — PROGRESS NOTES
"Otolaryngology Progress Note 6/10/21    S: No events overnight.    O: /53 (BP Location: Right arm)   Pulse 55   Temp 97.9  F (36.6  C) (Oral)   Resp 16   Ht 1.753 m (5' 9\")   Wt 74.3 kg (163 lb 14.4 oz)   SpO2 98%   BMI 24.20 kg/m     General: Alert and oriented x 3, No acute distress  HEENT: EOMI. HB 1/6. Trach site clean. No crepitus or swelling.   Pulmonary: Breathing non-labored, no stridor, no accessory muscle use.     A/P: 80M with hypothyroidism, G-tube, hx base of tongue cancer s/p chemoradiation now with b/l vocal cord paralysis s/p tracheostomy 6/4 with 6 cuffed Shiley who had some bleeding and went to OR POD1. No bleeding since then.      Neuro: tylenol, oxycodone prn. Only taking tylenol.   HEENT: trach changed to 6 cuffless Shiley. Normal trach cares.   CV: HARMEET  Resp: HARMEET  FEN/GI: Nutrition & SLP consulted. 6/6 TF yesterday. Nutrition added TF orders- may use home regimen. SLP- takes sips of coke, but patient is not interested in other PO at this time & SLP recommended NPO. Working on PMSV and trach cares.   : voiding spontaneously.   Heme: HARMEET  Endo: PTA levothyroxine.   ID: HARMEET  Consults: Nutrition, SLP, PLC.  Ppx: SCDs, IS, lovenox  Dispo: to home Friday after supplies are delivered.     -- Patient and above plan will be discussed with Dr. Mg Nino MD   Otolaryngology-Head & Neck Surgery PGY3  Please contact ENT with questions by dialing * [phone] and entering job code [phone] when prompted.   "

## 2021-06-10 NOTE — PLAN OF CARE
Status: Pt on 6A s/p trach for bilateral vocal cord paralysis on 6/4. Hx of tongue cancer and long-term PEG.   Vitals: VSS on 21%, intermittent bradycardia.   Neuros: A&Ox4, denies N/T strength 5/5, writes, mouths to communicate. Slight right tongue deviation noted, ENT aware. Denied N/T.  IV: PIV SL'd.  Resp/trach: #6 uncuffed Shiley in place. Good productive cough. Suctioning performed during practice with daughter this shift. Able to clear secretions, secretions are blood tinged at times, creamy. Site care/cannula changed with pt and daughter this shift under staff supervision/instruction, daughter able to complete without staff interventions, responded well to instruction. Tolerating speaking valve, Emergency supplies in room, on continuous pulse ox.  Diet: Bolus TF via PEG, 6/6 cans completed. Pt independently administers cans when reminded.  NPO diet.  Bowel status: BS+, normoactive all quadrants. LBM 6/7 per pt, refused Bowel meds this shift.   : Voiding spontaneously without difficulty via urinal.  Skin: Blanchable redness around trach, mepilex in place at this time.  Pain: Declined  Activity: Up SBA, GB.  Social: Daughter at bedside to help practice trach cares with pt.   Plan: Pt to discharge Friday once supplies are delivered to pt house.

## 2021-06-11 VITALS
OXYGEN SATURATION: 95 % | RESPIRATION RATE: 16 BRPM | HEART RATE: 50 BPM | DIASTOLIC BLOOD PRESSURE: 56 MMHG | BODY MASS INDEX: 24.23 KG/M2 | HEIGHT: 69 IN | TEMPERATURE: 97.7 F | WEIGHT: 163.6 LBS | SYSTOLIC BLOOD PRESSURE: 108 MMHG

## 2021-06-11 LAB — GLUCOSE BLDC GLUCOMTR-MCNC: 92 MG/DL (ref 70–99)

## 2021-06-11 PROCEDURE — 999N001017 HC STATISTIC GLUCOSE BY METER IP

## 2021-06-11 PROCEDURE — 999N000157 HC STATISTIC RCP TIME EA 10 MIN

## 2021-06-11 PROCEDURE — 250N000013 HC RX MED GY IP 250 OP 250 PS 637: Performed by: STUDENT IN AN ORGANIZED HEALTH CARE EDUCATION/TRAINING PROGRAM

## 2021-06-11 PROCEDURE — 250N000011 HC RX IP 250 OP 636: Performed by: OTOLARYNGOLOGY

## 2021-06-11 PROCEDURE — 999N000043 HC STATISTIC CTO2 CONT OXYGEN TECH TIME EA 90 MIN

## 2021-06-11 RX ORDER — MAGNESIUM HYDROXIDE 1200 MG/15ML
LIQUID ORAL
Status: DISCONTINUED
Start: 2021-06-11 | End: 2021-06-11 | Stop reason: HOSPADM

## 2021-06-11 RX ADMIN — ENOXAPARIN SODIUM 40 MG: 40 INJECTION SUBCUTANEOUS at 09:13

## 2021-06-11 RX ADMIN — LEVOTHYROXINE SODIUM 100 MCG: 100 TABLET ORAL at 09:13

## 2021-06-11 ASSESSMENT — ACTIVITIES OF DAILY LIVING (ADL)
ADLS_ACUITY_SCORE: 17

## 2021-06-11 NOTE — PLAN OF CARE
Status: Pt on 6A s/p trach for bilateral vocal cord paralysis on 6/4. Hx of tongue cancer and long-term PEG.   Vitals: VSS on 21%, intermittent bradycardia.  Neuros: A&Ox4, denies N/T strength 5/5, writes, mouths to communicate. Slight right tongue deviation noted, ENT aware. Denied N/T, only when pt writing to communicate.  IV: PIV SL'd.  Resp/trach: #6 uncuffed Shiley in place. Good productive cough. No suctioning required this shift. Able to clear secretions, secretions are creamy. Site care/cannula changed with pt this shift x1 under staff supervision/instruction, responds well to instruction. Declined cleansing cannula a second time this shift. Tolerating speaking valve, Emergency supplies in room, on continuous pulse ox.  Diet: Bolus TF via PEG, 6/6 cans completed. Pt independently administers cans when reminded.  NPO diet.  Bowel status: BS+, normoactive all quadrants. LBM this evening per pt, refused Bowel meds this shift.   : Voiding spontaneously without difficulty via urinal.  Skin: Blanchable redness around trach, mepilex in place at this time.  Pain: Declined.  Activity: Up SBA, GB. Pt steady on feet, ambulated hallways x2 this shift.  Plan: Pt to discharge tomorrow around noon.

## 2021-06-11 NOTE — PLAN OF CARE
Speech Language Therapy Discharge Summary    Reason for therapy discharge:    Discharged to home with outpatient therapy.    Progress towards therapy goal(s). See goals on Care Plan in Epic electronic health record for goal details.  Goals not met.  Barriers to achieving goals:   discharge from facility.    Therapy recommendation(s):    Continued therapy is recommended.  Rationale/Recommendations:  Continued ST targeting PMSV tolerance/education and dysphagia as appropriate.    Recommend PMSV use as tolerated. Please remove PMSV if pt's SOB, fatigued, or sleeping. Recommend pt continue NPO with ice chips for comfort.

## 2021-06-11 NOTE — PLAN OF CARE
Status: Pt on 6A s/p trach for bilateral vocal cord paralysis on 6/4. Hx of tongue cancer and long-term PEG.   Vitals: VSS on HTD 21% with continuous pulse ox 92-98%, bradycardia while resting (50-60bpm)  Neuros: A&Ox4, writes/mouths to communicate. Slight right tongue deviation noted, ENT aware. 5/5 t/o with generalized weakness. Denied N/T.  IV: PIV SL'd.  Resp/trach: #6 uncuffed Shiley in place. Did not require suction overnight, strong productive cough with small amount of clear/creamy secretions. Inner cannula cleansed q4hrs. BUL with crackles, diminished in bases. Speaking valve at bedside.  Diet: NPO ex/ ice chips + Bolus TF via PEG, 6/6 cans completed yesterday.  Bowel status: No BM this shift, LBM 6/10  : Voiding spontaneously without difficulty via urinal.  Skin: Blanchable redness around trach, mepilex in place  Pain: Denied  Activity: Up SBA, GB.  Plan: Pt to discharge today around noon once supplies are delivered to pt house. Continue to monitor and follow POC

## 2021-06-11 NOTE — PROGRESS NOTES
"Otolaryngology Progress Note 6/11/21     S: No events overnight. 21% HTD overnight. Able to perform trach cares.      O: /55   Pulse 63   Temp 98.4  F (36.9  C) (Oral)   Resp 16   Ht 1.753 m (5' 9\")   Wt 74.2 kg (163 lb 9.6 oz)   SpO2 94%   BMI 24.16 kg/m     General: Alert and oriented x 3, No acute distress  HEENT: EOMI. HB 1/6. Trach site clean. No crepitus or swelling.   Pulmonary: Breathing non-labored, no stridor, no accessory muscle use.     A/P: 80M with hypothyroidism, G-tube, hx base of tongue cancer s/p chemoradiation now with b/l vocal cord paralysis s/p tracheostomy 6/4 with 6 cuffed Shiley who had some bleeding and went to OR POD1. No bleeding since then.      Neuro: tylenol  HEENT: trach changed to 6 cuffless Shiley. Normal trach cares.   CV: HARMEET  Resp: HARMEET  FEN/GI: Nutrition & SLP consulted. 6/6 TF yesterday. Nutrition added TF orders- may use home regimen. SLP- takes sips of coke, but patient is not interested in other PO at this time & SLP recommended NPO. PMSV.   : voiding spontaneously  Heme: HARMEET  Endo: PTA levothyroxine  ID: HARMEET  Consults: Nutrition, SLP, PLC  Ppx: SCDs, IS, lovenox  Dispo: to home today     -- Patient and above plan will be discussed with Dr. Mg Marshall MD   Otolaryngology-Head & Neck Surgery PGY3  Please contact ENT with questions by dialing * [phone] and entering job code [phone] when prompted.  "

## 2021-06-11 NOTE — DISCHARGE INSTRUCTIONS
[facility]  [place], [place]  Phone:  [phone]  Fax:  [phone]    Trach supplies   Box split 4x4 gauze sponges   Trach kits with brushes   Velcro trach ties   Large gloves   Cool mist humidity via trach dome   Portable suction machine   14 [place] suction catheters     Call [facility] if you have any questions or problems with the equipment.   ____________________________________________________________________    [facility] does not carry Normal Saline. A prescription was sent to the [facility].   3 cc 0.9% sodium chloride lavages for trach suctioning  .9% sodium chloride 1000 mL bottles     [facility] is not able to order the trach HMEs. Your nurse will send you home with some. You can order these on-line.   Trach HMEs (heat moisture exchanger), Thermovent T, Portex. REF [id number].   ____________________________________________________________________

## 2021-06-11 NOTE — PLAN OF CARE
RN went over discharge paperwork, daughter and pt both actively listening. Education on trach safety/care, follow-up and medications given. Extra supplies given to pt, pt declined taking his bowel medications and his oxycodone. He has not required those medications while in hospital but was educated that if his needs change those medications will be in file. Pt left via wheelchair with nursing staff and home with daughter.

## 2021-06-11 NOTE — DISCHARGE SUMMARY
Discharge Summary  Hieu Hughes  [id number]  1940    Date of Admission: 6/4/2021  Date of Discharge: 6/11/2021    Admission Diagnosis: Bilateral vocal fold paralysis [J38.02]  Discharge Diagnosis: Same    Procedures:  Date: 6/4/2021  Procedure(s): Microdirect suspension laryngoscopy, Flexible bronchoscopy, Awake tracheotomy    Date: 6/5/2021  Procedures:  TRACHEOSTOMY EXCHANGE, Control of bleeding  FLEXIBLE BRONCHOSCOPY    Pathology: none    HPI: Hieu Hughes is a 80 year old male with history of hypothyroidism, g-tube dependence, base of tongue cancer s/p chemoradiation now with b/l vocal cord paralysis resulting in severe airway limitation and glottic obstruction. It was recommended that he undergo operative intervention and the patient consented to the above procedure after detailed explanation of the risks and benefits of said procedure.    Hospital Course: The patient was admitted to the hospital and underwent the above mentioned procedure. He tolerated the procedure without any intra- or jesse-operative complications. Please see the operative report for full details of the procedure. The patient was admitted for post-operative monitoring. His postoperative course was complicated by bleeding from the tracheostomy site and difficulty breathing overnight on POD-1. He was taken back to the OR for control of bleeding. He was found to have a large 5-6 cm clot distal to the trach tube which was removed. There was minor oozing circumferentially in the stoma tract which was cauterized and hemostasis was achieved. He did not have any further issues with bleeding or his trach the remainder of his hospital stay. He attended patient learning center class to learn trach cares and was able to demonstrate ability to perform all cares independently prior to discharge. At discharge, the patient's pain was well controlled, the patient was voiding on his own, and was ambulating and tolerating his prior to admission tube  feeding diet via gastrostomy tube.     Discharge Exam:  Vitals:    06/10/21 2300 06/11/21 0000 06/11/21 0217 06/11/21 0752   BP: 115/55   108/56   BP Location:    Right arm   Pulse: 63   50   Resp: 16   16   Temp: 98.4  F (36.9  C)   97.7  F (36.5  C)   TempSrc: Oral   Oral   SpO2: 98% 95% 94% 95%   Weight:       Height:         General: Alert and oriented x 3, No acute distress  HEENT: EOMI. HB 1/6. Trach site clean. No crepitus or swelling. No bleeding  Pulmonary: Breathing non-labored, no stridor, no accessory muscle use.    Discharge Medications:   Hieu Hughes   Home Medication Instructions ABBI:[id number]    Printed on:06/11/21 1331   Medication Information                      acetaminophen (TYLENOL) 325 MG tablet  Take 2 tablets (650 mg) by mouth every 4 hours as needed for mild pain             docusate (COLACE) 50 MG/5ML liquid  Take 10 mLs (100 mg) by mouth 2 times daily             Levothyroxine Sodium (SYNTHROID PO)  Take 100 mcg by mouth daily Crushes to take in water             oxyCODONE (ROXICODONE) 5 MG tablet  Take 1 tablet (5 mg) by mouth every 4 hours as needed for moderate to severe pain             polyethylene glycol (MIRALAX) 17 GM/Dose powder  Take 17 g by mouth daily             sodium chloride 0.9 % neb solution  3 mLs by NS Lavage route as needed for wheezing Use for tracheostomy lavage every 2-4 hours as needed             sodium chloride 0.9%, bottle, 0.9 % irrigation  Irrigate with 30 mLs as directed 3 times daily Use for routine tracheostomy care and cleaning                 Discharge Procedure Orders   Home care nursing referral   Referral Priority: Routine Referral Type: Home Health Therapies & Aides   Number of Visits Requested: 1     Reason for your hospital stay   Order Comments: Post-operative care     Adult [facility] Follow-up and recommended labs and tests   Order Comments: Follow up in ENT clinic with Dr. Holliday on 6/24/21 at 3:00PM. Please call the clinic with  "questions/concerns: [phone].    Otolaryngology/ENT Clinic:  [facility]  [facility]  [address] [place] [address]      Appointments on [place] and/or [facility] (with [facility] or [facility] provider or service). Call [phone] if you haven't heard regarding these appointments within 7 days of discharge.     Activity   Order Comments: Your activity upon discharge: No heavy lifting greater than 10 lbs and no strenuous exercise for 2 weeks or until follow up appointment. No driving while taking narcotic pain medications.     Order Specific Question Answer Comments   Is discharge order? Yes      When to contact your care team   Order Comments: Please notify your doctor if you experience wound breakdown, sustained bleeding from the wound site, or increasing redness, swelling, and/or purulent malorodorous discharge from the wound site which may indicate infection. If you feel it is acute, or experience sudden changes in breathing, chest pain, or excessive sleepiness/somnolence please return to the emergency department or call [phone]. If you have questions or concerns during the day please call ENT clinic and [phone]. If at night you can call [facility] at [phone] and ask for the \"ENT resident on call\".     Discharge Instructions   Order Comments: Please refer to tracheostomy care handout from patient learning center for tracheostomy care instructions     Full Code     Order Specific Question Answer Comments   Code status determined by: Discussion with patient/ legal decision maker      Trach Supplies Order   Order Comments: Equipment being ordered: Tracheostomy supplies    0.9% sodium chloride 1000 mL bottles  Box split 4x4 gauze sponges  Trach kits with brushes  Velcro trach ties  3 cc 0.9% sodium chloride lavages for trach suctioning  Large gloves  Cool mist humidity via trach dome  Trach HMEs  Portable suction machine  14 french suction " catheters    Diagnosis: bilateral vocal cord paresis     Trach/Supplies Documentation:   Patient has an open surgical tracheostomy. The patient requires the ordered tracheostomy supplies to provide appropriate routine tracheostomy care.     I, the undersigned, certify that the above prescribed supplies are medically necessary for this patient and is both reasonable and necessary in reference to accepted standards of medical and necessary in reference to accepted standards of medical practice in the treatment of this patient's condition and is not prescribed as a convenience.     Order Specific Question Answer Comments   Trach Supply Type: Suction Kit    Trach Supply Type: Tracheostomy Replacement Tube    Trach Supply Type: Tracheostomy care kit (trach older than 2 weeks)    Trach Supply Type: Trach Valve/Speaking Valve    Trach Supply Type: Tracheostomy [place] (trach tie)    Suction Supplies Machine    Suction Supplies Replacement Supplies    Tracheostomy Replacment Tube Uncuffed, Size (in comments) 6 cuffless shiley   Heated Humidity Needed: Yes OK for cool mist humidity   The face to face evaluation was performed on: 6/7/2021      Miscellaneous DME Order   Order Comments: Supplies ordered:  6 Shiley compatible tracheostomy HME     DME Documentation:   Describe the reason for need to support medical necessity: bilateral vocal cord paralysis, s/p tracheostomy     I, the undersigned, certify that the above prescribed supplies are medically necessary for this patient and is both reasonable and necessary in reference to accepted standards of medical and necessary in reference to accepted standards of medical practice in the treatment of this patient's condition and is not prescribed as a convenience.     Order Specific Question Answer Comments   DME Provider: [place]    DME Item Needed: Trach HME    Length of Need: 3 months      Diet   Order Comments: Follow this diet upon discharge: Clear liquid diet by mouth.  Bolus tube feeding via gastrostomy tube, formula: Isosource 1.5 ( may substitute Jevity 1.5), 6 cans per day. Give 2 cans per feeding, 3 feedings per day. Separate feedings by 3-4  Hours. Give 50 mL of water before and after each feeding and 30 mL of water before and after medications.     Order Specific Question Answer Comments   Is discharge order? Yes        Dispo: To home in good condition. All of the patient's questions/concerns have been addressed at this time.     Raquel Wagner PA-C  Otolaryngology-Head & Neck Surgery  Please contact ENT by dialing [phone] and entering job code [phone].

## 2021-06-14 ENCOUNTER — PATIENT OUTREACH (OUTPATIENT)
Dept: CARE COORDINATION | Facility: CLINIC | Age: 81
End: 2021-06-14

## 2021-06-14 ENCOUNTER — PATIENT OUTREACH (OUTPATIENT)
Dept: OTOLARYNGOLOGY | Facility: CLINIC | Age: 81
End: 2021-06-14

## 2021-06-14 DIAGNOSIS — Z71.89 OTHER SPECIFIED COUNSELING: ICD-10-CM

## 2021-06-14 DIAGNOSIS — J38.02 BILATERAL VOCAL CORD PARALYSIS: Primary | ICD-10-CM

## 2021-06-14 DIAGNOSIS — Z93.0 TRACHEOSTOMY DEPENDENT (H): ICD-10-CM

## 2021-06-14 NOTE — PROGRESS NOTES
[facility]: Post-Discharge Note  SITUATION                                                      Admission:    Admission Date: (P) 06/04/21   Reason for Admission: (P) Bilateral vocal fold paralysis  Discharge:   Discharge Date: (P) 06/11/21  Discharge Diagnosis: (P) Bilateral vocal fold paralysis    BACKGROUND                                                    The patient was admitted to the hospital and underwent the above mentioned procedure. He tolerated the procedure without any intra- or jesse-operative complications. Please see the operative report for full details of the procedure. The patient was admitted for post-operative monitoring. His postoperative course was complicated by bleeding from the tracheostomy site and difficulty breathing overnight on POD-1. He was taken back to the OR for control of bleeding. He was found to have a large 5-6 cm clot distal to the trach tube which was removed. There was minor oozing circumferentially in the stoma tract which was cauterized and hemostasis was achieved. He did not have any further issues with bleeding or his trach the remainder of his hospital stay. He attended patient learning center class to learn trach cares and was able to demonstrate ability to perform all cares independently prior to discharge. At discharge, the patient's pain was well controlled, the patient was voiding on his own, and was ambulating and tolerating his prior to admission tube feeding diet via gastrostomy tube.        ASSESSMENT      Discharge Assessment  Patient reports symptoms are: (P) Improved  Does the patient have all of their medications?: (P) Yes  Does patient know what their new medications are for?: (P) Yes  Does patient have a follow-up appointment scheduled?: (P) Yes  Does patient have any other questions or concerns?: (P) No    Post-op  Did the patient have surgery or a procedure: (P) Yes        PLAN                                                      Outpatient Plan:     Follow up in ENT clinic with Dr. Holliday on 6/24/21 at 3:00PM. Please call the clinic with questions/concerns: [phone].     Otolaryngology/ENT Clinic:  [facility]  Cl[facility]  [address] [place] [address]        Appointments on [place] and/or [facility] (with [facility] or [facility] provider or service). Call [phone] if you haven't heard regarding these appointments within 7 days of discharge.         Future Appointments   Date Time Provider Department Center   6/24/2021  3:00 PM Kaykay Holliday MD [facility]           Jeannie Stahl MA

## 2021-06-18 ENCOUNTER — TELEPHONE (OUTPATIENT)
Dept: OTOLARYNGOLOGY | Facility: CLINIC | Age: 81
End: 2021-06-18

## 2021-06-18 DIAGNOSIS — J38.02 BILATERAL VOCAL CORD PARALYSIS: ICD-10-CM

## 2021-06-18 DIAGNOSIS — Z93.0 TRACHEOSTOMY DEPENDENT (H): Primary | ICD-10-CM

## 2021-06-18 DIAGNOSIS — Z43.0 TRACHEOSTOMY CARE (H): ICD-10-CM

## 2021-06-18 DIAGNOSIS — J38.02 BILATERAL VOCAL FOLD PARALYSIS: ICD-10-CM

## 2021-06-18 RX ORDER — SODIUM CHLORIDE FOR INHALATION 3 %
VIAL, NEBULIZER (ML) INHALATION
Qty: 360 ML | Refills: 11 | Status: SHIPPED | OUTPATIENT
Start: 2021-06-18 | End: 2021-07-01

## 2021-06-18 RX ORDER — MAGNESIUM HYDROXIDE 1200 MG/15ML
30 LIQUID ORAL 3 TIMES DAILY
Qty: 250 ML | Refills: 11 | Status: SHIPPED | OUTPATIENT
Start: 2021-06-18 | End: 2021-07-08

## 2021-06-18 NOTE — TELEPHONE ENCOUNTER
[facility] Call Center    Phone Message    May a detailed message be left on voicemail: yes     Reason for Call: Medication Question or concern regarding medication   Prescription Clarification  Name of Medication: sodium chloride (NEBUSAL) 3 % neb solution  Prescribing Provider: Dr Holliday   Pharmacy: [facility] - [place], [place] - [address] [place] [address]   What on the order needs clarification? Pharmacy has question about the ml on medication and that If it was sent to correct Pharmacy, he also said they  don't have it on hand              Action Taken: Message routed to:  [facility] ([facility]): ENT    Travel Screening: Not Applicable

## 2021-06-18 NOTE — TELEPHONE ENCOUNTER
Spoke to pharmacy tech regarding medication. They stated pt has never had anything filled there and also they did not have the solution ordered on hand. Advised tech that this writer would contact pt and verify information and to disregard prescription at this time. We will resubmit if necessary.
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
## 2021-06-24 ENCOUNTER — DOCUMENTATION ONLY (OUTPATIENT)
Dept: OTOLARYNGOLOGY | Facility: CLINIC | Age: 81
End: 2021-06-24

## 2021-06-24 ENCOUNTER — OFFICE VISIT (OUTPATIENT)
Dept: OTOLARYNGOLOGY | Facility: CLINIC | Age: 81
End: 2021-06-24
Payer: COMMERCIAL

## 2021-06-24 ENCOUNTER — THERAPY VISIT (OUTPATIENT)
Dept: SPEECH THERAPY | Facility: CLINIC | Age: 81
End: 2021-06-24
Payer: COMMERCIAL

## 2021-06-24 VITALS
OXYGEN SATURATION: 93 % | HEART RATE: 69 BPM | TEMPERATURE: 98.7 F | WEIGHT: 171 LBS | BODY MASS INDEX: 25.33 KG/M2 | HEIGHT: 69 IN

## 2021-06-24 DIAGNOSIS — R13.12 OROPHARYNGEAL DYSPHAGIA: Primary | ICD-10-CM

## 2021-06-24 DIAGNOSIS — Z93.0 TRACHEOSTOMY IN PLACE (H): ICD-10-CM

## 2021-06-24 DIAGNOSIS — Z93.0 TRACHEOSTOMY DEPENDENCE (H): ICD-10-CM

## 2021-06-24 DIAGNOSIS — R49.0 DYSPHONIA: ICD-10-CM

## 2021-06-24 DIAGNOSIS — J38.02 BILATERAL VOCAL CORD PARALYSIS: ICD-10-CM

## 2021-06-24 DIAGNOSIS — J38.02 BILATERAL VOCAL FOLD PARALYSIS: Primary | ICD-10-CM

## 2021-06-24 PROCEDURE — 31575 DIAGNOSTIC LARYNGOSCOPY: CPT | Mod: 51 | Performed by: OTOLARYNGOLOGY

## 2021-06-24 PROCEDURE — 31615 TRCHEOBRNCHSC EST TRACHS INC: CPT | Performed by: OTOLARYNGOLOGY

## 2021-06-24 PROCEDURE — 99212 OFFICE O/P EST SF 10 MIN: CPT | Mod: 25 | Performed by: OTOLARYNGOLOGY

## 2021-06-24 PROCEDURE — 92610 EVALUATE SWALLOWING FUNCTION: CPT | Mod: GN | Performed by: SPEECH-LANGUAGE PATHOLOGIST

## 2021-06-24 ASSESSMENT — MIFFLIN-ST. JEOR: SCORE: 1481.03

## 2021-06-24 ASSESSMENT — PAIN SCALES - GENERAL: PAINLEVEL: NO PAIN (0)

## 2021-06-24 NOTE — NURSING NOTE
"Chief Complaint   Patient presents with     RECHECK     2-3 week follow up       Pulse 69, temperature 98.7  F (37.1  C), temperature source Temporal, height 1.753 m (5' 9\"), weight 77.6 kg (171 lb), SpO2 93 %.    Joe Dalal, EMT  "

## 2021-06-24 NOTE — PATIENT INSTRUCTIONS
1.  You were seen in the ENT Clinic today by Dr. Holliday. The following has been recommended:   - Continue lavaging and suctioning to help decrease secretions in the tube   - We have ordered you an extra speaking valve    2.  Plan is to return to clinic in one month.    If you have any questions or concerns after your appointment, please call the clinic .   - Clinic phone: [phone]. Press option #1 for scheduling related needs. Press option #3 for Nurse advice.  - Direct phone: [phone]      Marcelina Chávez RN, BSN  [phone]  [facility]  Department of Otolaryngology  [facility]  [email or web address]
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
## 2021-06-30 NOTE — PROGRESS NOTES
OUTPATIENT SWALLOW  EVALUATION  PLAN OF TREATMENT FOR OUTPATIENT REHABILITATION  (COMPLETE FOR INITIAL CLAIMS ONLY)  Patient's Last Name, First Name, M.I.  YOB: 1940  Hieu Hughes     Provider's Name   MAXIMUS Thompson   Medical Record No.  [id number]     Start of Care Date:  06/24/21   Onset Date:  06/04/21(date of tracheostomy)   Type:     ___PT   ____OT  ___X_SLP Medical Diagnosis:  Oropharyngeal dysphagia     Treatment Diagnosis:  Moderately severe oropharyngeal dysphagia Visits from SOC:  1     _________________________________________________________________________________  Plan of Treatment/Functional Goals:  Planned Therapy Interventions: Dysphagia Treatment  Dysphagia treatment: Compensatory strategies for swallowing, Instruction of safe swallow strategies                     Goals   1. Goal Identifier: PO for pleasure       Goal Description: 2. Pt will tolerate small amounts of thin liquid for pleasure independently using small sips, right head turn, multiple swallows and wearing PMSV in 100% of PO trials.       Target Date: 09/22/21                                  Predicted Duration of Therapy Intervention (days/wks): 1 follow up session in conjunction with next ENT clinic visit    MAXIMUS Thompson       I CERTIFY THE NEED FOR THESE SERVICES FURNISHED UNDER        THIS PLAN OF TREATMENT AND WHILE UNDER MY CARE     (Physician attestation of this document indicates review and certification of the therapy plan).                  Certification date from: 06/24/21 Certification date to: 09/22/21          Referring Physician: Dr. Kaykay Holliday    Initial Assessment        See Epic Evaluation Start Of Care Date: 06/24/21

## 2021-06-30 NOTE — PROGRESS NOTES
Speech-Language Pathology Department   EVALUATION  [facility]  Clinical Swallow Evaluation Under Endoscopy Completed by MD     06/24/21 1500   General Information   Type Of Visit Initial   Start Of Care Date 06/24/21   Referring Physician Dr. 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  He then developed esophageal strictures and underwent multiple dilations with Dr. Dunn from [id number].  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 He opted against a tracheostomy and elected for observation. Due to worsening dyspnea, pt elected to undergo awake tracheostomy on 6/4/21. He was seen inpatient by SLP team and cleared for PMSV. He presents today in conjunction with ENT clinic visit and is seen per MD request. He has been NPO since surgery using PEG for primary source of nutrition/hydration. Prior to his tracheostomy, he was PEG dependent and only took thin liquids in small amounts for pleasure. He would like to return to this.    Respiratory Status Tracheostomy  (PMSV in place)   Prior Level Of Function Swallowing   Prior Level Of Function Comment NPO since surgery with PEG   Patient Role/employment History Retired   Living Environment Other, Comments  (Lives alone in [facility])   General Observations Pt is highly pleasant and cooperative throughout  evaluation.    Patient/family Goals To initiate some PO for pleasure   Clinical Swallow Evaluation   Oral Musculature generally intact   Structural Abnormalities none present   Dentition upper and lower dentures;uses dentures to eat   Mucosal Quality dry   Mandibular Strength and Mobility impaired   Oral Labial Strength and Mobility WFL   Lingual Strength and Mobility other (see comments)  (mild weakness and discoordination)   Velar Elevation impaired   Buccal Strength and Mobility impaired   Laryngeal Function Cough;Voicing initiated;Swallow   Oral Musculature Comments Trimus, impaired lingual, buccal and palatal function. Upper and lower dentures present. Weak cough. Hypernasal voice. 6 cuffless Shiley tracheostomy with PMSV in place.    Additional Documentation Yes   Clinical Swallow Eval: Thin Liquid Texture Trial   Mode of Presentation, Thin Liquids straw;fed by clinician   Volume of Liquid or Food Presented 2oz   Oral Phase of Swallow WFL   Pharyngeal Phase of Swallow repeated swallows   Successful Strategies Trialed During Procedure head turn to the right   Diagnostic Statement No aspiration with head turn to the right and small sips with repeated, dry swallows.    Swallow Compensations   Swallow Compensations Reduce amounts;Pacing;Multiple swallow;Head turn right   Results No difficulties noted   Educational Assessment   Barriers to Learning No barriers   General Therapy Interventions   Planned Therapy Interventions Dysphagia Treatment   Dysphagia treatment Compensatory strategies for swallowing;Instruction of safe swallow strategies   Swallow Eval: Clinical Impressions   Skilled Criteria for Therapy Intervention Skilled criteria met.  Treatment indicated.   Dysphagia Outcome Severity Scale (ALEXA) Level 2 - ALEXA   Treatment Diagnosis Moderately severe oropharyngeal dysphagia   Diet texture recommendations NPO  (*thin liquids in small amounts for pleasure)   Recommended Feeding/Eating Techniques turn head  right during every swallow;small sips/bites;maintain upright posture during/after eating for 30 mins;hard swallow w/ each bite or sip   Rehab Potential fair, will monitor progress closely   Predicted Duration of Therapy Intervention (days/wks) 1 follow up session in conjunction with next ENT clinic visit   Anticipated Discharge Disposition home w/ outpatient services   Risks and Benefits of Treatment have been explained. Yes   Patient, family and/or staff in agreement with Plan of Care Yes   Clinical Impression Comments Pt presents today s/p tracheostomy with swallow function similar to pre-op function with chronic moderately severe oropharyngeal dysphagia. Oral motor examination reveals Trimus, impaired lingual, buccal and palatal function. Upper and lower dentures present. Weak cough. Hypernasal voice. 6 cuffless Shiley tracheostomy with PMSV in place. Pt assessed with thin liquids under endoscopy completed by MD. PMSV in place for trials. With right head turn, small sip and multiple dry swallow no aspiration is observed. Residuals are eventually cleared with 2-3 dry swallows. Recommend pt continue PEG as primary source of nutrition/hydration. Pt may initiate small amounts of thin liquids for pleasure using single sip, right head turn, double/triple swallow, wearing PMSV, and completing oral cares prior. Pt will benefit from one follow up session to ensure independent use of safe/compensatory swallow strategies and tolerance of PO for pleasure.    Swallow Goals   SLP Swallow Goals 1   Swallow Goal 1   Goal Identifier PO for pleasure   Goal Description 2. Pt will tolerate small amounts of thin liquid for pleasure independently using small sips, right head turn, multiple swallows and wearing PMSV in 100% of PO trials.   Target Date 09/22/21   Total Session Time   SLP Eval: oral/pharyngeal swallow function, clinical minutes (60837) 12   Total Evaluation Time 12   Therapy Certification   Certification date from  06/24/21   Certification date to 09/22/21   Medical Diagnosis Oropharyngeal dysphagia   Certification I certify the need for these services furnished under this plan of treatment and while under my care.  (Physician co-signature of this document indicates review and certification of the therapy plan).     Thank you for the referral of Hieu Hughes. If you have any questions about this report, please contact me using the information below.     ILANA Thompson MA (music), CCC-SLP   Speech Language Pathologist  [place] Trained Vocologist   [facility]  Dept. of Otolaryngology  Department of Rehabilitation Services  [address] [place] [id number]  Email: [email or web address]   Pronouns: she/her/hers

## 2021-07-01 DIAGNOSIS — J38.02 BILATERAL VOCAL CORD PARALYSIS: ICD-10-CM

## 2021-07-01 DIAGNOSIS — Z93.0 TRACHEOSTOMY DEPENDENT (H): ICD-10-CM

## 2021-07-01 DIAGNOSIS — J38.02 BILATERAL VOCAL FOLD PARALYSIS: ICD-10-CM

## 2021-07-01 DIAGNOSIS — Z43.0 TRACHEOSTOMY CARE (H): ICD-10-CM

## 2021-07-01 RX ORDER — SODIUM CHLORIDE FOR INHALATION 0.9 %
3 VIAL, NEBULIZER (ML) INHALATION PRN
Qty: 500 ML | Refills: 11 | Status: SHIPPED | OUTPATIENT
Start: 2021-07-01 | End: 2024-05-23

## 2021-07-01 RX ORDER — SODIUM CHLORIDE FOR INHALATION 3 %
VIAL, NEBULIZER (ML) INHALATION
Qty: 360 ML | Refills: 11 | Status: SHIPPED | OUTPATIENT
Start: 2021-07-01 | End: 2024-09-01

## 2021-07-08 ENCOUNTER — DOCUMENTATION ONLY (OUTPATIENT)
Dept: OTOLARYNGOLOGY | Facility: CLINIC | Age: 81
End: 2021-07-08

## 2021-07-08 DIAGNOSIS — Z43.0 TRACHEOSTOMY CARE (H): ICD-10-CM

## 2021-07-08 DIAGNOSIS — J38.02 BILATERAL VOCAL FOLD PARALYSIS: ICD-10-CM

## 2021-07-08 RX ORDER — MAGNESIUM HYDROXIDE 1200 MG/15ML
30 LIQUID ORAL 3 TIMES DAILY
Qty: 2000 ML | Refills: 11 | Status: SHIPPED | OUTPATIENT
Start: 2021-07-08 | End: 2022-06-07

## 2021-07-13 ENCOUNTER — TELEPHONE (OUTPATIENT)
Dept: OTOLARYNGOLOGY | Facility: CLINIC | Age: 81
End: 2021-07-13

## 2021-07-13 NOTE — TELEPHONE ENCOUNTER
Writer called patient to confirm appointment 7/15/21 8:30am. Voicemail left with details.    Joe Dalal, EMT

## 2021-07-14 NOTE — TELEPHONE ENCOUNTER
Writer called patient to confirm appointment 7/15/21 8:30am. Patient confirmed.     Joe Dalal, EMT

## 2021-07-15 ENCOUNTER — OFFICE VISIT (OUTPATIENT)
Dept: OTOLARYNGOLOGY | Facility: CLINIC | Age: 81
End: 2021-07-15
Payer: COMMERCIAL

## 2021-07-15 ENCOUNTER — THERAPY VISIT (OUTPATIENT)
Dept: SPEECH THERAPY | Facility: CLINIC | Age: 81
End: 2021-07-15
Payer: COMMERCIAL

## 2021-07-15 VITALS
WEIGHT: 174 LBS | TEMPERATURE: 99.3 F | HEART RATE: 68 BPM | HEIGHT: 69 IN | OXYGEN SATURATION: 92 % | BODY MASS INDEX: 25.77 KG/M2

## 2021-07-15 DIAGNOSIS — Z93.0 TRACHEOSTOMY IN PLACE (H): ICD-10-CM

## 2021-07-15 DIAGNOSIS — Z93.0 TRACHEOSTOMY DEPENDENT (H): Primary | ICD-10-CM

## 2021-07-15 DIAGNOSIS — J38.02 BILATERAL VOCAL CORD PARALYSIS: Primary | ICD-10-CM

## 2021-07-15 DIAGNOSIS — R13.10 DYSPHAGIA, UNSPECIFIED TYPE: ICD-10-CM

## 2021-07-15 DIAGNOSIS — R49.0 DYSPHONIA: ICD-10-CM

## 2021-07-15 DIAGNOSIS — R13.12 OROPHARYNGEAL DYSPHAGIA: ICD-10-CM

## 2021-07-15 PROCEDURE — 92526 ORAL FUNCTION THERAPY: CPT | Mod: GN | Performed by: SPEECH-LANGUAGE PATHOLOGIST

## 2021-07-15 PROCEDURE — 99212 OFFICE O/P EST SF 10 MIN: CPT | Mod: 25 | Performed by: OTOLARYNGOLOGY

## 2021-07-15 PROCEDURE — 31615 TRCHEOBRNCHSC EST TRACHS INC: CPT | Performed by: OTOLARYNGOLOGY

## 2021-07-15 ASSESSMENT — MIFFLIN-ST. JEOR: SCORE: 1494.64

## 2021-07-15 ASSESSMENT — PAIN SCALES - GENERAL: PAINLEVEL: NO PAIN (0)

## 2021-07-15 NOTE — NURSING NOTE
"Chief Complaint   Patient presents with     RECHECK     [facility] Return        Pulse 68, temperature 99.3  F (37.4  C), temperature source Temporal, height 1.753 m (5' 9\"), weight 78.9 kg (174 lb), SpO2 92 %.    Joe Dalal, EMT  "

## 2021-07-15 NOTE — PATIENT INSTRUCTIONS
1.  You were seen in the ENT Clinic today by . If you have any questions or concerns after your appointment, please call [phone]. Press option #1 for scheduling related needs. Press option #3 for Nurse advice.    2.  Plan is to return to clinic in 6 weeks       Mai Martínez LPN  [phone]  [facility] Otolaryngology
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
## 2021-08-11 ENCOUNTER — DOCUMENTATION ONLY (OUTPATIENT)
Dept: OTOLARYNGOLOGY | Facility: CLINIC | Age: 81
End: 2021-08-11

## 2021-08-11 NOTE — PROGRESS NOTES
Order for speaking valve faxed to [facility] at [phone]. 2 [facility] faxed.

## 2021-09-10 ENCOUNTER — OFFICE VISIT (OUTPATIENT)
Dept: OTOLARYNGOLOGY | Facility: CLINIC | Age: 81
End: 2021-09-10
Payer: COMMERCIAL

## 2021-09-10 ENCOUNTER — THERAPY VISIT (OUTPATIENT)
Dept: SPEECH THERAPY | Facility: CLINIC | Age: 81
End: 2021-09-10
Payer: COMMERCIAL

## 2021-09-10 VITALS — WEIGHT: 174 LBS | HEIGHT: 69 IN | BODY MASS INDEX: 25.77 KG/M2

## 2021-09-10 DIAGNOSIS — R13.12 OROPHARYNGEAL DYSPHAGIA: ICD-10-CM

## 2021-09-10 DIAGNOSIS — R49.0 DYSPHONIA: Primary | ICD-10-CM

## 2021-09-10 DIAGNOSIS — J38.02 BILATERAL VOCAL FOLD PARALYSIS: ICD-10-CM

## 2021-09-10 DIAGNOSIS — Z43.0 TRACHEOSTOMY CARE (H): ICD-10-CM

## 2021-09-10 DIAGNOSIS — Z93.0 TRACHEOSTOMY IN PLACE (H): ICD-10-CM

## 2021-09-10 DIAGNOSIS — J38.02 BILATERAL VOCAL CORD PARALYSIS: Primary | ICD-10-CM

## 2021-09-10 PROCEDURE — 99213 OFFICE O/P EST LOW 20 MIN: CPT | Mod: 25 | Performed by: OTOLARYNGOLOGY

## 2021-09-10 PROCEDURE — 92507 TX SP LANG VOICE COMM INDIV: CPT | Mod: GN | Performed by: SPEECH-LANGUAGE PATHOLOGIST

## 2021-09-10 PROCEDURE — 31575 DIAGNOSTIC LARYNGOSCOPY: CPT | Mod: 51 | Performed by: OTOLARYNGOLOGY

## 2021-09-10 PROCEDURE — 31615 TRCHEOBRNCHSC EST TRACHS INC: CPT | Performed by: OTOLARYNGOLOGY

## 2021-09-10 ASSESSMENT — MIFFLIN-ST. JEOR: SCORE: 1489.64

## 2021-09-10 ASSESSMENT — PAIN SCALES - GENERAL: PAINLEVEL: NO PAIN (0)
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
## 2021-09-10 NOTE — PATIENT INSTRUCTIONS
1.  You were seen in the ENT Clinic today by . If you have any questions or concerns after your appointment, please call [phone]. Press option #1 for scheduling related needs. Press option #3 for Nurse advice.    2.   has recommended  the following:   - continue trach cares   -ok to have thins for pleasure with head turn    3.  Plan is to return to clinic 12/10/21 at 11:00 am       Mai Martínez LPN  [phone]  [facility] - Otolaryngology

## 2021-09-10 NOTE — NURSING NOTE
"Chief Complaint   Patient presents with     RECHECK     follow up       Height 1.753 m (5' 9\"), weight 78.9 kg (174 lb).    Joe Dalal, EMT  "
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
## 2021-09-17 ENCOUNTER — TELEPHONE (OUTPATIENT)
Dept: OTOLARYNGOLOGY | Facility: CLINIC | Age: 81
End: 2021-09-17

## 2021-09-24 ENCOUNTER — TELEPHONE (OUTPATIENT)
Dept: OTOLARYNGOLOGY | Facility: CLINIC | Age: 81
End: 2021-09-24

## 2021-09-24 NOTE — TELEPHONE ENCOUNTER
Health Call Center    Phone Message    May a detailed message be left on voicemail: yes     Reason for Call: Form or Letter   Type or form/letter needing completion: Letter of medical necessity  Provider: Dr. Holliday  Date form needed: ASAP  Once completed: Fax form to: [phone]     Radha Napoles is calling to follow up on a fax she had sent over on 9/17/21, requesting a letter of medical necessity. Please call Radha to discuss at [phone], extension [phone]. Thank you.      Action Taken: Message routed to:  [facility]    Travel Screening: Not Applicable

## 2021-10-11 NOTE — TELEPHONE ENCOUNTER
[facility] Call Center    Phone Message    May a detailed message be left on voicemail: yes     Reason for Call: other:Other: Radha calling back to see if we received the form, Please call back to update     Thank you,    Action Taken: Message routed to:  [facility] ENT    Travel Screening: Not Applicable

## 2021-10-20 NOTE — TELEPHONE ENCOUNTER
[facility] Call Center    Phone Message    May a detailed message be left on voicemail: yes     Reason for Call: Other:   Radha calling to check on status of the medical necessity form. Radha last faxed it over on 10/13 at 10:18am to the [phone] number. Writer spoke with clinic who was unable to find the fax. Writer asked Radha to refax it to same fax number. Radha is faxing it over now. Please keep an eye out for it.     Action Taken: Other:  ent    Travel Screening: Not Applicable

## 2021-10-25 NOTE — TELEPHONE ENCOUNTER
[facility] Call Center    Phone Message    May a detailed message be left on voicemail: yes     Reason for Call: Other: Radha with [facility] is calling back to see if the clinic has received the medical necessity forms that she has faxed over numerous times. Please call Radha with an update at  # [phone], extension [phone]. Thank you.     Action Taken: Message routed to:  [facility] ENT    Travel Screening: Not Applicable

## 2021-10-26 NOTE — TELEPHONE ENCOUNTER
Spoke to yoav at [facility] in regards to forms that need completion. Fax number has been provided to yoav several times and she has faxed forms, however this writer has checked dept fax numerous times and not locating forms anywhere. Writers email address has now been provided to yoav to have forms emailed for completion.

## 2021-10-27 ENCOUNTER — DOCUMENTATION ONLY (OUTPATIENT)
Dept: OTOLARYNGOLOGY | Facility: CLINIC | Age: 81
End: 2021-10-27

## 2021-10-27 NOTE — PROGRESS NOTES
Order for enteral nutrition faxed to yoav at [facility], fax number [phone]. 2 pgs faxed.

## 2021-12-10 ENCOUNTER — OFFICE VISIT (OUTPATIENT)
Dept: OTOLARYNGOLOGY | Facility: CLINIC | Age: 81
End: 2021-12-10
Payer: COMMERCIAL

## 2021-12-10 ENCOUNTER — ALLIED HEALTH/NURSE VISIT (OUTPATIENT)
Dept: SPEECH THERAPY | Facility: CLINIC | Age: 81
End: 2021-12-10

## 2021-12-10 VITALS — WEIGHT: 175 LBS | HEART RATE: 68 BPM | BODY MASS INDEX: 25.92 KG/M2 | HEIGHT: 69 IN | OXYGEN SATURATION: 94 %

## 2021-12-10 DIAGNOSIS — Z93.0 TRACHEOSTOMY DEPENDENCE (H): ICD-10-CM

## 2021-12-10 DIAGNOSIS — R49.0 DYSPHONIA: Primary | ICD-10-CM

## 2021-12-10 PROCEDURE — 99214 OFFICE O/P EST MOD 30 MIN: CPT | Mod: 25 | Performed by: OTOLARYNGOLOGY

## 2021-12-10 PROCEDURE — 31615 TRCHEOBRNCHSC EST TRACHS INC: CPT | Performed by: OTOLARYNGOLOGY

## 2021-12-10 ASSESSMENT — MIFFLIN-ST. JEOR: SCORE: 1494.17

## 2021-12-10 ASSESSMENT — PAIN SCALES - GENERAL: PAINLEVEL: NO PAIN (0)

## 2021-12-10 NOTE — NURSING NOTE
"Chief Complaint   Patient presents with     RECHECK     Follow up       Pulse 68, height 1.753 m (5' 9\"), weight 79.4 kg (175 lb), SpO2 94 %.    Joe Dalal, EMT  "

## 2021-12-10 NOTE — PATIENT INSTRUCTIONS
1.  You were seen in the ENT Clinic today by . If you have any questions or concerns after your appointment, please call [phone]. Press option #1 for scheduling related needs. Press option #3 for Nurse advice.    2.  Plan is to return to clinic 6/9/22 at 11:00am      Mai Martínez LPN  [phone]  [facility] - Otolaryngology
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
## 2022-02-03 ENCOUNTER — TELEPHONE (OUTPATIENT)
Dept: OTOLARYNGOLOGY | Facility: CLINIC | Age: 82
End: 2022-02-03
Payer: COMMERCIAL

## 2022-02-03 NOTE — TELEPHONE ENCOUNTER
[facility] Call Center    Phone Message    May a detailed message be left on voicemail: yes     Reason for Call: Form or Letter   Type or form/letter needing completion: Letter of medical necessity for services with Dr. Holliday & the clinical notes supporting.  Provider: Dr. Holliday  Date form needed: As soon as possible.  Once completed: Fax form to: [employer] - [phone]      Action Taken: Message routed to:  [facility] ([facility]): ent    Travel Screening: Not Applicable

## 2022-02-03 NOTE — TELEPHONE ENCOUNTER
attempted to contact oliva at [place] to discuss letter needed and why. She was unavailable at this time. Writers call back number was provided to  for a call back.

## 2022-02-22 NOTE — PLAN OF CARE
Neuro: A&Ox4. Neuros intact. Pleasant, able to make needs known. Slept well overnight.  Cardiac: SR 50-60s. Continues to occasionally be 48-49bpm. VSS.       Respiratory: Shiley 6, cuffed. Sating >94% on TD 21% FiO2.  Trach cares completed q4, suctioned x2 - red/thin. Minimal stoma bleeding compared to last night.  GI/: Adequate urine output via urinal. No BM this shift.  Diet/appetite: Clears diet ordered, no PO intake. 2 cans of TF given by gravity.  Activity:  Assist of 1. Independently repositioning.  Pain: Denies.   Skin: Bruising below trach, skin CDI.  LDA's:  -L PIV: LR at 75ml/hr  -Shiley 6  -PEG     Plan: ENT planning on deflating cuff in AM. Continue with POC. Notify primary team with changes.   bilateral upper extremity Active ROM was WFL (within functional limits)/bilateral  lower extremity Active ROM was WFL (within functional limits)

## 2022-04-06 ENCOUNTER — TELEPHONE (OUTPATIENT)
Dept: OTOLARYNGOLOGY | Facility: CLINIC | Age: 82
End: 2022-04-06
Payer: COMMERCIAL

## 2022-04-06 ENCOUNTER — TELEPHONE (OUTPATIENT)
Dept: ONCOLOGY | Facility: CLINIC | Age: 82
End: 2022-04-06
Payer: COMMERCIAL

## 2022-04-06 NOTE — TELEPHONE ENCOUNTER
Oncology Nutrition Services:     Received phone call from Mary ([facility]) requesting fax of medical records indicating need for EN.   Mary tells me that Dr. Holliday's clinic will not provide the records as she is not managing his tube feedings nor placed the feeding tube.      KALLIE indicated that Dr. Holliday referred patient to writer for MNT consult and EN recommendations.  Writer saw patient one year ago for consult.  Write called Hieu today to confirm EN regimen. He remains dependent on EN via PEG tube for 100% of his nutrition needs with good tolerance.  Noted no anticipation of PO intake per Dr. Holliday's note on 12/10/21.  RD sent Dr. Holliday's RNCC a note indicating that the note from 12/10/21 will meet insurance requirements for EN formula and supplies.     'Franco Oneill,     I heard and see in epic that you spoke with Mary from [facility] today.     She is needing medical records for Hieu, indicating his need for tube feeding with inability to take PO which she documented in her last visit on 12/10/21.  These records will meet insurance requirements to continue to allow Hieu to get his feeding tube supplies and formula through insurance.       Dr. Holliday referred Hieu to me a year ago for tube feeding recommendations.  She does not need to order or manage anything, [facility] just needs medical records indicating need for tube feeding from MD within 6 months of visit.   If you or Dr. Holliday can fax her note from 12/10/21 to [facility] attn Mary: [phone] that would be great.       Thank you!'    Madison Bejarano RD, [facility]   [phone]

## 2022-04-06 NOTE — TELEPHONE ENCOUNTER
[facility] Call Center    Phone Message    May a detailed message be left on voicemail: yes     Reason for Call: Other:    Mary from [facility] states that she sent over a clinical request form to get the most recent clinical notes for this pt. Request was faxed over on 03/31 and Mary will refax today. Writer verified clinic fax #. Please keep an eye out for document.     Fax #: [phone]    Action Taken: Other:  ent    Travel Screening: Not Applicable

## 2022-04-06 NOTE — TELEPHONE ENCOUNTER
Spoke to barrett at [facility] in regards to paperwork and records needed. Advised barrett that writer had spoken to someone at [facility] previously in regards to this. Since they are wanting information to do with patients tube feedings, provider here is not the one to do this, as she did not place the feeding tube and did not order any tube feedings and is not managing this area of patients care. Barertt stated she would follow up with patient to figure out who needs to be contacted. No further action taken at this time.

## 2022-04-08 ENCOUNTER — DOCUMENTATION ONLY (OUTPATIENT)
Dept: OTOLARYNGOLOGY | Facility: CLINIC | Age: 82
End: 2022-04-08
Payer: COMMERCIAL

## 2022-04-08 NOTE — PROGRESS NOTES
office note from 12/10/21 faxed to Mary at [place]. Fax number [phone]. [facility] faxed.

## 2022-06-02 DIAGNOSIS — Z43.0 TRACHEOSTOMY CARE (H): ICD-10-CM

## 2022-06-02 DIAGNOSIS — J38.02 BILATERAL VOCAL FOLD PARALYSIS: ICD-10-CM

## 2022-06-06 ENCOUNTER — TELEPHONE (OUTPATIENT)
Dept: OTOLARYNGOLOGY | Facility: CLINIC | Age: 82
End: 2022-06-06
Payer: COMMERCIAL

## 2022-06-06 NOTE — TELEPHONE ENCOUNTER
LVM for patient regarding [employer] plan is inactive and patient has a different commercial medicare advantage plan through [employer]. Provided Registration number ([phone])  for updating INSURANCE INFORMATION and ENT Clinic number as well. Asked patient to BRING Insurance card to upcoming appointment.

## 2022-06-07 RX ORDER — MAGNESIUM HYDROXIDE 1200 MG/15ML
30 LIQUID ORAL 3 TIMES DAILY
Qty: 2000 ML | Refills: 11 | Status: SHIPPED | OUTPATIENT
Start: 2022-06-07

## 2022-06-09 ENCOUNTER — TELEPHONE (OUTPATIENT)
Dept: OTOLARYNGOLOGY | Facility: CLINIC | Age: 82
End: 2022-06-09

## 2022-06-09 NOTE — TELEPHONE ENCOUNTER
RN responded to incoming phone call from Cindy, daughter of Pt reporting tiny amount of blod from trach 3x this week.  Pt Dx with Covid. Tested Monday, resulted today. Overall Pt is doing well but fatigued. Per Dr. Holliday  Recommend hydration, saline bullets, and trach humidifier. Cindy denies further questions and concerns.

## 2022-06-10 ENCOUNTER — DOCUMENTATION ONLY (OUTPATIENT)
Dept: ONCOLOGY | Facility: CLINIC | Age: 82
End: 2022-06-10
Payer: COMMERCIAL

## 2022-06-10 ENCOUNTER — DOCUMENTATION ONLY (OUTPATIENT)
Dept: OTOLARYNGOLOGY | Facility: CLINIC | Age: 82
End: 2022-06-10
Payer: COMMERCIAL

## 2022-06-10 NOTE — PROGRESS NOTES
Nutrition Services:     Received phone call from [facility] ARREGUIN today, 6/10/22, indicating that they need patient records from Dr. Holliday regarding tube feeding.   KALLIE sent message to Dony to confirm [facility] home infusion service as he was previous on services with Dony, not Dann.     Per Dony RD: patient was on service with Dony since 2020 but just in last month they worked to transition him to [facility] Provider as Dony is no longer taking [facility] Medicare , I believe that is his plan. They spoke with him on 5/23 regarding this. I see [employer] shipped a dlvry 5/23 to help bridge gap while this was being transitioned.     RD sent message to Mai CORDON care coordinator to send documentation to Dann.  (same records that were sent on 4/8/22 to [facility]ony).       Madison Bejarano RD, [facility] - [facility]  [phone]

## 2022-06-10 NOTE — PROGRESS NOTES
Sandor's office visit note from 12/10/21 was faxed to [facility]. Fax number [phone]. 13 pgs faxed.

## 2022-06-17 ENCOUNTER — TELEPHONE (OUTPATIENT)
Dept: OTOLARYNGOLOGY | Facility: CLINIC | Age: 82
End: 2022-06-17
Payer: COMMERCIAL

## 2022-06-17 ENCOUNTER — DOCUMENTATION ONLY (OUTPATIENT)
Dept: OTOLARYNGOLOGY | Facility: CLINIC | Age: 82
End: 2022-06-17
Payer: COMMERCIAL

## 2022-06-17 NOTE — PROGRESS NOTES
Signed statement of medical necessity faxed to jay michaels at [facility]. Fax number [phone]. 2 [employer] [facility] [employer] [facility] faxed.

## 2022-06-17 NOTE — TELEPHONE ENCOUNTER
Spoke to Helen at [facility] and advised form was faxed this morning. She confirmed she received it. No further action needed

## 2022-06-17 NOTE — TELEPHONE ENCOUNTER
[facility] Call Center    Phone Message    May a detailed message be left on voicemail: yes     Reason for Call: Other: Helen with [facility] calling to f/u on medical necessity form that was faxed to the clinic on 5/24 and 6/13, as she has not received it back yet. She will also be faxing to clinic again today. Please call Helen with an update at  # [phone]. Thank you.     Action Taken: Message routed to:  [facility] ([facility] ENT    Travel Screening: Not Applicable

## 2022-08-09 ENCOUNTER — OFFICE VISIT (OUTPATIENT)
Dept: OTOLARYNGOLOGY | Facility: CLINIC | Age: 82
End: 2022-08-09
Payer: COMMERCIAL

## 2022-08-09 ENCOUNTER — THERAPY VISIT (OUTPATIENT)
Dept: SPEECH THERAPY | Facility: CLINIC | Age: 82
End: 2022-08-09

## 2022-08-09 VITALS
HEIGHT: 69 IN | DIASTOLIC BLOOD PRESSURE: 75 MMHG | WEIGHT: 173.7 LBS | SYSTOLIC BLOOD PRESSURE: 148 MMHG | BODY MASS INDEX: 25.73 KG/M2 | HEART RATE: 63 BPM

## 2022-08-09 DIAGNOSIS — R49.0 DYSPHONIA: Primary | ICD-10-CM

## 2022-08-09 DIAGNOSIS — Z43.0 TRACHEOSTOMY CARE (H): ICD-10-CM

## 2022-08-09 DIAGNOSIS — R13.12 DYSPHAGIA, OROPHARYNGEAL PHASE: Primary | ICD-10-CM

## 2022-08-09 PROCEDURE — 99213 OFFICE O/P EST LOW 20 MIN: CPT | Mod: 25 | Performed by: OTOLARYNGOLOGY

## 2022-08-09 PROCEDURE — 92507 TX SP LANG VOICE COMM INDIV: CPT | Mod: GN | Performed by: SPEECH-LANGUAGE PATHOLOGIST

## 2022-08-09 PROCEDURE — 92526 ORAL FUNCTION THERAPY: CPT | Mod: GN | Performed by: SPEECH-LANGUAGE PATHOLOGIST

## 2022-08-09 PROCEDURE — 31615 TRCHEOBRNCHSC EST TRACHS INC: CPT | Performed by: OTOLARYNGOLOGY

## 2022-08-09 ASSESSMENT — PAIN SCALES - GENERAL: PAINLEVEL: NO PAIN (0)
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
## 2022-08-09 NOTE — PATIENT INSTRUCTIONS
1.  You were seen in the ENT Clinic today by . If you have any questions or concerns after your appointment, please call [phone]. Press option #1 for scheduling related needs. Press option #3 for Nurse advice.    2.   has recommended  the following:   - enteral nutrition   - ok for thins for pleasure with head turn    3.  Plan is to return to clinic 3/14/23 at 1 pm      Mai Martínez LPN  [phone]  [facility] - Otolaryngology
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
## 2022-08-23 ENCOUNTER — TELEPHONE (OUTPATIENT)
Dept: SPEECH THERAPY | Facility: CLINIC | Age: 82
End: 2022-08-23

## 2022-08-23 NOTE — TELEPHONE ENCOUNTER
SLP called and left  for patient to see how he is doing since his recent trach change with Dr Holliday on 8/9. Contact information with left with patient to notify SLP if he has any questions or concerns.     Nataly Marrufo MS, CCC-SLP  Speech-Language Pathology  [facility]  Department of Otolaryngology/D&T - 4th floor  Phone: [phone]  Email: [email or web address]
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
## 2023-03-14 ENCOUNTER — OFFICE VISIT (OUTPATIENT)
Dept: OTOLARYNGOLOGY | Facility: CLINIC | Age: 83
End: 2023-03-14
Payer: COMMERCIAL

## 2023-03-14 ENCOUNTER — THERAPY VISIT (OUTPATIENT)
Dept: SPEECH THERAPY | Facility: CLINIC | Age: 83
End: 2023-03-14
Payer: COMMERCIAL

## 2023-03-14 VITALS
OXYGEN SATURATION: 91 % | HEIGHT: 69 IN | WEIGHT: 173 LBS | SYSTOLIC BLOOD PRESSURE: 144 MMHG | DIASTOLIC BLOOD PRESSURE: 66 MMHG | HEART RATE: 59 BPM | BODY MASS INDEX: 25.62 KG/M2

## 2023-03-14 DIAGNOSIS — Z43.0 TRACHEOSTOMY CARE (H): ICD-10-CM

## 2023-03-14 DIAGNOSIS — R13.12 DYSPHAGIA, OROPHARYNGEAL PHASE: Primary | ICD-10-CM

## 2023-03-14 DIAGNOSIS — Z93.0 TRACHEOSTOMY IN PLACE (H): ICD-10-CM

## 2023-03-14 DIAGNOSIS — R49.0 DYSPHONIA: Primary | ICD-10-CM

## 2023-03-14 DIAGNOSIS — J38.02 BILATERAL VOCAL CORD PARALYSIS: ICD-10-CM

## 2023-03-14 DIAGNOSIS — R49.0 DYSPHONIA: ICD-10-CM

## 2023-03-14 PROCEDURE — 99213 OFFICE O/P EST LOW 20 MIN: CPT | Mod: 25 | Performed by: OTOLARYNGOLOGY

## 2023-03-14 PROCEDURE — 31615 TRCHEOBRNCHSC EST TRACHS INC: CPT | Performed by: OTOLARYNGOLOGY

## 2023-03-14 PROCEDURE — 99207 PR NO CHARGE LOS: CPT | Performed by: SPEECH-LANGUAGE PATHOLOGIST

## 2023-03-14 ASSESSMENT — PAIN SCALES - GENERAL: PAINLEVEL: NO PAIN (0)
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
## 2023-03-14 NOTE — PATIENT INSTRUCTIONS
1.  You were seen in the ENT Clinic today by Dr. Holliday. If you have any questions or concerns after your appointment, please call [phone]. Press option #1 for scheduling related needs. Press option #3 for Nurse advice.    2.    Plan is to return to clinic 9/12/23 at 10:00 am       Samantha Mcgrath  [phone]  [facility] - Otolaryngology

## 2023-03-14 NOTE — PROGRESS NOTES
Pt seen for brief allied health visit today per MD request. Pt with fenestrated 6 cuffless Shiley tracheostomy in place with PMSV.  Pt with good tolerance of this. Per MD, pt not yet a candidate for Mayo cannula, but may be a candidate at next visit. Pt would benefit from SLP evaluation/treatment should his tracheostomy be switched to a Mayo cannula. No charge for today's brief allied health visit.    ILANA Thompson MA (music), CCC-SLP   Speech Language Pathologist  [facility] Trained Vocologist   [facility]  Dept. of Otolaryngology  Department of Rehabilitation Services  [address] [place] [id number]  Email: [email or web address]   Phone: [phone]  Pronouns: she/her/hers

## 2023-06-19 ENCOUNTER — PATIENT OUTREACH (OUTPATIENT)
Dept: OTOLARYNGOLOGY | Facility: CLINIC | Age: 83
End: 2023-06-19
Payer: COMMERCIAL

## 2023-06-19 NOTE — PROGRESS NOTES
Called patient's daughter to let her know that we sent over the new letter with the last office visit attached to prove medical necessity for the trach supplies. Provided direct line call back information on the fax, and encouraged patient's daughter to reach out with any questions or concerns. Patient's daughter was agreeable and verbalized understanding of the situation. Samantha Mcgrath RN on 6/19/2023 at 1:18 PM

## 2023-06-20 ENCOUNTER — TELEPHONE (OUTPATIENT)
Dept: OTOLARYNGOLOGY | Facility: CLINIC | Age: 83
End: 2023-06-20
Payer: COMMERCIAL

## 2023-06-20 NOTE — TELEPHONE ENCOUNTER
[facility] Call Center    Phone Message    May a detailed message be left on voicemail: yes     Reason for Call: Other: Yasmine from [facility] calling in as they did not recieve any thing via fax at all yesterday as Samantha indicates in her TE from 6/19/23. Please reach out to Yasmine you can reach her at [phone] ext [phone] and she will help get you the correct nunmbers. Thank you      Action Taken: Message routed to:  [facility] ([facility]): ceci    Travel Screening: Not Applicable

## 2023-06-20 NOTE — TELEPHONE ENCOUNTER
Contacted Nava from [facility] and she provided me with her fax line to send the paperwork to. Nava will upload paperwork to patient's file once he receives it. Paperwork faxed 6/20/23 3:12 PM to [phone].    Samantha Mcgrath RN on 6/20/2023 at 3:12 PM

## 2023-06-22 ENCOUNTER — DOCUMENTATION ONLY (OUTPATIENT)
Dept: OTOLARYNGOLOGY | Facility: CLINIC | Age: 83
End: 2023-06-22
Payer: COMMERCIAL

## 2023-06-22 NOTE — PROGRESS NOTES
Signed medical necessity form faxed to [facility]. Fax number [phone]. 3 [facility] faxed.

## 2023-09-19 ENCOUNTER — TELEPHONE (OUTPATIENT)
Dept: OTOLARYNGOLOGY | Facility: CLINIC | Age: 83
End: 2023-09-19
Payer: COMMERCIAL

## 2023-09-19 NOTE — TELEPHONE ENCOUNTER
[facility] Call Center    Phone Message    May a detailed message be left on voicemail: yes     Reason for Call: Looking to reschedule his appts from 9/12. Needs the appts same day. Prefers later morning. Please call jordan White    Action Taken: Message routed to:  [facility] ([facility]): ENT    Travel Screening: Not Applicable

## 2023-09-22 ENCOUNTER — TELEPHONE (OUTPATIENT)
Dept: OTOLARYNGOLOGY | Facility: CLINIC | Age: 83
End: 2023-09-22

## 2023-09-22 NOTE — TELEPHONE ENCOUNTER
[facility] Call Center    Phone Message    May a detailed message be left on voicemail: yes     Reason for Call: Other: Pt needs to be rescheduled with Dr Holliday and SLP.  Per priority line sending TE to clinic schedulers.  Please see cancelled appt from 9/19 for info. Please call Cindy @ [phone] (daughter) [facility]. Thanks      Action Taken: Other: ENT    Travel Screening: Not Applicable

## 2023-09-28 NOTE — PROGRESS NOTES
Spoke to patient and patient's daughter  They report:  - Doing well  - Been suctioning secretions, some blood in secretions, as expected, but blood has been decreasing  - Last 2 days Hieu has had less energy  - Daughter will be staying with Hieu until 6/21/21    Discussed that some blood in secretions is normal. Hieu may have less energy now being home and doing his normal activities - considering he had been in the hospital for 7 days. Daughter agreed.    Encouraged to watch for any signs of infection at the trach site. Symptoms may include; redness, swelling, fever, chills. Should call MD office if this occurs. Daughter confirmed there are no signs of infection at this time.    No other concerns and feels comfortable following up with Dr. Holliday on 6/24/21. At this time can help patient get an order for an extra trach if needed / if the trach gets changed. Encouraged to call with any new or worsening concerns.    Marcelina Chávez RN on 6/14/2021 at 5:36 PM     oral yes

## 2023-10-12 ENCOUNTER — THERAPY VISIT (OUTPATIENT)
Dept: SPEECH THERAPY | Facility: CLINIC | Age: 83
End: 2023-10-12
Payer: COMMERCIAL

## 2023-10-12 ENCOUNTER — OFFICE VISIT (OUTPATIENT)
Dept: OTOLARYNGOLOGY | Facility: CLINIC | Age: 83
End: 2023-10-12
Payer: COMMERCIAL

## 2023-10-12 VITALS
SYSTOLIC BLOOD PRESSURE: 121 MMHG | WEIGHT: 167 LBS | BODY MASS INDEX: 24.66 KG/M2 | HEART RATE: 58 BPM | DIASTOLIC BLOOD PRESSURE: 67 MMHG

## 2023-10-12 DIAGNOSIS — R13.12 DYSPHAGIA, OROPHARYNGEAL PHASE: Primary | ICD-10-CM

## 2023-10-12 DIAGNOSIS — R49.0 DYSPHONIA: ICD-10-CM

## 2023-10-12 DIAGNOSIS — Z43.0 TRACHEOSTOMY CARE (H): Primary | ICD-10-CM

## 2023-10-12 DIAGNOSIS — Z93.0 TRACHEOSTOMY IN PLACE (H): ICD-10-CM

## 2023-10-12 PROCEDURE — 99207 PR NO CHARGE LOS: CPT | Performed by: SPEECH-LANGUAGE PATHOLOGIST

## 2023-10-12 PROCEDURE — 31615 TRCHEOBRNCHSC EST TRACHS INC: CPT | Performed by: OTOLARYNGOLOGY

## 2023-10-12 RX ORDER — IPRATROPIUM BROMIDE AND ALBUTEROL SULFATE 2.5; .5 MG/3ML; MG/3ML
3 SOLUTION RESPIRATORY (INHALATION)
COMMUNITY
Start: 2021-08-24 | End: 2024-09-01

## 2023-10-12 RX ORDER — FERROUS SULFATE 325(65) MG
1 TABLET ORAL
COMMUNITY
Start: 2021-10-25

## 2023-10-12 RX ORDER — LEVOFLOXACIN 750 MG/1
750 TABLET, FILM COATED ORAL
COMMUNITY
Start: 2023-09-29 | End: 2024-09-01

## 2023-10-12 ASSESSMENT — PAIN SCALES - GENERAL: PAINLEVEL: NO PAIN (0)
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
## 2023-10-12 NOTE — PATIENT INSTRUCTIONS
1.  You were seen in the ENT Clinic today by . If you have any questions or concerns after your appointment, please call [phone]. Press option #1 for scheduling related needs. Press option #3 for Nurse advice.    2.  Plan is to return to clinic in the summer      Mai Martínez LPN  [phone]  [facility] - Otolaryngology

## 2023-10-12 NOTE — PROGRESS NOTES
Pt seen today for allied health visit in conjunction with ENT clinic visit. Pt with 6 cuffless shiley with PMSV in place. Assisted Dr. Holliday with trach change and provided new speaking valve for patient.     No charge for today's brief allied health visit.      ILANA Thompson MA (music), CCC-SLP   Speech Language Pathologist  [place] Trained Vocologist   [facility] [place]  Dept. of Otolaryngology  Department of Rehabilitation Services  [address] [place] [id number]  Email: [email or web address]   Phone: [phone]  Pronouns: she/her/hers
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
## 2023-11-02 ENCOUNTER — TELEPHONE (OUTPATIENT)
Dept: OTOLARYNGOLOGY | Facility: CLINIC | Age: 83
End: 2023-11-02
Payer: COMMERCIAL

## 2024-04-14 ENCOUNTER — HOSPITAL ENCOUNTER (OUTPATIENT)
Age: 84
End: 2024-04-14
Payer: COMMERCIAL

## 2024-04-15 NOTE — PROGRESS NOTES
Transfer Type: [facility]  Transfer Triage Note    Date of call: 04/14/24  Time of call: 4:55 PM    Current Patient Location:  [facility]  Current Level of Care: ED    Diagnosis: Hemoptysis  Reason for requested transfer: Patient has established care here  Further diagnostic work up, management, and consultation for specialized care   Isolation Needs: None    Care everywhere has been updated and reviewed: No  Necessary images have been sent through PACS: No    If patient is transferring for specialty care or specific procedure, the specialist required has participated in the transfer call and agreed with need for transfer and anticipated timeline: Yes, Provider name: Dr Manzo specialty with: ENT    Transfer accepted: Yes  Stability of Patient: Patient is at risk for instability, however patient requires urgent transfer and does not meet ICU criteria   Is the patient appropriate for [facility]? No, What specific [place] needs are anticipated? ENT. Heme/Onc, IR   Level of Care Needed: IMC  Telemetry Needed:  Cardiac Telemetry  Expected Time of Arrival for Transfer: 8-24 hours  Arrival Location:  [facility]     Recommendations for Management and Stabilization: Given    Additional Comments: Patient is an 83-year-old man with H/o Right BOT SCC s/p CRT 2009, Bilateral vocal fold paralysis, and Dysphagia S/p PEG  3/31/20 and S/p trach 6/4/21 who presented to outside ED with new hemoptysis.  Patient currently has uncuffed trach.  Concern that is with continued hemoptysis and coughing patient may lose his airway.  Outside ED seeking to transfer patient for higher level of care and to evaluate etiology of hemoptysis.  ENT was involved with transfer call and recommended that outside ED attempted to switch to a cuffed trach if possible.  Patient remained stable in outside ED without worsening oxygen requirement as he is still on trach dome.  ENT  would like to further evaluate etiology of hemoptysis.    Yousif Zuleta MD

## 2024-05-23 ENCOUNTER — OFFICE VISIT (OUTPATIENT)
Dept: OTOLARYNGOLOGY | Facility: CLINIC | Age: 84
End: 2024-05-23
Payer: COMMERCIAL

## 2024-05-23 VITALS
HEART RATE: 63 BPM | DIASTOLIC BLOOD PRESSURE: 62 MMHG | HEIGHT: 69 IN | BODY MASS INDEX: 25.79 KG/M2 | WEIGHT: 174.1 LBS | OXYGEN SATURATION: 93 % | SYSTOLIC BLOOD PRESSURE: 145 MMHG

## 2024-05-23 DIAGNOSIS — J38.02 BILATERAL VOCAL FOLD PARALYSIS: ICD-10-CM

## 2024-05-23 DIAGNOSIS — Z43.0 TRACHEOSTOMY CARE (H): Primary | ICD-10-CM

## 2024-05-23 PROCEDURE — 31575 DIAGNOSTIC LARYNGOSCOPY: CPT | Mod: 51 | Performed by: PHYSICIAN ASSISTANT

## 2024-05-23 PROCEDURE — 31615 TRCHEOBRNCHSC EST TRACHS INC: CPT | Performed by: PHYSICIAN ASSISTANT

## 2024-05-23 PROCEDURE — 99214 OFFICE O/P EST MOD 30 MIN: CPT | Mod: 25 | Performed by: PHYSICIAN ASSISTANT

## 2024-05-23 RX ORDER — LACTOSE-REDUCED FOOD
LIQUID (ML) ORAL
COMMUNITY
End: 2024-09-01

## 2024-05-23 RX ORDER — ALBUTEROL SULFATE 0.83 MG/ML
2.5 SOLUTION RESPIRATORY (INHALATION)
COMMUNITY
Start: 2023-09-15 | End: 2024-09-01

## 2024-05-23 RX ORDER — MONTELUKAST SODIUM 10 MG/1
TABLET ORAL
COMMUNITY
Start: 2024-04-05 | End: 2024-09-01

## 2024-05-23 RX ORDER — KETOCONAZOLE 20 MG/ML
SHAMPOO TOPICAL
COMMUNITY
End: 2024-09-01

## 2024-05-23 RX ORDER — FLUCONAZOLE 150 MG/1
TABLET ORAL
COMMUNITY
Start: 2024-01-16 | End: 2024-09-01

## 2024-05-23 RX ORDER — SODIUM CHLORIDE FOR INHALATION 0.9 %
3 VIAL, NEBULIZER (ML) INHALATION
Qty: 500 ML | Refills: 11 | Status: SHIPPED | OUTPATIENT
Start: 2024-05-23

## 2024-05-23 ASSESSMENT — PAIN SCALES - GENERAL: PAINLEVEL: NO PAIN (0)
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
## 2024-05-23 NOTE — PATIENT INSTRUCTIONS
You were seen in the ENT Clinic today by Kanchan Fox. If you have any questions or concerns after your appointment, please contact us (see below)    The following has been recommended for you based upon your appointment today:      Please return to clinic     How to Contact Us:  Send a "[employer] message to your provider. Our team will respond to you via "[employer]. Occasionally, we will need to call you to get further information.  For urgent matters (Monday-Friday), call the ENT Clinic: [phone] and speak with a call center team member - they will route your call appropriately.   If you'd like to speak directly with a nurse, please find our contact information below. We do our best to check voicemail frequently throughout the day, and will work to call you back within 1-2 days. For urgent matters, please use the general clinic phone numbers listed above.      Liliana MALLOY LPN  Direct: [phone]
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
## 2024-07-15 NOTE — PROGRESS NOTES
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 start.   Patient instructed to call 911 or return to ER if these symptoms worsen, fail to improve as anticipated, or if new symptoms develop.  Patient is agreeable to the treatment plan and any follow up evaluation. Patient's questions were answered.   Nursing staff may relay any normal results to patient.     Rashad Foster MD 03/29/2024  Assessment:     83-year-old male with chronic tracheostomy who presents with increased sputum production and cough. He has not in distress however his oxygen saturations are on the low end of normal. He does have an asymmetric chest exam and will send him to x-ray for evaluation of pneumonia. I doubt pulmonary embolism, acute coronary syndrome etc..    ICD-10-CM   1. Cough, unspecified type R05.9 XR Chest 2 Views     Remington Doan DO 4/5/2024   Assessment/Plan     1. Hypothyroidism, unspecified type (HRC)   2. Elevated lipoprotein(a)   3. Cervical disc disorder with radiculopathy   4. Basal cell carcinoma (BCC) of scalp   5. Pneumonia of both lower lobes due to infectious organism   6. Encounter for Medicare annual wellness exam   7. Rhinitis, unspecified type   8. Squamous cell cancer of tongue (HRC)   9. Uses feeding tube   10. Cervical radiculopathy   11.  Dysphonia   12. Tracheostomy care ([facility])     Hypothyroidism: We will get a TSH today. I will update him with results. Levothyroxine refilled today.    Hyperlipidemia: We will get lipid panel today. I will update him with results.    Cervical disc disorder with bilateral radicular symptoms: Patient is still not fully recovered and doubtful he will ever be completely recovered since his surgery. Right now he is finding ways to manage with his decreased sensation and strength.    Basal cell carcinoma of scalp: Site looks good. Follow up as needed.    Pneumonia: Seems to be improving. He tells me that his breathing is better but his congestion, rhinitis are worse. It is a little bit early for repeat x-ray but I would like to do this today to make sure things are not progressing. Lung exam today was quite good.    Health maintenance: Patient is up-to-date.    Rhinitis, difficulty with mucus: Patient has never tried Singulair in the past so we are going to try crushing Singulair and putting in his PEG tube. We will see if this helps. He will let me know.    Squamous cell cancer of tongue: Patient continues his tracheostomy with cares. Overall, doing well.    Peg tube: Continue current plan.    Dysphonia: Getting quite severe. He is getting harder to understand. Could consider seeing ENT but I doubt they have much to offer him.    Tracheostomy: Overall doing well, tracheostomy site looks good, no infection. Minimal drainage in his trach today.    I spent 40 minutes in non health maintenance related care including patient's pneumonia, radiculopathy, basal cell cancer, management of tracheostomy, peg tube     Remington Doan DO 05/03/2024  Assessment/plan:    Hemoptysis: Continues to be resolved. Patient coughed up mucus for me to look at today and there was no blood tinge.    Tongue mass: Patient getting worked up on 05/23/2024 at [facility] for this tongue mass by their ENT department. We will await these  notes.    Dysphagia, recurrent pneumonia: Discussed with patient and daughter-in-law at length that there could be some potential for aspiration pneumonia. We discussed what a swallow study is. He is willing to do this. This is ordered today. We will follow up with results when we get them.    Pneumonia: Patient feeling much better. Discussed his hospitalizations, emergency department visits, labs and testing performed. Follow up with me as needed.     Gi Franco, SLP 5/7/2024   Assessment:  Hieu Hughes has an impaired swallow. Swallow rehab potential is guarded; however, patient goals are appropriate and prognosis towards patient goals, rather than full rehabilitation, is judged to be fair. Oral swallow is moderately impaired. Pharyngeal swallow is profoundly impaired. This resulted in: aspiration was observed with thin trials. and laryngeal penetration was not observed., and vallecular residue was observed with thin trials. and pyriform residue was observed with thin trials. Hieu demonstrated significant delay and difficulty initiating a swallow. Right head turn did decrease amount of residue and aspiration. Fatigue is quick and likely exacerbated d/t disuse atrophy and weakness from NPO over the past 4-5 weeks since most recent admission. Hieu was drinking thin liquids prior to recent admission. Has been having recurrent bouts of pneumonia. Hieu does not have a cap for his trach and does not use a speaking valve; therefore, swallow is at higher risk d/t inability to build up adequate pressures as he currently has an open-loop rather than closed-loop swallow (as evidenced by research). In future trials when appropriate, should trial finger occlusion at a minimum or trach cap/speaking valve when swallowing. Communication is also severely impaired. Hieu's goal is to be able to safely (without any respiratory complications or pneumonia) swallow thin liquids for an occasional coke. He is very motivated and  "has the cognitive capacity to complete extensive rehabilitation.     Bruce Willis, MAXIMUS 5/16/2024   ASSESSMENT/PROGRESS TOWARD GOALS:    Hieu reports for follow up speech/swallow therapy session after VFSS 5/7/24 revealed severe/profound dysphagia.     SLP instructed in finger occlusion with trach WHILE turning head to right to swallow secretions. SLP provided education that all swallowing, throat clearing, coughing, and speaking should be attempted with occlusion. He reports spitting out secretions when he can rather than attempting to swallow them. As recommended, he has stopped trying to drink anything at this point. He has not consumed anything besides barium from the swallow study in the last six weeks. He reports a few hours after the barium swallow study, he was coughing barium out of his trach. SLP recommends continued strict NPO. Today, he is coughing out bloody phlegm while in session. He reports bloody trach mucus this morning at 8am and 10:30am. He reports ongoing issues with blood in his mucus, and that it will be addressed at his appointment at [facility] next week. SLP requested he be supplied with a 2000/2001 Passy Hipolito Speaking Valve (PMSV) at his next appointment. 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   Communication is significantly impaired due to rigid vocal folds with 1/8'' glottal gap. He is most intelligible when finger occluding and speaking single words at a time. We need to improve proficiency in his ability to gauge interlocutor comprehension. He carries a notebook with him and writes in the notebook with no evidence of impaired language function at all. He is hoping to get the " PMSV at his next appointment to improve his ability to communicate hands-free. SLP assured him that speech therapy will help him with his PMSV and instruct in strategies once he has one.    Pt will continue to benefit from skilled SLP services to address swallowing and phonation/communication deficits secondary to tracheostomy, cancer.     PLAN:   Continue with SLP services per initial evaluation. Pt should continue to complete home exercise program list consisting of HEP listed below. Outpatient services will continue to address the following goals.     Treatment Plan:  94257: Treatment of speech, language, voice, communication, and/or auditory processing disorder; individual  47170: Treatment of swallowing disorders     Kanchan Fox PA-C 5/23/24  Assessment and Plan:  1. Tracheostomy care (H)  2. Bilateral vocal fold paralysis  No source of bleeding or active bleeding identified. Patient requested more saline. This has been ordered. He mentioned something about pneumonia. Recommend seeing his PCP if there is concern for pneumonia.      - sodium chloride 0.9 % neb solution; Take 3 mLs by nebulization every 3 hours as needed for wheezing Use for tracheostomy lavage every 2-4 hours as needed  Dispense: 500 mL; Refill: 11     Patient will follow up with Dr. Holliday 7/16/2024    Imaging  XR CHEST 2 VIEWS 12/1/2023   IMPRESSION: Tracheostomy tube in place. Heart size and mediastinum are stable. Normal vasculature. Left lower lobe atelectasis. No consolidation or pleural effusion evident. No pneumothorax.     XR CHEST 2 VIEWS 12/17/2023   IMPRESSION: The lungs are hyperinflated with patchy bibasilar atelectasis and/or scarring. Trace left pleural effusion. No pneumothorax. Tracheostomy is in place. Heart size is normal.     XR CHEST 2 VIEWS 3/29/2024   IMPRESSION: Tracheostomy again identified and unchanged. Flattening of the posterior left hemithorax is again noted and unchanged, suggestive of scar formation. Minimal  scattered bibasilar opacities, likely atelectasis. No pneumothorax or abnormal area of consolidation.     XR CHEST 2 VIEWS 4/5/2024   IMPRESSION: Increasing small opacity in the right midlung may represent pneumonia or atelectasis. Recommend a follow-up chest radiograph in 4-6 weeks to document resolution.     Stable mild bibasilar atelectasis/scar. No pleural effusion. Normal cardiac size. Stable tracheotomy. Pulmonary vasculature is normal.     Abnormal findings requiring follow-up.  Recommend: Follow-up chest radiograph in 4-6 weeks.    CT ANGIO CHEST WITH IV CONTRAST PE STUDY 04/14/2024   IMPRESSION:   1. No evidence for pulmonary embolism.   2.  New finding of patchy micronodular opacities primarily at the right upper lobe suggestive of an infectious or inflammatory etiology.   3.  Somewhat improved scarring or atelectasis at the left lower lobe.   4.  Cholelithiasis.   5.  Coronary artery calcifications.     CT NECK SOFT TISSUE W IV CONT 4/14/2024   IMPRESSION:   1.  There is a tracheostomy tube which terminates in the intrathoracic trachea. There are no findings to suggest acute bleeding.     2.  There is a 1.1 x 1.2 x 2.2 cm area of ulceration and enhancement involving the right lateral base of tongue which was not present on the 04/09/2021 CT neck study. This is concerning for malignancy. Recommend ENT consultation and direct visual inspection of this area.     3.   There is moderate to severe stenosis of the right common carotid artery and 50% stenosis of the mid right internal carotid artery.     4.  There is 65% stenosis of the proximal left internal carotid artery with adjacent ulcerated atheromatous plaque.     XR CHEST 1 VIEW 4/18/2024   IMPRESSION: Tracheostomy is in place. Resolution or near complete resolution of the bandlike opacity in the right midlung. Mild bibasilar atelectasis and/or scarring. No effusions or pneumothorax. Heart size is normal.     FL VIDEO SWALLOW WITH SPEECH THERAPY 5/7/2024  "  IMPRESSION:   1. Please see the Speech Pathologist's note for diet recommendations.   2.  Aspiration with thin liquids as described above.      Labs:  Lab Results   Component Value Date    TSH 5.85 (H) 01/25/2011     Lab Results   Component Value Date     06/05/2021    CO2 28 06/05/2021    BUN 25 06/05/2021    CREAT 0.8 05/29/2019    PHOS 4.2 06/05/2021     Lab Results   Component Value Date    WBC 12.3 (H) 08/20/2009    HGB 11.3 (L) 06/04/2021    HCT 39.1 (L) 08/20/2009    MCV 98 08/20/2009     06/10/2021     Lab Results   Component Value Date    INR 1.15 (H) 06/04/2021     No results found for: \"RAVI\"  No components found for: \"RHEUMATOIDFACTOR\", \"RF\"  No results found for: \"CRP\"  No components found for: \"CKTOT\", \"URICACID\"  No components found for: \"C3\", \"C4\", \"DSDNAAB\", \"NDNAABIFA\"  No results found for: \"MPOAB\"    Patient reported Quality of Life (QOL) Measures   Patient Supplied Answers To VHI Questionnaire      2/28/2020     1:00 PM   Voice Handicap Index (VHI-10)   My voice makes it difficult for people to hear me 2   People have difficulty understanding me in a noisy room 3   My voice difficulties restrict my personal and social life.  2   I feel left out of conversations because of my voice 0   My voice problem causes me to lose income 0   I feel as though I have to strain to produce voice 1   The clarity of my voice is unpredictable 1   My voice problem upsets me 1   My voice makes me feel handicapped 0   People ask, \"What's wrong with your voice?\" 0   VHI-10 10         Patient Supplied Answers To EAT Questionnaire      2/28/2020    11:18 AM   Eating Assessment Tool (EAT-10)   My swallowing problem has caused me to lose weight 0   My swallowing problem interferes with my ability to go out for meals 4   Swallowing liquids takes extra effort 1   Swallowing solids takes extra effort 4   Swallowing pills takes extra effort 4   Swallowing is painful 0   The pleasure of eating is affected by my " swallowing 2   When I swallow food sticks in my throat 4   I cough when I eat 2   Swallowing is stressful 2   EAT-10 23         Patient Supplied Answers To CSI Questionnaire      8/9/2022     2:00 PM   Cough Severity Index (CSI)   My cough is worse when I lie down 0   My coughing problem causes me to restrict my personal and social life 2   I tend to avoid places because of my cough problem 1   I feel embarrassed because of my coughing problem 0   People ask, ''What's wrong?'' because I cough a lot 0   I run out of air when I cough 1   My coughing problem affects my voice 2   My coughing problem limits my physical activity 1   My coughing problem upsets me 0   People ask me if I am sick because I cough a lot 0   CSI Score 7         Patient Supplied Answers to Dyspnea Index Questionnaire:      2/28/2020    11:18 AM   Dyspnea Index   1. I have trouble getting air in. 2   2. I feel tightness in my throat when I am having isabel breathing problem. 3   3. It takes more effort to breathe than it used to. 2   4. Change in weather affect my breathing problem. 0   5. My breathing gets worse with stress. 0   6. I make sound/noise breathing in 2   7. I have to strain to breathe. 2   8. My shortness of breath gets worse with exercise or physical activity 2   9. My breathing problem makes me feel stressed. 3   10. My breathing problem casuses me to restrict my personal and social life. 1   Dyspnea Index Total Score 17     Scribe Disclosure:   I, Denae Zuñiga, am serving as a scribe; to document services personally performed by Kaykay Holliday MD -based on data collection and the provider's statements to me.     Provider Disclosure:  I agree with above History, Review of Systems, Physical exam and Plan.  I have reviewed the content of the documentation and have edited it as needed. I have personally performed the services documented here and the documentation accurately represents those services and the decisions I have made.       Kaykay Holliday MD    Laryngology    [facility]  Department of  Otolaryngology - Head and Neck Surgery  [facility]  [address] [place] [address]  Appointment line: [phone]  Fax: [phone]  [email or web address]

## 2024-07-16 ENCOUNTER — OFFICE VISIT (OUTPATIENT)
Dept: OTOLARYNGOLOGY | Facility: CLINIC | Age: 84
End: 2024-07-16
Payer: COMMERCIAL

## 2024-07-16 VITALS
WEIGHT: 174 LBS | SYSTOLIC BLOOD PRESSURE: 110 MMHG | DIASTOLIC BLOOD PRESSURE: 59 MMHG | HEIGHT: 69 IN | HEART RATE: 67 BPM | BODY MASS INDEX: 25.77 KG/M2

## 2024-07-16 DIAGNOSIS — Z43.0 TRACHEOSTOMY CARE (H): Primary | ICD-10-CM

## 2024-07-16 DIAGNOSIS — K14.8 TONGUE LESION: ICD-10-CM

## 2024-07-16 PROCEDURE — 99215 OFFICE O/P EST HI 40 MIN: CPT | Mod: 25 | Performed by: OTOLARYNGOLOGY

## 2024-07-16 PROCEDURE — 31615 TRCHEOBRNCHSC EST TRACHS INC: CPT | Performed by: OTOLARYNGOLOGY

## 2024-07-16 PROCEDURE — 31575 DIAGNOSTIC LARYNGOSCOPY: CPT | Mod: 51 | Performed by: OTOLARYNGOLOGY

## 2024-07-16 NOTE — LETTER
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 benefits of antibiotic therapy today. Recommend close monitoring of symptoms and return immediately if symptoms worsen. Discussed continued supportive therapies at home.  Advised patient to follow up with primary care in 2-3 days or sooner if symptoms fail to improve despite treatment, or new worrisome symptoms start.   Patient instructed to call 911 or return to ER if these symptoms worsen, fail to improve as anticipated, or if new symptoms develop.  Patient is agreeable to the treatment plan and any follow up evaluation. Patient's questions were answered.   Nursing staff may relay any normal results to patient.     Rashad Foster MD 03/29/2024  Assessment:     83-year-old male with chronic tracheostomy who presents with increased sputum production and cough. He has not in distress however his oxygen saturations are on the low end of normal. He does have an asymmetric chest exam and will send him to x-ray for evaluation of pneumonia. I doubt pulmonary embolism, acute coronary syndrome etc..    ICD-10-CM   1. Cough, unspecified type R05.9 XR Chest 2 Views     Remington Doan DO 4/5/2024   Assessment/Plan      1. Hypothyroidism, unspecified type (HRC)   2. Elevated lipoprotein(a)   3. Cervical disc disorder with radiculopathy   4. Basal cell carcinoma (BCC) of scalp   5. Pneumonia of both lower lobes due to infectious organism   6. Encounter for Medicare annual wellness exam   7. Rhinitis, unspecified type   8. Squamous cell cancer of tongue (HRC)   9. Uses feeding tube   10. Cervical radiculopathy   11. Dysphonia   12. Tracheostomy care (HR)     Hypothyroidism: We will get a TSH today. I will update him with results. Levothyroxine refilled today.    Hyperlipidemia: We will get lipid panel today. I will update him with results.    Cervical disc disorder with bilateral radicular symptoms: Patient is still not fully recovered and doubtful he will ever be completely recovered since his surgery. Right now he is finding ways to manage with his decreased sensation and strength.    Basal cell carcinoma of scalp: Site looks good. Follow up as needed.    Pneumonia: Seems to be improving. He tells me that his breathing is better but his congestion, rhinitis are worse. It is a little bit early for repeat x-ray but I would like to do this today to make sure things are not progressing. Lung exam today was quite good.    Health maintenance: Patient is up-to-date.    Rhinitis, difficulty with mucus: Patient has never tried Singulair in the past so we are going to try crushing Singulair and putting in his PEG tube. We will see if this helps. He will let me know.    Squamous cell cancer of tongue: Patient continues his tracheostomy with cares. Overall, doing well.    Peg tube: Continue current plan.    Dysphonia: Getting quite severe. He is getting harder to understand. Could consider seeing ENT but I doubt they have much to offer him.    Tracheostomy: Overall doing well, tracheostomy site looks good, no infection. Minimal drainage in his trach today.    I spent 40 minutes in non health maintenance related care including patient's  pneumonia, radiculopathy, basal cell cancer, management of tracheostomy, peg tube     Remington Doan DO 05/03/2024  Assessment/plan:    Hemoptysis: Continues to be resolved. Patient coughed up mucus for me to look at today and there was no blood tinge.    Tongue mass: Patient getting worked up on 05/23/2024 at [facility] for this tongue mass by their ENT department. We will await these notes.    Dysphagia, recurrent pneumonia: Discussed with patient and daughter-in-law at length that there could be some potential for aspiration pneumonia. We discussed what a swallow study is. He is willing to do this. This is ordered today. We will follow up with results when we get them.    Pneumonia: Patient feeling much better. Discussed his hospitalizations, emergency department visits, labs and testing performed. Follow up with me as needed.     Gi Franco, SLP 5/7/2024   Assessment:  Hieu Hughes has an impaired swallow. Swallow rehab potential is guarded; however, patient goals are appropriate and prognosis towards patient goals, rather than full rehabilitation, is judged to be fair. Oral swallow is moderately impaired. Pharyngeal swallow is profoundly impaired. This resulted in: aspiration was observed with thin trials. and laryngeal penetration was not observed., and vallecular residue was observed with thin trials. and pyriform residue was observed with thin trials. Hieu demonstrated significant delay and difficulty initiating a swallow. Right head turn did decrease amount of residue and aspiration. Fatigue is quick and likely exacerbated d/t disuse atrophy and weakness from NPO over the past 4-5 weeks since most recent admission. Hieu was drinking thin liquids prior to recent admission. Has been having recurrent bouts of pneumonia. Hieu does not have a cap for his trach and does not use a speaking valve; therefore, swallow is at higher risk d/t inability to build up adequate pressures as he  "currently has an open-loop rather than closed-loop swallow (as evidenced by research). In future trials when appropriate, should trial finger occlusion at a minimum or trach cap/speaking valve when swallowing. Communication is also severely impaired. Hieu's goal is to be able to safely (without any respiratory complications or pneumonia) swallow thin liquids for an occasional coke. He is very motivated and has the cognitive capacity to complete extensive rehabilitation.     MAXIMUS Valentin 5/16/2024   ASSESSMENT/PROGRESS TOWARD GOALS:    Hieu reports for follow up speech/swallow therapy session after [facility] 5/7/24 revealed severe/profound dysphagia.     SLP instructed in finger occlusion with trach WHILE turning head to right to swallow secretions. SLP provided education that all swallowing, throat clearing, coughing, and speaking should be attempted with occlusion. He reports spitting out secretions when he can rather than attempting to swallow them. As recommended, he has stopped trying to drink anything at this point. He has not consumed anything besides barium from the swallow study in the last six weeks. He reports a few hours after the barium swallow study, he was coughing barium out of his trach. SLP recommends continued strict NPO. Today, he is coughing out bloody phlegm while in session. He reports bloody trach mucus this morning at 8am and 10:30am. He reports ongoing issues with blood in his mucus, and that it will be addressed at his appointment at [facility] next week. SLP requested he be supplied with a 2000/2001 Passy Hipolito Speaking Valve (PMSV) at his next appointment. 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   Communication is significantly impaired due to rigid vocal folds with 1/8'' glottal gap. He is most intelligible when finger occluding and speaking single words at a time. We need to improve proficiency in his ability to gauge interlocutor comprehension. He carries a notebook with him and writes in the notebook with no evidence of impaired language function at all. He is hoping to get the PMSV at his next appointment to improve his ability to communicate hands-free. SLP assured him that speech therapy will help him with his PMSV and instruct in strategies once he has one.    Pt will continue to benefit from skilled SLP services to address swallowing and phonation/communication deficits secondary to tracheostomy, cancer.     PLAN:   Continue with SLP services per initial evaluation. Pt should continue to complete home exercise program list consisting of HEP listed below. Outpatient services will continue to address the following goals.     Treatment Plan:  65565: Treatment of speech, language, voice, communication, and/or auditory processing disorder; individual  09465: Treatment of swallowing disorders     Kanchan Fox PA-C 5/23/24  Assessment and Plan:  1. Tracheostomy care (H)  2. Bilateral vocal fold paralysis  No source of bleeding or active bleeding identified. Patient requested more saline. This has been ordered. He mentioned something about pneumonia. Recommend seeing his PCP if there is concern for pneumonia.      - sodium chloride 0.9 % neb solution; Take 3 mLs by nebulization every 3 hours as needed for wheezing Use for tracheostomy lavage every 2-4 hours as needed  Dispense: 500 mL; Refill: 11     Patient will follow up with Dr. Holliday 7/16/2024    Imaging  XR CHEST 2 VIEWS 12/1/2023   IMPRESSION: Tracheostomy tube in place. Heart size and mediastinum are stable. Normal vasculature. Left lower lobe atelectasis. No consolidation or pleural effusion evident. No pneumothorax.     XR  CHEST 2 VIEWS 12/17/2023   IMPRESSION: The lungs are hyperinflated with patchy bibasilar atelectasis and/or scarring. Trace left pleural effusion. No pneumothorax. Tracheostomy is in place. Heart size is normal.     XR CHEST 2 VIEWS 3/29/2024   IMPRESSION: Tracheostomy again identified and unchanged. Flattening of the posterior left hemithorax is again noted and unchanged, suggestive of scar formation. Minimal scattered bibasilar opacities, likely atelectasis. No pneumothorax or abnormal area of consolidation.     XR CHEST 2 VIEWS 4/5/2024   IMPRESSION: Increasing small opacity in the right midlung may represent pneumonia or atelectasis. Recommend a follow-up chest radiograph in 4-6 weeks to document resolution.     Stable mild bibasilar atelectasis/scar. No pleural effusion. Normal cardiac size. Stable tracheotomy. Pulmonary vasculature is normal.     Abnormal findings requiring follow-up.  Recommend: Follow-up chest radiograph in 4-6 weeks.    CT ANGIO CHEST WITH IV CONTRAST PE STUDY 04/14/2024   IMPRESSION:   1. No evidence for pulmonary embolism.   2.  New finding of patchy micronodular opacities primarily at the right upper lobe suggestive of an infectious or inflammatory etiology.   3.  Somewhat improved scarring or atelectasis at the left lower lobe.   4.  Cholelithiasis.   5.  Coronary artery calcifications.     CT NECK SOFT TISSUE W IV CONT 4/14/2024   IMPRESSION:   1.  There is a tracheostomy tube which terminates in the intrathoracic trachea. There are no findings to suggest acute bleeding.     2.  There is a 1.1 x 1.2 x 2.2 cm area of ulceration and enhancement involving the right lateral base of tongue which was not present on the 04/09/2021 CT neck study. This is concerning for malignancy. Recommend ENT consultation and direct visual inspection of this area.     3.   There is moderate to severe stenosis of the right common carotid artery and 50% stenosis of the mid right internal carotid artery.     4.  " There is 65% stenosis of the proximal left internal carotid artery with adjacent ulcerated atheromatous plaque.     XR CHEST 1 VIEW 4/18/2024   IMPRESSION: Tracheostomy is in place. Resolution or near complete resolution of the bandlike opacity in the right midlung. Mild bibasilar atelectasis and/or scarring. No effusions or pneumothorax. Heart size is normal.     FL VIDEO SWALLOW WITH SPEECH THERAPY 5/7/2024   IMPRESSION:   1. Please see the Speech Pathologist's note for diet recommendations.   2.  Aspiration with thin liquids as described above.      Labs:  Lab Results   Component Value Date    TSH 5.85 (H) 01/25/2011     Lab Results   Component Value Date     06/05/2021    CO2 28 06/05/2021    BUN 25 06/05/2021    CREAT 0.8 05/29/2019    PHOS 4.2 06/05/2021     Lab Results   Component Value Date    WBC 12.3 (H) 08/20/2009    HGB 11.3 (L) 06/04/2021    HCT 39.1 (L) 08/20/2009    MCV 98 08/20/2009     06/10/2021     Lab Results   Component Value Date    INR 1.15 (H) 06/04/2021     No results found for: \"RAVI\"  No components found for: \"RHEUMATOIDFACTOR\", \"RF\"  No results found for: \"CRP\"  No components found for: \"CKTOT\", \"URICACID\"  No components found for: \"C3\", \"C4\", \"DSDNAAB\", \"NDNAABIFA\"  No results found for: \"MPOAB\"    Patient reported Quality of Life (QOL) Measures   Patient Supplied Answers To VHI Questionnaire      2/28/2020     1:00 PM   Voice Handicap Index (VHI-10)   My voice makes it difficult for people to hear me 2   People have difficulty understanding me in a noisy room 3   My voice difficulties restrict my personal and social life.  2   I feel left out of conversations because of my voice 0   My voice problem causes me to lose income 0   I feel as though I have to strain to produce voice 1   The clarity of my voice is unpredictable 1   My voice problem upsets me 1   My voice makes me feel handicapped 0   People ask, \"What's wrong with your voice?\" 0   VHI-10 10         Patient " Supplied Answers To EAT Questionnaire      2/28/2020    11:18 AM   Eating Assessment Tool (EAT-10)   My swallowing problem has caused me to lose weight 0   My swallowing problem interferes with my ability to go out for meals 4   Swallowing liquids takes extra effort 1   Swallowing solids takes extra effort 4   Swallowing pills takes extra effort 4   Swallowing is painful 0   The pleasure of eating is affected by my swallowing 2   When I swallow food sticks in my throat 4   I cough when I eat 2   Swallowing is stressful 2   EAT-10 23         Patient Supplied Answers To CSI Questionnaire      8/9/2022     2:00 PM   Cough Severity Index (CSI)   My cough is worse when I lie down 0   My coughing problem causes me to restrict my personal and social life 2   I tend to avoid places because of my cough problem 1   I feel embarrassed because of my coughing problem 0   People ask, ''What's wrong?'' because I cough a lot 0   I run out of air when I cough 1   My coughing problem affects my voice 2   My coughing problem limits my physical activity 1   My coughing problem upsets me 0   People ask me if I am sick because I cough a lot 0   CSI Score 7         Patient Supplied Answers to Dyspnea Index Questionnaire:      2/28/2020    11:18 AM   Dyspnea Index   1. I have trouble getting air in. 2   2. I feel tightness in my throat when I am having isabel breathing problem. 3   3. It takes more effort to breathe than it used to. 2   4. Change in weather affect my breathing problem. 0   5. My breathing gets worse with stress. 0   6. I make sound/noise breathing in 2   7. I have to strain to breathe. 2   8. My shortness of breath gets worse with exercise or physical activity 2   9. My breathing problem makes me feel stressed. 3   10. My breathing problem casuses me to restrict my personal and social life. 1   Dyspnea Index Total Score 17     Scribe Disclosure:   I, Denae Zuñiga, am serving as a scribe; to document services personally  performed by Kaykay Holliday MD -based on data collection and the provider's statements to me.     Provider Disclosure:  I agree with above History, Review of Systems, Physical exam and Plan.  I have reviewed the content of the documentation and have edited it as needed. I have personally performed the services documented here and the documentation accurately represents those services and the decisions I have made.      Kaykay Holliday MD    Laryngology    [facility]  Department of  Otolaryngology - Head and Neck Surgery  [facility] [place] [address]  Appointment line: [phone]  Fax: [phone]  [email or web address]

## 2024-07-17 ENCOUNTER — PREP FOR PROCEDURE (OUTPATIENT)
Dept: OTOLARYNGOLOGY | Facility: CLINIC | Age: 84
End: 2024-07-17

## 2024-07-17 ENCOUNTER — PATIENT OUTREACH (OUTPATIENT)
Dept: OTOLARYNGOLOGY | Facility: CLINIC | Age: 84
End: 2024-07-17

## 2024-07-17 DIAGNOSIS — K14.8 TONGUE LESION: Primary | ICD-10-CM

## 2024-07-17 NOTE — TELEPHONE ENCOUNTER
LVM for patient to discuss scheduling a biopsy with Dr. Ospina on Monday 7/22. Provided direct call back number for patient or daughter to return call.    Adri FUENTESN, RN

## 2024-07-18 ENCOUNTER — TELEPHONE (OUTPATIENT)
Dept: OTOLARYNGOLOGY | Facility: CLINIC | Age: 84
End: 2024-07-18

## 2024-07-18 NOTE — TELEPHONE ENCOUNTER
Adri Rodrigues    7/18/24 12:50 PM  Note  Spoke to Rody patient's daughter in law to discuss next steps. Reviewed that patient is scheduled to meet with Dr. Moses on Monday 4/22. However, he could have a biopsy in the OR with Dr. Ospina instead. Patient and Rody confirmed they would like to move forward with biopsy. Reviewed that patient would need a pre-op exam. Patient was able to meet with PCP today for a pre-op. Discussed that pre-op team will call to confirm timing of surgery and pre-op instructions. Rody and patient verbalized understanding.  Completed pre-operative teaching. Will update Dr. Ospina and surgery scheduling team.     Adri Rodrigues BSN, RN

## 2024-07-18 NOTE — TELEPHONE ENCOUNTER
FUTURE VISIT INFORMATION      SURGERY INFORMATION:  pre op clearance      RECORDS REQUESTED FROM:       Primary Care Provider: [facility]    Most recent EKG+ Tracin24- [facility]

## 2024-07-18 NOTE — TELEPHONE ENCOUNTER
Spoke to Rody patient's daughter in law to discuss next steps. Reviewed that patient is scheduled to meet with Dr. Moses on Monday 4/22. However, he could have a biopsy in the OR with Dr. Ospina instead. Patient and Rody confirmed they would like to move forward with biopsy. Reviewed that patient would need a pre-op exam. Patient was able to meet with PCP today for a pre-op. Discussed that pre-op team will call to confirm timing of surgery and pre-op instructions. Rody and patient verbalized understanding.  Completed pre-operative teaching. Will update Dr. Ospina and surgery scheduling team.    Adri Rodrigues BSN, RN

## 2024-07-18 NOTE — TELEPHONE ENCOUNTER
Left patient a voicemail to schedule surgery for Direct Laryngoscopy with Biopsy (N/A) with Dr. Ospina on Monday 7/22/2024 - Left Surgery Scheduling line for callback [phone]    Kanchan Cardenas on 7/18/2024 at 12:02 PM

## 2024-07-19 ENCOUNTER — ANESTHESIA EVENT (OUTPATIENT)
Dept: SURGERY | Facility: CLINIC | Age: 84
End: 2024-07-19
Payer: COMMERCIAL

## 2024-07-19 ENCOUNTER — PRE VISIT (OUTPATIENT)
Dept: SURGERY | Facility: CLINIC | Age: 84
End: 2024-07-19

## 2024-07-20 ASSESSMENT — LIFESTYLE VARIABLES: TOBACCO_USE: 1
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
## 2024-07-22 ENCOUNTER — ANESTHESIA (OUTPATIENT)
Dept: SURGERY | Facility: CLINIC | Age: 84
End: 2024-07-22
Payer: COMMERCIAL

## 2024-07-22 ENCOUNTER — HOSPITAL ENCOUNTER (OUTPATIENT)
Facility: CLINIC | Age: 84
Discharge: HOME | End: 2024-07-22
Attending: OTOLARYNGOLOGY | Admitting: OTOLARYNGOLOGY
Payer: COMMERCIAL

## 2024-07-22 VITALS
DIASTOLIC BLOOD PRESSURE: 96 MMHG | BODY MASS INDEX: 25.31 KG/M2 | HEART RATE: 54 BPM | HEIGHT: 69 IN | OXYGEN SATURATION: 93 % | TEMPERATURE: 98 F | RESPIRATION RATE: 16 BRPM | WEIGHT: 170.86 LBS | SYSTOLIC BLOOD PRESSURE: 161 MMHG

## 2024-07-22 DIAGNOSIS — G89.18 POSTOPERATIVE PAIN: ICD-10-CM

## 2024-07-22 DIAGNOSIS — C02.9 SQUAMOUS CELL CANCER OF TONGUE (H): Primary | ICD-10-CM

## 2024-07-22 PROCEDURE — 88360 TUMOR IMMUNOHISTOCHEM/MANUAL: CPT | Mod: 26 | Performed by: STUDENT IN AN ORGANIZED HEALTH CARE EDUCATION/TRAINING PROGRAM

## 2024-07-22 PROCEDURE — 370N000017 HC ANESTHESIA TECHNICAL FEE, PER MIN: Performed by: OTOLARYNGOLOGY

## 2024-07-22 PROCEDURE — 88331 PATH CONSLTJ SURG 1 BLK 1SPC: CPT | Mod: TC,XU | Performed by: OTOLARYNGOLOGY

## 2024-07-22 PROCEDURE — 31510 LARYNGOSCOPY WITH BIOPSY: CPT

## 2024-07-22 PROCEDURE — 250N000025 HC SEVOFLURANE, PER MIN: Performed by: OTOLARYNGOLOGY

## 2024-07-22 PROCEDURE — 250N000009 HC RX 250

## 2024-07-22 PROCEDURE — 250N000009 HC RX 250: Performed by: OTOLARYNGOLOGY

## 2024-07-22 PROCEDURE — 99100 ANES PT EXTEME AGE<1 YR&>70: CPT

## 2024-07-22 PROCEDURE — 258N000003 HC RX IP 258 OP 636

## 2024-07-22 PROCEDURE — 88342 IMHCHEM/IMCYTCHM 1ST ANTB: CPT | Mod: 26 | Performed by: STUDENT IN AN ORGANIZED HEALTH CARE EDUCATION/TRAINING PROGRAM

## 2024-07-22 PROCEDURE — 250N000011 HC RX IP 250 OP 636: Performed by: OTOLARYNGOLOGY

## 2024-07-22 PROCEDURE — 710N000010 HC RECOVERY PHASE 1, LEVEL 2, PER MIN: Performed by: OTOLARYNGOLOGY

## 2024-07-22 PROCEDURE — 250N000013 HC RX MED GY IP 250 OP 250 PS 637

## 2024-07-22 PROCEDURE — 88331 PATH CONSLTJ SURG 1 BLK 1SPC: CPT | Mod: 26 | Performed by: STUDENT IN AN ORGANIZED HEALTH CARE EDUCATION/TRAINING PROGRAM

## 2024-07-22 PROCEDURE — 88305 TISSUE EXAM BY PATHOLOGIST: CPT | Mod: 26 | Performed by: STUDENT IN AN ORGANIZED HEALTH CARE EDUCATION/TRAINING PROGRAM

## 2024-07-22 PROCEDURE — 88342 IMHCHEM/IMCYTCHM 1ST ANTB: CPT | Mod: TC,XU | Performed by: OTOLARYNGOLOGY

## 2024-07-22 PROCEDURE — 250N000011 HC RX IP 250 OP 636

## 2024-07-22 PROCEDURE — 272N000001 HC OR GENERAL SUPPLY STERILE: Performed by: OTOLARYNGOLOGY

## 2024-07-22 PROCEDURE — 710N000012 HC RECOVERY PHASE 2, PER MINUTE: Performed by: OTOLARYNGOLOGY

## 2024-07-22 PROCEDURE — 360N000076 HC SURGERY LEVEL 3, PER MIN: Performed by: OTOLARYNGOLOGY

## 2024-07-22 PROCEDURE — 31536 LARYNGOSCOPY W/BX & OP SCOPE: CPT | Mod: GC | Performed by: OTOLARYNGOLOGY

## 2024-07-22 PROCEDURE — 999N000141 HC STATISTIC PRE-PROCEDURE NURSING ASSESSMENT: Performed by: OTOLARYNGOLOGY

## 2024-07-22 RX ORDER — OXYMETAZOLINE HYDROCHLORIDE 0.05 G/100ML
SPRAY NASAL PRN
Status: DISCONTINUED | OUTPATIENT
Start: 2024-07-22 | End: 2024-07-22 | Stop reason: HOSPADM

## 2024-07-22 RX ORDER — DEXAMETHASONE SODIUM PHOSPHATE 4 MG/ML
4 INJECTION, SOLUTION INTRA-ARTICULAR; INTRALESIONAL; INTRAMUSCULAR; INTRAVENOUS; SOFT TISSUE
Status: DISCONTINUED | OUTPATIENT
Start: 2024-07-22 | End: 2024-07-22 | Stop reason: HOSPADM

## 2024-07-22 RX ORDER — ACETAMINOPHEN 325 MG/1
650 TABLET ORAL EVERY 4 HOURS PRN
Qty: 50 TABLET | Refills: 0 | Status: SHIPPED | OUTPATIENT
Start: 2024-07-22 | End: 2024-09-01

## 2024-07-22 RX ORDER — DEXAMETHASONE SODIUM PHOSPHATE 10 MG/ML
10 INJECTION, SOLUTION INTRAMUSCULAR; INTRAVENOUS ONCE
Status: DISCONTINUED | OUTPATIENT
Start: 2024-07-22 | End: 2024-07-22 | Stop reason: HOSPADM

## 2024-07-22 RX ORDER — IPRATROPIUM BROMIDE AND ALBUTEROL SULFATE 2.5; .5 MG/3ML; MG/3ML
3 SOLUTION RESPIRATORY (INHALATION) ONCE
Status: COMPLETED | OUTPATIENT
Start: 2024-07-22 | End: 2024-07-22

## 2024-07-22 RX ORDER — LIDOCAINE HYDROCHLORIDE 20 MG/ML
INJECTION, SOLUTION INFILTRATION; PERINEURAL PRN
Status: DISCONTINUED | OUTPATIENT
Start: 2024-07-22 | End: 2024-07-22

## 2024-07-22 RX ORDER — HYDROMORPHONE HYDROCHLORIDE 1 MG/ML
0.2 INJECTION, SOLUTION INTRAMUSCULAR; INTRAVENOUS; SUBCUTANEOUS EVERY 5 MIN PRN
Status: DISCONTINUED | OUTPATIENT
Start: 2024-07-22 | End: 2024-07-22 | Stop reason: HOSPADM

## 2024-07-22 RX ORDER — NALOXONE HYDROCHLORIDE 0.4 MG/ML
0.1 INJECTION, SOLUTION INTRAMUSCULAR; INTRAVENOUS; SUBCUTANEOUS
Status: DISCONTINUED | OUTPATIENT
Start: 2024-07-22 | End: 2024-07-22 | Stop reason: HOSPADM

## 2024-07-22 RX ORDER — LIDOCAINE 40 MG/G
CREAM TOPICAL
Status: DISCONTINUED | OUTPATIENT
Start: 2024-07-22 | End: 2024-07-22 | Stop reason: HOSPADM

## 2024-07-22 RX ORDER — FENTANYL CITRATE 50 UG/ML
INJECTION, SOLUTION INTRAMUSCULAR; INTRAVENOUS PRN
Status: DISCONTINUED | OUTPATIENT
Start: 2024-07-22 | End: 2024-07-22

## 2024-07-22 RX ORDER — AMPICILLIN AND SULBACTAM 2; 1 G/1; G/1
3 INJECTION, POWDER, FOR SOLUTION INTRAMUSCULAR; INTRAVENOUS
Status: COMPLETED | OUTPATIENT
Start: 2024-07-22 | End: 2024-07-22

## 2024-07-22 RX ORDER — ACETAMINOPHEN 325 MG/1
975 TABLET ORAL ONCE
Status: COMPLETED | OUTPATIENT
Start: 2024-07-22 | End: 2024-07-22

## 2024-07-22 RX ORDER — SODIUM CHLORIDE, SODIUM LACTATE, POTASSIUM CHLORIDE, CALCIUM CHLORIDE 600; 310; 30; 20 MG/100ML; MG/100ML; MG/100ML; MG/100ML
INJECTION, SOLUTION INTRAVENOUS CONTINUOUS
Status: DISCONTINUED | OUTPATIENT
Start: 2024-07-22 | End: 2024-07-22 | Stop reason: HOSPADM

## 2024-07-22 RX ORDER — OXYCODONE HCL 5 MG/5 ML
5 SOLUTION, ORAL ORAL EVERY 6 HOURS PRN
Qty: 50 ML | Refills: 0 | Status: SHIPPED | OUTPATIENT
Start: 2024-07-22 | End: 2024-07-25

## 2024-07-22 RX ORDER — FENTANYL CITRATE 50 UG/ML
25 INJECTION, SOLUTION INTRAMUSCULAR; INTRAVENOUS EVERY 5 MIN PRN
Status: DISCONTINUED | OUTPATIENT
Start: 2024-07-22 | End: 2024-07-22 | Stop reason: HOSPADM

## 2024-07-22 RX ORDER — ONDANSETRON 2 MG/ML
4 INJECTION INTRAMUSCULAR; INTRAVENOUS EVERY 30 MIN PRN
Status: DISCONTINUED | OUTPATIENT
Start: 2024-07-22 | End: 2024-07-22 | Stop reason: HOSPADM

## 2024-07-22 RX ORDER — LABETALOL HYDROCHLORIDE 5 MG/ML
10 INJECTION, SOLUTION INTRAVENOUS
Status: DISCONTINUED | OUTPATIENT
Start: 2024-07-22 | End: 2024-07-22 | Stop reason: HOSPADM

## 2024-07-22 RX ORDER — ONDANSETRON 4 MG/1
4 TABLET, ORALLY DISINTEGRATING ORAL EVERY 30 MIN PRN
Status: DISCONTINUED | OUTPATIENT
Start: 2024-07-22 | End: 2024-07-22 | Stop reason: HOSPADM

## 2024-07-22 RX ORDER — AMPICILLIN AND SULBACTAM 1; .5 G/1; G/1
1.5 INJECTION, POWDER, FOR SOLUTION INTRAMUSCULAR; INTRAVENOUS SEE ADMIN INSTRUCTIONS
Status: DISCONTINUED | OUTPATIENT
Start: 2024-07-22 | End: 2024-07-22 | Stop reason: HOSPADM

## 2024-07-22 RX ORDER — PROPOFOL 10 MG/ML
INJECTION, EMULSION INTRAVENOUS PRN
Status: DISCONTINUED | OUTPATIENT
Start: 2024-07-22 | End: 2024-07-22

## 2024-07-22 RX ORDER — DEXAMETHASONE SODIUM PHOSPHATE 4 MG/ML
INJECTION, SOLUTION INTRA-ARTICULAR; INTRALESIONAL; INTRAMUSCULAR; INTRAVENOUS; SOFT TISSUE PRN
Status: DISCONTINUED | OUTPATIENT
Start: 2024-07-22 | End: 2024-07-22

## 2024-07-22 RX ORDER — FENTANYL CITRATE 50 UG/ML
50 INJECTION, SOLUTION INTRAMUSCULAR; INTRAVENOUS EVERY 5 MIN PRN
Status: DISCONTINUED | OUTPATIENT
Start: 2024-07-22 | End: 2024-07-22 | Stop reason: HOSPADM

## 2024-07-22 RX ORDER — HYDROMORPHONE HYDROCHLORIDE 1 MG/ML
0.4 INJECTION, SOLUTION INTRAMUSCULAR; INTRAVENOUS; SUBCUTANEOUS EVERY 5 MIN PRN
Status: DISCONTINUED | OUTPATIENT
Start: 2024-07-22 | End: 2024-07-22 | Stop reason: HOSPADM

## 2024-07-22 RX ORDER — ONDANSETRON 2 MG/ML
INJECTION INTRAMUSCULAR; INTRAVENOUS PRN
Status: DISCONTINUED | OUTPATIENT
Start: 2024-07-22 | End: 2024-07-22

## 2024-07-22 RX ORDER — SODIUM CHLORIDE, SODIUM LACTATE, POTASSIUM CHLORIDE, CALCIUM CHLORIDE 600; 310; 30; 20 MG/100ML; MG/100ML; MG/100ML; MG/100ML
INJECTION, SOLUTION INTRAVENOUS CONTINUOUS PRN
Status: DISCONTINUED | OUTPATIENT
Start: 2024-07-22 | End: 2024-07-22

## 2024-07-22 RX ADMIN — Medication 40 MG: at 13:58

## 2024-07-22 RX ADMIN — SUGAMMADEX 200 MG: 100 INJECTION, SOLUTION INTRAVENOUS at 14:25

## 2024-07-22 RX ADMIN — FENTANYL CITRATE 100 MCG: 50 INJECTION INTRAMUSCULAR; INTRAVENOUS at 13:56

## 2024-07-22 RX ADMIN — LIDOCAINE HYDROCHLORIDE 60 MG: 20 INJECTION, SOLUTION INFILTRATION; PERINEURAL at 13:56

## 2024-07-22 RX ADMIN — ONDANSETRON 4 MG: 2 INJECTION INTRAMUSCULAR; INTRAVENOUS at 14:25

## 2024-07-22 RX ADMIN — PHENYLEPHRINE HYDROCHLORIDE 100 MCG: 10 INJECTION INTRAVENOUS at 14:06

## 2024-07-22 RX ADMIN — DEXAMETHASONE SODIUM PHOSPHATE 10 MG: 4 INJECTION, SOLUTION INTRA-ARTICULAR; INTRALESIONAL; INTRAMUSCULAR; INTRAVENOUS; SOFT TISSUE at 14:06

## 2024-07-22 RX ADMIN — SODIUM CHLORIDE, POTASSIUM CHLORIDE, SODIUM LACTATE AND CALCIUM CHLORIDE: 600; 310; 30; 20 INJECTION, SOLUTION INTRAVENOUS at 13:48

## 2024-07-22 RX ADMIN — PROPOFOL 90 MG: 10 INJECTION, EMULSION INTRAVENOUS at 13:56

## 2024-07-22 RX ADMIN — AMPICILLIN SODIUM AND SULBACTAM SODIUM 3 G: 2; 1 INJECTION, POWDER, FOR SOLUTION INTRAMUSCULAR; INTRAVENOUS at 12:10

## 2024-07-22 RX ADMIN — IPRATROPIUM BROMIDE AND ALBUTEROL SULFATE 3 ML: .5; 3 SOLUTION RESPIRATORY (INHALATION) at 12:00

## 2024-07-22 ASSESSMENT — ACTIVITIES OF DAILY LIVING (ADL)
ADLS_ACUITY_SCORE: 38

## 2024-07-22 NOTE — DISCHARGE INSTRUCTIONS
Contacting your Doctor -   To contact a doctor, call Dr. Ospina - Otolaryngology Clinic at [phone] or:  [phone] and ask for the resident on call for ENT-Otolaryngology (answered 24 hours a day)   Emergency Department:  [facility]: [phone]  [facility]: [phone] 911 if you are in need of immediate or emergent help

## 2024-07-22 NOTE — OP NOTE
Date of Procedure: 7/22/2024    Attending Physician: Abbey Ospina MD    Resident Physicians: Reece Casey MD    Procedure Performed:  Direct laryngoscopy with biopsy with telescope    Preoperative Diagnosis:   Base of tongue cancer  Hemoptysis  Tracheostomy dependence  History of radiation  Bilateral vocal cord paralysis    Postoperative Diagnosis: same    Anesthesia: General    Blood loss: minimal    Specimens:   ID Type Source Tests Collected by Time Destination   1 : RIGHT BASE OF TONGUE Tissue Other SURGICAL PATHOLOGY EXAM Abbey Ospina MD 7/22/2024  2:04 PM     2 : Right lateral pharyngeal wall Biopsy Other SURGICAL PATHOLOGY EXAM Abbey Ospina MD 7/22/2024  2:18 PM     3 : Right base of tongue Biopsy Other SURGICAL PATHOLOGY EXAM Abbey Ospina MD 7/22/2024  2:18 PM          Implants:  None    Complications: None    Findings:  Tumor of the right base of tongue, extends to midline, involves right lateral pharyngeal wall, submucosal extension into posterior oral tongue  Frozen section pathology consistent with SCC    Indications:  Hieu Hughes is an 84 year old man with a history of right base of tongue cancer treated with chemoradiation in 2009. He had trach placement with bilateral vocal cord paralysis in 2021. He has PEG dependence since 2020. He developed issues with hemoptysis and on exam by Dr Holliday on 7/16/2024 was noted to have a right base of tongue mass concerning for recurrent cancer. He is indicated for DL and biopsy for definitive diagnosis.    Description of Procedure:  After informed consent was obtained, the patient was brought back to the main operating room and placed in a supine position. The bed had been turned 90 degrees from anesthesia. General anesthesia was induced through the tracheostomy. The patient was draped in usual fashion. A time out was performed. A sponge was used to protect the alveolus. Palpation was performed of the oral tongue and base of tongue with tumor  of the right base of tongue, extending to midline, extending on to the right lateral pharyngeal wall, and into the posterior right oral tongue. The patient had moderate trismus. The Dedo laryngoscope was introduced and the zero degree telescope was used to inspect the oropharynx. There was tumor on the right base of tongue and lateral pharyngeal wall. The 4 mm biopsy forceps were used to sample the right base of tongue and was sent for frozen section pathology. Additional biopsies were obtained from the right base of tongue and right lateral pharyngeal wall, using the Dedo and Holinger laryngoscopes. The tissue was sent for permanent pathology. Hemostasis was achieved with afrin soaked pledgets. The visualization of the larynx was limited due to radiation effect and the patient's trismus. The laryngoscope was removed. The patient was handed back to anesthesia for emergence. He was transported to the recovery room in stable condition with no immediate complications.     I was present for and participated in the entire procedure.    Abbey Ospina MD    Department of Otolaryngology

## 2024-07-22 NOTE — ANESTHESIA CARE TRANSFER NOTE
Patient: Hieu Hughes    Procedure: Procedure(s):  Direct Laryngoscopy with Biopsy       Diagnosis: Tongue lesion [K14.8]  Diagnosis Additional Information: No value filed.    Anesthesia Type:   General     Note:    Oropharynx: oropharynx clear of all foreign objects and spontaneously breathing  Level of Consciousness: awake  Oxygen Supplementation: face mask  Level of Supplemental Oxygen (L/min / FiO2): 6  Independent Airway: airway patency not satisfactory and stable (Prior tracheostomy)  Dentition: dentition unchanged  Vital Signs Stable: post-procedure vital signs reviewed and stable  Report to RN Given: handoff report given  Patient transferred to: PACU    Handoff Report: Identifed the Patient, Identified the Reponsible Provider, Reviewed the pertinent medical history, Discussed the surgical course, Reviewed Intra-OP anesthesia mangement and issues during anesthesia, Set expectations for post-procedure period and Allowed opportunity for questions and acknowledgement of understanding      Vitals:  Vitals Value Taken Time   /69 07/22/24 1439   Temp     Pulse 59 07/22/24 1441   Resp 18 07/22/24 1441   SpO2 95 % 07/22/24 1441   Vitals shown include unfiled device data.    Electronically Signed By: Fawad Llanes MD  July 22, 2024  2:42 PM

## 2024-07-22 NOTE — ANESTHESIA POSTPROCEDURE EVALUATION
Patient: Hieu Hughes    Procedure: Procedure(s):  Direct Laryngoscopy with Biopsy       Anesthesia Type:  General    Note:  Disposition: Outpatient   Postop Pain Control: Uneventful            Sign Out: Well controlled pain   PONV: No   Neuro/Psych: Uneventful            Sign Out: Acceptable/Baseline neuro status   Airway/Respiratory: Uneventful            Sign Out: Acceptable/Baseline resp. status   CV/Hemodynamics: Uneventful            Sign Out: Acceptable CV status; No obvious hypovolemia; No obvious fluid overload   Other NRE: NONE   DID A NON-ROUTINE EVENT OCCUR? No           Last vitals:  Vitals Value Taken Time   /63 07/22/24 1515   Temp 36.8  C (98.3  F) 07/22/24 1530   Pulse 54 07/22/24 1530   Resp 13 07/22/24 1530   SpO2 95 % 07/22/24 1530   Vitals shown include unfiled device data.    Electronically Signed By: Kathy Harris MD  July 22, 2024  3:30 PM

## 2024-07-22 NOTE — BRIEF OP NOTE
[facility]    Brief Operative Note    Pre-operative diagnosis: Tongue lesion [K14.8]  Post-operative diagnosis Same as pre-operative diagnosis    Procedure: Direct Laryngoscopy with Biopsy, N/A - Throat    Surgeon: Surgeons and Role:     * Abbey Ospina MD - Primary     * Reece Casey MD - Resident - Assisting  Anesthesia: General   Estimated Blood Loss: Minimal    Drains: None  Specimens:   ID Type Source Tests Collected by Time Destination   1 : RIGHT BASE OF TONGUE Tissue Other SURGICAL PATHOLOGY EXAM Abbey Ospina MD 7/22/2024  2:04 PM    2 : Right lateral pharyngeal wall Biopsy Other SURGICAL PATHOLOGY EXAM Abbey Ospina MD 7/22/2024  2:18 PM    3 : Right base of tongue Biopsy Other SURGICAL PATHOLOGY EXAM Abbey Ospina MD 7/22/2024  2:18 PM      Findings:   Intra-op op frozen section positive for invasive SCC .  Complications: None.  Implants: * No implants in log *

## 2024-07-23 ENCOUNTER — TELEPHONE (OUTPATIENT)
Dept: OTOLARYNGOLOGY | Facility: CLINIC | Age: 84
End: 2024-07-23
Payer: COMMERCIAL

## 2024-07-23 ENCOUNTER — TELEPHONE (OUTPATIENT)
Dept: OTOLARYNGOLOGY | Facility: CLINIC | Age: 84
End: 2024-07-23

## 2024-07-23 DIAGNOSIS — C01 SQUAMOUS CELL CARCINOMA OF BASE OF TONGUE (H): Primary | ICD-10-CM

## 2024-07-23 NOTE — TELEPHONE ENCOUNTER
Left Voicemail (1st Attempt) for the patient to call back and schedule the following:    Appointment type: STAT PET Scan  Provider:   Return date: ASAP  Specialty phone number: [phone]  Additional appointment(s) needed:   Additional Notes:

## 2024-07-23 NOTE — TELEPHONE ENCOUNTER
[facility] Call Center    Phone Message    May a detailed message be left on voicemail: yes     Reason for Call: Other: Pt needing new supplies for his trach sent to [facility]. Please fax to [facility] [phone]. Thanks.    Action Taken: Other: ENT    Travel Screening: Not Applicable     Date of Service:

## 2024-07-25 LAB
PATH REPORT.COMMENTS IMP SPEC: ABNORMAL
PATH REPORT.COMMENTS IMP SPEC: YES
PATH REPORT.FINAL DX SPEC: ABNORMAL
PATH REPORT.GROSS SPEC: ABNORMAL
PATH REPORT.INTRAOP OBS SPEC DOC: ABNORMAL
PATH REPORT.MICROSCOPIC SPEC OTHER STN: ABNORMAL
PATH REPORT.RELEVANT HX SPEC: ABNORMAL
PHOTO IMAGE: ABNORMAL

## 2024-07-26 NOTE — TELEPHONE ENCOUNTER
Left vm message for patient in regards to trach supplies needed. Writers call back number provided on vm.
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
## 2024-07-31 ENCOUNTER — PRE VISIT (OUTPATIENT)
Dept: OTOLARYNGOLOGY | Facility: CLINIC | Age: 84
End: 2024-07-31

## 2024-07-31 ENCOUNTER — OFFICE VISIT (OUTPATIENT)
Dept: OTOLARYNGOLOGY | Facility: CLINIC | Age: 84
End: 2024-07-31
Payer: COMMERCIAL

## 2024-07-31 VITALS
SYSTOLIC BLOOD PRESSURE: 134 MMHG | HEART RATE: 65 BPM | BODY MASS INDEX: 25.83 KG/M2 | WEIGHT: 174.4 LBS | DIASTOLIC BLOOD PRESSURE: 67 MMHG | HEIGHT: 69 IN | OXYGEN SATURATION: 93 %

## 2024-07-31 DIAGNOSIS — Z43.0 TRACHEOSTOMY CARE (H): ICD-10-CM

## 2024-07-31 DIAGNOSIS — C01 SQUAMOUS CELL CARCINOMA OF BASE OF TONGUE (H): Primary | ICD-10-CM

## 2024-07-31 PROCEDURE — 99215 OFFICE O/P EST HI 40 MIN: CPT | Mod: 25 | Performed by: OTOLARYNGOLOGY

## 2024-07-31 PROCEDURE — 31575 DIAGNOSTIC LARYNGOSCOPY: CPT | Performed by: OTOLARYNGOLOGY

## 2024-07-31 ASSESSMENT — PAIN SCALES - GENERAL: PAINLEVEL: NO PAIN (0)
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
## 2024-07-31 NOTE — NURSING NOTE
"Chief Complaint   Patient presents with    RECHECK   Blood pressure 134/67, pulse 65, height 1.753 m (5' 9\"), weight 79.1 kg (174 lb 6.4 oz), SpO2 93%. Edgar Moore, EMT    "

## 2024-07-31 NOTE — TELEPHONE ENCOUNTER
FUTURE VISIT INFORMATION      FUTURE VISIT INFORMATION:  Date: 7/31/24  Time: 2:30pm  Location: [facility]  REFERRAL INFORMATION:  Referring provider:    Referring providers clinic:    Reason for visit/diagnosis  post - ops    RECORDS REQUESTED FROM:       Cl[facility] name Comments [facility] Status [facility]   [facility] OV with Dr. Holliday     5/23/24= OV  Kanchan Fox PA-C                                                   [facility] 10/12/23- ov Izabela Vela, MAXIMUS  EPIC     IMAGING  8/1/24- peT  [facility]  7/10/24- MR SPINE  4/14/24- ct NECK + CT CHEST      oFFICE VISIT   7/18/24- PRE=OP Akosua Billings PA-C    6/7/24- oV Remington Heard,   & Gi Franco, SLP      MORE VISIT IN CE  PACS

## 2024-08-01 ENCOUNTER — HOSPITAL ENCOUNTER (OUTPATIENT)
Dept: PET IMAGING | Facility: CLINIC | Age: 84
Discharge: HOME | End: 2024-08-01
Attending: OTOLARYNGOLOGY
Payer: COMMERCIAL

## 2024-08-01 ENCOUNTER — PATIENT OUTREACH (OUTPATIENT)
Dept: ONCOLOGY | Facility: CLINIC | Age: 84
End: 2024-08-01

## 2024-08-01 DIAGNOSIS — C01 SQUAMOUS CELL CARCINOMA OF BASE OF TONGUE (H): ICD-10-CM

## 2024-08-01 LAB
CREAT BLD-MCNC: 0.7 MG/DL (ref 0.7–1.3)
EGFRCR SERPLBLD CKD-EPI 2021: >60 ML/MIN/1.73M2

## 2024-08-01 PROCEDURE — A9552 F18 FDG: HCPCS | Performed by: OTOLARYNGOLOGY

## 2024-08-01 PROCEDURE — 70491 CT SOFT TISSUE NECK W/DYE: CPT

## 2024-08-01 PROCEDURE — 71260 CT THORAX DX C+: CPT

## 2024-08-01 PROCEDURE — 78815 PET IMAGE W/CT SKULL-THIGH: CPT | Mod: 26 | Performed by: RADIOLOGY

## 2024-08-01 PROCEDURE — 70491 CT SOFT TISSUE NECK W/DYE: CPT | Mod: 26 | Performed by: RADIOLOGY

## 2024-08-01 PROCEDURE — 74177 CT ABD & PELVIS W/CONTRAST: CPT | Mod: 26 | Performed by: RADIOLOGY

## 2024-08-01 PROCEDURE — 71260 CT THORAX DX C+: CPT | Mod: 26 | Performed by: RADIOLOGY

## 2024-08-01 PROCEDURE — 250N000011 HC RX IP 250 OP 636: Performed by: OTOLARYNGOLOGY

## 2024-08-01 PROCEDURE — 82565 ASSAY OF CREATININE: CPT

## 2024-08-01 PROCEDURE — 343N000001 HC RX 343: Performed by: OTOLARYNGOLOGY

## 2024-08-01 PROCEDURE — 78815 PET IMAGE W/CT SKULL-THIGH: CPT | Mod: PS

## 2024-08-01 RX ORDER — FLUDEOXYGLUCOSE F 18 200 MCI/ML
10-18 INJECTION, SOLUTION INTRAVENOUS ONCE
Status: COMPLETED | OUTPATIENT
Start: 2024-08-01 | End: 2024-08-01

## 2024-08-01 RX ORDER — IOPAMIDOL 755 MG/ML
10-135 INJECTION, SOLUTION INTRAVASCULAR ONCE
Status: COMPLETED | OUTPATIENT
Start: 2024-08-01 | End: 2024-08-01

## 2024-08-01 RX ADMIN — FLUDEOXYGLUCOSE F 18 10.57 MILLICURIE: 200 INJECTION, SOLUTION INTRAVENOUS at 06:50

## 2024-08-01 RX ADMIN — IOPAMIDOL 100 ML: 755 INJECTION, SOLUTION INTRAVENOUS at 07:46

## 2024-08-01 NOTE — PROGRESS NOTES
New Patient Oncology Nurse Navigator Note     Referring provider: Abbey Ospina MD      Referring Clinic/Organization: [facility]      Referred to (specialty:) Medical Oncology     Requested provider (if applicable): Sriram/Zeb/Melodie     Date Referral Received: 7/31/24 2024     Evaluation for:  C01 (ICD-10-CM) - 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)      Clinical History (per Nurse review of records provided):      Referral received from Dr. Ospina with ENT. Patient was seen yesterday in clinic. Pt with history of right oropharyngeal cancer s/p chemoradiation in 2009. Now with biopsy proven oropharyngeal cancer on 7/22/24.     Final Diagnosis   A&C. PHARYNX, RIGHT BASE OF TONGUE, BIOPSY:  - INVASIVE KERATINIZING SQUAMOUS CELL CARCINOMA  - See comment     B. PHARYNX, RIGHT PHARYNGEAL WALL, BIOPSY:  - INVASIVE KERATINIZING SQUAMOUS CELL CARCINOMA         Comments:   This is an appended report. These results have been appended to a previously preliminary verified report.      Electronically signed by Mel Sprague MD on 7/25/2024 at  2:48 PM   Comment  [id number]   Immunohistochemical stain performed with adequate control for p16 is negative in the tumor cells.     RESULT FOR IMMUNOHISTOCHEMICAL VENTANA CLONE  PD-L1 ASSAY  COMBINED POSITIVE SCORE (CPS): 12  TUMOR PROPORTION SCORE (TPS): 10 %  INTERPRETATION: LOW PD-L1 EXPRESSION (TPS >/=1-49%)  COMMENT:  This Ola  PD-L1 immunohistochemistry antibody assay is a laboratory developed test to be used for patients with non-small cell lung carcinoma (NSCLC), gastric or gastroesophageal junction (GEJ) adenocarcinoma, urothelial carcinomas, melanomas, liver, breast and brain tumors who are being considered for treatment with Keytruda (Pembrolizumab), an anti-PD-1 immune checkpoint inhibitor. The Ola  PD-L1 assay has been validated by the [facility]  [facility] against the FDA approved clinical trial-validated PharmDx 22C3 PD-L1 assay. Evidence suggests that the level of PD-L1 expression in the tumor cell population by immunohistochemistry is a major predictor of response to checkpoint inhibitor therapy. Previous studies demonstrate a high correlation between PD-L1 immunohistochemistry expression data obtained with PD-L1 clones Dako 22C3 and Knightsen  in NSCLC. (References: Lancet 387:1540-50, 2016; :1823-33, 2016; J Clin Oncol 34:4102-9. 2016; J Thorac Oncol 12:1654-63, 2017; [facility] PD-L1 2018 assessment)  Scoring system: The tumor proportion score (TPS) is determined by enumeration of the percentage of PD-L1 tumor cells with any amount of membrane positivity expressed as a whole number relative to all viable tumor cells in the specimen. The scoring system for PD-L1 expression is divided into three groups: a) High expressor, for tumors with TPS >/= 50%; b) Low expressor, for tumors with TPS of >/=1%-49%; and c) Negative, for tumors with TPS <1%. If CPS score is reported, it is calculated based on the following formula: [(PD-L1 positive tumor cells + PD-L1 positive mononuclear inflammatory cells)/Total tumor cells] x 100.  Assay conditions:  - Fixation and processing: 10% neutral buffered formalin, paraffin embedded.  - Staining method: Knightsen predilute monoclonal PD-L1 antibody clone , standard heat induced epitope retrieval in cell conditioning 1 (CC1 - EDTA, alkaline pH), primary antibody incubation 16 minutes, Knightsen Optiview detection kit, and [employer] BenchMark Ultra automated instrument.  - Minimum tumor cell requirement: >/= 100 viable tumor cells present in the specimen.  - Positive and negative controls react appropriately.   Comment: This is an appended report. These results have been appended to a previously preliminary verified report.     Plan:   PET scan today 8/1/24 (Results still in process)  Tumor  Conference 8/2  Medical Oncology Consultation TBD    See Dr. Ospina's notes for further details.     Records Location: See Bookmarked material     Records Needed: NA     Payor: [facility] / Plan: [employer] MEDICARE ADVANTAGE / Product Type: HMO /       August 1, 2024  Referral received and reviewed.   Called Rody daughter in law as instructed.   Rody did not answer her phone so a detailed message was left for her. Provided her with contact information and encouraged her to call with any questions or concerns. Slot on hold for patient on 8/14/24 with Dr. Bhatia. Referral sent to [employer]PS for processing.     Racquel FUENTESN, RN   Oncology Nurse Navigator   [facility]   [phone] / [phone]
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
## 2024-08-02 ENCOUNTER — TUMOR CONFERENCE (OUTPATIENT)
Dept: ONCOLOGY | Facility: CLINIC | Age: 84
End: 2024-08-02
Payer: COMMERCIAL

## 2024-08-02 NOTE — TUMOR CONFERENCE
Called patient's daughter in law Rody to review tumor conference recommendations. Reviewed the plan would be to move forward with medical oncology. Provided Rody appointment details with medical oncology to be scheduled with Dr. Bhatia. Sent a message to oncology stating that patient is aware of time and is able to make appointment. Rody or patient will call clinic with further questions or concerns.    Adri FUENTESN, RN

## 2024-08-12 NOTE — TELEPHONE ENCOUNTER
RECORDS STATUS - ALL OTHER DIAGNOSIS      RECORDS RECEIVED FROM: [facility] - Internal records   DATE RECEIVED: 8/12

## 2024-08-12 NOTE — TELEPHONE ENCOUNTER
RECORDS STATUS - ALL OTHER DIAGNOSIS      RECORDS RECEIVED FROM: [facility] - Internal records   DATE RECEIVED: 8/12

## 2024-08-14 ENCOUNTER — ONCOLOGY VISIT (OUTPATIENT)
Dept: ONCOLOGY | Facility: CLINIC | Age: 84
End: 2024-08-14
Attending: INTERNAL MEDICINE
Payer: COMMERCIAL

## 2024-08-14 ENCOUNTER — PATIENT OUTREACH (OUTPATIENT)
Dept: ONCOLOGY | Facility: CLINIC | Age: 84
End: 2024-08-14

## 2024-08-14 ENCOUNTER — PRE VISIT (OUTPATIENT)
Dept: ONCOLOGY | Facility: CLINIC | Age: 84
End: 2024-08-14
Payer: COMMERCIAL

## 2024-08-14 VITALS
OXYGEN SATURATION: 94 % | SYSTOLIC BLOOD PRESSURE: 145 MMHG | HEIGHT: 70 IN | TEMPERATURE: 98 F | HEART RATE: 65 BPM | DIASTOLIC BLOOD PRESSURE: 66 MMHG | RESPIRATION RATE: 16 BRPM | WEIGHT: 175 LBS | BODY MASS INDEX: 25.05 KG/M2

## 2024-08-14 DIAGNOSIS — E83.42 HYPOMAGNESEMIA: ICD-10-CM

## 2024-08-14 DIAGNOSIS — C01 CANCER OF BASE OF TONGUE (H): ICD-10-CM

## 2024-08-14 DIAGNOSIS — I65.21 STENOSIS OF RIGHT CAROTID ARTERY: Primary | ICD-10-CM

## 2024-08-14 DIAGNOSIS — Z13.29 SCREENING FOR HYPOTHYROIDISM: ICD-10-CM

## 2024-08-14 DIAGNOSIS — C01 SQUAMOUS CELL CARCINOMA OF BASE OF TONGUE (H): ICD-10-CM

## 2024-08-14 PROCEDURE — G0463 HOSPITAL OUTPT CLINIC VISIT: HCPCS | Performed by: INTERNAL MEDICINE

## 2024-08-14 PROCEDURE — G2211 COMPLEX E/M VISIT ADD ON: HCPCS | Performed by: INTERNAL MEDICINE

## 2024-08-14 PROCEDURE — 99417 PROLNG OP E/M EACH 15 MIN: CPT | Performed by: INTERNAL MEDICINE

## 2024-08-14 PROCEDURE — 99205 OFFICE O/P NEW HI 60 MIN: CPT | Performed by: INTERNAL MEDICINE

## 2024-08-14 RX ORDER — DIPHENHYDRAMINE HYDROCHLORIDE 50 MG/ML
50 INJECTION INTRAMUSCULAR; INTRAVENOUS
Status: CANCELLED
Start: 2024-08-17

## 2024-08-14 RX ORDER — METHYLPREDNISOLONE SODIUM SUCCINATE 125 MG/2ML
125 INJECTION, POWDER, LYOPHILIZED, FOR SOLUTION INTRAMUSCULAR; INTRAVENOUS
Status: CANCELLED
Start: 2024-08-17

## 2024-08-14 RX ORDER — HEPARIN SODIUM,PORCINE 10 UNIT/ML
5-20 VIAL (ML) INTRAVENOUS DAILY PRN
Status: CANCELLED | OUTPATIENT
Start: 2024-08-17

## 2024-08-14 RX ORDER — ALBUTEROL SULFATE 90 UG/1
1-2 AEROSOL, METERED RESPIRATORY (INHALATION)
Status: CANCELLED
Start: 2024-08-17

## 2024-08-14 RX ORDER — EPINEPHRINE 1 MG/ML
0.3 INJECTION, SOLUTION INTRAMUSCULAR; SUBCUTANEOUS EVERY 5 MIN PRN
Status: CANCELLED | OUTPATIENT
Start: 2024-08-17

## 2024-08-14 RX ORDER — ALBUTEROL SULFATE 0.83 MG/ML
2.5 SOLUTION RESPIRATORY (INHALATION)
Status: CANCELLED | OUTPATIENT
Start: 2024-08-17

## 2024-08-14 RX ORDER — LORAZEPAM 2 MG/ML
0.5 INJECTION INTRAMUSCULAR EVERY 4 HOURS PRN
Status: CANCELLED | OUTPATIENT
Start: 2024-08-17

## 2024-08-14 RX ORDER — MEPERIDINE HYDROCHLORIDE 25 MG/ML
25 INJECTION INTRAMUSCULAR; INTRAVENOUS; SUBCUTANEOUS EVERY 30 MIN PRN
Status: CANCELLED | OUTPATIENT
Start: 2024-08-17

## 2024-08-14 RX ORDER — HEPARIN SODIUM (PORCINE) LOCK FLUSH IV SOLN 100 UNIT/ML 100 UNIT/ML
5 SOLUTION INTRAVENOUS
Status: CANCELLED | OUTPATIENT
Start: 2024-08-17

## 2024-08-14 ASSESSMENT — PAIN SCALES - GENERAL: PAINLEVEL: NO PAIN (0)

## 2024-08-14 NOTE — LETTER
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  to hyoid bone and mylohyoid muscle level, and lingual mucosal surface  of the suprahyoid epiglottis is involved. Hyoid bone is intact.  Posterior laterally there is enhancing lesion extending along the  right lateral oropharyngeal wall. The mass measures 2.8 (cc) x 2.8 x  1.8 (ap) cm. The maximum SUV is 21.5. Metabolic tumor volume is 23  ccm.    FDG avid enhancing lesion deep to right thyroid cartilage in the right  parapharyngeal fat of the false cord and extending to submucosal  surface of the true cord. It measures 1.5 x 0.7 cm. The max SUV 10.1.  This is new since 2019 study.    Tracheostomy in place. Other pharyngeal mucosal space is unremarkable.    Sinonasal region: Mild mucosal thickening of the maxillary sinuses and  scattered mucosal thickening of the ethmoid air cells.    No mass within the nasal cavity.    Lymph nodes: No FDG avid or abnormally enlarged lymph nodes.    Salivary glands: Parotid and submandibular glands are atrophic.    Thyroid gland: The thyroid gland is atrophic    Vascular structures: There is calcified plaque along the right common  carotid artery extending to carotid bifurcation. At the bifurcation  there is soft plaque resulting in residual lumen measuring      3 mm. Brain: No abnormal FDG avid lesion or abnormal enhancement.  Right frontal lobe developmental venous anomaly.    Orbital cavities: No abnormal FDG avid lesion or abnormal enhancement.    CHEST:    Lymph nodes: Right and left nonenlarged hilar reactive nodes.    Lungs: The central tracheobronchial tree is clear. No acute  consolidation. Right lower lobe linear scarring. Left lower lobe  pleural thickening with mild FDG metabolism. Linear scarring. A  calcified granuloma. Left lower lobe medial periaortic atelectasis.  These are new since 2019. No pleural effusion or pneumothorax.     Heart and great vessels: Heart size is within normal limits. No  pericardial effusion. The thoracic aorta and main pulmonary artery are  within normal limits. The esophagus is unremarkable.     Breasts: No abnormal uptake in the breasts.    ABDOMEN AND PELVIS:    Liver: No FDG avid lesion. Hepatic cysts in the segment 4A. No  intrahepatic or extrahepatic biliary ductal dilatation. Layering  gallstones without evidence of acute cholecystitis.     Pancreas: The pancreas is within normal limits. No pancreatic ductal  dilatation.     Spleen: No FDG avid lesion. Multiple punctate calcifications  reflecting prior granulomatous disease.    Adrenal glands: No FDG avid foci.    Kidneys: No FDG avid lesion. No hydronephrosis. Nonobstructive  nephrolithiasis of left kidney, the largest measuring up to 7 mm. The  urinary bladder is unremarkable.     Reproductive organs are within normal limits.    Gastrointestinal system: Normal caliber of the small and large bowel.  Diverticulosis without evidence of acute diverticulitis. FDG uptake  along the anorectal mucosa and enhancement of the mucosa on CT.     Lymph nodes: No FDG avid or abnormally enlarged lymph nodes.    Vascular structures: Normal caliber of the abdominal aorta.  Atherosclerotic wall calcifications of the aorta and iliac arteries.    No free air, free fluid, or fluid collection.      EXTREMITIES:     No abnormal FDG uptake in the visualized extremities.    BONES AND SOFT TISSUES:     No abnormal FDG uptake in the skeleton. No abnormal lytic or blastic  osseous lesions. . Degenerative changes of the spine.    SPINAL CANAL:     No evident canal compromise. No abnormal FDG avid lesion.  Impression: IMPRESSION:   1. FDG avid enhancing lesion involving tongue base eccentric to the  right and extending to involve the right right hemitongue and  posteriorly extending to involve the lateral oropharyngeal wall  compatible with squamous cell cancer.    2. FDG avid enhancing lesion in the right paralaryngeal fat of the  false cord and slight extending to true cord submucosal region. This  is suspicious for another focus of malignancy.    3. No evidence of tamy disease.    4. No evidence of distant metastasis.     5. Left lower lateral pleural thickening, linear scarring and  mediobasal paraaortic atelectasis. These findings are new since 2019  CT likely and chronic changes.    5. Focal uptake and mucosal thickening and enhancement of the  anorectal region. Clinical correlation is recommended.    I have personally reviewed the examination and initial interpretation  and I agree with the findings.    CHEPE NEFF MD         SYSTEM ID:  [id number]             PET/CT was personally reviewed and interpreted by me  The primary tumor is fairly large  I don't see worrisome adenopathy or lung nodules on my review               Again, thank you for allowing me to participate in the care of your patient.        Sincerely,        Ellie Bhatia MD

## 2024-08-14 NOTE — NURSING NOTE
"Oncology Rooming Note    August 14, 2024 1:23 PM   Hieu Hughes is a 84 year old male who presents for:    Chief Complaint   Patient presents with    Oncology Clinic Visit     Squamous cell carcinoma of base of tongue      Initial Vitals: BP (!) 145/66   Pulse 65   Temp 98  F (36.7  C)   Resp 16   Ht 1.77 m (5' 9.69\")   Wt 79.4 kg (175 lb)   SpO2 94%   BMI 25.34 kg/m   Estimated body mass index is 25.34 kg/m  as calculated from the following:    Height as of this encounter: 1.77 m (5' 9.69\").    Weight as of this encounter: 79.4 kg (175 lb). Body surface area is 1.98 meters squared.  No Pain (0) Comment: Data Unavailable   No LMP for male patient.  Allergies reviewed: Yes  Medications reviewed: Yes    Medications: Medication refills not needed today.  Pharmacy name entered into [employer]:    [id number] - [place], [place] - [address] [facility] - [place], [place] - [address] [facility] - [place], [place] - [address] [facility] - [place], [place] - [address] [facility] - [place], [place] - [address]    Frailty Screening:   Is the patient here for a new oncology consult visit in cancer care? 1. Yes. Over the past month, have you experienced difficulty or required a caregiver to assist with:   1. Balance, walking or general mobility (including any falls)? NO  2. Completion of self-care tasks such as bathing, dressing, toileting, grooming/hygiene?  NO  3. Concentration or memory that affects your daily life?  NO       Clinical concerns: no other complaints      Rakesh Govea"

## 2024-08-14 NOTE — PROGRESS NOTES
[facility]: Cancer Care Initial Note                                    Discussion with Patient:                                                      Met with Hieu and his daughter Nano in clinic today prior to his visit with Dr. Bhatia. Provided contact information for myself, triage, and our scheduling team.     Updated demographics/contact information for daughter-in-law Rody to be main point of contact. Authorization to communicate signed in clinic. Nano was wondering if we could help connect them with the team that is ordering Hieu's HME, as he is having issues fulfilling the order. Message sent to [facility] who will assist in this.     I will contact them with treatment education once treatment plan is finalized.    Assessment:                                                      Initial  Current living arrangement:: I live alone;I live in a private home  Type of residence:: Private home - stairs  Informal Support system:: Children;Family  Equipment Currently Used at Home: none  Bed or wheelchair confined:: No  Medication adherence problem (GOAL):: No  Knowledgeable about how to use meds:: Yes  Medication side effects suspected:: No  Referrals Placed: None    No assessment indicated    Intervention/Education provided during outreach:                                                       Patient to follow up as scheduled at next appt  Patient to call/Wozityout message with updates  Confirmed patient has clinic and triage numbers    Laura Headley, RN, BSN  RN Care Coordinator  [facility]

## 2024-08-14 NOTE — PROGRESS NOTES
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  right lateral oropharyngeal wall. The mass measures 2.8 (cc) x 2.8 x  1.8 (ap) cm. The maximum SUV is 21.5. Metabolic tumor volume is 23  ccm.    FDG avid enhancing lesion deep to right thyroid cartilage in the right  parapharyngeal fat of the false cord and extending to submucosal  surface of the true cord. It measures 1.5 x 0.7 cm. The max SUV 10.1.  This is new since 2019 study.    Tracheostomy in place. Other pharyngeal mucosal space is unremarkable.    Sinonasal region: Mild mucosal thickening of the maxillary sinuses and  scattered mucosal thickening of the ethmoid air cells.    No mass within the nasal cavity.    Lymph nodes: No FDG avid or abnormally enlarged lymph nodes.    Salivary glands: Parotid and submandibular glands are atrophic.    Thyroid gland: The thyroid gland is atrophic    Vascular structures: There is calcified plaque along the right common  carotid artery extending to carotid bifurcation. At the bifurcation  there is soft plaque resulting in residual lumen measuring     3 mm. Brain: No abnormal FDG avid lesion or abnormal enhancement.  Right frontal lobe developmental venous anomaly.    Orbital cavities: No abnormal FDG avid lesion or abnormal enhancement.    CHEST:    Lymph  nodes: Right and left nonenlarged hilar reactive nodes.    Lungs: The central tracheobronchial tree is clear. No acute  consolidation. Right lower lobe linear scarring. Left lower lobe  pleural thickening with mild FDG metabolism. Linear scarring. A  calcified granuloma. Left lower lobe medial periaortic atelectasis.  These are new since 2019. No pleural effusion or pneumothorax.     Heart and great vessels: Heart size is within normal limits. No  pericardial effusion. The thoracic aorta and main pulmonary artery are  within normal limits. The esophagus is unremarkable.     Breasts: No abnormal uptake in the breasts.    ABDOMEN AND PELVIS:    Liver: No FDG avid lesion. Hepatic cysts in the segment 4A. No  intrahepatic or extrahepatic biliary ductal dilatation. Layering  gallstones without evidence of acute cholecystitis.     Pancreas: The pancreas is within normal limits. No pancreatic ductal  dilatation.     Spleen: No FDG avid lesion. Multiple punctate calcifications  reflecting prior granulomatous disease.    Adrenal glands: No FDG avid foci.    Kidneys: No FDG avid lesion. No hydronephrosis. Nonobstructive  nephrolithiasis of left kidney, the largest measuring up to 7 mm. The  urinary bladder is unremarkable.     Reproductive organs are within normal limits.    Gastrointestinal system: Normal caliber of the small and large bowel.  Diverticulosis without evidence of acute diverticulitis. FDG uptake  along the anorectal mucosa and enhancement of the mucosa on CT.     Lymph nodes: No FDG avid or abnormally enlarged lymph nodes.    Vascular structures: Normal caliber of the abdominal aorta.  Atherosclerotic wall calcifications of the aorta and iliac arteries.    No free air, free fluid, or fluid collection.     EXTREMITIES:     No abnormal FDG uptake in the visualized extremities.    BONES AND SOFT TISSUES:     No abnormal FDG uptake in the skeleton. No abnormal lytic or blastic  osseous lesions. . Degenerative  changes of the spine.    SPINAL CANAL:     No evident canal compromise. No abnormal FDG avid lesion.  Impression: IMPRESSION:   1. FDG avid enhancing lesion involving tongue base eccentric to the  right and extending to involve the right right hemitongue and  posteriorly extending to involve the lateral oropharyngeal wall  compatible with squamous cell cancer.    2. FDG avid enhancing lesion in the right paralaryngeal fat of the  false cord and slight extending to true cord submucosal region. This  is suspicious for another focus of malignancy.    3. No evidence of tamy disease.    4. No evidence of distant metastasis.     5. Left lower lateral pleural thickening, linear scarring and  mediobasal paraaortic atelectasis. These findings are new since 2019  CT likely and chronic changes.    5. Focal uptake and mucosal thickening and enhancement of the  anorectal region. Clinical correlation is recommended.    I have personally reviewed the examination and initial interpretation  and I agree with the findings.    CHEPE NEFF MD         SYSTEM ID:  [id number]             PET/CT was personally reviewed and interpreted by me  The primary tumor is fairly large  I don't see worrisome adenopathy or lung nodules on my review

## 2024-08-15 ENCOUNTER — PATIENT OUTREACH (OUTPATIENT)
Dept: ONCOLOGY | Facility: CLINIC | Age: 84
End: 2024-08-15

## 2024-08-15 NOTE — PROGRESS NOTES
[facility]/Voicemail    Clinical Data: Care Coordinator Outreach    Attempted to reach out to Rody (daughter-in-law, point of contact) to provide pembrolizumab education.     Outreach attempted x 1.  Left message on  Rody's  voicemail with call back information and requested return call.    Plan: Care Coordinator will try to reach patient again in 1-2 business days.    Laura Headley, RN, BSN  RN Care Coordinator  [facility]

## 2024-08-15 NOTE — PROGRESS NOTES
"[facility]: Cancer Care Plan of Care Education Note                                    Discussion with Patient:                                                      Phone call with Hieu's daughter-in-law Rody following their visit with Dr. Bhatia to discuss treatment and resources available at the [facility].     Reviewed plan for pembrolizumab. Highlighted steps to expect when getting treatment, including labs, GONZALEZ visits, and infusion appointments.    Sent Hieu \"My Cancer Guidebook\" and [facility] printout on pembrolizumab via mail. Reviewed administration, side effects, and safety guidelines.     Discussed how and when to contact triage team and provided informational sheet on \"When to Call For Help\".    Assessment:                                                      Assessment completed with:: Patient;Family    Plan of Care Education   Yearly learning assessment completed?: Yes (see Education tab)  Diagnosis:: SCC R BOT  Does patient understand diagnosis?: Yes  Tx plan/regimen:: pembrolizumab  Does patient understand treatment plan/regimen?: Yes  Preparing for treatment:: Reviewed treatment preparation information with patient (vascular access, day of chemo, visitor policy, what to bring, etc.)  Vascular access education provided for:: Peripheral IV  Side effect education:: Immune-mediated effects;Diarrhea/Constipation;Lab value monitoring (anemia, neutropenia, thrombocytopenia)  Safety/self care at home reviewed with patient:: Yes  Coping - concerns/fears reviewed with patient:: Yes  Plan of Care:: GONZALEZ follow-up appointment;Lab appointment;Imaging;MD follow-up appointment;Treatment schedule  When to call provider:: Bleeding;Uncontrolled nausea/vomiting;Increased shortness of breath;New/worsening pain;Shaking chills;Temperature >100.4F;Uncontrolled diarrhea/constipation  Reasons for deferring treatment reviewed with patient:: Yes    Evaluation of Learning  Patient Education Provided: " Yes  Readiness:: Acceptance  Method:: Explanation;Literature;Booklet/Handout  Response:: Verbalizes understanding    No assessment indicated    Intervention/Education provided during outreach:                                                       Patient to follow up as scheduled at next appt  Patient to call/[employer] message with updates  Confirmed patient has clinic and triage numbers    Laura Headley, RN, BSN  RN Care Coordinator  [facility]

## 2024-08-15 NOTE — PROGRESS NOTES
[facility]: Cancer Care                                                                                          Received VM from Hieu's daughter Cindy asking if we can send orders for his bilateral carotid US to a clinic closer to his home.    Attempted to contact Matt ONEAL requesting return call specifying where she would like the orders sent.     Addendum 1:46 PM  Spoke with Cindy. They would like to get the ultrasound at [facility]. Order & facesheet faxed to [facility] @ fax # [phone].    Laura Headley, RN, BSN  RN Care Coordinator  [facility]

## 2024-08-19 DIAGNOSIS — C01 SQUAMOUS CELL CARCINOMA OF BASE OF TONGUE (H): Primary | ICD-10-CM

## 2024-08-21 ENCOUNTER — APPOINTMENT (OUTPATIENT)
Dept: CT IMAGING | Facility: CLINIC | Age: 84
End: 2024-08-21
Attending: EMERGENCY MEDICINE
Payer: COMMERCIAL

## 2024-08-21 ENCOUNTER — HOSPITAL ENCOUNTER (OUTPATIENT)
Facility: CLINIC | Age: 84
Setting detail: OBSERVATION
Discharge: HOME | End: 2024-08-22
Attending: EMERGENCY MEDICINE | Admitting: STUDENT IN AN ORGANIZED HEALTH CARE EDUCATION/TRAINING PROGRAM
Payer: COMMERCIAL

## 2024-08-21 DIAGNOSIS — Z93.0 TRACHEOSTOMY DEPENDENCE (H): Primary | ICD-10-CM

## 2024-08-21 DIAGNOSIS — R04.2 HEMOPTYSIS: ICD-10-CM

## 2024-08-21 DIAGNOSIS — K13.79 ORAL BLEEDING: ICD-10-CM

## 2024-08-21 DIAGNOSIS — C01 SQUAMOUS CELL CARCINOMA OF BASE OF TONGUE (H): ICD-10-CM

## 2024-08-21 PROBLEM — J39.2 OROPHARYNGEAL BLEEDING: Status: ACTIVE | Noted: 2024-08-21

## 2024-08-21 LAB
ACANTHOCYTES BLD QL SMEAR: ABNORMAL
ANION GAP SERPL CALCULATED.3IONS-SCNC: 10 MMOL/L (ref 7–15)
AUER BODIES BLD QL SMEAR: ABNORMAL
BASO STIPL BLD QL SMEAR: ABNORMAL
BASOPHILS # BLD AUTO: 0 10E3/UL (ref 0–0.2)
BASOPHILS NFR BLD AUTO: 0 %
BITE CELLS BLD QL SMEAR: ABNORMAL
BLISTER CELLS BLD QL SMEAR: ABNORMAL
BUN SERPL-MCNC: 26.3 MG/DL (ref 8–23)
BURR CELLS BLD QL SMEAR: ABNORMAL
CALCIUM SERPL-MCNC: 9.1 MG/DL (ref 8.8–10.4)
CHLORIDE SERPL-SCNC: 102 MMOL/L (ref 98–107)
CREAT SERPL-MCNC: 0.7 MG/DL (ref 0.67–1.17)
DACRYOCYTES BLD QL SMEAR: ABNORMAL
EGFRCR SERPLBLD CKD-EPI 2021: >90 ML/MIN/1.73M2
ELLIPTOCYTES BLD QL SMEAR: ABNORMAL
EOSINOPHIL # BLD AUTO: 0.1 10E3/UL (ref 0–0.7)
EOSINOPHIL NFR BLD AUTO: 1 %
ERYTHROCYTE [DISTWIDTH] IN BLOOD BY AUTOMATED COUNT: 14.4 % (ref 10–15)
FRAGMENTS BLD QL SMEAR: ABNORMAL
GLUCOSE SERPL-MCNC: 107 MG/DL (ref 70–99)
HCO3 SERPL-SCNC: 28 MMOL/L (ref 22–29)
HCT VFR BLD AUTO: 33 % (ref 40–53)
HGB BLD-MCNC: 10.2 G/DL (ref 13.3–17.7)
HGB C CRYSTALS: ABNORMAL
HOWELL-JOLLY BOD BLD QL SMEAR: ABNORMAL
IMM GRANULOCYTES # BLD: 0 10E3/UL
IMM GRANULOCYTES NFR BLD: 0 %
INR PPP: 1.09 (ref 0.85–1.15)
LYMPHOCYTES # BLD AUTO: 0.4 10E3/UL (ref 0.8–5.3)
LYMPHOCYTES NFR BLD AUTO: 6 %
MCH RBC QN AUTO: 30.9 PG (ref 26.5–33)
MCHC RBC AUTO-ENTMCNC: 30.9 G/DL (ref 31.5–36.5)
MCV RBC AUTO: 100 FL (ref 78–100)
MONOCYTES # BLD AUTO: 0.6 10E3/UL (ref 0–1.3)
MONOCYTES NFR BLD AUTO: 8 %
NEUTROPHILS # BLD AUTO: 6.5 10E3/UL (ref 1.6–8.3)
NEUTROPHILS NFR BLD AUTO: 85 %
NEUTS HYPERSEG BLD QL SMEAR: ABNORMAL
NRBC # BLD AUTO: 0 10E3/UL
NRBC BLD AUTO-RTO: 0 /100
PLAT MORPH BLD: ABNORMAL
PLATELET # BLD AUTO: 179 10E3/UL (ref 150–450)
POLYCHROMASIA BLD QL SMEAR: SLIGHT
POTASSIUM SERPL-SCNC: 4.8 MMOL/L (ref 3.4–5.3)
RBC # BLD AUTO: 3.3 10E6/UL (ref 4.4–5.9)
RBC AGGLUT BLD QL: ABNORMAL
RBC MORPH BLD: ABNORMAL
ROULEAUX BLD QL SMEAR: ABNORMAL
SICKLE CELLS BLD QL SMEAR: ABNORMAL
SMUDGE CELLS BLD QL SMEAR: ABNORMAL
SODIUM SERPL-SCNC: 140 MMOL/L (ref 135–145)
SPHEROCYTES BLD QL SMEAR: ABNORMAL
STOMATOCYTES BLD QL SMEAR: ABNORMAL
TARGETS BLD QL SMEAR: ABNORMAL
TOXIC GRANULES BLD QL SMEAR: ABNORMAL
VARIANT LYMPHS BLD QL SMEAR: ABNORMAL
WBC # BLD AUTO: 7.7 10E3/UL (ref 4–11)

## 2024-08-21 PROCEDURE — 82374 ASSAY BLOOD CARBON DIOXIDE: CPT | Performed by: EMERGENCY MEDICINE

## 2024-08-21 PROCEDURE — G0378 HOSPITAL OBSERVATION PER HR: HCPCS

## 2024-08-21 PROCEDURE — 99291 CRITICAL CARE FIRST HOUR: CPT | Performed by: EMERGENCY MEDICINE

## 2024-08-21 PROCEDURE — 85610 PROTHROMBIN TIME: CPT | Performed by: EMERGENCY MEDICINE

## 2024-08-21 PROCEDURE — 85041 AUTOMATED RBC COUNT: CPT | Performed by: EMERGENCY MEDICINE

## 2024-08-21 PROCEDURE — 250N000013 HC RX MED GY IP 250 OP 250 PS 637

## 2024-08-21 PROCEDURE — 99285 EMERGENCY DEPT VISIT HI MDM: CPT | Mod: 25 | Performed by: EMERGENCY MEDICINE

## 2024-08-21 PROCEDURE — 36415 COLL VENOUS BLD VENIPUNCTURE: CPT | Performed by: EMERGENCY MEDICINE

## 2024-08-21 PROCEDURE — 70491 CT SOFT TISSUE NECK W/DYE: CPT

## 2024-08-21 PROCEDURE — 999N000157 HC STATISTIC RCP TIME EA 10 MIN

## 2024-08-21 PROCEDURE — 70491 CT SOFT TISSUE NECK W/DYE: CPT | Mod: 26 | Performed by: RADIOLOGY

## 2024-08-21 PROCEDURE — 31502 CHANGE OF WINDPIPE AIRWAY: CPT

## 2024-08-21 PROCEDURE — 250N000011 HC RX IP 250 OP 636: Performed by: EMERGENCY MEDICINE

## 2024-08-21 PROCEDURE — 999N000127 HC STATISTIC PERIPHERAL IV START W US GUIDANCE

## 2024-08-21 PROCEDURE — 31525 DX LARYNGOSCOPY EXCL NB: CPT

## 2024-08-21 RX ORDER — ALBUTEROL SULFATE 0.83 MG/ML
2.5 SOLUTION RESPIRATORY (INHALATION) EVERY 8 HOURS PRN
Status: DISCONTINUED | OUTPATIENT
Start: 2024-08-21 | End: 2024-08-21

## 2024-08-21 RX ORDER — LEVOTHYROXINE SODIUM 100 UG/1
100 TABLET ORAL DAILY
Status: DISCONTINUED | OUTPATIENT
Start: 2024-08-21 | End: 2024-08-22 | Stop reason: HOSPADM

## 2024-08-21 RX ORDER — IOPAMIDOL 755 MG/ML
90 INJECTION, SOLUTION INTRAVASCULAR ONCE
Status: COMPLETED | OUTPATIENT
Start: 2024-08-21 | End: 2024-08-21

## 2024-08-21 RX ORDER — ALBUTEROL SULFATE 0.83 MG/ML
2.5 SOLUTION RESPIRATORY (INHALATION) EVERY 6 HOURS PRN
Status: DISCONTINUED | OUTPATIENT
Start: 2024-08-21 | End: 2024-08-22 | Stop reason: HOSPADM

## 2024-08-21 RX ORDER — FERROUS SULFATE 325(65) MG
325 TABLET ORAL
Status: DISCONTINUED | OUTPATIENT
Start: 2024-08-21 | End: 2024-08-22 | Stop reason: HOSPADM

## 2024-08-21 RX ORDER — DEXTROSE MONOHYDRATE 100 MG/ML
INJECTION, SOLUTION INTRAVENOUS CONTINUOUS PRN
Status: DISCONTINUED | OUTPATIENT
Start: 2024-08-21 | End: 2024-08-22 | Stop reason: HOSPADM

## 2024-08-21 RX ADMIN — IOPAMIDOL 90 ML: 755 INJECTION, SOLUTION INTRAVENOUS at 08:46

## 2024-08-21 RX ADMIN — FERROUS SULFATE TAB 325 MG (65 MG ELEMENTAL FE) 325 MG: 325 (65 FE) TAB at 09:35

## 2024-08-21 RX ADMIN — LEVOTHYROXINE SODIUM 100 MCG: 100 TABLET ORAL at 09:35

## 2024-08-21 ASSESSMENT — ACTIVITIES OF DAILY LIVING (ADL)
ADLS_ACUITY_SCORE: 38

## 2024-08-21 ASSESSMENT — COLUMBIA-SUICIDE SEVERITY RATING SCALE - C-SSRS
2. HAVE YOU ACTUALLY HAD ANY THOUGHTS OF KILLING YOURSELF IN THE PAST MONTH?: NO
1. IN THE PAST MONTH, HAVE YOU WISHED YOU WERE DEAD OR WISHED YOU COULD GO TO SLEEP AND NOT WAKE UP?: NO
6. HAVE YOU EVER DONE ANYTHING, STARTED TO DO ANYTHING, OR PREPARED TO DO ANYTHING TO END YOUR LIFE?: NO
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
## 2024-08-21 NOTE — ED NOTES
Bed: IN01  Expected date:   Expected time:   Means of arrival:   Comments:  85 yo male with bleeding from mouth and tracheostomy site  Hx of oropharnygeal cancer, trach dependent  Mild intermittent bleeding x 1 month; bleeding persistently from trach site  Inhaled TXA - suspect base of tongue  Full code, palliative chemo  Possible sentinal bleed; possible IR bleeding     Angélica Winslow MD  08/21/24 0848
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
## 2024-08-21 NOTE — ED PROVIDER NOTES
Patient seen by Dr. Winslow refer to her note.  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  Patient has also tracheostomy.  Patient noted some hemoptysis with clots noted also at the trach site.  He was seen at outside facility given TXA nebulization treatment at that point.  Transferred from [facility] here where patient valuated here along with ENT they exchanged with a cuffed trach tube placed.  Patient in the ER IV established labs drawn etc.  CT scan was ordered.  Patient seen by ENT waiting for this for further disposition.  Patient otherwise has been stable down here in the ER.  Admit as observation to ENT service.    This note was created at least in part by the use of dragon voice dictation system. Inadvertent typographical errors may still exist.  Tone Dodge MD.  Patient evaluated in the emergency department during the COVID-19 pandemic period. Careful attention to patients safety was addressed throughout the evaluation. Evaluation and treatment management was initiated with disposition made efficiently and appropriate as possible to minimize any risk of potential exposure to patient during this evaluation.       Tone Dodge MD  08/21/24 3493

## 2024-08-21 NOTE — ED TRIAGE NOTES
Pt BIBA from [place] c/o bleeding coming from trach and back of his throat, this morning. Pt denies SOB or pain. Pt communicates via notebook and pen, hx of oropharyngeal cancer.

## 2024-08-21 NOTE — PHARMACY-CONSULT NOTE
Pharmacy Tube Feeding Consult    Medication reviewed for administration by feeding tube and for potential food/drug interactions.    Recommendation: No changes are needed at this time.     Pharmacy will continue to follow as new medications are ordered.    Gil Mayo, PharmD, BCPS

## 2024-08-22 VITALS
DIASTOLIC BLOOD PRESSURE: 60 MMHG | RESPIRATION RATE: 16 BRPM | BODY MASS INDEX: 25.84 KG/M2 | HEART RATE: 75 BPM | TEMPERATURE: 97.9 F | SYSTOLIC BLOOD PRESSURE: 112 MMHG | HEIGHT: 69 IN | OXYGEN SATURATION: 95 %

## 2024-08-22 LAB — GLUCOSE BLDC GLUCOMTR-MCNC: 105 MG/DL (ref 70–99)

## 2024-08-22 PROCEDURE — 82962 GLUCOSE BLOOD TEST: CPT

## 2024-08-22 PROCEDURE — 999N000157 HC STATISTIC RCP TIME EA 10 MIN

## 2024-08-22 PROCEDURE — 250N000013 HC RX MED GY IP 250 OP 250 PS 637

## 2024-08-22 PROCEDURE — 99238 HOSP IP/OBS DSCHRG MGMT 30/<: CPT | Performed by: OTOLARYNGOLOGY

## 2024-08-22 PROCEDURE — G0378 HOSPITAL OBSERVATION PER HR: HCPCS

## 2024-08-22 RX ADMIN — LEVOTHYROXINE SODIUM 100 MCG: 100 TABLET ORAL at 07:40

## 2024-08-22 ASSESSMENT — ACTIVITIES OF DAILY LIVING (ADL)
ADLS_ACUITY_SCORE: 40
ADLS_ACUITY_SCORE: 38
ADLS_ACUITY_SCORE: 40
ADLS_ACUITY_SCORE: 40
ADLS_ACUITY_SCORE: 31
ADLS_ACUITY_SCORE: 40
ADLS_ACUITY_SCORE: 40
ADLS_ACUITY_SCORE: 31
ADLS_ACUITY_SCORE: 40

## 2024-08-22 NOTE — PLAN OF CARE
Observation goals:     - No active oropharyngeal bleeding: not noted since admission (met)   - Stable respiratory status: improving: Met   - Return to baseline: Met      Discharge time/date: 8/22 0915  Walked or Wheelchair: Walked with transport   PIV removed: Yes   Reviewed AVS with patient: Yes  Medication due times added to AVS in EPIC: Yes   Verbalized understanding of discharge with teachback: Yes   Supplies sent home: Yes     Patient educated on how to inflate and deflate cuff on trach with teach back.

## 2024-08-22 NOTE — DISCHARGE SUMMARY
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  The patient experienced a moderate amount of bleeding from the mouth and a small amount from the tracheostomy tube earlier this morning, which prompted him to visit the [facility] in [place].  They noted patient had coughed up a few clots from his mouth and had some bleeding around the trach.  As such, he was subsequently transferred here to [facility].  Patient states he is felt fine during the entire episode.  He reports he has been stable from respiratory standpoint and has no concerns.  He does have intermittent bleeding from the tumor at baseline, but reports earlier this evening there was more than usual.  He states this has tapered off and has slowed down considerably since he was at OSH.  Denies dyspnea, new neck masses, hematemesis, or large volumes of blood causing him to choke or be in distress.     Regarding his cancer history, the patient was recently diagnosed on 7/22/24 with recurrent p16 -, [id number] SCC of the right base of tongue. This extends from the right tongue base to involve the right oral tongue, hyoid bone, mylohyoid, lingual surface of the epiglottis, lateral oropharyngeal wall, right parapharyngeal fat, false cord, and true cord. He is set to begin palliative pembrolizumab in the next few days. He is G-tube fed at baseline. He had a trach placed with Dr. 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 He is not intubatable from above.  He does not take blood thinners.     He was admitted for close observation for any further bleeding episodes.    Hospital Course: The patient was admitted to the hospital for close observation. A CT neck was obtained which demonstrated no acute bleed from a feeding vessel to the tumor. He had a cuffed trach placed while in the ED to protect his airway from any bloody drainage/secretions that could fall into the lungs. He has been self sufficient with his tracheostomy for years and felt comfortable with the new tracheostomy tube. He had no further episodes of oral or tracheal bleeding that were identified in the 24 hour observation. We reviewed the benefits of discharging with a cuffed tracheostomy tube and reviewed how to inflate/deflate the cuff at home. Hieu demonstrated comfort and agreement with the plan.  At discharge, the patient's pain, diet, ambulatory status were at his baseline with no further bleeding episodes.    Discharge Exam:  Vitals:    08/22/24 0400 08/22/24 0500 08/22/24 0600 08/22/24 0724   BP:    112/60   BP Location:       Pulse: 53 66 61 75   Resp:    16   Temp:       TempSrc:    Axillary   SpO2: 94% 94% 93% 95%   Height:           General: A&O x 3, No acute distress  HEENT: 6-0 cuffed shiley in place, cuff deflated. Stoma intact with no peristomal breakdown, trach ties appropriately snug.  Respiratory: Breathing non-labored on HTD    Discharge Medications:     Medication List        ASK your doctor about these medications      docusate 50 MG/5ML liquid  Commonly known as: COLACE  100 mg, Oral, 2 TIMES DAILY     fluconazole 150 MG tablet  Commonly known as: DIFLUCAN     ipratropium - albuterol 0.5 mg/2.5 mg/3 mL 0.5-2.5 (3) MG/3ML neb solution  Commonly known as: DUONEB     ketoconazole 2 % external shampoo  Commonly known as: NIZORAL    "  levofloxacin 750 MG tablet  Commonly known as: LEVAQUIN     montelukast 10 MG tablet  Commonly known as: SINGULAIR     oxyCODONE 5 MG tablet  Commonly known as: ROXICODONE  5 mg, Oral, EVERY 4 HOURS PRN     polyethylene glycol 17 GM/Dose powder  Commonly known as: MIRALAX  17 g, Oral, DAILY     sodium chloride 0.9% (bottle) 0.9 % irrigation  30 mLs, Irrigation, 3 TIMES DAILY, Use for routine tracheostomy care and cleaning     sodium chloride 3 % neb solution  Commonly known as: NEBUSAL  Use 3 ml of saline solution 4 times daily as needed              Discharge Procedure Orders   Reason for your hospital stay   Order Comments: Monitor bleeding     Activity   Order Comments: Resume pre-hospitalization activity     Order Specific Question Answer Comments   Is discharge order? Yes      Adult [facility] Follow-up and recommended labs and tests   Order Comments: Follow-up with ENT as needed; continue with plans for palliative immunotherapy.     Please call the clinic with questions/concerns: [phone].    Otolaryngology/ENT Clinic:  [facility]  [address] [place] [address]     When to contact your care team   Order Comments: Please notify your doctor if you experience wound breakdown, sustained bleeding from the wound site, or increasing redness, swelling, and/or purulent malorodorous discharge from the wound site which may indicate infection. If you feel it is acute, or experience sudden changes in breathing, chest pain, or excessive sleepiness/somnolence please return to the emergency department or call [phone]. If you have questions or concerns during the day please call ENT clinic and [phone]. If at night you can call [facility] at [phone] and ask for the \"ENT resident on call\".     Discharge Instructions   Order Comments: There was no further bleeding noted during your hospitalization; either from the mouth or from your trach. " Given the concern for bleeding from your tracheostomy, we will discharge you home with a 6-0 CUFFED shiley. As we discussed int  hospital; the cuff can be deflated (flat balloon) at all times, we would recommend inflating (filling the balloon with air) during times where you are coughing up blood either from the trach or from your mouth. The inflated balloon will help protect your lungs from having any secretions or blood drop down into your lungs and cause a pneumonia. We reviewed how to attach an empty syringe to your balloon and inflate/deflate this prior to your departure.     Diet   Order Comments: Resume pre-hospitalization diet     Order Specific Question Answer Comments   Is discharge order? Yes        Dispo: To home in good condition. All of the patient's questions/concerns have been addressed at this time. They are comfortable and independent in all cares.     Reece Casey MD  Otolaryngology Head and Neck Surgery Resident, PGY-5  Please page on call Otolaryngology resident with questions.      Teaching statement:  I saw the patient at the time of discharge and agree with the plan. Patient scheduled for systemic therapy next week. No further bleeding. Discussed that likely will have benefit with treatment of tumor. Explained plan to discharge with cuffed trach in place, does not use speaking valve. Patient comfortable with discharge to home today.    Abbey Ospina MD    Department of Otolaryngology

## 2024-08-22 NOTE — PLAN OF CARE
Arrived from: [facility] ED to 6A at 2000  Belongings/meds: With patient; trach supplies, TF supplies, clothing.   2 RN Skin Assessment Completed by: Birgit GUAJARDO & Luna GASTON   Non-intact findings documented (yes/no/NA): Bruise to R arm, rash to bilateral ankles, trach  Trach: Removable or Disposable Inner Cannula (check trach plate): Disposable inner cannula    Status: Admitted 8/21 for bleeding from trach site. Hx of oropharnygeal cancer, trach dependent   Vitals: VSS on 21% HTD, bradycardic into 50s baseline  Neuros: Intact ex writes to communicate   IV: PIV SL   Resp/trach: LS clear, moderate amount oral + inline secretions. No suctioning needed. #6 shiley cuffed (cuff down). Patient managing  Diet: PEG, goal 2 cans TF TID  Bowel status: LBM PTA   : Voiding spont   Skin: See above   Pain: Denies   Activity: Up SBA/GB   Plan: Continue to monitor for bleeding     Observation goals:     - No active oropharyngeal bleeding: not noted since admission (met)   - Stable respiratory status: on 21% HTD (met)   - Return to baseline: not met, but improving

## 2024-08-22 NOTE — CONSULTS
Care Management Follow Up    Length of Stay (days): 0    Expected Discharge Date:  TBD     Concerns to be Addressed:     PEACE Document  Patient plan of care discussed at interdisciplinary rounds:  unknown    Anticipated Discharge Disposition:  TBD     Anticipated Discharge Services:    Anticipated Discharge DME:      Patient/family educated on Medicare website which has current facility and service quality ratings:  N/A  Education Provided on the Discharge Plan:  N/A  Patient/Family in Agreement with the Plan:  N/A    Referrals Placed by CM/SW:  Not at this time  Private pay costs discussed: insurance costs out of pocket expenses, co-pays and deductibles    Additional Information:    1905 Due to Hieu's request, MARC contacted pt's daughter Cindy Hughes ([phone]) to review Medicare Outpatient Observation Notice (MOON) and why pt was receiving it. After introducing self and the reason for the call, MARC explained the document. MARC then addressed questions and concerns, and Cindy verbally expressed understanding of the document at 1908.     MARC offered to email blank copy of PEACE to Cindy for Hieu's records.  Cindy accepted the offer and provided email address ([email or web address]). Cindy requested that a copy also be left at bedside. MARC agreed      1913  MARC emailed blank copy of PEACE to Cindy (email: [email or web address]).    1908 MARC faxed PEACE to [facility] ([phone]) then placed PEACE in Hieu's chart.  SW/RNCC is no longer following. Please place a new consult should new needs arise.    DORIS Olivas, LGSW  ED/OBS   [facility]  Phone: [phone]  Pager: [phone]  Fax: [phone]     After hours Studio Kate and After Hours Vocera [employer]     On-call pager, [phone], 4:00 pm to midnight

## 2024-08-22 NOTE — PLAN OF CARE
Observation goals:    - No active oropharyngeal bleeding: not noted since admission (met)   - Stable respiratory status: improving (not met)   - Return to baseline: not met

## 2024-08-23 ENCOUNTER — PATIENT OUTREACH (OUTPATIENT)
Dept: CARE COORDINATION | Facility: CLINIC | Age: 84
End: 2024-08-23
Payer: COMMERCIAL

## 2024-08-23 NOTE — PROGRESS NOTES
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 Nano shares that she is staying with her patient at this time for marv to help out other family members who have been caring for patient. They are looking into a communication device that would help patient be able to talk on the phone since he is not able to with trach. Writer offers to send any resources I may have. Nano thanks writer for call  How are your symptoms? (Red Flag symptoms escalate to triage hotline per guidelines): Improved  Do you know how to contact your clinic care team if you have future questions or changes to your health status? : Yes  Does the patient have their discharge instructions? : Yes  Does the patient have questions regarding their discharge instructions? : No  Were you started on any new medications or were there changes to any of your previous medications? : Yes  Does the patient have all of their medications?: Yes  Do you have questions regarding any of your medications? : No  Do you have all of your needed medical supplies or equipment (DME)?  (i.e. oxygen tank, CPAP, cane, etc.): Yes    Post-op ([facility] CTA Only)  If the patient had a surgery or procedure, do they have any questions for a nurse?: No           Follow up Plan     Discharge Follow-Up  Discharge follow up  appointment scheduled in alignment with recommended follow up timeframe or Transitions of Risk Category? (Low = within 30 days; Moderate= within 14 days; High= within 7 days): Yes  Discharge Follow Up Appointment Date: 09/04/24  Discharge Follow Up Appointment Scheduled with?: Specialty Care Provider    Future Appointments   Date Time Provider Department Center   8/27/2024  1:00 PM [facility]   8/27/2024  1:30 PM [place] CANCER INFUSION NURSE [facility]   9/4/2024  2:00 PM Domonique Hutson CNP [facility]   9/17/2024  9:00 AM Benjamin Tripathi APRN CNP [facility]   9/17/2024 11:00 AM [facility]   9/17/2024 11:30 AM [place] CANCER INFUSION NURSE [facility]   10/8/2024 10:40 AM [facility]   10/8/2024 11:15 AM Noris Lovelace, NP [facility]   10/8/2024 12:00 PM [place] CANCER INFUSION NURSE [facility]   10/29/2024 12:20 PM [facility]1 [facility]   10/30/2024  3:00 PM Ellie Bhatia MD [facility]       Outpatient Plan as outlined on AVS reviewed with patient.    For any urgent concerns, please contact our 24 hour nurse triage line: [phone] ([phone])       FILIPPO Solorio

## 2024-08-26 ENCOUNTER — MYC MEDICAL ADVICE (OUTPATIENT)
Dept: ONCOLOGY | Facility: CLINIC | Age: 84
End: 2024-08-26
Payer: COMMERCIAL

## 2024-08-27 ENCOUNTER — INFUSION THERAPY VISIT (OUTPATIENT)
Dept: INFUSION THERAPY | Facility: CLINIC | Age: 84
End: 2024-08-27
Attending: INTERNAL MEDICINE
Payer: COMMERCIAL

## 2024-08-27 ENCOUNTER — APPOINTMENT (OUTPATIENT)
Dept: LAB | Facility: CLINIC | Age: 84
End: 2024-08-27
Payer: COMMERCIAL

## 2024-08-27 VITALS
HEART RATE: 61 BPM | BODY MASS INDEX: 25.9 KG/M2 | RESPIRATION RATE: 18 BRPM | OXYGEN SATURATION: 94 % | SYSTOLIC BLOOD PRESSURE: 132 MMHG | DIASTOLIC BLOOD PRESSURE: 46 MMHG | TEMPERATURE: 98.4 F | WEIGHT: 175.4 LBS

## 2024-08-27 DIAGNOSIS — C01 CANCER OF BASE OF TONGUE (H): ICD-10-CM

## 2024-08-27 DIAGNOSIS — C01 SQUAMOUS CELL CARCINOMA OF BASE OF TONGUE (H): Primary | ICD-10-CM

## 2024-08-27 LAB
ALBUMIN SERPL BCG-MCNC: 3.5 G/DL (ref 3.5–5.2)
ALP SERPL-CCNC: 122 U/L (ref 40–150)
ALT SERPL W P-5'-P-CCNC: 27 U/L (ref 0–70)
ANION GAP SERPL CALCULATED.3IONS-SCNC: 8 MMOL/L (ref 7–15)
AST SERPL W P-5'-P-CCNC: 25 U/L (ref 0–45)
BASOPHILS # BLD AUTO: 0 10E3/UL (ref 0–0.2)
BASOPHILS NFR BLD AUTO: 0 %
BILIRUB SERPL-MCNC: 0.3 MG/DL
BUN SERPL-MCNC: 22.3 MG/DL (ref 8–23)
CALCIUM SERPL-MCNC: 9.1 MG/DL (ref 8.8–10.4)
CHLORIDE SERPL-SCNC: 101 MMOL/L (ref 98–107)
CREAT SERPL-MCNC: 0.75 MG/DL (ref 0.67–1.17)
EGFRCR SERPLBLD CKD-EPI 2021: 90 ML/MIN/1.73M2
EOSINOPHIL # BLD AUTO: 0.1 10E3/UL (ref 0–0.7)
EOSINOPHIL NFR BLD AUTO: 2 %
ERYTHROCYTE [DISTWIDTH] IN BLOOD BY AUTOMATED COUNT: 14.1 % (ref 10–15)
GLUCOSE SERPL-MCNC: 99 MG/DL (ref 70–99)
HCO3 SERPL-SCNC: 31 MMOL/L (ref 22–29)
HCT VFR BLD AUTO: 36.4 % (ref 40–53)
HGB BLD-MCNC: 11.5 G/DL (ref 13.3–17.7)
IMM GRANULOCYTES # BLD: 0 10E3/UL
IMM GRANULOCYTES NFR BLD: 0 %
LYMPHOCYTES # BLD AUTO: 0.5 10E3/UL (ref 0.8–5.3)
LYMPHOCYTES NFR BLD AUTO: 7 %
MCH RBC QN AUTO: 31.3 PG (ref 26.5–33)
MCHC RBC AUTO-ENTMCNC: 31.6 G/DL (ref 31.5–36.5)
MCV RBC AUTO: 99 FL (ref 78–100)
MONOCYTES # BLD AUTO: 0.6 10E3/UL (ref 0–1.3)
MONOCYTES NFR BLD AUTO: 8 %
NEUTROPHILS # BLD AUTO: 6.1 10E3/UL (ref 1.6–8.3)
NEUTROPHILS NFR BLD AUTO: 83 %
NRBC # BLD AUTO: 0 10E3/UL
NRBC BLD AUTO-RTO: 0 /100
PLATELET # BLD AUTO: 191 10E3/UL (ref 150–450)
POTASSIUM SERPL-SCNC: 4.6 MMOL/L (ref 3.4–5.3)
PROT SERPL-MCNC: 7.6 G/DL (ref 6.4–8.3)
RBC # BLD AUTO: 3.67 10E6/UL (ref 4.4–5.9)
SODIUM SERPL-SCNC: 140 MMOL/L (ref 135–145)
TSH SERPL DL<=0.005 MIU/L-ACNC: 3.62 UIU/ML (ref 0.3–4.2)
WBC # BLD AUTO: 7.4 10E3/UL (ref 4–11)

## 2024-08-27 PROCEDURE — 250N000011 HC RX IP 250 OP 636: Performed by: INTERNAL MEDICINE

## 2024-08-27 PROCEDURE — 85004 AUTOMATED DIFF WBC COUNT: CPT | Performed by: INTERNAL MEDICINE

## 2024-08-27 PROCEDURE — 84443 ASSAY THYROID STIM HORMONE: CPT | Performed by: INTERNAL MEDICINE

## 2024-08-27 PROCEDURE — 258N000003 HC RX IP 258 OP 636: Performed by: INTERNAL MEDICINE

## 2024-08-27 PROCEDURE — 82040 ASSAY OF SERUM ALBUMIN: CPT | Performed by: INTERNAL MEDICINE

## 2024-08-27 PROCEDURE — 36415 COLL VENOUS BLD VENIPUNCTURE: CPT | Performed by: INTERNAL MEDICINE

## 2024-08-27 PROCEDURE — 96413 CHEMO IV INFUSION 1 HR: CPT

## 2024-08-27 RX ADMIN — SODIUM CHLORIDE 250 ML: 9 INJECTION, SOLUTION INTRAVENOUS at 14:22

## 2024-08-27 RX ADMIN — SODIUM CHLORIDE 200 MG: 9 INJECTION, SOLUTION INTRAVENOUS at 14:21

## 2024-08-27 NOTE — PROGRESS NOTES
Infusion Nursing Note:  Hieu Hughes presents today for Keytruda C1D1.    Patient seen by provider today: No   present during visit today: Not Applicable.    Note: Patient's daughter, Cindy, at bedside helping Hieu communicate. He is [place], has a trach and limited ability to speak. He writes with pen/paper to communicate. Patient has decreased ability to open his mouth and prefers ear or forehead temperatures to be taken.  He does tube feedings and free water through his GTube. He had questions regarding fluid/food intake. Explained that he does not need to make any changes to his normal intake and okay to do feedings before treatment/labs. In the event, he feels dehydrated he can add more free water as he normally would.     Intravenous Access:  Peripheral IV placed.    Treatment Conditions:  Lab Results   Component Value Date     08/27/2024    POTASSIUM 4.6 08/27/2024    MAG 2.2 06/05/2021    CR 0.75 08/27/2024    MANPREET 9.1 08/27/2024    BILITOTAL 0.3 08/27/2024    ALBUMIN 3.5 08/27/2024    ALT 27 08/27/2024    AST 25 08/27/2024   Results reviewed, labs MET treatment parameters, ok to proceed with treatment.    Post Infusion Assessment:  Patient tolerated infusion without incident.  Blood return noted pre and post infusion.  Site patent and intact, free from redness, edema or discomfort.  No evidence of extravasations.  Access discontinued per protocol.     Discharge Plan:   Discharge instructions reviewed with: Patient.  Patient and/or family verbalized understanding of discharge instructions and all questions answered.  AVS to patient via [employer].  Patient will return 9/4/2024 fpr video visit with Domonique Hutson NP for next appointment.   Patient discharged in stable condition accompanied by: self and daughter.  Departure Mode: Ambulatory.    Kenia Hope, SAMUEL      Addendum:  1540: Patient and daughter came back to infusion after having left in stable condition. Patient states he was having some  SOB and his arm felt heavy. Upon arrival to clinic he was also lightheaded and seeing spots. This resolved after sitting in a wheelchair. VSS. O2 mid 90's. He mentioned he had been waiting to cough (maybe holding it in?). Daughter states he changed his cannula in the car. SOB resolved. Patient using arms and writing, says arm some what improved.Patient did not have his usual 2 TF's prior to arrival.  It was determined some of his symptoms were likely related to no nutritional intake and limited water intake today. Reiterated to patient and daughter he can do his normal feeding schedule on infusion days.

## 2024-08-30 NOTE — TELEPHONE ENCOUNTER
Call to pt daughter Cindy, regarding fax request. Cindy stating PCP is within the system and will be able to see result as well as communication with Dr. Bhatia. Cindy stating no need to fax US. She reports they have scheduled follow up appt.

## 2024-09-01 ENCOUNTER — APPOINTMENT (OUTPATIENT)
Dept: CT IMAGING | Facility: CLINIC | Age: 84
DRG: 165 | End: 2024-09-01
Payer: COMMERCIAL

## 2024-09-01 ENCOUNTER — HOSPITAL ENCOUNTER (INPATIENT)
Facility: CLINIC | Age: 84
Setting detail: SURGERY ADMIT
End: 2024-09-01
Attending: OTOLARYNGOLOGY | Admitting: OTOLARYNGOLOGY
Payer: COMMERCIAL

## 2024-09-01 ENCOUNTER — ANESTHESIA (OUTPATIENT)
Dept: SURGERY | Facility: CLINIC | Age: 84
DRG: 165 | End: 2024-09-01
Payer: COMMERCIAL

## 2024-09-01 ENCOUNTER — ANESTHESIA EVENT (OUTPATIENT)
Dept: SURGERY | Facility: CLINIC | Age: 84
DRG: 165 | End: 2024-09-01
Payer: COMMERCIAL

## 2024-09-01 ENCOUNTER — HOSPITAL ENCOUNTER (INPATIENT)
Facility: CLINIC | Age: 84
LOS: 1 days | Discharge: HOME | DRG: 165 | End: 2024-09-02
Attending: EMERGENCY MEDICINE | Admitting: OTOLARYNGOLOGY
Payer: COMMERCIAL

## 2024-09-01 DIAGNOSIS — C02.9 SQUAMOUS CELL CANCER OF TONGUE (H): ICD-10-CM

## 2024-09-01 DIAGNOSIS — Z87.891 PERSONAL HISTORY OF TOBACCO USE, PRESENTING HAZARDS TO HEALTH: Primary | ICD-10-CM

## 2024-09-01 DIAGNOSIS — J95.01 TRACHEOSTOMY HEMORRHAGE (H): ICD-10-CM

## 2024-09-01 LAB
ABO/RH(D): NORMAL
ANTIBODY SCREEN: NEGATIVE
APTT PPP: 30 SECONDS (ref 22–38)
BASOPHILS # BLD AUTO: 0 10E3/UL (ref 0–0.2)
BASOPHILS NFR BLD AUTO: 0 %
CREAT BLD-MCNC: 0.7 MG/DL (ref 0.7–1.3)
EGFRCR SERPLBLD CKD-EPI 2021: >60 ML/MIN/1.73M2
EOSINOPHIL # BLD AUTO: 0.1 10E3/UL (ref 0–0.7)
EOSINOPHIL NFR BLD AUTO: 1 %
ERYTHROCYTE [DISTWIDTH] IN BLOOD BY AUTOMATED COUNT: 14.6 % (ref 10–15)
HCT VFR BLD AUTO: 34.8 % (ref 40–53)
HGB BLD-MCNC: 10.7 G/DL (ref 13.3–17.7)
HOLD SPECIMEN: NORMAL
IMM GRANULOCYTES # BLD: 0.1 10E3/UL
IMM GRANULOCYTES NFR BLD: 1 %
INR PPP: 1.07 (ref 0.85–1.15)
LYMPHOCYTES # BLD AUTO: 0.5 10E3/UL (ref 0.8–5.3)
LYMPHOCYTES NFR BLD AUTO: 6 %
MCH RBC QN AUTO: 30.6 PG (ref 26.5–33)
MCHC RBC AUTO-ENTMCNC: 30.7 G/DL (ref 31.5–36.5)
MCV RBC AUTO: 99 FL (ref 78–100)
MONOCYTES # BLD AUTO: 0.8 10E3/UL (ref 0–1.3)
MONOCYTES NFR BLD AUTO: 8 %
NEUTROPHILS # BLD AUTO: 7.6 10E3/UL (ref 1.6–8.3)
NEUTROPHILS NFR BLD AUTO: 84 %
NRBC # BLD AUTO: 0 10E3/UL
NRBC BLD AUTO-RTO: 0 /100
PLATELET # BLD AUTO: 213 10E3/UL (ref 150–450)
RBC # BLD AUTO: 3.5 10E6/UL (ref 4.4–5.9)
SPECIMEN EXPIRATION DATE: NORMAL
WBC # BLD AUTO: 9 10E3/UL (ref 4–11)

## 2024-09-01 PROCEDURE — 120N000002 HC R&B MED SURG/OB UMMC

## 2024-09-01 PROCEDURE — 85730 THROMBOPLASTIN TIME PARTIAL: CPT | Performed by: EMERGENCY MEDICINE

## 2024-09-01 PROCEDURE — 250N000009 HC RX 250: Performed by: EMERGENCY MEDICINE

## 2024-09-01 PROCEDURE — 86901 BLOOD TYPING SEROLOGIC RH(D): CPT | Performed by: EMERGENCY MEDICINE

## 2024-09-01 PROCEDURE — 250N000013 HC RX MED GY IP 250 OP 250 PS 637: Performed by: OTOLARYNGOLOGY

## 2024-09-01 PROCEDURE — 70496 CT ANGIOGRAPHY HEAD: CPT | Mod: 26 | Performed by: RADIOLOGY

## 2024-09-01 PROCEDURE — 86900 BLOOD TYPING SEROLOGIC ABO: CPT | Performed by: EMERGENCY MEDICINE

## 2024-09-01 PROCEDURE — G0378 HOSPITAL OBSERVATION PER HR: HCPCS

## 2024-09-01 PROCEDURE — 31505 DIAGNOSTIC LARYNGOSCOPY: CPT | Performed by: SURGERY

## 2024-09-01 PROCEDURE — 85025 COMPLETE CBC W/AUTO DIFF WBC: CPT | Performed by: EMERGENCY MEDICINE

## 2024-09-01 PROCEDURE — 250N000025 HC SEVOFLURANE, PER MIN: Performed by: OTOLARYNGOLOGY

## 2024-09-01 PROCEDURE — 99285 EMERGENCY DEPT VISIT HI MDM: CPT | Performed by: EMERGENCY MEDICINE

## 2024-09-01 PROCEDURE — 258N000003 HC RX IP 258 OP 636

## 2024-09-01 PROCEDURE — 370N000017 HC ANESTHESIA TECHNICAL FEE, PER MIN: Performed by: OTOLARYNGOLOGY

## 2024-09-01 PROCEDURE — 250N000009 HC RX 250: Performed by: SURGERY

## 2024-09-01 PROCEDURE — 70498 CT ANGIOGRAPHY NECK: CPT | Mod: 26 | Performed by: RADIOLOGY

## 2024-09-01 PROCEDURE — 85610 PROTHROMBIN TIME: CPT | Performed by: EMERGENCY MEDICINE

## 2024-09-01 PROCEDURE — 70496 CT ANGIOGRAPHY HEAD: CPT

## 2024-09-01 PROCEDURE — 99100 ANES PT EXTEME AGE<1 YR&>70: CPT

## 2024-09-01 PROCEDURE — 250N000011 HC RX IP 250 OP 636

## 2024-09-01 PROCEDURE — 94640 AIRWAY INHALATION TREATMENT: CPT | Mod: 76

## 2024-09-01 PROCEDURE — 999N000157 HC STATISTIC RCP TIME EA 10 MIN

## 2024-09-01 PROCEDURE — 82565 ASSAY OF CREATININE: CPT

## 2024-09-01 PROCEDURE — 31505 DIAGNOSTIC LARYNGOSCOPY: CPT

## 2024-09-01 PROCEDURE — 0CJY8ZZ INSPECTION OF MOUTH AND THROAT, VIA NATURAL OR ARTIFICIAL OPENING ENDOSCOPIC: ICD-10-PCS | Performed by: OTOLARYNGOLOGY

## 2024-09-01 PROCEDURE — 360N000076 HC SURGERY LEVEL 3, PER MIN: Performed by: OTOLARYNGOLOGY

## 2024-09-01 PROCEDURE — 42961 CONTROL THROAT BLEEDING: CPT | Mod: GC | Performed by: OTOLARYNGOLOGY

## 2024-09-01 PROCEDURE — 36415 COLL VENOUS BLD VENIPUNCTURE: CPT | Performed by: EMERGENCY MEDICINE

## 2024-09-01 PROCEDURE — 99100 ANES PT EXTEME AGE<1 YR&>70: CPT | Performed by: SURGERY

## 2024-09-01 PROCEDURE — 99140 ANES COMP EMERGENCY COND: CPT | Performed by: SURGERY

## 2024-09-01 PROCEDURE — 272N000001 HC OR GENERAL SUPPLY STERILE: Performed by: OTOLARYNGOLOGY

## 2024-09-01 PROCEDURE — 250N000009 HC RX 250

## 2024-09-01 PROCEDURE — 250N000011 HC RX IP 250 OP 636: Performed by: SURGERY

## 2024-09-01 PROCEDURE — 258N000003 HC RX IP 258 OP 636: Performed by: SURGERY

## 2024-09-01 PROCEDURE — 70450 CT HEAD/BRAIN W/O DYE: CPT

## 2024-09-01 PROCEDURE — 710N000010 HC RECOVERY PHASE 1, LEVEL 2, PER MIN: Performed by: OTOLARYNGOLOGY

## 2024-09-01 PROCEDURE — 0B21XFZ CHANGE TRACHEOSTOMY DEVICE IN TRACHEA, EXTERNAL APPROACH: ICD-10-PCS | Performed by: OTOLARYNGOLOGY

## 2024-09-01 PROCEDURE — 0W338ZZ CONTROL BLEEDING IN ORAL CAVITY AND THROAT, VIA NATURAL OR ARTIFICIAL OPENING ENDOSCOPIC: ICD-10-PCS | Performed by: OTOLARYNGOLOGY

## 2024-09-01 PROCEDURE — 999N000258 HC STATISTIC TRACH CHANGE

## 2024-09-01 PROCEDURE — 99140 ANES COMP EMERGENCY COND: CPT

## 2024-09-01 PROCEDURE — 250N000009 HC RX 250: Performed by: OTOLARYNGOLOGY

## 2024-09-01 PROCEDURE — 94640 AIRWAY INHALATION TREATMENT: CPT

## 2024-09-01 RX ORDER — ONDANSETRON 4 MG/1
4 TABLET, ORALLY DISINTEGRATING ORAL EVERY 6 HOURS PRN
Status: DISCONTINUED | OUTPATIENT
Start: 2024-09-01 | End: 2024-09-02 | Stop reason: HOSPADM

## 2024-09-01 RX ORDER — LIDOCAINE 40 MG/G
CREAM TOPICAL
Status: DISCONTINUED | OUTPATIENT
Start: 2024-09-01 | End: 2024-09-02 | Stop reason: HOSPADM

## 2024-09-01 RX ORDER — AMOXICILLIN 250 MG
2 CAPSULE ORAL 2 TIMES DAILY PRN
Status: DISCONTINUED | OUTPATIENT
Start: 2024-09-01 | End: 2024-09-01

## 2024-09-01 RX ORDER — IOPAMIDOL 755 MG/ML
67 INJECTION, SOLUTION INTRAVASCULAR ONCE
Status: COMPLETED | OUTPATIENT
Start: 2024-09-01 | End: 2024-09-01

## 2024-09-01 RX ORDER — AMOXICILLIN 250 MG
1 CAPSULE ORAL 2 TIMES DAILY
Status: DISCONTINUED | OUTPATIENT
Start: 2024-09-02 | End: 2024-09-02 | Stop reason: HOSPADM

## 2024-09-01 RX ORDER — ONDANSETRON 2 MG/ML
INJECTION INTRAMUSCULAR; INTRAVENOUS PRN
Status: DISCONTINUED | OUTPATIENT
Start: 2024-09-01 | End: 2024-09-01

## 2024-09-01 RX ORDER — SODIUM CHLORIDE FOR INHALATION 0.9 %
3 VIAL, NEBULIZER (ML) INHALATION
Status: DISCONTINUED | OUTPATIENT
Start: 2024-09-01 | End: 2024-09-02 | Stop reason: HOSPADM

## 2024-09-01 RX ORDER — DEXAMETHASONE SODIUM PHOSPHATE 4 MG/ML
INJECTION, SOLUTION INTRA-ARTICULAR; INTRALESIONAL; INTRAMUSCULAR; INTRAVENOUS; SOFT TISSUE PRN
Status: DISCONTINUED | OUTPATIENT
Start: 2024-09-01 | End: 2024-09-01

## 2024-09-01 RX ORDER — ACETAMINOPHEN 325 MG/1
975 TABLET ORAL ONCE
Status: DISCONTINUED | OUTPATIENT
Start: 2024-09-01 | End: 2024-09-01 | Stop reason: HOSPADM

## 2024-09-01 RX ORDER — ONDANSETRON 2 MG/ML
4 INJECTION INTRAMUSCULAR; INTRAVENOUS EVERY 6 HOURS PRN
Status: DISCONTINUED | OUTPATIENT
Start: 2024-09-01 | End: 2024-09-02 | Stop reason: HOSPADM

## 2024-09-01 RX ORDER — FENTANYL CITRATE 50 UG/ML
50 INJECTION, SOLUTION INTRAMUSCULAR; INTRAVENOUS EVERY 5 MIN PRN
Status: DISCONTINUED | OUTPATIENT
Start: 2024-09-01 | End: 2024-09-01 | Stop reason: HOSPADM

## 2024-09-01 RX ORDER — ONDANSETRON 2 MG/ML
4 INJECTION INTRAMUSCULAR; INTRAVENOUS EVERY 30 MIN PRN
Status: DISCONTINUED | OUTPATIENT
Start: 2024-09-01 | End: 2024-09-01 | Stop reason: HOSPADM

## 2024-09-01 RX ORDER — ALBUTEROL SULFATE 0.83 MG/ML
2.5 SOLUTION RESPIRATORY (INHALATION) EVERY 8 HOURS PRN
Status: DISCONTINUED | OUTPATIENT
Start: 2024-09-01 | End: 2024-09-01

## 2024-09-01 RX ORDER — ONDANSETRON 2 MG/ML
4 INJECTION INTRAMUSCULAR; INTRAVENOUS EVERY 6 HOURS PRN
Status: DISCONTINUED | OUTPATIENT
Start: 2024-09-01 | End: 2024-09-02

## 2024-09-01 RX ORDER — POLYETHYLENE GLYCOL 3350 17 G/17G
17 POWDER, FOR SOLUTION ORAL DAILY
Status: DISCONTINUED | OUTPATIENT
Start: 2024-09-02 | End: 2024-09-02 | Stop reason: HOSPADM

## 2024-09-01 RX ORDER — OXYMETAZOLINE HYDROCHLORIDE 0.05 G/100ML
2 SPRAY NASAL 2 TIMES DAILY
Status: DISCONTINUED | OUTPATIENT
Start: 2024-09-01 | End: 2024-09-01

## 2024-09-01 RX ORDER — OXYMETAZOLINE HYDROCHLORIDE 0.05 G/100ML
SPRAY NASAL PRN
Status: DISCONTINUED | OUTPATIENT
Start: 2024-09-01 | End: 2024-09-01 | Stop reason: HOSPADM

## 2024-09-01 RX ORDER — LEVOTHYROXINE SODIUM 100 UG/1
100 TABLET ORAL DAILY
Status: DISCONTINUED | OUTPATIENT
Start: 2024-09-01 | End: 2024-09-01

## 2024-09-01 RX ORDER — ONDANSETRON 4 MG/1
4 TABLET, ORALLY DISINTEGRATING ORAL EVERY 6 HOURS PRN
Status: DISCONTINUED | OUTPATIENT
Start: 2024-09-01 | End: 2024-09-02

## 2024-09-01 RX ORDER — PROPOFOL 10 MG/ML
INJECTION, EMULSION INTRAVENOUS PRN
Status: DISCONTINUED | OUTPATIENT
Start: 2024-09-01 | End: 2024-09-01

## 2024-09-01 RX ORDER — DEXTROSE MONOHYDRATE 100 MG/ML
INJECTION, SOLUTION INTRAVENOUS CONTINUOUS PRN
Status: DISCONTINUED | OUTPATIENT
Start: 2024-09-01 | End: 2024-09-02 | Stop reason: HOSPADM

## 2024-09-01 RX ORDER — ONDANSETRON 4 MG/1
4 TABLET, ORALLY DISINTEGRATING ORAL EVERY 30 MIN PRN
Status: DISCONTINUED | OUTPATIENT
Start: 2024-09-01 | End: 2024-09-01 | Stop reason: HOSPADM

## 2024-09-01 RX ORDER — FENTANYL CITRATE 50 UG/ML
25 INJECTION, SOLUTION INTRAMUSCULAR; INTRAVENOUS EVERY 5 MIN PRN
Status: DISCONTINUED | OUTPATIENT
Start: 2024-09-01 | End: 2024-09-01 | Stop reason: HOSPADM

## 2024-09-01 RX ORDER — SODIUM CHLORIDE, SODIUM LACTATE, POTASSIUM CHLORIDE, CALCIUM CHLORIDE 600; 310; 30; 20 MG/100ML; MG/100ML; MG/100ML; MG/100ML
INJECTION, SOLUTION INTRAVENOUS CONTINUOUS PRN
Status: DISCONTINUED | OUTPATIENT
Start: 2024-09-01 | End: 2024-09-01

## 2024-09-01 RX ORDER — LEVOTHYROXINE SODIUM 100 UG/1
100 TABLET ORAL DAILY
Status: DISCONTINUED | OUTPATIENT
Start: 2024-09-01 | End: 2024-09-02 | Stop reason: HOSPADM

## 2024-09-01 RX ORDER — ACETAMINOPHEN 325 MG/1
650 TABLET ORAL EVERY 4 HOURS PRN
Status: DISCONTINUED | OUTPATIENT
Start: 2024-09-01 | End: 2024-09-01

## 2024-09-01 RX ORDER — SODIUM CHLORIDE, SODIUM LACTATE, POTASSIUM CHLORIDE, CALCIUM CHLORIDE 600; 310; 30; 20 MG/100ML; MG/100ML; MG/100ML; MG/100ML
INJECTION, SOLUTION INTRAVENOUS CONTINUOUS
Status: DISCONTINUED | OUTPATIENT
Start: 2024-09-01 | End: 2024-09-02

## 2024-09-01 RX ORDER — OXYCODONE HYDROCHLORIDE 10 MG/1
10 TABLET ORAL EVERY 4 HOURS PRN
Status: DISCONTINUED | OUTPATIENT
Start: 2024-09-01 | End: 2024-09-02 | Stop reason: HOSPADM

## 2024-09-01 RX ORDER — ACETAMINOPHEN 325 MG/1
975 TABLET ORAL EVERY 8 HOURS
Status: DISCONTINUED | OUTPATIENT
Start: 2024-09-02 | End: 2024-09-02 | Stop reason: HOSPADM

## 2024-09-01 RX ORDER — AMOXICILLIN 250 MG
1 CAPSULE ORAL 2 TIMES DAILY PRN
Status: DISCONTINUED | OUTPATIENT
Start: 2024-09-01 | End: 2024-09-01

## 2024-09-01 RX ORDER — NALOXONE HYDROCHLORIDE 0.4 MG/ML
0.1 INJECTION, SOLUTION INTRAMUSCULAR; INTRAVENOUS; SUBCUTANEOUS
Status: DISCONTINUED | OUTPATIENT
Start: 2024-09-01 | End: 2024-09-01 | Stop reason: HOSPADM

## 2024-09-01 RX ORDER — OXYCODONE HYDROCHLORIDE 5 MG/1
5 TABLET ORAL EVERY 4 HOURS PRN
Status: DISCONTINUED | OUTPATIENT
Start: 2024-09-01 | End: 2024-09-02 | Stop reason: HOSPADM

## 2024-09-01 RX ORDER — PROCHLORPERAZINE MALEATE 5 MG
5 TABLET ORAL EVERY 6 HOURS PRN
Status: DISCONTINUED | OUTPATIENT
Start: 2024-09-01 | End: 2024-09-02 | Stop reason: HOSPADM

## 2024-09-01 RX ORDER — LIDOCAINE HYDROCHLORIDE 20 MG/ML
INJECTION, SOLUTION INFILTRATION; PERINEURAL PRN
Status: DISCONTINUED | OUTPATIENT
Start: 2024-09-01 | End: 2024-09-01

## 2024-09-01 RX ORDER — SODIUM CHLORIDE, SODIUM LACTATE, POTASSIUM CHLORIDE, CALCIUM CHLORIDE 600; 310; 30; 20 MG/100ML; MG/100ML; MG/100ML; MG/100ML
INJECTION, SOLUTION INTRAVENOUS CONTINUOUS
Status: DISCONTINUED | OUTPATIENT
Start: 2024-09-01 | End: 2024-09-01 | Stop reason: HOSPADM

## 2024-09-01 RX ORDER — BISACODYL 10 MG
10 SUPPOSITORY, RECTAL RECTAL DAILY PRN
Status: DISCONTINUED | OUTPATIENT
Start: 2024-09-04 | End: 2024-09-02 | Stop reason: HOSPADM

## 2024-09-01 RX ORDER — ACETAMINOPHEN 325 MG/1
650 TABLET ORAL EVERY 4 HOURS PRN
Status: DISCONTINUED | OUTPATIENT
Start: 2024-09-04 | End: 2024-09-01

## 2024-09-01 RX ORDER — FENTANYL CITRATE 50 UG/ML
INJECTION, SOLUTION INTRAMUSCULAR; INTRAVENOUS PRN
Status: DISCONTINUED | OUTPATIENT
Start: 2024-09-01 | End: 2024-09-01

## 2024-09-01 RX ADMIN — OXYMETAZOLINE HCL 2 SPRAY: 0.05 SPRAY NASAL at 07:32

## 2024-09-01 RX ADMIN — FENTANYL CITRATE 25 MCG: 50 INJECTION INTRAMUSCULAR; INTRAVENOUS at 18:22

## 2024-09-01 RX ADMIN — Medication 10 MG: at 18:39

## 2024-09-01 RX ADMIN — ONDANSETRON 4 MG: 2 INJECTION INTRAMUSCULAR; INTRAVENOUS at 18:55

## 2024-09-01 RX ADMIN — DEXAMETHASONE SODIUM PHOSPHATE 4 MG: 4 INJECTION, SOLUTION INTRA-ARTICULAR; INTRALESIONAL; INTRAMUSCULAR; INTRAVENOUS; SOFT TISSUE at 17:43

## 2024-09-01 RX ADMIN — LEVOTHYROXINE SODIUM 100 MCG: 100 TABLET ORAL at 07:53

## 2024-09-01 RX ADMIN — FENTANYL CITRATE 25 MCG: 50 INJECTION, SOLUTION INTRAMUSCULAR; INTRAVENOUS at 20:27

## 2024-09-01 RX ADMIN — SODIUM CHLORIDE, POTASSIUM CHLORIDE, SODIUM LACTATE AND CALCIUM CHLORIDE: 600; 310; 30; 20 INJECTION, SOLUTION INTRAVENOUS at 18:09

## 2024-09-01 RX ADMIN — SODIUM CHLORIDE, POTASSIUM CHLORIDE, SODIUM LACTATE AND CALCIUM CHLORIDE: 600; 310; 30; 20 INJECTION, SOLUTION INTRAVENOUS at 10:47

## 2024-09-01 RX ADMIN — IOPAMIDOL 67 ML: 755 INJECTION, SOLUTION INTRAVENOUS at 11:08

## 2024-09-01 RX ADMIN — TRANEXAMIC ACID 500 MG: 1 INJECTION, SOLUTION INTRAVENOUS at 14:55

## 2024-09-01 RX ADMIN — RACEPINEPHRINE HYDROCHLORIDE 0.5 ML: 11.25 SOLUTION RESPIRATORY (INHALATION) at 20:01

## 2024-09-01 RX ADMIN — TRANEXAMIC ACID 500 MG: 1 INJECTION, SOLUTION INTRAVENOUS at 09:54

## 2024-09-01 RX ADMIN — SODIUM CHLORIDE, POTASSIUM CHLORIDE, SODIUM LACTATE AND CALCIUM CHLORIDE: 600; 310; 30; 20 INJECTION, SOLUTION INTRAVENOUS at 17:15

## 2024-09-01 RX ADMIN — PHENYLEPHRINE HYDROCHLORIDE 50 MCG: 10 INJECTION INTRAVENOUS at 18:42

## 2024-09-01 RX ADMIN — PHENYLEPHRINE HYDROCHLORIDE 200 MCG: 10 INJECTION INTRAVENOUS at 17:30

## 2024-09-01 RX ADMIN — PROPOFOL 30 MG: 10 INJECTION, EMULSION INTRAVENOUS at 17:28

## 2024-09-01 RX ADMIN — Medication 200 MG: at 18:56

## 2024-09-01 RX ADMIN — FENTANYL CITRATE 25 MCG: 50 INJECTION INTRAMUSCULAR; INTRAVENOUS at 19:05

## 2024-09-01 RX ADMIN — TRANEXAMIC ACID 500 MG: 1 INJECTION, SOLUTION INTRAVENOUS at 05:38

## 2024-09-01 RX ADMIN — Medication 20 MG: at 17:29

## 2024-09-01 RX ADMIN — Medication 20 MG: at 18:00

## 2024-09-01 RX ADMIN — LIDOCAINE HYDROCHLORIDE 100 MG: 20 INJECTION, SOLUTION INFILTRATION; PERINEURAL at 17:28

## 2024-09-01 RX ADMIN — Medication 20 MG: at 17:38

## 2024-09-01 RX ADMIN — PHENYLEPHRINE HYDROCHLORIDE 100 MCG: 10 INJECTION INTRAVENOUS at 17:45

## 2024-09-01 RX ADMIN — FENTANYL CITRATE 50 MCG: 50 INJECTION INTRAMUSCULAR; INTRAVENOUS at 17:42

## 2024-09-01 ASSESSMENT — ACTIVITIES OF DAILY LIVING (ADL)
ADLS_ACUITY_SCORE: 39
ADLS_ACUITY_SCORE: 37
ADLS_ACUITY_SCORE: 39
ADLS_ACUITY_SCORE: 37
ADLS_ACUITY_SCORE: 39
ADLS_ACUITY_SCORE: 37
ADLS_ACUITY_SCORE: 37
ADLS_ACUITY_SCORE: 39
DEPENDENT_IADLS:: INDEPENDENT
ADLS_ACUITY_SCORE: 37

## 2024-09-01 ASSESSMENT — COLUMBIA-SUICIDE SEVERITY RATING SCALE - C-SSRS
2. HAVE YOU ACTUALLY HAD ANY THOUGHTS OF KILLING YOURSELF IN THE PAST MONTH?: NO
6. HAVE YOU EVER DONE ANYTHING, STARTED TO DO ANYTHING, OR PREPARED TO DO ANYTHING TO END YOUR LIFE?: NO
1. IN THE PAST MONTH, HAVE YOU WISHED YOU WERE DEAD OR WISHED YOU COULD GO TO SLEEP AND NOT WAKE UP?: NO

## 2024-09-01 ASSESSMENT — LIFESTYLE VARIABLES: TOBACCO_USE: 1
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
## 2024-09-01 NOTE — MEDICATION SCRIBE - ADMISSION MEDICATION HISTORY
Medication Scribe Admission Medication History    Admission medication history is complete. The information provided in this note is only as accurate as the sources available at the time of the update.    Information Source(s): Patient via in-person    Pertinent Information: Spoke with patient in person and completed medication hx. Difficult for patient to talk. Patient used paper and pen to communicate to writer.   Patient is no longer taking the following medications Acetaminophen 325 MG Tab, Albuterol 0.083% Neb Solution, Colace 50 MG/5ML Liquid, Fluconazole 150 MG Tab, Duoneb 0.5*2.5 3 MN/ 3ML Neb Solution, Levofloxacin 750 MG Tab, Montelukast 10 MG Tab, Isosource Liquid, Oxycodone 5 MG Tab, Miralax 17 g Powder and Nebusal 3% Neb Solution.     Changes made to PTA medication list:  Added: None  Deleted:  Acetaminophen 325 MG Tab            Albuterol 0.083% Neb Solution            Colace 50 MG/5ML Liquid            Fluconazole 150 MG Tab            Duoneb 0.5*2.5 3 MN/ 3ML Neb Solution            Levofloxacin 750 MG Tab              Montelukast 10 MG Tab            Isosource Liquid            Oxycodone 5 MG Tab            Miralax 17 g Powder             Nebusal 3% Neb Solution.   Changed: None    Allergies reviewed with patient and updates made in EHR: no    Medication History Completed By: Giovanna Westbrook 9/1/2024 11:56 AM    PTA Med List   Medication Sig Last Dose    ferrous sulfate (FEROSUL) 325 (65 Fe) MG tablet Take 1 tablet by mouth every 48 hours 9/1/2024    Levothyroxine Sodium (SYNTHROID PO) Take 100 mcg by mouth daily Crushes to take in water 9/1/2024    sodium chloride 0.9 % neb solution Take 3 mLs by nebulization every 3 hours as needed for wheezing Use for tracheostomy lavage every 2-4 hours as needed 9/1/2024    sodium chloride 0.9%, bottle, 0.9 % irrigation Irrigate with 30 mLs as directed 3 times daily Use for routine tracheostomy care and cleaning Past Week

## 2024-09-01 NOTE — ED TRIAGE NOTES
Triage Assessment (Adult)       Row Name 09/01/24 0244          Triage Assessment    Airway WDL X        Respiratory WDL    Respiratory WDL X        Skin Circulation/Temperature WDL    Skin Circulation/Temperature WDL WDL        Cardiac WDL    Cardiac WDL WDL        Peripheral/Neurovascular WDL    Peripheral Neurovascular WDL WDL        Cognitive/Neuro/Behavioral WDL    Cognitive/Neuro/Behavioral WDL WDL

## 2024-09-01 NOTE — PHARMACY-CONSULT NOTE
Pharmacy Tube Feeding Consult    Medication reviewed for administration by feeding tube and for potential food/drug interactions.    Recommendation: No changes are needed at this time.     Pharmacy will continue to follow as new medications are ordered.     Omar Palomino, CarinaD

## 2024-09-01 NOTE — ED TRIAGE NOTES
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   Received:  -500mg TXA neb  -100mg atomized TXA     PIV L AC  VSS     Triage Assessment (Adult)       Row Name 09/01/24 0244          Triage Assessment    Airway WDL X        Respiratory WDL    Respiratory WDL X        Skin Circulation/Temperature WDL    Skin Circulation/Temperature WDL WDL        Cardiac WDL    Cardiac WDL WDL        Peripheral/Neurovascular WDL    Peripheral Neurovascular WDL WDL        Cognitive/Neuro/Behavioral WDL    Cognitive/Neuro/Behavioral WDL WDL

## 2024-09-01 NOTE — ANESTHESIA PROCEDURE NOTES
Airway       Patient location during procedure: OR  Staff -        Anesthesiologist:  Darrell Love MD       CRNA: Ravinder Flores APRN CRNA       Performed By: anesthesiologist  Consent for Airway        Urgency: elective  Indications and Patient Condition       Indications for airway management: jesse-procedural         Mask difficulty assessment: 0 - not attempted    Final Airway Details       Final airway type: endotracheal airway (6.0 shiley trachestomy exchanged for 7.0 armored ETT.)       Successful airway: Reinforced tube  Endotracheal Airway Details        ETT size (mm): 7.0       Cuffed: yes    Post intubation assessment        Placement verified by: capnometry        Number of attempts at approach: 1       Ease of procedure: easy
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
## 2024-09-01 NOTE — PLAN OF CARE
/57 (BP Location: Left arm)   Pulse 62   Temp 98.1  F (36.7  C) (Oral)   Resp 20   Wt 79.4 kg (175 lb)   SpO2 99%   BMI 25.84 kg/m      Patient is alert and oriented x 4. VSS on 3 L. Using TXA nebulizer. Hgb 10.7 today. L PIV infusing LR @100 mL/hr. Strict NPO. [place] tube clamped. Communicates by writing. Continue with plan of care

## 2024-09-01 NOTE — PROGRESS NOTES
RT called to the bedside to assess pt. His trach cuff is inflated to avoid any blood from entering his lungs. RT suctioned with a 14F with no issues. Very minimal secretions were suctioned up. Pt has been using a yaunker to suction his mouth. BS are clear and satting 93% on RA. Pt does not endorse SOB at this time. Trach safety supplies at the bedside with an extra 6.0 Shiley cuffed trach and RN ordering 4.0 Shiley cuffed trach.     Ronaldo Gallagher, RT on 9/1/2024 at 3:25 AM

## 2024-09-01 NOTE — CONSULTS
Care Management Initial Consult    General Information  Assessment completed with: Patient, Family, -chart review, Franca White  Type of CM/SW Visit: Initial Assessment    Primary Care Provider verified and updated as needed: Yes (Juan M Makenna BANDAR [phone] [address] [facility] [address]   Readmission within the last 30 days:        Reason for Consult: health care directive  Advance Care Planning: Advance Care Planning Reviewed: other (see comments) (Reports having an HCD on file at the hospital in [facility], Copy requested)        Communication Assessment  Patient's communication style: spoken language (English or Bilingual)  Pt has a trach and is unable to communicate verbally and uses a note pad for communication      Cognitive  Cognitive/Neuro/Behavioral: WDL  Level of Consciousness: alert  Arousal Level: opens eyes spontaneously     Mood/Behavior: calm, cooperative     Speech: trached    Living Environment:   People in home: alone     Current living Arrangements: house      Able to return to prior arrangements: yes  Living Arrangement Comments: Residence a 2 level home. Bedrooms and bath are on the main level    Family/Social Support:  Care provided by: self  Provides care for: no one  Marital Status:   Support system: Children          Description of Support System: Supportive, Involved    Support Assessment: Adequate family and caregiver support    Current Resources:   Patient receiving home care services: No      Community Resources: Home Infusion  Equipment currently used at home: other (see comments) (jovita kenney)  Supplies currently used at home: Oxygen Tubing/Supplies, Enteral Nutrition & Supplies (Enteral support through [facility] [phone])    Employment/Financial:  Employment Status: retired     Financial Concerns: none   Referral to Financial Worker: No     Does the patient's insurance plan have a 3 day qualifying hospital stay waiver?  Yes     Which insurance plan 3 day  waiver is available? Alternative insurance waiver    Will the waiver be used for post-acute placement? No    Lifestyle & Psychosocial Needs:  Social Determinants of Health     Food Insecurity: Low Risk  (8/22/2024)    Food Insecurity     Within the past 12 months, did you worry that your food would run out before you got money to buy more?: No     Within the past 12 months, did the food you bought just not last and you didn t have money to get more?: No   Depression: Not at risk (7/18/2024)    Received from [employer]    PHQ-2     PHQ-2 Score: 0   Housing Stability: Low Risk  (8/22/2024)    Housing Stability     Do you have housing? : Yes     Are you worried about losing your housing?: No   Tobacco Use: Medium Risk (8/30/2024)    Received from [employer]    Patient History     Smoking Tobacco Use: Former     Smokeless Tobacco Use: Never     Passive Exposure: Never   Financial Resource Strain: Low Risk  (8/22/2024)    Financial Resource Strain     Within the past 12 months, have you or your family members you live with been unable to get utilities (heat, electricity) when it was really needed?: No   Alcohol Use: Not on file   Transportation Needs: Low Risk  (8/22/2024)    Transportation Needs     Within the past 12 months, has lack of transportation kept you from medical appointments, getting your medicines, non-medical meetings or appointments, work, or from getting things that you need?: No   Physical Activity: Not on file   Interpersonal Safety: Unknown (8/31/2024)    Received from [employer]    Humiliation, Afraid, Rape, and Kick questionnaire     Fear of Current or Ex-Partner: Not on file     Emotionally Abused: No     Physically Abused: No     Sexually Abused: No   Stress: Not on file   Social Connections: Unknown (5/4/2023)    Received from [employer] Pending [facility] [employer] [facility]    Social Connections     Frequency of Communication with Friends and  "Family: Not on file   Health Literacy: Not on file     Functional Status:  Prior to admission patient needed assistance:   Dependent ADLs:: Independent  Dependent IADLs:: Independent (Daughter Elayne assists with insurance issues and orders pt's enteral supplies)  Assesssment of Functional Status: At functional baseline    Mental Health Status:  Mental Health Status: No Current Concerns       Chemical Dependency Status:  Chemical Dependency Status: No Current Concerns           Values/Beliefs:  Spiritual, Cultural Beliefs, Spiritism Practices, Values that affect care: no               Discussed  Partnership in Safe Discharge Planning  document with patient/family: Not at this time    Additional Information:    Per chart review: \"Hieu Hughes is a 84 year old male with a past medical history of right oropharyngeal cancer s/p chemoradiation in 2009 now with recurrent oropharyngeal cancer proven on biopsy on 7/22 presenting as a transfer from [facility] for oropharyngeal and tracheostomy bleed.\" (Sonam Caceres MD; 9/1/2024)     Care Management/Social Work Consult placed due to patient's complex medical history and for ACP discussion. SW performed chart review to begin assessment.     1000 MARC spoke with ENT MD Caceres who reported that pt's most recent HCD was from 2019 and that MD had suggested reviewing and possibly updating it to reflect pt's current medical issues. SW agreed to address the issue with pt    1155 Due to Hieu's Observation status, SW met with him at bedside to introduce self, explain SW role, review the Medicare Outpatient Observation Notice (MOON), why pt was receiving it, and to discuss ACP documents.    Pt called his daughter Cindy Hughes ([phone]) in order for her to participate in the conversation    SW provided unsigned MOON document, explained it, then addressed questions and concerns. Cindy voiced understanding.     SW explained that MARC had spoken with Dr Caceres earlier " and MD had reported that pt had an HCD from 2019 that might need updating. Cindy explained that pt's HCD is on file at the hospital in [facility];  that the one she has still lists pt's spouse (who has since passed away) as HCA. Pt indicated via notepad that he had discussed this with his doctor on 8/30 and that he updated the document. (Per chart review, the document has not yet been scanned into his EHR). Cindy voiced the opinion that it would be a good idea for pt to complete a new one in the event that the one on file in [place] is not able to be shared in a timely fashion. Pt indicated that  he understood. MARC provided work email should Cindy be able to send a copy the version she has for pt to review. At this point Cindy ended the call.    1201 Hieu signed [facility] document acknowledging its receipt.  MARC offered to make copy of signed document for pt records. Pt accepted offer.      1203 MARC provided copy of signed MOON to pt, faxed original to [facility] ([phone]) and placed it in Hieu's chart. MARC asked if it was okay to call Cindy to complete the assessment and he nodded in agreement.    1312 MARC contacted Cindy to complete the assessment. Cindy reported that her [place] Her brother and his wife are very involved in his care, though they live about 30 minutes from him. He is independent in his I/ADLs. Because he is unable to speak and is somewhat hard of hearing, Cindy assists him with tasks that require verbal communication, such as phone calls, insurance issues, etc. She also orders his enteral supplies for him on-line as he does not have a computer and it is the most efficient way to order them.    Cindy reported that she contacted [facility] and they have the same version of Hieu's HCD as she has. MARC asked if she could have them email it to either her to forward to MARC, or directly to MARC. Cindy agreed. MARC agreed to update her when HCD was received  and again once it had been uploaded into his chart. No additional questions or concerns were reported. SW provided availability and contact information and the call was ended    Next Steps:   Forward HCD to [employer] when received.  Confirm discharge transportation    SW will continue to follow as needed.    DORIS Olivas, [facility]  ED/OBS   [facility]  Phone: [phone]  Pager: [phone]  Fax: [phone]     After hours [employer] and After Hours [employer] [place]     On-call pager, [phone], 4:00 pm to midnight

## 2024-09-01 NOTE — ED NOTES
Bed: ED19  Expected date:   Expected time:   Means of arrival:   Comments:  Transfer From [facility] behind trach began bleeding again at 2100 as it did a week and a half ago  1 txa 500mg neb  1 txa 100mg atomized to back of throat  Still bleeding slowed a bit  HGB 11.1  18LAC

## 2024-09-02 VITALS
OXYGEN SATURATION: 95 % | SYSTOLIC BLOOD PRESSURE: 100 MMHG | WEIGHT: 168.87 LBS | DIASTOLIC BLOOD PRESSURE: 60 MMHG | BODY MASS INDEX: 24.18 KG/M2 | HEIGHT: 70 IN | HEART RATE: 91 BPM | RESPIRATION RATE: 16 BRPM | TEMPERATURE: 98.4 F

## 2024-09-02 LAB
ERYTHROCYTE [DISTWIDTH] IN BLOOD BY AUTOMATED COUNT: 14.3 % (ref 10–15)
GLUCOSE BLDC GLUCOMTR-MCNC: 116 MG/DL (ref 70–99)
HCT VFR BLD AUTO: 35.7 % (ref 40–53)
HGB BLD-MCNC: 11 G/DL (ref 13.3–17.7)
MCH RBC QN AUTO: 30.5 PG (ref 26.5–33)
MCHC RBC AUTO-ENTMCNC: 30.8 G/DL (ref 31.5–36.5)
MCV RBC AUTO: 99 FL (ref 78–100)
PLATELET # BLD AUTO: 214 10E3/UL (ref 150–450)
RBC # BLD AUTO: 3.61 10E6/UL (ref 4.4–5.9)
WBC # BLD AUTO: 11.1 10E3/UL (ref 4–11)

## 2024-09-02 PROCEDURE — 250N000013 HC RX MED GY IP 250 OP 250 PS 637: Performed by: OTOLARYNGOLOGY

## 2024-09-02 PROCEDURE — 36415 COLL VENOUS BLD VENIPUNCTURE: CPT

## 2024-09-02 PROCEDURE — 999N000157 HC STATISTIC RCP TIME EA 10 MIN

## 2024-09-02 PROCEDURE — 999N000215 HC STATISTIC HFNC ADULT NON-CPAP

## 2024-09-02 PROCEDURE — 85014 HEMATOCRIT: CPT

## 2024-09-02 RX ORDER — OXYCODONE HCL 5 MG/5 ML
5 SOLUTION, ORAL ORAL
Status: DISCONTINUED | OUTPATIENT
Start: 2024-09-02 | End: 2024-09-02

## 2024-09-02 RX ORDER — ACETAMINOPHEN 325 MG/1
975 TABLET ORAL ONCE
Status: DISCONTINUED | OUTPATIENT
Start: 2024-09-02 | End: 2024-09-02

## 2024-09-02 RX ORDER — NALOXONE HYDROCHLORIDE 0.4 MG/ML
0.2 INJECTION, SOLUTION INTRAMUSCULAR; INTRAVENOUS; SUBCUTANEOUS
Status: DISCONTINUED | OUTPATIENT
Start: 2024-09-02 | End: 2024-09-02 | Stop reason: HOSPADM

## 2024-09-02 RX ORDER — NALOXONE HYDROCHLORIDE 0.4 MG/ML
0.1 INJECTION, SOLUTION INTRAMUSCULAR; INTRAVENOUS; SUBCUTANEOUS
Status: DISCONTINUED | OUTPATIENT
Start: 2024-09-02 | End: 2024-09-02

## 2024-09-02 RX ORDER — ONDANSETRON 2 MG/ML
4 INJECTION INTRAMUSCULAR; INTRAVENOUS EVERY 30 MIN PRN
Status: DISCONTINUED | OUTPATIENT
Start: 2024-09-02 | End: 2024-09-02

## 2024-09-02 RX ORDER — ONDANSETRON 4 MG/1
4 TABLET, ORALLY DISINTEGRATING ORAL EVERY 30 MIN PRN
Status: DISCONTINUED | OUTPATIENT
Start: 2024-09-02 | End: 2024-09-02

## 2024-09-02 RX ORDER — NALOXONE HYDROCHLORIDE 0.4 MG/ML
0.4 INJECTION, SOLUTION INTRAMUSCULAR; INTRAVENOUS; SUBCUTANEOUS
Status: DISCONTINUED | OUTPATIENT
Start: 2024-09-02 | End: 2024-09-02 | Stop reason: HOSPADM

## 2024-09-02 RX ORDER — OXYCODONE HCL 5 MG/5 ML
2.5 SOLUTION, ORAL ORAL
Status: DISCONTINUED | OUTPATIENT
Start: 2024-09-02 | End: 2024-09-02

## 2024-09-02 RX ADMIN — LEVOTHYROXINE SODIUM 100 MCG: 100 TABLET ORAL at 08:41

## 2024-09-02 ASSESSMENT — ACTIVITIES OF DAILY LIVING (ADL)
ADLS_ACUITY_SCORE: 43
ADLS_ACUITY_SCORE: 28
ADLS_ACUITY_SCORE: 43
ADLS_ACUITY_SCORE: 39
ADLS_ACUITY_SCORE: 43
ADLS_ACUITY_SCORE: 43
ADLS_ACUITY_SCORE: 28
ADLS_ACUITY_SCORE: 43

## 2024-09-02 NOTE — DISCHARGE SUMMARY
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  It was recommended to undergo aforementioned procedure. All risks and benefits were discussed with the consenting party and they elected to proceed with the procedure.     Hospital Course: The patient was admitted to the hospital and underwent the above mentioned procedure. He tolerated the procedure without any intra- or jesse-operative complications. Please see the operative report for full details of the procedure. The patient was admitted for post-operative monitoring. His postoperative course was uneventful. Patient remained hemostatic throughout his hospital stay following surgery. Hemoglobin recheck morning of discharge was stable. He was weaned to 21% FiO2 via HTD without issue and had no difficulty breathing. He resumed his TFs prior to discharge. At discharge, there was no further bleeding, the patient's pain was well controlled, the patient was voiding on his own, and he was ambulating and tolerating his tube feeds.Thus, patient was deemed safe for discharge home on 9/2/24.     Discharge Exam:  Vitals:    09/02/24 0633 09/02/24 0719 09/02/24 1009 09/02/24 1157   BP: 123/70 100/60     BP Location: Right arm Right arm     Pulse:  91     Resp: 18 16     Temp: 98  F (36.7  C) 98.4  F (36.9  C)     TempSrc: Oral Oral     SpO2: 98% 95% 100% 95%   Weight:       Height:             General: NAD, resting comfortably in bed                HEENT: Oral cavity and oropharynx clear without clot or active bleeding. Secretions clear. 6-0 cuffed Shiley in place with cuff inflated. Thick trach secretions clear, no active bleeding or clot.                Pulmonary: Breathing non-labored on 21% HTD     Discharge Medications:     Medication List      There are no discharge medications for this visit.         Discharge Procedure Orders   Home Infusion Referral   Referral Priority: Routine: Next available opening Referral Type: Consultation   Number of Visits Requested: 1     Activity   Order Comments: Your activity upon discharge: activity as tolerated     Order Specific Question Answer Comments   Is discharge order? Yes      Follow Up ([facility])   Order Comments: You have a follow up appointment in the medical oncology clinic on 9/4. Please plan to keep this appointment     Reason for your hospital stay   Order Comments: You were in the hospital for control of bleeding from your tumor. You spent the night in the hospital to ensure no further bleeding following surgery.    - It is possible that you will experience more bleeding from your mouth and from your trach due to the tumor in your throat   - If you experience more bleeding, inflate your trach cuff immediately and seek medical care     Diet   Order Comments: Follow this diet upon discharge: Adult Formula Bolus Feeding: Jevity 1.5; Route: Gastrostomy; 3 times daily; Volume per Bolus: 2; Can(s)/ Carton(s); Additional free water (mL): 50 before/after feeds, 30 before/after meds  Free water - Feeding Tube Flush Frequency: Before & after each cyclic feedingAdditional free water (mL): Give 50 mL of water before and after each feeding and 30 mL of water before and after medications     Order Specific Question Answer Comments   Is discharge order? Yes        Dispo: To home in stable condition. All of the patient's questions/concerns have been addressed at this time.     Sonam Caceres MD  Otolaryngology-Head &  Neck Surgery, PGY3  Please contact ENT with questions by dialing [phone] and entering job code [phone] when prompted.

## 2024-09-02 NOTE — ANESTHESIA POSTPROCEDURE EVALUATION
Patient: Hieu Hughes    Procedure: Procedure(s):  Direct Laryngoscopy, control of oral hemorrhage       Anesthesia Type:  General    Note:  Disposition: Inpatient   Postop Pain Control: Uneventful            Sign Out: Well controlled pain   PONV: No   Neuro/Psych: Uneventful            Sign Out: Acceptable/Baseline neuro status   Airway/Respiratory: Uneventful            Sign Out: AIRWAY IN SITU/Resp. Support (preexisting tracheostomy)               Airway in situ/Resp. Support: Tracheostomy                 Reason: Planned Pre-op   CV/Hemodynamics: Uneventful            Sign Out: Acceptable CV status; No obvious hypovolemia; No obvious fluid overload   Other NRE: NONE   DID A NON-ROUTINE EVENT OCCUR? No           Last vitals:  Vitals Value Taken Time   /80 09/01/24 1930   Temp 36.9  C (98.4  F) 09/01/24 1911   Pulse 73 09/01/24 1936   Resp 21 09/01/24 1936   SpO2 95 % 09/01/24 1936   Vitals shown include unfiled device data.    Electronically Signed By: Darrell Love MD  September 1, 2024  7:37 PM

## 2024-09-02 NOTE — PLAN OF CARE
Goal Outcome Evaluation:      Plan of Care Reviewed With: patient, family (daughter Cindy Hughes)    Overall Patient Progress: no changeOverall Patient Progress: no change    Outcome Evaluation: discharge home when medically stable    SW will continue to follow as needed.    DORIS Olivas, [facility]  ED/OBS   [facility]  Phone: [phone]  Pager: [phone]  Fax: [phone]     After hours [employer] and After Hours Vocera [place]     On-call pager, [phone], 4:00 pm to midnight

## 2024-09-02 NOTE — PLAN OF CARE
Discharge time/date: 9/2 1540  Walked or Wheelchair: Wheelchair   PIV removed: Yes  Reviewed AVS with patient: Yes  Medication due times added to AVS in EPIC: Yes  Verbalized understanding of discharge with teachback: Yes  Medications retrieved from pharmacy: No new medications in place   Supplies sent home: Yes, extra trach supplies sent   Belongings from security with patient: NA

## 2024-09-02 NOTE — PROGRESS NOTES
Care Management Follow Up    Length of Stay (days): 1    Expected Discharge Date: 9/2/2024     Concerns to be Addressed: all concerns addressed in this encounter     Patient plan of care discussed at interdisciplinary rounds: Yes    Anticipated Discharge Disposition: Home, Home Infusion              Anticipated Discharge Services: None  Anticipated Discharge DME: None (no new DME)    Patient/family educated on Medicare website which has current facility and service quality ratings: no  Education Provided on the Discharge Plan:    Patient/Family in Agreement with the Plan: yes    Referrals Placed by CM/SW:  none new, RNCC placed resumption of TF orders  Private pay costs discussed: Not applicable    Discussed  Partnership in Safe Discharge Planning  document with patient/family: No     Handoff Completed: Yes, non-[facility] PCP: External handoff communication completed    Additional Information:  RNCC discussed discharge plan with charge nurse, then with ENT resident. Pt to discharge home today, resumption of prior TF with [employer] and trach equipment/supplies. RNCC has placed TF orders for resumption, will fax to [facility]. RNCC visiting with patient to ensure has all necessary equipment/supplies needed at this time. RNCC to cease following after discharge.     RNCC visited pt, pt has all equipment, no changes, TF pt orders 30 days at a time through [facility] pt uses for trach supplies/equipment. RNCC went over IMM form with patient, received signature and faxed form to [facility] ([phone]).     [facility]-Trach Supplies/equipment    [facility]   [address] [place] [address]  Phone: [phone]  Fax:  [phone]    GIRISH Seaman, BA, RN, CMSRN, RNCC  [facility]  Covering Units [address] 6322-5422/OBS  Phone: [phone]  Available on [employer] search [phone] RNCC or OBS RNCC  After Hours [phone]     [address] 3794-44[address] SW Ph: [phone]     6B/CRNCC  Weekend/holiday on Vocera or [phone]     [facility] RNCC ED/5 Ortho/5 Med/Surg [phone]     [facility] Med/Surg 8A, [phone]     Observation SW and weekend/after hours phone: [phone]

## 2024-09-02 NOTE — OP NOTE
Otolaryngology Operative Report   Date of Operation: 2024  Patient Name: Hieu Hughes  Patient : 1940   Patient MRN: [id number]    Staff Surgeon: Travis Arreola MD  Resident Surgeon: Bhavik Baxter MD & Reece Casey MD  Preoperative Diagnosis:   1. Oropharyngeal hemorrhage  2. Oropharyngeal squamous cell carcinoma  Postoperative Diagnosis: Same  Procedure:   1. Direct laryngosocpy with control of oropharyngeal hemorrhage  Anesthesia: General  Findings: Approximately 75cc of old clot suctioned; diffuse oozing from known necrotic tumor, no pulsatile hemorrhaging identified.   EBL: 15 cc  Complications: None  Specimens: None    Clinical Indications: Hieu Hughes is a 84 year old male with history of prior T4 oropharyngeal cancer status post chemoradiation in  now with second primary at the base of tongue and the right tonsil who presents with oral bleeding.  It was recommended to undergo aforementioned procedure. All risks and benefits were discussed with the consenting party and they elected to proceed with the procedure.  Description of Procedure: The patient was brought to the operating room and placed in supine position on the operating table. General anesthesia was induced and all appropriate monitoring lines were placed.  His tracheostomy tube was exchanged for a 6-0 reinforced tube however due to significant leak it was changed to a 7-0 wire reinforced tube and secured to the chest with a Tegaderm.  A timeout was then performed with all operating members in agreement to the patient name and procedure being performed.  We began with using a nasopharyngoscope to obtain easy visualization of the base of tongue and right tonsillar fossa.  There was diffuse clot burden present that was suctioned free, the oropharyngeal cavity was then irrigated copiously with saline to identify the areas of bleeding.  There was diffuse oozing from the entirety of the tumor burden, the areas that were  identified were cauterized using a Kim monopolar cautery.  At the termination of the case no further active hemorrhage was identified, all areas were hemostatic.  Surgiflo was applied topically to the entirety of the area with necrotic tumor and previous oozing and topically patted down with saline soaked pledgets.  At the end of the procedure we swapped his endotracheal tube with a new 6.0 cuffed Shiley.  This concluded our procedure. The patient was then turned over to our anesthesia colleagues and was extubated and taken to the PACU in satisfactory and stable condition.     Dr. Arreola was present for the procedure

## 2024-09-02 NOTE — PROGRESS NOTES
"Otolaryngology Progress Note    Subjective/Intvl events: No acute events since the OR yesterday evening. VSS, satting appropriately on HTD weaned to 21% FiO2. Initially had some blood tinged trach secretions but these have cleared up. Oral secretions have been clear. Patient didn't want tube feeds last night but is planning on resuming this morning.     O: /60 (BP Location: Right arm)   Pulse 91   Temp 98.4  F (36.9  C) (Oral)   Resp 16   Ht 1.778 m (5' 10\")   Wt 76.6 kg (168 lb 14 oz)   SpO2 95%   BMI 24.23 kg/m     General: NAD, resting comfortably in bed    HEENT: Oral cavity and oropharynx clear without clot or active bleeding. Secretions clear. 6-0 cuffed Shiley in place with cuff inflated. Thick trach secretions clear, no active bleeding or clot.     Pulmonary: Breathing non-labored on 21% HTD        LABS:    BMP  Recent Labs   Lab 09/02/24  0627 09/01/24  1050 08/27/24  1304   NA  --   --  140   POTASSIUM  --   --  4.6   CHLORIDE  --   --  101   MANPREET  --   --  9.1   CO2  --   --  31*   BUN  --   --  22.3   CR  --  0.7 0.75   *  --  99     CBC  Recent Labs   Lab 09/01/24  0256 08/27/24  1304   WBC 9.0 7.4   RBC 3.50* 3.67*   HGB 10.7* 11.5*   HCT 34.8* 36.4*   MCV 99 99   MCH 30.6 31.3   MCHC 30.7* 31.6   RDW 14.6 14.1    191     INR  Recent Labs   Lab 09/01/24  0256   INR 1.07       A/P: Hieu Hughes is a 84 year old male with a past medical history of right oropharyngeal squamous cell carcinoma s/p chemoradiation in 2009 now with second primary of the right oropharynx (including right tongue base, glossotonsillar sulcus and inferior aspect of the right tonsillar fossa) undergoing pembrolizumab (first infusion 8/27) who presented 9/1 with a second episode of coughing up clots concerning for tumor hemorrhage. He is now s/p OR for DL and control of hemorrhage on 9/1. Intraoperatively there was diffuse oozing from the entirety of the necrotic tumor, no active arterial bleed identified. " Hemostasis was obtained with a combination of monopolar cautery and topical hemostatic agents. Patient has remained hemostatic since OR. Anticipate discharge later this afternoon pending hemoglobin recheck and continued hemostasis.     Neuro:  - Pain control: scheduled tylenol, oxycodone prn    HEENT:  - monitor exam for recurrent bleed  - TXA nebs q4h prn for bleeding. Please administer these through the mouth via facemask with cuff down and no cap on trach, and then reinflate cuff when neb is complete. (If patient not tolerating cuff deflation, can re-inflate cuff and continue with neb)       Respiratory:  - 6-0 cuffed shiley in place. Please ensure trach ties remain appropriately snug with only 1-2 fingerbreadths between skin and ties   - Cuff is currently inflated due to concern for bleeding. It may be deflated per patient preference. If there is any further bleeding, please reinflate cuff and notify ENT on call      CV:  - hemodynamically stable  - hb recheck this AM    FEN/GI:  - resume TF this AM   - Bowel regimen: scheduled miralax and senna     :  - voiding independently     Endo  - PTA synthroid     ID:  - [id number]       Dispo: Discharge this afternoon pending Hb recheck and continued hemostasis     -- Patient and above plan discussed with chief resident, Dr. Casey, staff on call (Dr. Arreola), and cancer surgeon (Dr. Ospina)     Sonam Caceres MD  Otolaryngology-Head & Neck Surgery, PGY3  Please contact ENT with questions by dialing * [phone] and entering job code [phone] when prompted.

## 2024-09-02 NOTE — OR NURSING
He is coughing out thin loose bloody secretion but its not oozing, paged ENT resident on call. Called back, he said nothing concerning, it is expected as long as its not persistent.

## 2024-09-02 NOTE — PLAN OF CARE
Goal Outcome Evaluation:      Plan of Care Reviewed With: patient    Overall Patient Progress: improving    Outcome Evaluation: No active bleeding, tracheal secretions pink.    Status: Pt s/p direct laryngoscopy and control of oral hemorrhage  PMHx: R oropharyngeal SCC s/p chemoradiation (2009)  Vitals: VSS, on continuous pulse ox.  Neuros: Writes to communicate. A&O x4. Able to make needs known.  IV: PIV infusing with LR @ 50mL/hr  Labs/Electrolytes: No lab orders for this AM  Resp/trach: 6.0 cuffed Shiley, weaned to 21% FiO2 via trach dome. Strong cough with thick pink secretions, inner cannula changed q4h.   Diet: Bolus TF orders in place. Has PEG tube clamped, [place] extension set at bedside.   Bowel status: Last BM PTA  : VDSP via urinal at bedside  Skin: some drainage around trach stoma post-op, changed 4x4 split gauze under trach  Pain: Denies. Refused scheduled tylenol  Activity: SBA d/t lines. Steady on feet  Social: PACU RN said daughter was updated postop  Plan: Monitor for tracheal bleeding. Resume tube feeds. Cont with current POC      Arrived from: PACU  Belongings/meds: 2 duffle bags, norma pack, watch, shoes, clothing  2 RN Skin Assessment Completed by: SAMUEL Balladr and SAMUEL Lin  Non-intact findings documented (yes/no/NA): Abrasions, scabs, and rash to bilateral shins.

## 2024-09-02 NOTE — PROGRESS NOTES
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  There was significantly more clot burden at this presentation and thus patient was taken to the OR for direct laryngoscopy and control of hemorrhage.  There was diffuse oozing from the entirety of the necrotic tumor, no active arterial bleed identified.  Hemostasis was obtained with a combination of monopolar cautery and topical hemostatic agents.    -Patient has a new 6.0 cuffed Shiley in place, cuff is inflated due to concern for bleeding however it can be deflated if patient prefers.  If there is any bleeding please reinflate the cuff and contact ENT  -Patient will receive an epi neb in PACU via the mouth  -TXA nebs as needed ordered for bleeding  -Patient will be admitted overnight for close monitoring, concern is to have ongoing bleeding due to the nature of his known necrotic tumor.  -Will resume tube feeds  -Call ENT with questions    Reece Casey MD  Otolaryngology Head and Neck Surgery Resident, PGY-5  Please page on call Otolaryngology resident with questions.

## 2024-09-02 NOTE — ANESTHESIA CARE TRANSFER NOTE
Patient: Hieu Hughes    Procedure: Procedure(s):  Direct Laryngoscopy, control of oral hemorrhage       Diagnosis: * No pre-op diagnosis entered *  Diagnosis Additional Information: No value filed.    Anesthesia Type:   General     Note:    Oropharynx: spontaneously breathing (trach)  Level of Consciousness: awake and drowsy  Patient oxygen source: Tcollar.  Level of Supplemental Oxygen (L/min / FiO2): 6  Independent Airway: airway patency not satisfactory and stable  Dentition: dentition unchanged  Vital Signs Stable: post-procedure vital signs reviewed and stable  Report to RN Given: handoff report given  Patient transferred to: PACU    Handoff Report: Identifed the Patient, Identified the Reponsible Provider, Reviewed the pertinent medical history, Discussed the surgical course, Reviewed Intra-OP anesthesia mangement and issues during anesthesia, Set expectations for post-procedure period and Allowed opportunity for questions and acknowledgement of understanding      Vitals:  Vitals Value Taken Time   /75    Temp 36.5    Pulse 66 09/01/24 1912   Resp 17 09/01/24 1912   SpO2 100 % 09/01/24 1912   Vitals shown include unfiled device data.    Electronically Signed By: SHIRA HO CRNA  September 1, 2024  7:13 PM
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
## 2024-09-04 ENCOUNTER — PATIENT OUTREACH (OUTPATIENT)
Dept: CARE COORDINATION | Facility: CLINIC | Age: 84
End: 2024-09-04
Payer: COMMERCIAL

## 2024-09-04 ENCOUNTER — VIRTUAL VISIT (OUTPATIENT)
Dept: ONCOLOGY | Facility: CLINIC | Age: 84
End: 2024-09-04
Attending: REGISTERED NURSE
Payer: COMMERCIAL

## 2024-09-04 DIAGNOSIS — Z13.29 SCREENING FOR HYPOTHYROIDISM: ICD-10-CM

## 2024-09-04 DIAGNOSIS — C01 SQUAMOUS CELL CARCINOMA OF BASE OF TONGUE (H): Primary | ICD-10-CM

## 2024-09-04 DIAGNOSIS — D53.9 MACROCYTIC ANEMIA: ICD-10-CM

## 2024-09-04 DIAGNOSIS — R53.83 OTHER FATIGUE: ICD-10-CM

## 2024-09-04 DIAGNOSIS — K13.79 ORAL BLEEDING: ICD-10-CM

## 2024-09-04 PROCEDURE — 99215 OFFICE O/P EST HI 40 MIN: CPT | Mod: 95 | Performed by: REGISTERED NURSE

## 2024-09-04 PROCEDURE — G2211 COMPLEX E/M VISIT ADD ON: HCPCS | Mod: 95 | Performed by: REGISTERED NURSE

## 2024-09-04 NOTE — NURSING NOTE
Current patient location: Patient declined to provide     Is the patient currently in the state of [place]? YES    Visit mode:VIDEO    If the visit is dropped, the patient can be reconnected by: VIDEO VISIT: Send to e-mail at: [email or web address]    Will anyone else be joining the visit? NO  (If patient encounters technical issues they should call [phone]    How would you like to obtain your AVS? MyChart    Are changes needed to the allergy or medication list?  Per pt's daughter-in-law no changes to pts allergies or medications since last reviewed.    Are refills needed on medications prescribed by this physician? NO    Rooming Documentation:  Pt unable pt's daughter-in-law assisted with check-in.      Reason for visit: RECHECK    Gerald HODGSON

## 2024-09-04 NOTE — PROGRESS NOTES
[facility]: [facility]    Background: Transitional Care Management program identified per system criteria and reviewed by [facility] team for possible outreach.    Assessment: Upon chart review, [facility] Team member will not proceed with patient outreach related to this episode of Transitional Care Management program due to reason below:    Patient has a follow up appointment with an appropriate provider today for hospital discharge    [facility] made 1st attempt on 9/3/2024 to reach patient for Hospital Follow-up and left message at that time.      Plan: Transitional Care Management episode addressed appropriately per reason noted above.      Saloni Cross RN  [facility] [place], [facility]    *Connected Care Resource Team does NOT follow patient ongoing. Referrals are identified based on internal discharge reports and the outreach is to ensure patient has an understanding of their discharge instructions.
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
## 2024-09-05 DIAGNOSIS — C01 CANCER OF BASE OF TONGUE (H): Primary | ICD-10-CM

## 2024-09-05 DIAGNOSIS — C01 SQUAMOUS CELL CARCINOMA OF BASE OF TONGUE (H): ICD-10-CM
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
## 2024-09-16 ENCOUNTER — VIRTUAL VISIT (OUTPATIENT)
Dept: ONCOLOGY | Facility: CLINIC | Age: 84
End: 2024-09-16
Attending: REGISTERED NURSE
Payer: COMMERCIAL

## 2024-09-16 VITALS — HEIGHT: 70 IN | BODY MASS INDEX: 24.34 KG/M2 | WEIGHT: 170 LBS

## 2024-09-16 DIAGNOSIS — C01 SQUAMOUS CELL CARCINOMA OF BASE OF TONGUE (H): Primary | ICD-10-CM

## 2024-09-16 DIAGNOSIS — Z13.29 SCREENING FOR HYPOTHYROIDISM: ICD-10-CM

## 2024-09-16 DIAGNOSIS — R53.83 OTHER FATIGUE: ICD-10-CM

## 2024-09-16 DIAGNOSIS — K13.79 ORAL BLEEDING: ICD-10-CM

## 2024-09-16 DIAGNOSIS — C01 CANCER OF BASE OF TONGUE (H): ICD-10-CM

## 2024-09-16 PROCEDURE — 99214 OFFICE O/P EST MOD 30 MIN: CPT | Mod: 95 | Performed by: REGISTERED NURSE

## 2024-09-16 PROCEDURE — G2211 COMPLEX E/M VISIT ADD ON: HCPCS | Mod: 95 | Performed by: REGISTERED NURSE

## 2024-09-16 RX ORDER — ALBUTEROL SULFATE 90 UG/1
1-2 AEROSOL, METERED RESPIRATORY (INHALATION)
Status: CANCELLED
Start: 2024-09-16

## 2024-09-16 RX ORDER — HEPARIN SODIUM (PORCINE) LOCK FLUSH IV SOLN 100 UNIT/ML 100 UNIT/ML
5 SOLUTION INTRAVENOUS
Status: CANCELLED | OUTPATIENT
Start: 2024-09-16

## 2024-09-16 RX ORDER — HEPARIN SODIUM,PORCINE 10 UNIT/ML
5-20 VIAL (ML) INTRAVENOUS DAILY PRN
Status: CANCELLED | OUTPATIENT
Start: 2024-09-16

## 2024-09-16 RX ORDER — DIPHENHYDRAMINE HYDROCHLORIDE 50 MG/ML
50 INJECTION INTRAMUSCULAR; INTRAVENOUS
Status: CANCELLED
Start: 2024-09-16

## 2024-09-16 RX ORDER — MEPERIDINE HYDROCHLORIDE 25 MG/ML
25 INJECTION INTRAMUSCULAR; INTRAVENOUS; SUBCUTANEOUS EVERY 30 MIN PRN
Status: CANCELLED | OUTPATIENT
Start: 2024-09-16

## 2024-09-16 RX ORDER — LORAZEPAM 2 MG/ML
0.5 INJECTION INTRAMUSCULAR EVERY 4 HOURS PRN
Status: CANCELLED | OUTPATIENT
Start: 2024-09-16

## 2024-09-16 RX ORDER — ALBUTEROL SULFATE 0.83 MG/ML
2.5 SOLUTION RESPIRATORY (INHALATION)
Status: CANCELLED | OUTPATIENT
Start: 2024-09-16

## 2024-09-16 RX ORDER — EPINEPHRINE 1 MG/ML
0.3 INJECTION, SOLUTION INTRAMUSCULAR; SUBCUTANEOUS EVERY 5 MIN PRN
Status: CANCELLED | OUTPATIENT
Start: 2024-09-16

## 2024-09-16 RX ORDER — METHYLPREDNISOLONE SODIUM SUCCINATE 125 MG/2ML
125 INJECTION, POWDER, LYOPHILIZED, FOR SOLUTION INTRAMUSCULAR; INTRAVENOUS
Status: CANCELLED
Start: 2024-09-16

## 2024-09-16 ASSESSMENT — PAIN SCALES - GENERAL: PAINLEVEL: NO PAIN (0)

## 2024-09-16 NOTE — NURSING NOTE
Current patient location:  [place]    Is the patient currently in the state of [place]? YES    Visit mode:VIDEO    If the visit is dropped, the patient can be reconnected by: VIDEO VISIT: Text to cell phone:   Telephone Information:   Mobile [phone]       Will anyone else be joining the visit? NO  (If patient encounters technical issues they should call [phone])    How would you like to obtain your AVS? MyChart    Are changes needed to the allergy or medication list? No    Are refills needed on medications prescribed by this physician? NO    Rooming Documentation:  Questionnaire(s) not done per department protocol      Reason for visit: AMINA HODGSON
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
## 2024-09-17 ENCOUNTER — INFUSION THERAPY VISIT (OUTPATIENT)
Dept: INFUSION THERAPY | Facility: CLINIC | Age: 84
End: 2024-09-17
Attending: INTERNAL MEDICINE
Payer: COMMERCIAL

## 2024-09-17 ENCOUNTER — LAB (OUTPATIENT)
Dept: LAB | Facility: CLINIC | Age: 84
End: 2024-09-17
Attending: INTERNAL MEDICINE
Payer: COMMERCIAL

## 2024-09-17 VITALS
HEART RATE: 60 BPM | SYSTOLIC BLOOD PRESSURE: 115 MMHG | BODY MASS INDEX: 25.05 KG/M2 | RESPIRATION RATE: 18 BRPM | WEIGHT: 174.6 LBS | DIASTOLIC BLOOD PRESSURE: 61 MMHG

## 2024-09-17 DIAGNOSIS — Z79.899 HIGH RISK MEDICATION USE: Primary | ICD-10-CM

## 2024-09-17 DIAGNOSIS — C01 CANCER OF BASE OF TONGUE (H): Primary | ICD-10-CM

## 2024-09-17 DIAGNOSIS — C01 CANCER OF BASE OF TONGUE (H): ICD-10-CM

## 2024-09-17 DIAGNOSIS — C01 SQUAMOUS CELL CARCINOMA OF BASE OF TONGUE (H): ICD-10-CM

## 2024-09-17 LAB
ALBUMIN SERPL BCG-MCNC: 3.4 G/DL (ref 3.5–5.2)
ALP SERPL-CCNC: 103 U/L (ref 40–150)
ALT SERPL W P-5'-P-CCNC: 17 U/L (ref 0–70)
ANION GAP SERPL CALCULATED.3IONS-SCNC: 6 MMOL/L (ref 7–15)
AST SERPL W P-5'-P-CCNC: 17 U/L (ref 0–45)
BASOPHILS # BLD AUTO: 0 10E3/UL (ref 0–0.2)
BASOPHILS NFR BLD AUTO: 0 %
BILIRUB SERPL-MCNC: 0.3 MG/DL
BUN SERPL-MCNC: 26.4 MG/DL (ref 8–23)
CALCIUM SERPL-MCNC: 9.1 MG/DL (ref 8.8–10.4)
CHLORIDE SERPL-SCNC: 102 MMOL/L (ref 98–107)
CREAT SERPL-MCNC: 0.74 MG/DL (ref 0.67–1.17)
EGFRCR SERPLBLD CKD-EPI 2021: 90 ML/MIN/1.73M2
EOSINOPHIL # BLD AUTO: 0.2 10E3/UL (ref 0–0.7)
EOSINOPHIL NFR BLD AUTO: 2 %
ERYTHROCYTE [DISTWIDTH] IN BLOOD BY AUTOMATED COUNT: 14.6 % (ref 10–15)
GLUCOSE SERPL-MCNC: 126 MG/DL (ref 70–99)
HCO3 SERPL-SCNC: 33 MMOL/L (ref 22–29)
HCT VFR BLD AUTO: 31.6 % (ref 40–53)
HGB BLD-MCNC: 10 G/DL (ref 13.3–17.7)
IMM GRANULOCYTES # BLD: 0 10E3/UL
IMM GRANULOCYTES NFR BLD: 0 %
LYMPHOCYTES # BLD AUTO: 0.4 10E3/UL (ref 0.8–5.3)
LYMPHOCYTES NFR BLD AUTO: 6 %
MCH RBC QN AUTO: 31.1 PG (ref 26.5–33)
MCHC RBC AUTO-ENTMCNC: 31.6 G/DL (ref 31.5–36.5)
MCV RBC AUTO: 98 FL (ref 78–100)
MONOCYTES # BLD AUTO: 0.6 10E3/UL (ref 0–1.3)
MONOCYTES NFR BLD AUTO: 8 %
NEUTROPHILS # BLD AUTO: 6.5 10E3/UL (ref 1.6–8.3)
NEUTROPHILS NFR BLD AUTO: 84 %
NRBC # BLD AUTO: 0 10E3/UL
NRBC BLD AUTO-RTO: 0 /100
PLATELET # BLD AUTO: 211 10E3/UL (ref 150–450)
POTASSIUM SERPL-SCNC: 4.4 MMOL/L (ref 3.4–5.3)
PROT SERPL-MCNC: 7.2 G/DL (ref 6.4–8.3)
RBC # BLD AUTO: 3.22 10E6/UL (ref 4.4–5.9)
SODIUM SERPL-SCNC: 141 MMOL/L (ref 135–145)
TSH SERPL DL<=0.005 MIU/L-ACNC: 3.9 UIU/ML (ref 0.3–4.2)
WBC # BLD AUTO: 7.7 10E3/UL (ref 4–11)

## 2024-09-17 PROCEDURE — 96413 CHEMO IV INFUSION 1 HR: CPT

## 2024-09-17 PROCEDURE — 82040 ASSAY OF SERUM ALBUMIN: CPT | Performed by: REGISTERED NURSE

## 2024-09-17 PROCEDURE — 250N000011 HC RX IP 250 OP 636: Performed by: REGISTERED NURSE

## 2024-09-17 PROCEDURE — 85004 AUTOMATED DIFF WBC COUNT: CPT

## 2024-09-17 PROCEDURE — 36415 COLL VENOUS BLD VENIPUNCTURE: CPT | Performed by: REGISTERED NURSE

## 2024-09-17 PROCEDURE — 84443 ASSAY THYROID STIM HORMONE: CPT | Performed by: INTERNAL MEDICINE

## 2024-09-17 PROCEDURE — 258N000003 HC RX IP 258 OP 636: Performed by: REGISTERED NURSE

## 2024-09-17 RX ADMIN — SODIUM CHLORIDE 250 ML: 9 INJECTION, SOLUTION INTRAVENOUS at 11:56

## 2024-09-17 RX ADMIN — SODIUM CHLORIDE 200 MG: 9 INJECTION, SOLUTION INTRAVENOUS at 12:18

## 2024-09-17 NOTE — PROGRESS NOTES
Infusion Nursing Note:  Hieu Hughes presents today for Keytruda C2D1.    Patient seen by provider today: No   present during visit today: Not Applicable.    Note: Denies any new medical concerns since visit with Domonique Hutson NP yesterday. Davion Tripathi at chairside for palliative visit.    Intravenous Access:  Peripheral IV placed.    Treatment Conditions:  Lab Results   Component Value Date     09/17/2024    POTASSIUM 4.4 09/17/2024    MAG 2.2 06/05/2021    CR 0.74 09/17/2024    MANPREET 9.1 09/17/2024    BILITOTAL 0.3 09/17/2024    ALBUMIN 3.4 (L) 09/17/2024    ALT 17 09/17/2024    AST 17 09/17/2024   Results reviewed, labs MET treatment parameters, ok to proceed with treatment.    Post Infusion Assessment:  Patient tolerated infusion without incident.  Blood return noted pre and post infusion.  Site patent and intact, free from redness, edema or discomfort.  No evidence of extravasations.  Access discontinued per protocol.     Discharge Plan:   Discharge instructions reviewed with: Patient.  Patient and/or family verbalized understanding of discharge instructions and all questions answered.  AVS to patient via [employer].  Patient will return 10/8/2024 for next appointment.   Patient discharged in stable condition accompanied by: self.  Departure Mode: Ambulatory.    Kenia Hope RN

## 2024-10-13 ENCOUNTER — HEALTH MAINTENANCE LETTER (OUTPATIENT)
Age: 84
End: 2024-10-13

## 2024-10-21 ENCOUNTER — DOCUMENTATION ONLY (OUTPATIENT)
Dept: OTHER | Facility: CLINIC | Age: 84
End: 2024-10-21
Payer: COMMERCIAL

## 2024-10-24 ENCOUNTER — PATIENT OUTREACH (OUTPATIENT)
Dept: ONCOLOGY | Facility: CLINIC | Age: 84
End: 2024-10-24
Payer: COMMERCIAL

## 2024-10-24 NOTE — PROGRESS NOTES
[facility]/Voicemail    Clinical Data: Care Coordinator Outreach    Reaching out to check in on Hieu. Per Noris Lovelace, he did not show up for his past two infusions. His daughter in law Rody has since reached out to our schedulers to get his infusions rescheduled, but I would like to touch base with her.    Outreach attempted x 1.  Left message on  daughter-in-law Rody's  voicemail with call back information and requested return call.    Plan: Care Coordinator will try to reach patient again in 1-2 business days.    Addendum 10/24/24  Spoke with Rody. She reports that Hieu is doing okay. One of the infusions was canceled because of the fluid shortage and he missed the other infusion he was scheduled for. Reviewed that our schedulers will work on getting his infusion rescheduled after he sees Dr. Bhatia next week.    Laura Headley, RN, BSN  RN Care Coordinator  [facility]

## 2024-10-25 DIAGNOSIS — C01 CANCER OF BASE OF TONGUE (H): Primary | ICD-10-CM

## 2024-10-25 DIAGNOSIS — C01 SQUAMOUS CELL CARCINOMA OF BASE OF TONGUE (H): ICD-10-CM

## 2024-10-29 ENCOUNTER — HOSPITAL ENCOUNTER (OUTPATIENT)
Dept: CT IMAGING | Facility: CLINIC | Age: 84
Discharge: HOME | End: 2024-10-29
Attending: INTERNAL MEDICINE | Admitting: INTERNAL MEDICINE
Payer: COMMERCIAL

## 2024-10-29 DIAGNOSIS — C01 SQUAMOUS CELL CARCINOMA OF BASE OF TONGUE (H): ICD-10-CM

## 2024-10-29 LAB
CREAT BLD-MCNC: 0.8 MG/DL (ref 0.7–1.3)
EGFRCR SERPLBLD CKD-EPI 2021: >60 ML/MIN/1.73M2

## 2024-10-29 PROCEDURE — 82565 ASSAY OF CREATININE: CPT

## 2024-10-29 PROCEDURE — 250N000009 HC RX 250: Performed by: INTERNAL MEDICINE

## 2024-10-29 PROCEDURE — 250N000011 HC RX IP 250 OP 636: Performed by: INTERNAL MEDICINE

## 2024-10-29 PROCEDURE — 70491 CT SOFT TISSUE NECK W/DYE: CPT

## 2024-10-29 PROCEDURE — 74177 CT ABD & PELVIS W/CONTRAST: CPT

## 2024-10-29 RX ORDER — IOPAMIDOL 755 MG/ML
115 INJECTION, SOLUTION INTRAVASCULAR ONCE
Status: COMPLETED | OUTPATIENT
Start: 2024-10-29 | End: 2024-10-29

## 2024-10-29 RX ORDER — IOPAMIDOL 755 MG/ML
90 INJECTION, SOLUTION INTRAVASCULAR ONCE
Status: DISCONTINUED | OUTPATIENT
Start: 2024-10-29 | End: 2024-10-29

## 2024-10-29 RX ADMIN — SODIUM CHLORIDE 80 ML: 9 INJECTION, SOLUTION INTRAVENOUS at 12:09

## 2024-10-29 RX ADMIN — IOPAMIDOL 115 ML: 755 INJECTION, SOLUTION INTRAVENOUS at 12:08

## 2024-10-30 ENCOUNTER — PATIENT OUTREACH (OUTPATIENT)
Dept: ONCOLOGY | Facility: CLINIC | Age: 84
End: 2024-10-30

## 2024-10-30 ENCOUNTER — ONCOLOGY VISIT (OUTPATIENT)
Dept: ONCOLOGY | Facility: CLINIC | Age: 84
End: 2024-10-30
Attending: INTERNAL MEDICINE
Payer: COMMERCIAL

## 2024-10-30 VITALS
SYSTOLIC BLOOD PRESSURE: 137 MMHG | BODY MASS INDEX: 25.84 KG/M2 | WEIGHT: 180.1 LBS | TEMPERATURE: 98.1 F | HEART RATE: 65 BPM | OXYGEN SATURATION: 93 % | DIASTOLIC BLOOD PRESSURE: 69 MMHG | RESPIRATION RATE: 14 BRPM

## 2024-10-30 DIAGNOSIS — C01 SQUAMOUS CELL CARCINOMA OF BASE OF TONGUE (H): ICD-10-CM

## 2024-10-30 DIAGNOSIS — C01 CANCER OF BASE OF TONGUE (H): Primary | ICD-10-CM

## 2024-10-30 DIAGNOSIS — Z13.29 SCREENING FOR HYPOTHYROIDISM: ICD-10-CM

## 2024-10-30 PROCEDURE — G2211 COMPLEX E/M VISIT ADD ON: HCPCS | Performed by: INTERNAL MEDICINE

## 2024-10-30 PROCEDURE — G0463 HOSPITAL OUTPT CLINIC VISIT: HCPCS | Performed by: INTERNAL MEDICINE

## 2024-10-30 PROCEDURE — 99215 OFFICE O/P EST HI 40 MIN: CPT | Performed by: INTERNAL MEDICINE

## 2024-10-30 PROCEDURE — 99417 PROLNG OP E/M EACH 15 MIN: CPT | Performed by: INTERNAL MEDICINE

## 2024-10-30 RX ORDER — ALBUTEROL SULFATE 0.83 MG/ML
2.5 SOLUTION RESPIRATORY (INHALATION) EVERY 4 HOURS PRN
COMMUNITY
Start: 2024-10-24

## 2024-10-30 RX ORDER — LORAZEPAM 2 MG/ML
0.5 INJECTION INTRAMUSCULAR EVERY 4 HOURS PRN
Status: CANCELLED | OUTPATIENT
Start: 2024-11-08

## 2024-10-30 RX ORDER — DIPHENHYDRAMINE HYDROCHLORIDE 50 MG/ML
50 INJECTION INTRAMUSCULAR; INTRAVENOUS
Status: CANCELLED
Start: 2024-11-01

## 2024-10-30 RX ORDER — DIPHENHYDRAMINE HCL 25 MG
50 CAPSULE ORAL ONCE
Status: CANCELLED
Start: 2024-11-08

## 2024-10-30 RX ORDER — ALBUTEROL SULFATE 90 UG/1
1-2 INHALANT RESPIRATORY (INHALATION)
Status: CANCELLED
Start: 2024-11-01

## 2024-10-30 RX ORDER — DIPHENHYDRAMINE HYDROCHLORIDE 50 MG/ML
25 INJECTION INTRAMUSCULAR; INTRAVENOUS
Status: CANCELLED
Start: 2024-11-01

## 2024-10-30 RX ORDER — MEPERIDINE HYDROCHLORIDE 25 MG/ML
25 INJECTION INTRAMUSCULAR; INTRAVENOUS; SUBCUTANEOUS
Status: CANCELLED | OUTPATIENT
Start: 2024-11-01

## 2024-10-30 RX ORDER — PALONOSETRON 0.05 MG/ML
0.25 INJECTION, SOLUTION INTRAVENOUS ONCE
Status: CANCELLED | OUTPATIENT
Start: 2024-11-01

## 2024-10-30 RX ORDER — METHYLPREDNISOLONE SODIUM SUCCINATE 40 MG/ML
40 INJECTION INTRAMUSCULAR; INTRAVENOUS
Status: CANCELLED
Start: 2024-11-01

## 2024-10-30 RX ORDER — ALBUTEROL SULFATE 90 UG/1
1-2 INHALANT RESPIRATORY (INHALATION)
Status: CANCELLED
Start: 2024-11-08

## 2024-10-30 RX ORDER — DIPHENHYDRAMINE HYDROCHLORIDE 50 MG/ML
50 INJECTION INTRAMUSCULAR; INTRAVENOUS
Status: CANCELLED
Start: 2024-11-08

## 2024-10-30 RX ORDER — MEPERIDINE HYDROCHLORIDE 25 MG/ML
25 INJECTION INTRAMUSCULAR; INTRAVENOUS; SUBCUTANEOUS
Status: CANCELLED | OUTPATIENT
Start: 2024-11-08

## 2024-10-30 RX ORDER — METHYLPREDNISOLONE SODIUM SUCCINATE 40 MG/ML
40 INJECTION INTRAMUSCULAR; INTRAVENOUS
Status: CANCELLED
Start: 2024-11-08

## 2024-10-30 RX ORDER — HEPARIN SODIUM,PORCINE 10 UNIT/ML
5-20 VIAL (ML) INTRAVENOUS DAILY PRN
Status: CANCELLED | OUTPATIENT
Start: 2024-11-01

## 2024-10-30 RX ORDER — ALBUTEROL SULFATE 0.83 MG/ML
2.5 SOLUTION RESPIRATORY (INHALATION)
Status: CANCELLED | OUTPATIENT
Start: 2024-11-01

## 2024-10-30 RX ORDER — HEPARIN SODIUM (PORCINE) LOCK FLUSH IV SOLN 100 UNIT/ML 100 UNIT/ML
5 SOLUTION INTRAVENOUS
Status: CANCELLED | OUTPATIENT
Start: 2024-11-01

## 2024-10-30 RX ORDER — LORAZEPAM 2 MG/ML
0.5 INJECTION INTRAMUSCULAR EVERY 4 HOURS PRN
Status: CANCELLED | OUTPATIENT
Start: 2024-11-01

## 2024-10-30 RX ORDER — EPINEPHRINE 1 MG/ML
0.3 INJECTION, SOLUTION INTRAMUSCULAR; SUBCUTANEOUS EVERY 5 MIN PRN
Status: CANCELLED | OUTPATIENT
Start: 2024-11-01

## 2024-10-30 RX ORDER — DIPHENHYDRAMINE HCL 25 MG
50 CAPSULE ORAL ONCE
Status: CANCELLED
Start: 2024-11-01

## 2024-10-30 RX ORDER — DIPHENHYDRAMINE HYDROCHLORIDE 50 MG/ML
25 INJECTION INTRAMUSCULAR; INTRAVENOUS
Status: CANCELLED
Start: 2024-11-08

## 2024-10-30 RX ORDER — ALBUTEROL SULFATE 0.83 MG/ML
2.5 SOLUTION RESPIRATORY (INHALATION)
Status: CANCELLED | OUTPATIENT
Start: 2024-11-08

## 2024-10-30 RX ORDER — EPINEPHRINE 1 MG/ML
0.3 INJECTION, SOLUTION INTRAMUSCULAR; SUBCUTANEOUS EVERY 5 MIN PRN
Status: CANCELLED | OUTPATIENT
Start: 2024-11-08

## 2024-10-30 RX ORDER — HEPARIN SODIUM,PORCINE 10 UNIT/ML
5-20 VIAL (ML) INTRAVENOUS DAILY PRN
Status: CANCELLED | OUTPATIENT
Start: 2024-11-08

## 2024-10-30 RX ORDER — HEPARIN SODIUM (PORCINE) LOCK FLUSH IV SOLN 100 UNIT/ML 100 UNIT/ML
5 SOLUTION INTRAVENOUS
Status: CANCELLED | OUTPATIENT
Start: 2024-11-08

## 2024-10-30 ASSESSMENT — PAIN SCALES - GENERAL: PAINLEVEL_OUTOF10: NO PAIN (0)

## 2024-10-30 NOTE — NURSING NOTE
"Oncology Rooming Note    October 30, 2024 2:47 PM   Hieu Hughes is a 84 year old male who presents for:    Chief Complaint   Patient presents with    Oncology Clinic Visit     Squamous cell carcinoma of base of tongue     Initial Vitals: /69 (BP Location: Right arm, Patient Position: Sitting, Cuff Size: Adult Regular)   Pulse 65   Temp 98.1  F (36.7  C) (Oral)   Resp 14   Wt 81.7 kg (180 lb 1.6 oz)   SpO2 93%   BMI 25.84 kg/m   Estimated body mass index is 25.84 kg/m  as calculated from the following:    Height as of 9/16/24: 1.778 m (5' 10\").    Weight as of this encounter: 81.7 kg (180 lb 1.6 oz). Body surface area is 2.01 meters squared.  No Pain (0) Comment: Data Unavailable   No LMP for male patient.  Allergies reviewed: Yes  Medications reviewed: Yes    Medications: Medication refills not needed today.  Pharmacy name entered into [employer]:    [facility] - [place], [place] - [address] [facility] [place] - [place], [place] - [address] [facility] - [place], [place] - [address] [facility] - [place], [place] - [address] [facility] - [place], [place] - [address]    Frailty Screening:   Is the patient here for a new oncology consult visit in cancer care? 2. No      Clinical concerns: Since the last time the patient saw the provider, he has had a couple of bleeding incidents that results in some trips to the ER and a surgery. He has also been having trouble staying asleep (usually sleeps 2 hours at a time), making him quite tired during the day. He would also like to go over his scans and schedule upcoming appointments.       Magdalena Jerez EMT            "
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
## 2024-10-31 RX ORDER — ONDANSETRON 8 MG/1
8 TABLET, FILM COATED ORAL EVERY 8 HOURS PRN
Qty: 30 TABLET | Refills: 11 | Status: SHIPPED | OUTPATIENT
Start: 2024-10-31

## 2024-10-31 RX ORDER — PROCHLORPERAZINE MALEATE 10 MG
5 TABLET ORAL EVERY 6 HOURS PRN
Qty: 30 TABLET | Refills: 11 | Status: SHIPPED | OUTPATIENT
Start: 2024-10-31

## 2024-10-31 NOTE — PROGRESS NOTES
[facility]: Cancer Care                                                                                          Met with Hieu to discuss addition of carboplatin/paclitaxel to treatment plan.  Via Oncology printouts provided.  Reviewed administration, side effects and ongoing symptom management by APPs in clinic. Reminded Hieu to use triage and after hours care for symptom and side effect management.  Reviewed use of compazine and zofran for nausea.    Laura Headley, RN, BSN  RN Care Coordinator  [facility]

## 2024-11-01 ENCOUNTER — INFUSION THERAPY VISIT (OUTPATIENT)
Dept: INFUSION THERAPY | Facility: CLINIC | Age: 84
End: 2024-11-01
Attending: INTERNAL MEDICINE
Payer: COMMERCIAL

## 2024-11-01 ENCOUNTER — APPOINTMENT (OUTPATIENT)
Dept: LAB | Facility: CLINIC | Age: 84
End: 2024-11-01
Attending: INTERNAL MEDICINE
Payer: COMMERCIAL

## 2024-11-01 VITALS
SYSTOLIC BLOOD PRESSURE: 136 MMHG | OXYGEN SATURATION: 96 % | BODY MASS INDEX: 25.77 KG/M2 | DIASTOLIC BLOOD PRESSURE: 67 MMHG | WEIGHT: 179.6 LBS | RESPIRATION RATE: 16 BRPM | TEMPERATURE: 98.8 F | HEART RATE: 64 BPM

## 2024-11-01 DIAGNOSIS — Z79.899 HIGH RISK MEDICATION USE: ICD-10-CM

## 2024-11-01 DIAGNOSIS — C01 SQUAMOUS CELL CARCINOMA OF BASE OF TONGUE (H): ICD-10-CM

## 2024-11-01 DIAGNOSIS — C01 CANCER OF BASE OF TONGUE (H): Primary | ICD-10-CM

## 2024-11-01 LAB
ALBUMIN SERPL BCG-MCNC: 3.4 G/DL (ref 3.5–5.2)
ALP SERPL-CCNC: 102 U/L (ref 40–150)
ALT SERPL W P-5'-P-CCNC: 18 U/L (ref 0–70)
ANION GAP SERPL CALCULATED.3IONS-SCNC: 11 MMOL/L (ref 7–15)
AST SERPL W P-5'-P-CCNC: 22 U/L (ref 0–45)
BASOPHILS # BLD AUTO: 0 10E3/UL (ref 0–0.2)
BASOPHILS NFR BLD AUTO: 1 %
BILIRUB SERPL-MCNC: 0.3 MG/DL
BUN SERPL-MCNC: 22.1 MG/DL (ref 8–23)
CALCIUM SERPL-MCNC: 9.1 MG/DL (ref 8.8–10.4)
CHLORIDE SERPL-SCNC: 99 MMOL/L (ref 98–107)
CREAT SERPL-MCNC: 0.77 MG/DL (ref 0.67–1.17)
EGFRCR SERPLBLD CKD-EPI 2021: 89 ML/MIN/1.73M2
EOSINOPHIL # BLD AUTO: 0.2 10E3/UL (ref 0–0.7)
EOSINOPHIL NFR BLD AUTO: 3 %
ERYTHROCYTE [DISTWIDTH] IN BLOOD BY AUTOMATED COUNT: 15.9 % (ref 10–15)
GLUCOSE SERPL-MCNC: 122 MG/DL (ref 70–99)
HCO3 SERPL-SCNC: 31 MMOL/L (ref 22–29)
HCT VFR BLD AUTO: 33.2 % (ref 40–53)
HGB BLD-MCNC: 10.2 G/DL (ref 13.3–17.7)
IMM GRANULOCYTES # BLD: 0 10E3/UL
IMM GRANULOCYTES NFR BLD: 0 %
LYMPHOCYTES # BLD AUTO: 0.5 10E3/UL (ref 0.8–5.3)
LYMPHOCYTES NFR BLD AUTO: 6 %
MCH RBC QN AUTO: 29.7 PG (ref 26.5–33)
MCHC RBC AUTO-ENTMCNC: 30.7 G/DL (ref 31.5–36.5)
MCV RBC AUTO: 97 FL (ref 78–100)
MONOCYTES # BLD AUTO: 0.6 10E3/UL (ref 0–1.3)
MONOCYTES NFR BLD AUTO: 8 %
NEUTROPHILS # BLD AUTO: 5.9 10E3/UL (ref 1.6–8.3)
NEUTROPHILS NFR BLD AUTO: 82 %
NRBC # BLD AUTO: 0 10E3/UL
NRBC BLD AUTO-RTO: 0 /100
PLATELET # BLD AUTO: 215 10E3/UL (ref 150–450)
POTASSIUM SERPL-SCNC: 4.4 MMOL/L (ref 3.4–5.3)
PROT SERPL-MCNC: 7.6 G/DL (ref 6.4–8.3)
RBC # BLD AUTO: 3.44 10E6/UL (ref 4.4–5.9)
SODIUM SERPL-SCNC: 141 MMOL/L (ref 135–145)
TSH SERPL DL<=0.005 MIU/L-ACNC: 3.86 UIU/ML (ref 0.3–4.2)
WBC # BLD AUTO: 7.2 10E3/UL (ref 4–11)

## 2024-11-01 PROCEDURE — 258N000003 HC RX IP 258 OP 636: Performed by: INTERNAL MEDICINE

## 2024-11-01 PROCEDURE — 96367 TX/PROPH/DG ADDL SEQ IV INF: CPT

## 2024-11-01 PROCEDURE — 36415 COLL VENOUS BLD VENIPUNCTURE: CPT | Performed by: INTERNAL MEDICINE

## 2024-11-01 PROCEDURE — 96413 CHEMO IV INFUSION 1 HR: CPT

## 2024-11-01 PROCEDURE — 250N000011 HC RX IP 250 OP 636: Performed by: INTERNAL MEDICINE

## 2024-11-01 PROCEDURE — 96375 TX/PRO/DX INJ NEW DRUG ADDON: CPT

## 2024-11-01 PROCEDURE — 85025 COMPLETE CBC W/AUTO DIFF WBC: CPT | Performed by: INTERNAL MEDICINE

## 2024-11-01 PROCEDURE — 80053 COMPREHEN METABOLIC PANEL: CPT | Performed by: INTERNAL MEDICINE

## 2024-11-01 PROCEDURE — 96417 CHEMO IV INFUS EACH ADDL SEQ: CPT

## 2024-11-01 PROCEDURE — 84443 ASSAY THYROID STIM HORMONE: CPT | Performed by: INTERNAL MEDICINE

## 2024-11-01 RX ORDER — PALONOSETRON 0.05 MG/ML
0.25 INJECTION, SOLUTION INTRAVENOUS ONCE
Status: COMPLETED | OUTPATIENT
Start: 2024-11-01 | End: 2024-11-01

## 2024-11-01 RX ADMIN — PALONOSETRON HYDROCHLORIDE 0.25 MG: 0.25 INJECTION INTRAVENOUS at 12:27

## 2024-11-01 RX ADMIN — PACLITAXEL 201 MG: 6 INJECTION, SOLUTION INTRAVENOUS at 13:41

## 2024-11-01 RX ADMIN — CARBOPLATIN 550 MG: 10 INJECTION, SOLUTION INTRAVENOUS at 14:41

## 2024-11-01 RX ADMIN — FOSAPREPITANT: 150 INJECTION, POWDER, LYOPHILIZED, FOR SOLUTION INTRAVENOUS at 12:29

## 2024-11-01 RX ADMIN — SODIUM CHLORIDE 200 MG: 9 INJECTION, SOLUTION INTRAVENOUS at 13:00

## 2024-11-01 RX ADMIN — SODIUM CHLORIDE 250 ML: 9 INJECTION, SOLUTION INTRAVENOUS at 12:04

## 2024-11-01 RX ADMIN — FAMOTIDINE 20 MG: 10 INJECTION, SOLUTION INTRAVENOUS at 12:04

## 2024-11-01 RX ADMIN — DIPHENHYDRAMINE HYDROCHLORIDE 50 MG: 50 INJECTION, SOLUTION INTRAMUSCULAR; INTRAVENOUS at 12:12

## 2024-11-01 ASSESSMENT — PAIN SCALES - GENERAL: PAINLEVEL_OUTOF10: NO PAIN (0)

## 2024-11-01 NOTE — PROGRESS NOTES
Infusion Nursing Note:  Hieu Hughes presents today for C1D1 Keytruda/Taxol/Carboplatin.    Patient seen by provider today: No   present during visit today: Not Applicable.    Note: Patient reported to clinic today with no new complaints or concerns.  Patient denies any pain today    Intravenous Access:  Peripheral IV placed.    Treatment Conditions:  Lab Results   Component Value Date    HGB 10.2 (L) 11/01/2024    WBC 7.2 11/01/2024    ANEU 4.8 05/20/2009    ANEUTAUTO 5.9 11/01/2024     11/01/2024        Lab Results   Component Value Date     11/01/2024    POTASSIUM 4.4 11/01/2024    MAG 2.2 06/05/2021    CR 0.77 11/01/2024    MANPREET 9.1 11/01/2024    BILITOTAL 0.3 11/01/2024    ALBUMIN 3.4 (L) 11/01/2024    ALT 18 11/01/2024    AST 22 11/01/2024       Results reviewed, labs MET treatment parameters, ok to proceed with treatment.      Post Infusion Assessment:  Patient tolerated infusion without incident.  Blood return noted pre and post infusion.  Site patent and intact, free from redness, edema or discomfort.  No evidence of extravasations.  Access discontinued per protocol.       Discharge Plan:   Discharge instructions reviewed with: Patient and Family.  Patient and/or family verbalized understanding of discharge instructions and all questions answered.  Copy of AVS reviewed with patient and/or family.  Patient will return 11/8/24 for next appointment.  AVS to patient via 1RebelHART.  Patient will return 11/8/24 for next appointment.   Patient discharged in stable condition accompanied by: Family.  Departure Mode: Ambulatory.      Gil Corona RN

## 2024-11-01 NOTE — PATIENT INSTRUCTIONS
[facility] Main Line: [phone]    Call triage nurse with chills and/or temperature greater than or equal to 100.4, uncontrolled nausea/vomiting, diarrhea, constipation, dizziness, shortness of breath, chest pain, bleeding, unexplained bruising, or any new/concerning symptoms, questions/concerns.   If you are having any concerning symptoms or wish to speak to a provider before your next infusion visit, please call your care coordinator or triage to notify them so we can adequately serve you.   Nurse Triage line:  [phone]    If after hours, weekends, or holidays, call [facility]  and ask for Oncology doctor on call @ [phone]     November 2024 Sunday Monday Tuesday Wednesday Thursday Friday Saturday                            1    LAB  10:30 AM   (10 min.)   [facility]    INFUSION 3 HR (180 MIN)  11:00 AM   (180 min.)   [place] CAN[place] INFUSION NURSE   [facility] 2       3     4     5     6     7     8    LAB  10:30 AM   (10 min.)   [facility]    INFUSION 1.5 HR (90 MIN)  11:00 AM   (90 min.)   [place] CAN[place] INFUSION NURSE   [facility] 9       10     11     12     13     14     15     16       17     18     19     20     21     22    LAB  12:20 PM   (10 min.)   [facility]    INFUSION 3 HR (180 MIN)   1:00 PM   (180 min.)   [place] CANC[place] INFUSION NURSE   [facility] 23       24     25     26     27     28     29    LAB  10:50 AM   (10 min.)   [facility]    INFUSION 1.5 HR (90 MIN)  11:30 AM   (90 min.)   [place] CAN[place] INFUSION NURSE   [facility] 30 December 2024 Sunday Monday Tuesday Wednesday Thursday Friday Saturday   1     2    RETURN PALLIATIVE  11:40 AM   (40 min.)   Benjamin Tripathi APRN CNP    [facility] 3     4     5     6     7       8     9     10     11     12     13     14       15     16     17     18     19     20     21       22     23     24     25     26     27     28       29     30     31                                         Lab Results:  Recent Results (from the past 12 hours)   Comprehensive metabolic panel    Collection Time: 11/01/24 10:13 AM   Result Value Ref Range    Sodium 141 135 - 145 mmol/L    Potassium 4.4 3.4 - 5.3 mmol/L    Carbon Dioxide (CO2) 31 (H) 22 - 29 mmol/L    Anion Gap 11 7 - 15 mmol/L    Urea Nitrogen 22.1 8.0 - 23.0 mg/dL    Creatinine 0.77 0.67 - 1.17 mg/dL    GFR Estimate 89 >60 mL/min/1.73m2    Calcium 9.1 8.8 - 10.4 mg/dL    Chloride 99 98 - 107 mmol/L    Glucose 122 (H) 70 - 99 mg/dL    Alkaline Phosphatase 102 40 - 150 U/L    AST 22 0 - 45 U/L    ALT 18 0 - 70 U/L    Protein Total 7.6 6.4 - 8.3 g/dL    Albumin 3.4 (L) 3.5 - 5.2 g/dL    Bilirubin Total 0.3 <=1.2 mg/dL   TSH with free T4 reflex    Collection Time: 11/01/24 10:13 AM   Result Value Ref Range    TSH 3.86 0.30 - 4.20 uIU/mL   CBC with platelets and differential    Collection Time: 11/01/24 10:13 AM   Result Value Ref Range    WBC Count 7.2 4.0 - 11.0 10e3/uL    RBC Count 3.44 (L) 4.40 - 5.90 10e6/uL    Hemoglobin 10.2 (L) 13.3 - 17.7 g/dL    Hematocrit 33.2 (L) 40.0 - 53.0 %    MCV 97 78 - 100 fL    MCH 29.7 26.5 - 33.0 pg    MCHC 30.7 (L) 31.5 - 36.5 g/dL    RDW 15.9 (H) 10.0 - 15.0 %    Platelet Count 215 150 - 450 10e3/uL    % Neutrophils 82 %    % Lymphocytes 6 %    % Monocytes 8 %    % Eosinophils 3 %    % Basophils 1 %    % Immature Granulocytes 0 %    NRBCs per 100 WBC 0 <1 /100    Absolute Neutrophils 5.9 1.6 - 8.3 10e3/uL    Absolute Lymphocytes 0.5 (L) 0.8 - 5.3 10e3/uL    Absolute Monocytes 0.6 0.0 - 1.3 10e3/uL    Absolute Eosinophils 0.2 0.0 - 0.7 10e3/uL    Absolute Basophils 0.0 0.0 - 0.2 10e3/uL    Absolute Immature Granulocytes 0.0 <=0.4 10e3/uL    Absolute  NRBCs 0.0 10e3/uL

## 2024-11-05 DIAGNOSIS — C01 CANCER OF BASE OF TONGUE (H): Primary | ICD-10-CM

## 2024-11-05 DIAGNOSIS — R58 HEMORRHAGE: ICD-10-CM

## 2024-11-06 ENCOUNTER — TELEPHONE (OUTPATIENT)
Dept: NEUROSURGERY | Facility: CLINIC | Age: 84
End: 2024-11-06
Payer: COMMERCIAL

## 2024-11-07 ENCOUNTER — DOCUMENTATION ONLY (OUTPATIENT)
Dept: OTHER | Facility: CLINIC | Age: 84
End: 2024-11-07
Payer: COMMERCIAL

## 2024-11-07 NOTE — TELEPHONE ENCOUNTER
Records Requested     November 7, 2024 8:35 AM   [id number]   Facility  [facility]   Outcome 8:37 am Sent request for imaging to be pushed to PACS. -     RECORDS RECEIVED FROM: Care Everywhere   REASON FOR VISIT: Evaluate for embolization   PROVIDER: Williams Morrow MD   DATE OF APPT: 12/3/24 @ 10:30 am    NOTES (FOR ALL VISITS) STATUS DETAILS   OFFICE NOTE from referring provider Internal 11/5/24 (Orders Only), 10/30/24, 8/14/24 Ellie Bhatia MD @[facility]     OFFICE NOTE from other specialist Care Everywhere 9/17/24 Benjamin Tripathi APRN CNP @[facility]    9/16/24, 9/4/24 Domonique Hutson CNP @[facility]    8/30/24 Remington Doan DO  @[facility]    7/31/24 [facility]     DISCHARGE SUMMARY from hospital Internal 9/1/24-9/2/24 Travis Arreola MD @[facility]    8/21/24-8/22/24 Abbey Ospina MD @[facility]    7/22/24 Abbey Ospina MD @[facility]     DISCHARGE REPORT from the ER Care Everywhere 10/22/24 Adrian Mcdermott DO  @[facility] ED    8/31/24 Ry Harmon MD  @[facility]  ED    8/21/24 Mk Emmanuel MD  @[facility] ED     OPERATIVE REPORT Internal 9/1/24 Travis Arreola MD @[facility] Direct Laryngoscopy, control of oral hemorrhage     7/22/24 Abbey Ospina MD @[facility] Direct Laryngoscopy with Biopsy      MEDICATION LIST Internal    IMAGING  (FOR ALL VISITS)     PET Internal [facility]  8/1/24 PET Oncology (Eyes to Thighs)      ULTRASOUND (CAROTID BILAT) *VASCULAR* In process [facility]*  8/19/24 US Carotid Bilat     MRI (HEAD, NECK, SPINE) In process [facility]  7/10/24 MR Cervical Spine  3/28/23 MR Cervical Spine*     CT (HEAD, NECK, SPINE) Internal [facility]  10/29/24 CT Soft Tissue Neck  9/1/24 CTA Head Neck  9/1/24 CT Head  8/21/24 CT Soft Tissue Neck  8/1/24 CT Sot Tissue Neck    [facility]  4/14/24 CT Soft Tissue Neck

## 2024-11-08 ENCOUNTER — INFUSION THERAPY VISIT (OUTPATIENT)
Dept: INFUSION THERAPY | Facility: CLINIC | Age: 84
End: 2024-11-08
Attending: INTERNAL MEDICINE
Payer: COMMERCIAL

## 2024-11-08 ENCOUNTER — LAB (OUTPATIENT)
Dept: LAB | Facility: CLINIC | Age: 84
End: 2024-11-08
Payer: COMMERCIAL

## 2024-11-08 VITALS
WEIGHT: 176.2 LBS | BODY MASS INDEX: 25.28 KG/M2 | HEART RATE: 73 BPM | TEMPERATURE: 98.7 F | SYSTOLIC BLOOD PRESSURE: 160 MMHG | DIASTOLIC BLOOD PRESSURE: 64 MMHG

## 2024-11-08 DIAGNOSIS — C01 SQUAMOUS CELL CARCINOMA OF BASE OF TONGUE (H): Primary | ICD-10-CM

## 2024-11-08 DIAGNOSIS — C01 CANCER OF BASE OF TONGUE (H): Primary | ICD-10-CM

## 2024-11-08 DIAGNOSIS — C01 CANCER OF BASE OF TONGUE (H): ICD-10-CM

## 2024-11-08 DIAGNOSIS — C01 SQUAMOUS CELL CARCINOMA OF BASE OF TONGUE (H): ICD-10-CM

## 2024-11-08 LAB
ALBUMIN SERPL BCG-MCNC: 3.5 G/DL (ref 3.5–5.2)
ALP SERPL-CCNC: 97 U/L (ref 40–150)
ALT SERPL W P-5'-P-CCNC: 22 U/L (ref 0–70)
ANION GAP SERPL CALCULATED.3IONS-SCNC: 9 MMOL/L (ref 7–15)
AST SERPL W P-5'-P-CCNC: 21 U/L (ref 0–45)
BASOPHILS # BLD AUTO: 0 10E3/UL (ref 0–0.2)
BASOPHILS NFR BLD AUTO: 1 %
BILIRUB SERPL-MCNC: 0.3 MG/DL
BUN SERPL-MCNC: 25 MG/DL (ref 8–23)
CALCIUM SERPL-MCNC: 9 MG/DL (ref 8.8–10.4)
CHLORIDE SERPL-SCNC: 98 MMOL/L (ref 98–107)
CREAT SERPL-MCNC: 0.71 MG/DL (ref 0.67–1.17)
EGFRCR SERPLBLD CKD-EPI 2021: >90 ML/MIN/1.73M2
EOSINOPHIL # BLD AUTO: 0.1 10E3/UL (ref 0–0.7)
EOSINOPHIL NFR BLD AUTO: 2 %
ERYTHROCYTE [DISTWIDTH] IN BLOOD BY AUTOMATED COUNT: 15.2 % (ref 10–15)
GLUCOSE SERPL-MCNC: 112 MG/DL (ref 70–99)
HCO3 SERPL-SCNC: 31 MMOL/L (ref 22–29)
HCT VFR BLD AUTO: 32 % (ref 40–53)
HGB BLD-MCNC: 9.8 G/DL (ref 13.3–17.7)
IMM GRANULOCYTES # BLD: 0.1 10E3/UL
IMM GRANULOCYTES NFR BLD: 1 %
LYMPHOCYTES # BLD AUTO: 0.3 10E3/UL (ref 0.8–5.3)
LYMPHOCYTES NFR BLD AUTO: 4 %
MCH RBC QN AUTO: 29.3 PG (ref 26.5–33)
MCHC RBC AUTO-ENTMCNC: 30.6 G/DL (ref 31.5–36.5)
MCV RBC AUTO: 96 FL (ref 78–100)
MONOCYTES # BLD AUTO: 0.2 10E3/UL (ref 0–1.3)
MONOCYTES NFR BLD AUTO: 2 %
NEUTROPHILS # BLD AUTO: 6 10E3/UL (ref 1.6–8.3)
NEUTROPHILS NFR BLD AUTO: 90 %
NRBC # BLD AUTO: 0 10E3/UL
NRBC BLD AUTO-RTO: 0 /100
PLATELET # BLD AUTO: 217 10E3/UL (ref 150–450)
POTASSIUM SERPL-SCNC: 4.6 MMOL/L (ref 3.4–5.3)
PROT SERPL-MCNC: 7.1 G/DL (ref 6.4–8.3)
RBC # BLD AUTO: 3.34 10E6/UL (ref 4.4–5.9)
SODIUM SERPL-SCNC: 138 MMOL/L (ref 135–145)
WBC # BLD AUTO: 6.6 10E3/UL (ref 4–11)

## 2024-11-08 PROCEDURE — 36415 COLL VENOUS BLD VENIPUNCTURE: CPT

## 2024-11-08 PROCEDURE — 96375 TX/PRO/DX INJ NEW DRUG ADDON: CPT

## 2024-11-08 PROCEDURE — 85004 AUTOMATED DIFF WBC COUNT: CPT | Performed by: INTERNAL MEDICINE

## 2024-11-08 PROCEDURE — 84155 ASSAY OF PROTEIN SERUM: CPT

## 2024-11-08 PROCEDURE — 258N000003 HC RX IP 258 OP 636: Performed by: INTERNAL MEDICINE

## 2024-11-08 PROCEDURE — 96413 CHEMO IV INFUSION 1 HR: CPT

## 2024-11-08 PROCEDURE — 250N000011 HC RX IP 250 OP 636: Performed by: INTERNAL MEDICINE

## 2024-11-08 RX ORDER — DIPHENHYDRAMINE HYDROCHLORIDE 50 MG/ML
50 INJECTION INTRAMUSCULAR; INTRAVENOUS ONCE
Status: COMPLETED | OUTPATIENT
Start: 2024-11-08 | End: 2024-11-08

## 2024-11-08 RX ADMIN — DEXAMETHASONE SODIUM PHOSPHATE 20 MG: 10 INJECTION, SOLUTION INTRAMUSCULAR; INTRAVENOUS at 11:25

## 2024-11-08 RX ADMIN — PACLITAXEL 201 MG: 6 INJECTION, SOLUTION INTRAVENOUS at 11:46

## 2024-11-08 RX ADMIN — SODIUM CHLORIDE 100 ML: 900 INJECTION INTRAVENOUS at 11:18

## 2024-11-08 RX ADMIN — DIPHENHYDRAMINE HYDROCHLORIDE 50 MG: 50 INJECTION INTRAMUSCULAR; INTRAVENOUS at 11:18

## 2024-11-08 RX ADMIN — FAMOTIDINE 20 MG: 10 INJECTION INTRAVENOUS at 11:18

## 2024-11-08 NOTE — PROGRESS NOTES
Post Infusion Assessment:  Patient tolerated infusion without incident.  Access discontinued per protocol.       Discharge Plan:   AVS to patient via MYCHART.  Patient will return 11/22 for next appointment.   Patient discharged in stable condition accompanied by: self.  Departure Mode: Ambulatory.      Mariana Zapien RN

## 2024-11-08 NOTE — PROGRESS NOTES
Infusion Nursing Note:  Hieu Hughes presents today for Taxol C1D8.    Patient seen by provider today: No   present during visit today: Not Applicable.    Note: N/A.    Intravenous Access:  Labs drawn peripherally by .    Treatment Conditions:  Lab Results   Component Value Date    HGB 9.8 (L) 11/08/2024    WBC 6.6 11/08/2024    ANEU 4.8 05/20/2009    ANEUTAUTO 6.0 11/08/2024     11/08/2024   Results reviewed, labs MET treatment parameters, ok to proceed with treatment.    1230 care transferred to SAMUEL Savage RN

## 2024-11-12 ENCOUNTER — PATIENT OUTREACH (OUTPATIENT)
Dept: ONCOLOGY | Facility: CLINIC | Age: 84
End: 2024-11-12
Payer: COMMERCIAL

## 2024-11-12 NOTE — PROGRESS NOTES
[facility]: Cancer Care                                                                                          Received  message from clinic coordinator that Hieu's family had questions on his visit with Dr. Mororw in a few weeks. Discussed that they were referred for lingual artery involvement of his SCC. They wanted to know the difference between this visit and the vascular provider visit he is schedule for through [facility] in December as well. Per chart review, he was referred by his PCP for coronary artery stenosis.    They also wanted to let us know that Hieu is inpatient at a hospital in [place]. Discussed that I will keep an eye out for hospital dispo and will determine follow up plan once he is discharged.    Laura Headley, RN, BSN  RN Care Coordinator  [facility]

## 2024-11-22 ENCOUNTER — LAB (OUTPATIENT)
Dept: LAB | Facility: CLINIC | Age: 84
End: 2024-11-22
Attending: INTERNAL MEDICINE
Payer: COMMERCIAL

## 2024-11-22 DIAGNOSIS — C01 SQUAMOUS CELL CARCINOMA OF BASE OF TONGUE (H): ICD-10-CM

## 2024-11-22 DIAGNOSIS — E83.42 HYPOMAGNESEMIA: ICD-10-CM

## 2024-11-22 DIAGNOSIS — C01 CANCER OF BASE OF TONGUE (H): Primary | ICD-10-CM

## 2024-11-22 LAB
ALBUMIN SERPL BCG-MCNC: 3.3 G/DL (ref 3.5–5.2)
ALP SERPL-CCNC: 95 U/L (ref 40–150)
ALT SERPL W P-5'-P-CCNC: 23 U/L (ref 0–70)
ANION GAP SERPL CALCULATED.3IONS-SCNC: 8 MMOL/L (ref 7–15)
AST SERPL W P-5'-P-CCNC: 20 U/L (ref 0–45)
BASOPHILS # BLD AUTO: 0 10E3/UL (ref 0–0.2)
BASOPHILS NFR BLD AUTO: 0 %
BILIRUB SERPL-MCNC: 0.2 MG/DL
BUN SERPL-MCNC: 20.1 MG/DL (ref 8–23)
CALCIUM SERPL-MCNC: 9.2 MG/DL (ref 8.8–10.4)
CHLORIDE SERPL-SCNC: 96 MMOL/L (ref 98–107)
CREAT SERPL-MCNC: 0.86 MG/DL (ref 0.67–1.17)
EGFRCR SERPLBLD CKD-EPI 2021: 86 ML/MIN/1.73M2
EOSINOPHIL # BLD AUTO: 0 10E3/UL (ref 0–0.7)
EOSINOPHIL NFR BLD AUTO: 0 %
ERYTHROCYTE [DISTWIDTH] IN BLOOD BY AUTOMATED COUNT: 16.8 % (ref 10–15)
GLUCOSE SERPL-MCNC: 104 MG/DL (ref 70–99)
HCO3 SERPL-SCNC: 33 MMOL/L (ref 22–29)
HCT VFR BLD AUTO: 30.2 % (ref 40–53)
HGB BLD-MCNC: 9.6 G/DL (ref 13.3–17.7)
IMM GRANULOCYTES # BLD: 0 10E3/UL
IMM GRANULOCYTES NFR BLD: 0 %
LYMPHOCYTES # BLD AUTO: 0.4 10E3/UL (ref 0.8–5.3)
LYMPHOCYTES NFR BLD AUTO: 6 %
MAGNESIUM SERPL-MCNC: 2.3 MG/DL (ref 1.7–2.3)
MCH RBC QN AUTO: 29.9 PG (ref 26.5–33)
MCHC RBC AUTO-ENTMCNC: 31.8 G/DL (ref 31.5–36.5)
MCV RBC AUTO: 94 FL (ref 78–100)
MONOCYTES # BLD AUTO: 0.4 10E3/UL (ref 0–1.3)
MONOCYTES NFR BLD AUTO: 6 %
NEUTROPHILS # BLD AUTO: 6.2 10E3/UL (ref 1.6–8.3)
NEUTROPHILS NFR BLD AUTO: 88 %
NRBC # BLD AUTO: 0 10E3/UL
NRBC BLD AUTO-RTO: 0 /100
PLATELET # BLD AUTO: 248 10E3/UL (ref 150–450)
POTASSIUM SERPL-SCNC: 4.7 MMOL/L (ref 3.4–5.3)
PROT SERPL-MCNC: 7.2 G/DL (ref 6.4–8.3)
RBC # BLD AUTO: 3.21 10E6/UL (ref 4.4–5.9)
SODIUM SERPL-SCNC: 137 MMOL/L (ref 135–145)
T4 FREE SERPL-MCNC: 1.16 NG/DL (ref 0.9–1.7)
TSH SERPL DL<=0.005 MIU/L-ACNC: 5.09 UIU/ML (ref 0.3–4.2)
WBC # BLD AUTO: 7.1 10E3/UL (ref 4–11)

## 2024-11-22 PROCEDURE — 36415 COLL VENOUS BLD VENIPUNCTURE: CPT | Performed by: INTERNAL MEDICINE

## 2024-11-22 PROCEDURE — 84439 ASSAY OF FREE THYROXINE: CPT | Performed by: INTERNAL MEDICINE

## 2024-11-22 PROCEDURE — 84155 ASSAY OF PROTEIN SERUM: CPT | Performed by: INTERNAL MEDICINE

## 2024-11-22 PROCEDURE — 84075 ASSAY ALKALINE PHOSPHATASE: CPT | Performed by: INTERNAL MEDICINE

## 2024-11-22 PROCEDURE — 84443 ASSAY THYROID STIM HORMONE: CPT | Performed by: INTERNAL MEDICINE

## 2024-11-22 PROCEDURE — 85004 AUTOMATED DIFF WBC COUNT: CPT | Performed by: INTERNAL MEDICINE

## 2024-11-22 PROCEDURE — 83735 ASSAY OF MAGNESIUM: CPT

## 2024-11-27 ENCOUNTER — PATIENT OUTREACH (OUTPATIENT)
Dept: ONCOLOGY | Facility: CLINIC | Age: 84
End: 2024-11-27
Payer: COMMERCIAL

## 2024-11-27 NOTE — PROGRESS NOTES
[facility]: Cancer Care                                                                                          Phone call with Hieu's daughter in law Rody per the request of Noris Lovelace NP.    Rody reports that Hieu is feeling well and is up for chemotherapy on Friday. They had no concerns regarding resuming treatment.    Encouraged they call if any questions/concerns arise. Reviewed lab and infusion appt info with Rody.    Laura Headley, RN, BSN  RN Care Coordinator  [facility]

## 2024-11-28 ENCOUNTER — DOCUMENTATION ONLY (OUTPATIENT)
Dept: OTHER | Facility: CLINIC | Age: 84
End: 2024-11-28
Payer: COMMERCIAL

## 2024-11-29 ENCOUNTER — LAB (OUTPATIENT)
Dept: LAB | Facility: CLINIC | Age: 84
End: 2024-11-29
Payer: COMMERCIAL

## 2024-11-29 DIAGNOSIS — C01 SQUAMOUS CELL CARCINOMA OF BASE OF TONGUE (H): ICD-10-CM

## 2024-11-29 DIAGNOSIS — Z79.899 HIGH RISK MEDICATION USE: ICD-10-CM

## 2024-11-29 DIAGNOSIS — C01 CANCER OF BASE OF TONGUE (H): Primary | ICD-10-CM

## 2024-11-29 LAB
ALBUMIN SERPL BCG-MCNC: 3.6 G/DL (ref 3.5–5.2)
ALP SERPL-CCNC: 108 U/L (ref 40–150)
ALT SERPL W P-5'-P-CCNC: 17 U/L (ref 0–70)
ANION GAP SERPL CALCULATED.3IONS-SCNC: 8 MMOL/L (ref 7–15)
AST SERPL W P-5'-P-CCNC: 19 U/L (ref 0–45)
BASOPHILS # BLD AUTO: 0 10E3/UL (ref 0–0.2)
BASOPHILS NFR BLD AUTO: 1 %
BILIRUB SERPL-MCNC: 0.2 MG/DL
BUN SERPL-MCNC: 18.8 MG/DL (ref 8–23)
CALCIUM SERPL-MCNC: 9.4 MG/DL (ref 8.8–10.4)
CHLORIDE SERPL-SCNC: 100 MMOL/L (ref 98–107)
CREAT SERPL-MCNC: 0.76 MG/DL (ref 0.67–1.17)
EGFRCR SERPLBLD CKD-EPI 2021: 89 ML/MIN/1.73M2
EOSINOPHIL # BLD AUTO: 0.1 10E3/UL (ref 0–0.7)
EOSINOPHIL NFR BLD AUTO: 2 %
ERYTHROCYTE [DISTWIDTH] IN BLOOD BY AUTOMATED COUNT: 17.8 % (ref 10–15)
GLUCOSE SERPL-MCNC: 117 MG/DL (ref 70–99)
HCO3 SERPL-SCNC: 33 MMOL/L (ref 22–29)
HCT VFR BLD AUTO: 32.8 % (ref 40–53)
HGB BLD-MCNC: 9.9 G/DL (ref 13.3–17.7)
IMM GRANULOCYTES # BLD: 0 10E3/UL
IMM GRANULOCYTES NFR BLD: 0 %
LYMPHOCYTES # BLD AUTO: 0.4 10E3/UL (ref 0.8–5.3)
LYMPHOCYTES NFR BLD AUTO: 10 %
MCH RBC QN AUTO: 29.3 PG (ref 26.5–33)
MCHC RBC AUTO-ENTMCNC: 30.2 G/DL (ref 31.5–36.5)
MCV RBC AUTO: 97 FL (ref 78–100)
MONOCYTES # BLD AUTO: 0.4 10E3/UL (ref 0–1.3)
MONOCYTES NFR BLD AUTO: 10 %
NEUTROPHILS # BLD AUTO: 3.1 10E3/UL (ref 1.6–8.3)
NEUTROPHILS NFR BLD AUTO: 77 %
NRBC # BLD AUTO: 0 10E3/UL
NRBC BLD AUTO-RTO: 0 /100
PLATELET # BLD AUTO: 298 10E3/UL (ref 150–450)
POTASSIUM SERPL-SCNC: 4.4 MMOL/L (ref 3.4–5.3)
PROT SERPL-MCNC: 7.2 G/DL (ref 6.4–8.3)
RBC # BLD AUTO: 3.38 10E6/UL (ref 4.4–5.9)
SODIUM SERPL-SCNC: 141 MMOL/L (ref 135–145)
TSH SERPL DL<=0.005 MIU/L-ACNC: 3.69 UIU/ML (ref 0.3–4.2)
WBC # BLD AUTO: 4 10E3/UL (ref 4–11)

## 2024-11-29 PROCEDURE — 85014 HEMATOCRIT: CPT | Performed by: INTERNAL MEDICINE

## 2024-11-29 PROCEDURE — 85004 AUTOMATED DIFF WBC COUNT: CPT | Performed by: INTERNAL MEDICINE

## 2024-11-29 PROCEDURE — 82040 ASSAY OF SERUM ALBUMIN: CPT | Performed by: INTERNAL MEDICINE

## 2024-11-29 PROCEDURE — 82565 ASSAY OF CREATININE: CPT | Performed by: INTERNAL MEDICINE

## 2024-11-29 PROCEDURE — 84443 ASSAY THYROID STIM HORMONE: CPT | Performed by: INTERNAL MEDICINE

## 2024-11-29 PROCEDURE — 84155 ASSAY OF PROTEIN SERUM: CPT | Performed by: INTERNAL MEDICINE

## 2024-11-29 PROCEDURE — 36415 COLL VENOUS BLD VENIPUNCTURE: CPT | Performed by: INTERNAL MEDICINE

## 2024-12-02 ENCOUNTER — HOSPITAL ENCOUNTER (OUTPATIENT)
Facility: CLINIC | Age: 84
Setting detail: OBSERVATION
End: 2024-12-02
Attending: STUDENT IN AN ORGANIZED HEALTH CARE EDUCATION/TRAINING PROGRAM | Admitting: INTERNAL MEDICINE
Payer: COMMERCIAL

## 2024-12-02 ENCOUNTER — NURSE TRIAGE (OUTPATIENT)
Dept: ONCOLOGY | Facility: CLINIC | Age: 84
End: 2024-12-02

## 2024-12-02 DIAGNOSIS — Z43.1 ATTENTION TO G-TUBE (H): Primary | ICD-10-CM

## 2024-12-02 PROCEDURE — 99223 1ST HOSP IP/OBS HIGH 75: CPT | Performed by: INTERNAL MEDICINE

## 2024-12-02 ASSESSMENT — ACTIVITIES OF DAILY LIVING (ADL): ADLS_ACUITY_SCORE: 55

## 2024-12-02 NOTE — TELEPHONE ENCOUNTER
Dr. Leavitt is calling to speak to Dr. Bhatia about pt who is in the [place] ED and his GT has fallen out. He states he called about 1 1/2 hours ago and hasn't heard back.  1325: Secure chat and page sent to Dr. Bhatia.  1326: Dr. Bhatia is in a visit and will call back in 30 minutes.    Dr. Leavitt: PH: [phone]

## 2024-12-03 ENCOUNTER — PRE VISIT (OUTPATIENT)
Dept: NEUROSURGERY | Facility: CLINIC | Age: 84
End: 2024-12-03

## 2024-12-03 ENCOUNTER — APPOINTMENT (OUTPATIENT)
Dept: INTERVENTIONAL RADIOLOGY/VASCULAR | Facility: CLINIC | Age: 84
End: 2024-12-03
Attending: INTERNAL MEDICINE
Payer: COMMERCIAL

## 2024-12-03 ENCOUNTER — TELEPHONE (OUTPATIENT)
Dept: NEUROSURGERY | Facility: CLINIC | Age: 84
End: 2024-12-03

## 2024-12-03 PROBLEM — E46 MALNUTRITION (H): Status: ACTIVE | Noted: 2024-12-03

## 2024-12-03 LAB
ALBUMIN SERPL BCG-MCNC: 3.1 G/DL (ref 3.5–5.2)
ALP SERPL-CCNC: 88 U/L (ref 40–150)
ALT SERPL W P-5'-P-CCNC: 15 U/L (ref 0–70)
ANION GAP SERPL CALCULATED.3IONS-SCNC: 6 MMOL/L (ref 7–15)
AST SERPL W P-5'-P-CCNC: 17 U/L (ref 0–45)
BILIRUB SERPL-MCNC: 0.7 MG/DL
BUN SERPL-MCNC: 18.4 MG/DL (ref 8–23)
CALCIUM SERPL-MCNC: 8.7 MG/DL (ref 8.8–10.4)
CHLORIDE SERPL-SCNC: 103 MMOL/L (ref 98–107)
CREAT SERPL-MCNC: 0.67 MG/DL (ref 0.67–1.17)
EGFRCR SERPLBLD CKD-EPI 2021: >90 ML/MIN/1.73M2
GLUCOSE SERPL-MCNC: 91 MG/DL (ref 70–99)
HCO3 SERPL-SCNC: 30 MMOL/L (ref 22–29)
MAGNESIUM SERPL-MCNC: 2.1 MG/DL (ref 1.7–2.3)
PHOSPHATE SERPL-MCNC: 3.3 MG/DL (ref 2.5–4.5)
POTASSIUM SERPL-SCNC: 4.5 MMOL/L (ref 3.4–5.3)
PROT SERPL-MCNC: 6.1 G/DL (ref 6.4–8.3)
SODIUM SERPL-SCNC: 139 MMOL/L (ref 135–145)

## 2024-12-03 PROCEDURE — 96376 TX/PRO/DX INJ SAME DRUG ADON: CPT

## 2024-12-03 PROCEDURE — C1769 GUIDE WIRE: HCPCS

## 2024-12-03 PROCEDURE — 49446 CHANGE G-TUBE TO G-J PERC: CPT

## 2024-12-03 PROCEDURE — 83735 ASSAY OF MAGNESIUM: CPT | Performed by: INTERNAL MEDICINE

## 2024-12-03 PROCEDURE — 96374 THER/PROPH/DIAG INJ IV PUSH: CPT

## 2024-12-03 PROCEDURE — 80053 COMPREHEN METABOLIC PANEL: CPT | Performed by: INTERNAL MEDICINE

## 2024-12-03 PROCEDURE — 84100 ASSAY OF PHOSPHORUS: CPT | Performed by: INTERNAL MEDICINE

## 2024-12-03 PROCEDURE — 36415 COLL VENOUS BLD VENIPUNCTURE: CPT | Performed by: INTERNAL MEDICINE

## 2024-12-03 PROCEDURE — 250N000013 HC RX MED GY IP 250 OP 250 PS 637: Performed by: INTERNAL MEDICINE

## 2024-12-03 PROCEDURE — 258N000003 HC RX IP 258 OP 636: Performed by: INTERNAL MEDICINE

## 2024-12-03 PROCEDURE — G0378 HOSPITAL OBSERVATION PER HR: HCPCS

## 2024-12-03 PROCEDURE — 272N000116 HC CATH CR1

## 2024-12-03 PROCEDURE — 82040 ASSAY OF SERUM ALBUMIN: CPT | Performed by: INTERNAL MEDICINE

## 2024-12-03 PROCEDURE — 250N000009 HC RX 250: Performed by: INTERNAL MEDICINE

## 2024-12-03 PROCEDURE — 999N000157 HC STATISTIC RCP TIME EA 10 MIN

## 2024-12-03 PROCEDURE — 99232 SBSQ HOSP IP/OBS MODERATE 35: CPT | Performed by: INTERNAL MEDICINE

## 2024-12-03 RX ORDER — METOCLOPRAMIDE HYDROCHLORIDE 5 MG/5ML
5 SOLUTION ORAL
Status: DISCONTINUED | OUTPATIENT
Start: 2024-12-03 | End: 2024-12-06 | Stop reason: HOSPADM

## 2024-12-03 RX ORDER — PROCHLORPERAZINE MALEATE 5 MG/1
5 TABLET ORAL EVERY 6 HOURS PRN
Status: DISCONTINUED | OUTPATIENT
Start: 2024-12-03 | End: 2024-12-06 | Stop reason: HOSPADM

## 2024-12-03 RX ORDER — FERROUS SULFATE 220 (44)/5
7.4 ELIXIR ORAL EVERY OTHER DAY
COMMUNITY

## 2024-12-03 RX ORDER — LEVOTHYROXINE SODIUM 100 UG/1
100 TABLET ORAL DAILY
Status: DISCONTINUED | OUTPATIENT
Start: 2024-12-03 | End: 2024-12-06 | Stop reason: HOSPADM

## 2024-12-03 RX ORDER — HYDROMORPHONE HCL IN WATER/PF 6 MG/30 ML
0.2 PATIENT CONTROLLED ANALGESIA SYRINGE INTRAVENOUS
Status: DISCONTINUED | OUTPATIENT
Start: 2024-12-03 | End: 2024-12-06 | Stop reason: HOSPADM

## 2024-12-03 RX ORDER — AMOXICILLIN 250 MG
2 CAPSULE ORAL 2 TIMES DAILY PRN
Status: DISCONTINUED | OUTPATIENT
Start: 2024-12-03 | End: 2024-12-06 | Stop reason: HOSPADM

## 2024-12-03 RX ORDER — DEXTROSE MONOHYDRATE 100 MG/ML
INJECTION, SOLUTION INTRAVENOUS CONTINUOUS PRN
Status: DISCONTINUED | OUTPATIENT
Start: 2024-12-03 | End: 2024-12-06 | Stop reason: HOSPADM

## 2024-12-03 RX ORDER — ACETAMINOPHEN 325 MG/10.15ML
650 LIQUID ORAL EVERY 4 HOURS PRN
Status: DISCONTINUED | OUTPATIENT
Start: 2024-12-03 | End: 2024-12-06 | Stop reason: HOSPADM

## 2024-12-03 RX ORDER — LIDOCAINE 40 MG/G
CREAM TOPICAL
Status: DISCONTINUED | OUTPATIENT
Start: 2024-12-03 | End: 2024-12-06 | Stop reason: HOSPADM

## 2024-12-03 RX ORDER — ONDANSETRON 2 MG/ML
4 INJECTION INTRAMUSCULAR; INTRAVENOUS EVERY 6 HOURS PRN
Status: DISCONTINUED | OUTPATIENT
Start: 2024-12-03 | End: 2024-12-06 | Stop reason: HOSPADM

## 2024-12-03 RX ORDER — NALOXONE HYDROCHLORIDE 0.4 MG/ML
0.2 INJECTION, SOLUTION INTRAMUSCULAR; INTRAVENOUS; SUBCUTANEOUS
Status: DISCONTINUED | OUTPATIENT
Start: 2024-12-03 | End: 2024-12-06 | Stop reason: HOSPADM

## 2024-12-03 RX ORDER — NALOXONE HYDROCHLORIDE 0.4 MG/ML
0.4 INJECTION, SOLUTION INTRAMUSCULAR; INTRAVENOUS; SUBCUTANEOUS
Status: DISCONTINUED | OUTPATIENT
Start: 2024-12-03 | End: 2024-12-06 | Stop reason: HOSPADM

## 2024-12-03 RX ORDER — AMOXICILLIN 250 MG
1 CAPSULE ORAL 2 TIMES DAILY PRN
Status: DISCONTINUED | OUTPATIENT
Start: 2024-12-03 | End: 2024-12-06 | Stop reason: HOSPADM

## 2024-12-03 RX ORDER — SODIUM CHLORIDE FOR INHALATION 0.9 %
3 VIAL, NEBULIZER (ML) INHALATION
Status: DISCONTINUED | OUTPATIENT
Start: 2024-12-03 | End: 2024-12-06 | Stop reason: HOSPADM

## 2024-12-03 RX ORDER — ALBUTEROL SULFATE 0.83 MG/ML
2.5 SOLUTION RESPIRATORY (INHALATION) EVERY 4 HOURS PRN
Status: DISCONTINUED | OUTPATIENT
Start: 2024-12-03 | End: 2024-12-06 | Stop reason: HOSPADM

## 2024-12-03 RX ORDER — ONDANSETRON 8 MG/1
8 TABLET, FILM COATED ORAL EVERY 8 HOURS PRN
Status: DISCONTINUED | OUTPATIENT
Start: 2024-12-03 | End: 2024-12-06 | Stop reason: HOSPADM

## 2024-12-03 RX ORDER — HYDROMORPHONE HCL IN WATER/PF 6 MG/30 ML
0.4 PATIENT CONTROLLED ANALGESIA SYRINGE INTRAVENOUS
Status: DISCONTINUED | OUTPATIENT
Start: 2024-12-03 | End: 2024-12-06 | Stop reason: HOSPADM

## 2024-12-03 RX ADMIN — PANTOPRAZOLE SODIUM 40 MG: 40 INJECTION, POWDER, FOR SOLUTION INTRAVENOUS at 08:01

## 2024-12-03 RX ADMIN — LEVOTHYROXINE SODIUM 100 MCG: 100 TABLET ORAL at 10:40

## 2024-12-03 RX ADMIN — METOCLOPRAMIDE HYDROCHLORIDE 5 MG: 5 SOLUTION ORAL at 17:04

## 2024-12-03 RX ADMIN — PANTOPRAZOLE SODIUM 40 MG: 40 INJECTION, POWDER, FOR SOLUTION INTRAVENOUS at 01:36

## 2024-12-03 RX ADMIN — METOCLOPRAMIDE HYDROCHLORIDE 5 MG: 5 SOLUTION ORAL at 21:02

## 2024-12-03 RX ADMIN — SODIUM CHLORIDE, POTASSIUM CHLORIDE, SODIUM LACTATE AND CALCIUM CHLORIDE 1000 ML: 600; 310; 30; 20 INJECTION, SOLUTION INTRAVENOUS at 00:27

## 2024-12-03 RX ADMIN — PANTOPRAZOLE SODIUM 40 MG: 40 INJECTION, POWDER, FOR SOLUTION INTRAVENOUS at 21:02

## 2024-12-03 RX ADMIN — METOCLOPRAMIDE HYDROCHLORIDE 5 MG: 5 SOLUTION ORAL at 12:50

## 2024-12-03 ASSESSMENT — ACTIVITIES OF DAILY LIVING (ADL)
ADLS_ACUITY_SCORE: 57
ADLS_ACUITY_SCORE: 59
ADLS_ACUITY_SCORE: 57
ADLS_ACUITY_SCORE: 59
ADLS_ACUITY_SCORE: 59
ADLS_ACUITY_SCORE: 57
ADLS_ACUITY_SCORE: 59
ADLS_ACUITY_SCORE: 55
ADLS_ACUITY_SCORE: 59
ADLS_ACUITY_SCORE: 59
ADLS_ACUITY_SCORE: 57
ADLS_ACUITY_SCORE: 59
ADLS_ACUITY_SCORE: 57
ADLS_ACUITY_SCORE: 57
ADLS_ACUITY_SCORE: 59
ADLS_ACUITY_SCORE: 59

## 2024-12-03 NOTE — PROGRESS NOTES
[facility]    Internal Medicine Hospitalist Progress Note  12/03/2024  I evaluated patient on the above date.    Elvis Jerez Jr., MD  [phone] (p)  Text Page  Vocera      [New actions/orders today (12/03/2024) are underlined in the assessment and plan. 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 Directly admitted to [facility] 12/2/2024 for further management including potential G-tube to GJ-tube conversion.      G-tube malfunction - s/p GJ-tube conversion 12/3/2024.  Moderate protein calorie malnutrition related to of intolerance of gastric feedings.  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  Increasing postvoid residuals as well as episodes of emesis resulting in respiratory failure and prolonged hospitalization in November.  Has been monitoring his postvoid residuals, and because of increased residuals has only been receiving 4 feedings per day rather than his prior 6 feedings per day.  Often will have no residual prior to his morning feeding, but then will have residuals of 200 mL 4 to 5 hours later.  This is not always the case.  Last G-tube exchange approximately 2 weeks ago, and it does not appear that he has had improvement in his residual since that time.  * Initial presentation as above. IR consulted on admit. Started on IV pantoprazole on admit (PTA on famotidine). Given IVF's on admit.  * 12/3: Underwent IR G-tube to GJ-tube conversion.  - Restart  TF's, consult Nutrition.  - Continue IV pantoprazole BID.  - Resume PTA metoclopramide, PRN ondansetron and PRN prochlorperazine after TF's restarted.    Generalized weakness, suspect multifactorial primarily driven by volume depletion with inability to tolerate his tube feeds and free water flushes.  * Initial presentation as above. Given IVF's on admit.  - Will need to ensure ability to tolerate postpyloric feedings prior to discharge.  - Ambulate 4 times daily with assist.    Squamous cell carcinoma base of tongue.  S/p tracheostomy and G-tube.  * Initial diagnosis in 2009. Noted to be unresectable. Currently on palliative chemotherapy with carboplatin, paclitaxel and pembrolizumab. Has completed 2 cycles (prior treatment w/ pembrolizumab alone w/ progression). Next due for chemotherapy on 12/6.  * Initial presentation as above. On admit, noted he typically replaces his inner 14 Danish cannula 4-8 times per day, cleaning used cannulas with saline and brushing with hydrogen peroxide.   - RT consulted for tracheostomy cares.  - Continue humidified trach dome.  - Continue PRN bedside suctioning (on admit, patient reports he rarely uses suction).  - Continue PRN albuterol nebs and sodium chloride nebs.    Hypothyroidism.  - Continue levothyroxine.    Right sided carotid artery stenosis.  * On admit, noted he has an appointment with neurosurgery via telephone visit 12/3.    - Try to call in to appointment if able today 12/3, ?set up video appointment as likely will not be able to communicate effectively with tracheostomy.  - Note also he has a history of epistaxis with aspirin, so this would need to be addressed if there would be a plan for surgical intervention on his carotid.        Clinically Significant Risk Factors Present on Admission                                 # Financial/Environmental Concerns:           Diet: NPO for Medical/Clinical Reasons Except for: No Exceptions    Prophylaxis: PCD's and  ambulation  Topete Catheter: Not present  Lines: None     Code Status: Full Code    Disposition Plan   Medically Ready for Discharge: Anticipated Tomorrow  Expected discharge to home pending above.    Entered: Elvis Jerez MD 12/03/2024, 7:21 AM               Interval History   Just back from procedure.  Doing OK presently.    * Data reviewed today: I reviewed all new labs and imaging over the last 24 hours. I personally reviewed no images or ECG's today.    Physical Exam   Most recent vitals:   , Blood pressure 107/55, pulse 59, temperature 97.9  F (36.6  C), temperature source Oral, resp. rate 18, SpO2 96%. O2 Device: Trach dome Oxygen Delivery: 30 LPM  There were no vitals filed for this visit.  Vital signs with ranges:  Temp:  [97.9  F (36.6  C)-99  F (37.2  C)] 97.9  F (36.6  C)  Pulse:  [59-70] 59  Resp:  [16-18] 18  BP: (102-125)/(55-65) 107/55  FiO2 (%):  [25 %] 25 %  SpO2:  [92 %-96 %] 96 %  Patient Vitals for the past 24 hrs:   BP Temp Temp src Pulse Resp SpO2   12/03/24 0707 -- -- -- -- -- 96 %   12/03/24 0500 107/55 97.9  F (36.6  C) Oral 59 18 96 %   12/03/24 0350 -- -- -- -- -- 95 %   12/03/24 0112 102/58 98.8  F (37.1  C) Oral 70 16 92 %   12/02/24 2245 125/65 99  F (37.2  C) Oral -- 18 --     I/O's last 24 hours:  No intake/output data recorded.    Constitutional: awake, alert, oriented, pleasant   Head:   Eyes:   ENT:   Neck:   Cardiovascular: regular rate and rhythm; no murmurs, rubs or gallops  Lungs: clear to auscultation bilaterally without crackles or wheezes  Gastrointestinal/Abdomen: soft, non-tender, non-distended, positive bowel sounds  :   Musculoskeletal:   Skin/Extremities: no bilateral lower extremity edema  Neurologic:   Psychiatric:   Hematologic/Lymphatic/Immunologic:        Labs reviewed.  Recent Labs   Lab 11/29/24  1020   WBC 4.0   HGB 9.9*   MCV 97         POTASSIUM 4.4   CHLORIDE 100   CO2 33*   BUN 18.8   CR 0.76   ANIONGAP 8   MANPREET 9.4   *   ALBUMIN  "3.6   PROTTOTAL 7.2   BILITOTAL 0.2   ALKPHOS 108   ALT 17   AST 19     No lab results found.  Recent Labs   Lab 11/29/24  1020   *     No lab results found.    No results for input(s): \"INR\", \"[id number]\" in the last 168 hours.  Recent Labs   Lab 11/29/24  1020   WBC 4.0       MICRO:  Cultures (including blood and urine):  No lab results found in last 7 days.    Recent Results (from the past 24 hours)   XR Chest 2 Views    Narrative    EXAM: XR CHEST 2 VIEWS  LOCATION: [facility]  DATE: 12/2/2024    INDICATION: Cough and shortness of breath.  COMPARISON: 11/11/2024 and chest CT 11/14/2024    IMPRESSION: The heart is normal in size with mild aortic calcifications. Tracheostomy tube overlying the trachea. Mild left basilar opacities with improvement from prior imaging, possibly residual scarring or recurrent atelectasis/pneumonia. The remainder of the lungs appear clear. No pneumothorax. Mild degenerative changes of the spine.       Medications   All medications were reviewed. MAR.    Infusions:  Current Facility-Administered Medications   Medication Dose Route Frequency Provider Last Rate Last Admin     Scheduled Medications:  Current Facility-Administered Medications   Medication Dose Route Frequency Provider Last Rate Last Admin    pantoprazole (PROTONIX) IV push injection 40 mg  40 mg Intravenous BID Ilan, Benjamin Ortega MD   40 mg at 12/03/24 0136    sodium chloride (PF) 0.9% PF flush 3 mL  3 mL Intracatheter Q8H Ilan, Benjamin Ortega MD   3 mL at 12/03/24 0031     PRN Medications:  Current Facility-Administered Medications   Medication Dose Route Frequency Provider Last Rate Last Admin    acetaminophen (TYLENOL) oral liquid 650 mg  650 mg Per Feeding Tube Q4H PRN Ilan, Benjamin Ortega MD        albuterol (PROVENTIL) neb solution 2.5 mg  2.5 mg Nebulization Q4H PRN Benjamin Talavera MD        HYDROmorphone (DILAUDID) injection 0.2 mg  0.2 mg Intravenous Q2H PRN Ilan, Benjamin Ortega MD        HYDROmorphone " (DILAUDID) injection 0.4 mg  0.4 mg Intravenous Q2H PRN Benjamin Talavera MD        lidocaine (LMX4) cream   Topical Q1H PRN Ilan, Benjamin Ortega MD        lidocaine 1 % 0.1-1 mL  0.1-1 mL Other Q1H PRN Ilan, Benjamin Ortega MD        naloxone (NARCAN) injection 0.2 mg  0.2 mg Intravenous Q2 Min PRN Norma Hubbard, DO        Or    naloxone (NARCAN) injection 0.4 mg  0.4 mg Intravenous Q2 Min PRN Krystal Hubbardson E, DO        Or    naloxone (NARCAN) injection 0.2 mg  0.2 mg Intramuscular Q2 Min PRN Norma Hubbard E, DO        Or    naloxone (NARCAN) injection 0.4 mg  0.4 mg Intramuscular Q2 Min PRN Norma Hubbard, DO        ondansetron (ZOFRAN) injection 4 mg  4 mg Intravenous Q6H PRN Benjamin Talavera MD        prochlorperazine (COMPAZINE) injection 5 mg  5 mg Intravenous Q6H PRN Benjamin Talavera MD        senna-docusate (SENOKOT-S/PERICOLACE) 8.6-50 MG per tablet 1 tablet  1 tablet Per Feeding Tube BID PRN Benjamin Talavera MD        Or    senna-docusate (SENOKOT-S/PERICOLACE) 8.6-50 MG per tablet 2 tablet  2 tablet Per Feeding Tube BID PRN Benjamin Talavera MD        sodium chloride (PF) 0.9% PF flush 3 mL  3 mL Intracatheter q1 min prn Benjamin Talavera MD

## 2024-12-03 NOTE — PRE-PROCEDURE
GENERAL PRE-PROCEDURE:   Date/Time:  12/3/2024 7:54 AM    Written consent obtained?: Yes    Risks and benefits: Risks, benefits and alternatives were discussed    DC Plan: Appropriate discharge home plan in place for patients who are going home after procedure   Consent given by:  Patient  Patient states understanding of procedure being performed: Yes    Patient's understanding of procedure matches consent: Yes    Procedure consent matches procedure scheduled: Yes    Appropriately NPO:  Yes    Thanks [facility] Interventional Radiology CNP ([phone]) (phone [phone])
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
## 2024-12-03 NOTE — CONSULTS
Patient is on IR schedule today Tuesday 12/3/24 for a G tube to GJ tube exchange.     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 He presents as a direct admission from [facility] with weakness in the setting of being unable to tolerate his 5 times daily Isosource tube feeding boluses secondary to elevated residual.  Recent hospitalization for aspiration pneumonia and respiratory failure related to the same.  Transferred for GJ placement     Per chart review the patient currently has a 20 Fr, 2.5 cm stoma size, [place] button which was last exchanged 11/2023 which he has done on an annual basis in [place]. The request is for a GJ tube for jejunal feeding and possible venting. IR ([place] system) does not carry 20 Fr g OR gj buttons, OR regular g or gj tubes. Available is 18 Fr or 22 Fr. We do not recommend increasing the size to a 22 Fr as once the tract size is increased it cannot go down in size. IR does not recommend a button style at this time for possible venting. Recommend a 18 Fr GJ tube exchange. An 18 Fr regular GJ tube tube is recommended and can be adjusted to decrease the risk of leaking. Discussed the above with the patient who understands and is willing to proceed.     Also the angle of the current puncture doesn't appear to be conducive to placing a GJ tube easily.     -Labs WNL for procedure.    -Orders for NPO have been entered.   -Procedural education reviewed with patient in detail including, but not limited to risks, benefits and alternatives, questions answered with understanding verbalized by him and consent is in IR. Patient communicates with writing.     Please [place] the IR department at [phone] for procedural related questions.     Imaging reviewed and d/w IR Dr Bejarano today.     Total time: 25 minutes     Thanks, Ban [facility] Interventional Radiology CNP  ([phone]) (phone [phone])

## 2024-12-03 NOTE — PLAN OF CARE
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    Aggression Stop Light: green  Mobility: SBA GB  Pain Management: denies pain  Diet: strict NPO.  Bowel/Bladder: continent. Last BM on 12/1 per reported.   Abnormal Lab/Assessments: see results  Drain/Device/Wound: trach doom at 30L 02, 25 Fi02%. G tube not infusing due to displaced.  trach dependence.   Consults: IR consults,   D/C Day/Goals/Place: TBD  Shift Note: pt had 1 L LR bolus this shift.   - Next due for chemotherapy on Friday.   - Humidified trach dome overnight, writer helped pt cleaning used cannulas with saline and brushing with hydrogen peroxide, bedside suctioning available

## 2024-12-03 NOTE — CONSULTS
CLINICAL NUTRITION SERVICES  -  ASSESSMENT NOTE    Recommendations Ordered by Registered Dietitian (RD):   Weigh pt     Nutrition Support Enteral:  Type of Feeding Tube: GJ (IR on 12/3)  Enteral Frequency:  Continuous   Enteral Regimen: Jevity 1.5 Daniele (or equivalent) @ goal of  60ml/hr  (1440ml/day) provides: 2160 kcals, 91 g PRO, 1094 ml free H20, 310 g CHO, and 30 g fiber daily.   Free Water Flush: 90 ml q4hr fluid flushes for tube patency    - Initiate @ 20 ml/hr and advance by 20 ml q 4 hr as tolerated  - Turn TF off in the morning (8 am) of 12/4 and start nocturnal feedings that evening (RD will updated orders)   - Will need to hold TF for 1 hr before/after Synthroid administration (spoke w/ pharmacist and RN for tomorrow (12/4)   Malnutrition:   % Weight Loss:  SHAW - current wt not entered yet   % Intake:  <75% for > 7 days (moderate malnutrition)  Subcutaneous Fat Loss:  None observed  Muscle Loss:  Clavicle bone region mild-moderate depletion and Dorsal hand region mild-moderate depletion  Fluid Retention:  None noted    Malnutrition Diagnosis: Moderate malnutrition  In Context of:  Acute illness or injury  Chronic illness or disease     REASON FOR ASSESSMENT  Hieu Hughes is a 84 year old male seen by Registered Dietitian for Provider Order - Registered Dietitian to Assess and Order TF per Medical Nutrition Therapy Protocol    PMH of: 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 Directly admitted to [facility] 12/2/2024 for further management including potential G-tube to GJ-tube conversion.   - Per H&P, Since November 24, following his hospital discharge, patient has been  "documenting his residuals.  He typically awakens in the morning with 0 mL of residual, but after his a.m. feeding he often will have 150-200 mL of residual 4 to 5 hours later prior to his second attempted feeding.  He does not report any difficulty with his feeding going through the G-tube into his stomach; it is run via gravity.  Does not have abdominal pain with this.  Last G-tube exchange was 2 weeks ago.     12/2/2024 ...Rather than receiving his 6 feedings per day, he really has only been able to tolerate 4 feedings.  He tells me that he had chemotherapy on Friday.  Felt well on Saturday and Sunday, but somewhat woozy on Monday leading to ER visit.     NUTRITION HISTORY  - Information obtained from chart review and pt in bed   - Tube feeding history - pt would have been doing 2 cans of Isosource 1.5 TID w/ 180 ml H20 flush afterwards. Because of \"the digestive issues\" he had been trying to do 1 can Isosource 1.5 5x/daily w/ 90 ml H20 flush afterwards. He wasn't doing any feedings until the prior feeding residuals were <100 ml. Usually in the morning the residuals were <100 ml but feedings throughout the day would be >100 ml so he would have to wait and ultimately miss some feedings. His last feeding was on 12/2 when he was able to get in 1 can BID. He was pretty good records of all his feedings, water flushes and residuals. He was made aware [facility] doesn't carry Isosource 1.5, he's had Jevity 1.5 at [facility], and is ok w/ Jevity while he's here    CURRENT NUTRITION ORDERS  Diet Order:   NPO     Current Intake/Tolerance: NPO    NUTRITION FOCUSED PHYSICAL ASSESSMENT FOR DIAGNOSING MALNUTRITION)  Yes          Observed:    Muscle wasting (refer to documentation in Malnutrition section)    ANTHROPOMETRICS  Height: Data Unavailable  Weight: 0 lbs 0 oz   There is no height or weight on file to calculate BMI.  Weight Status:  Normal BMI  IBW: 73 kg  % IBW: 105%  Weight History:   Wt Readings from Last 15 Encounters: "   11/29/24 76.7 kg (169 lb)   11/22/24 78.2 kg (172 lb 4.8 oz)   11/08/24 79.9 kg (176 lb 3.2 oz)   11/01/24 81.5 kg (179 lb 9.6 oz)   10/30/24 81.7 kg (180 lb 1.6 oz)   09/17/24 79.2 kg (174 lb 9.6 oz)   09/16/24 77.1 kg (170 lb)   09/02/24 76.6 kg (168 lb 14 oz)   08/27/24 79.6 kg (175 lb 6.4 oz)   08/14/24 79.4 kg (175 lb)   07/31/24 79.1 kg (174 lb 6.4 oz)   07/22/24 77.5 kg (170 lb 13.7 oz)   07/16/24 78.9 kg (174 lb)   05/23/24 79 kg (174 lb 1.6 oz)   10/12/23 75.8 kg (167 lb)     LABS  Reviewed     MEDICATIONS  Synthroid, Reglan    ASSESSED NUTRITION NEEDS PER APPROVED PRACTICE GUIDELINES:  Dosing Weight 76.7 kg (using old wt from 11/29)   Estimated Energy Needs: 0744-5624 kcals (25-30 Kcal/Kg)  Justification: maintenance  Estimated Protein Needs:  grams protein (1.2-1.5 g pro/Kg)  Justification: preservation of lean body mass  Estimated Fluid Needs: 1449-1825  mL (1 mL/Kcal)  Justification: per provider pending fluid status    MALNUTRITION:   % Weight Loss:  SHAW - current wt not entered yet   % Intake:  <75% for > 7 days (moderate malnutrition)  Subcutaneous Fat Loss:  None observed  Muscle Loss:  Clavicle bone region mild-moderate depletion and Dorsal hand region mild-moderate depletion  Fluid Retention:  None noted    Malnutrition Diagnosis: Moderate malnutrition  In Context of:  Acute illness or injury  Chronic illness or disease    NUTRITION DIAGNOSIS:  Inadequate enteral nutrition infusion related to altered GI function and malfunctioning PEG tube as evidenced by pt missing feedings d/t increased gastric residuals, need for a PEG conversion to PEJ, and pt reliant on EN support to meet nutrition needs    NUTRITION INTERVENTIONS  Recommendations / Nutrition Prescription  Weigh pt     Nutrition Support Enteral:  Type of Feeding Tube: GJ (IR on 12/3)  Enteral Frequency:  Continuous  Enteral Regimen: Jevity 1.5 Daniele (or equivalent) @ goal of  60ml/hr  (1440ml/day) provides: 2160 kcals, 91 g PRO, 1094 ml  free H20, 310 g CHO, and 30 g fiber daily.   Free Water Flush: 90 ml q4hr fluid flushes for tube patency    - Initiate @ 20 ml/hr and advance by 20 ml q 4 hr as tolerated  - Turn TF off in the morning (8 am) and start nocturnal feedings that evening   - Will need to hold TF for 1 hr before/after Synthroid administration (spoke w/ pharmacist and RN for tomorrow (12/4)    Implementation  Nutrition education: Per Provider order if indicated   EN Composition, EN Schedule, Feeding Tube Flush, and Collaboration and Referral of Nutrition care    Nutrition Goals  EN - tolerate feedings  EN - goal rate to meet % estimated needs    MONITORING AND EVALUATION:  Progress towards goals will be monitored and evaluated per protocol and Practice Guidelines    Bassem Merino MS, RD, LD

## 2024-12-03 NOTE — PLAN OF CARE
Goal Outcome Evaluation:      Plan of Care Reviewed With: patient          Outcome Evaluation: discharge to home pending final recommendations

## 2024-12-03 NOTE — CONSULTS
Care Management Initial Consult    General Information  Assessment completed with: Patient, patient  Type of CM/SW Visit: CM Role Introduction  Tube Feed Trach Supplies: [facility] [phone] currently receiving monthly isosource 150     Primary Care Provider verified and updated as needed: Yes   Readmission within the last 30 days: no previous admission in last 30 days      Reason for Consult: discharge planning  Advance Care Planning:            Communication Assessment  Patient's communication style: spoken language (English or Bilingual) (using notebook)             Cognitive  Cognitive/Neuro/Behavioral: .WDL except, speech  Level of Consciousness: alert  Arousal Level: opens eyes spontaneously  Orientation: oriented x 4  Mood/Behavior: calm, cooperative  Best Language: 3 - Mute  Speech: unable to speak (trached)    Living Environment:   People in home: alone     Current living Arrangements: house      Able to return to prior arrangements: yes       Family/Social Support:  Care provided by: self  Provides care for: no one  Marital Status: Single  Support system: Children          Description of Support System: Supportive, Involved    Support Assessment: Adequate family and caregiver support, Adequate social supports    Current Resources:   Patient receiving home care services: No        Community Resources: Home Infusion ([facility])  Equipment currently used at home: none  Supplies currently used at home: Nutritional Supplements, Enteral Nutrition & Supplies, Wound Care Supplies, Oxygen Tubing/Supplies, Other (trach supplies)    Employment/Financial:  Employment Status: retired        Financial Concerns: none   Referral to Financial Worker: No       Does the patient's insurance plan have a 3 day qualifying hospital stay waiver?  No    Lifestyle & Psychosocial Needs:  Social Drivers of Health     Food Insecurity: No Food Insecurity (11/10/2024)    Received from [employer]    Hunger Vital Sign     Worried About  Running Out of Food in the Last Year: Never true     Ran Out of Food in the Last Year: Never true   Depression: Not at risk (7/18/2024)    Received from [facility]    PHQ-2     PHQ-2 Score: 0   Housing Stability: Low Risk  (11/10/2024)    Received from [employer]    Housing Stability Vital Sign     Unable to Pay for Housing in the Last Year: No     Number of Times Moved in the Last Year: 0     Homeless in the Last Year: No   Tobacco Use: Medium Risk (12/2/2024)    Received from [facility]    Patient History     Smoking Tobacco Use: Former     Smokeless Tobacco Use: Never     Passive Exposure: Never   Financial Resource Strain: Low Risk  (9/2/2024)    Financial Resource Strain     Within the past 12 months, have you or your family members you live with been unable to get utilities (heat, electricity) when it was really needed?: No   Alcohol Use: Not on file   Transportation Needs: No Transportation Needs (11/10/2024)    Received from [facility]    PRAPARE - Transportation     Lack of Transportation (Medical): No     Lack of Transportation (Non-Medical): No   Physical Activity: Not on file   Interpersonal Safety: Not At Risk (12/2/2024)    Received from [facility]    Humiliation, Afraid, Rape, and Kick questionnaire     Fear of Current or Ex-Partner: No     Emotionally Abused: No     Physically Abused: No     Sexually Abused: No   Stress: Not on file   Social Connections: Unknown (5/4/2023)    [facility]    Social Connections     Frequency of Communication with Friends and Family: Not on file   Health Literacy: Not on file       Functional Status:  Prior to admission patient needed assistance: no             Mental Health Status:  Mental Health Status: No Current Concerns       Chemical Dependency Status:  Chemical Dependency Status: No Current Concerns             Values/Beliefs:  Spiritual, Cultural  Beliefs, Jew Practices, Values that affect care: no               Discussed  Partnership in Safe Discharge Planning  document with patient/family: No    Additional Information:  Writer met with the patient at the bedside. Went over role and scope of discharge planning. Patient is A+Ox4 up ad lucila. Patient mouthing words and using a notebook for communication. Writer went over Medicare with [employer] and answered all questions.  Patient informed this writer that he has already had his procedure done to  G to GJ tube with IR. Nutritionist consult is pending for recommendations.   Patient has [employer] for Tube feeding isosource 150 recqPatient has trach supplies as well.  Patient is wondering which tubes will be used for what informed the patient that once Nutrition sees him with recommendations we will have a plan in place and discharge instructions will also be entered.  Patient is aware that if his tube feeding changes we can work with [employer] to get the information.  Patient is aware he may require a follow up with his PCP (He notes Dr. Remington Doan) and he is agreeable to have this writer schedule next week for mid morning.  Appointment in [place] once scheduled.  Writer attempted to call [employer] to find out where to send new orders (if applicable) phone [phone] however no answer.  Will continue to follow.  Patient noted his family will provide transportation at discharge.  No further needs are identified.    Will sign off once PCP and [employer] information is verified.  Addendum: 12/3 13:25  Confirmed with [employer] if any changes to Tube feeds they require resumption orders and new regimen to be faxed to [phone] they do not have an ETA and we would need to follow up once information is sent. Awaiting consult and message sent to  Covering Nutrition services.  Scheduled the patient for PCP follow up as follows:    You are scheduled to see Dr. Remington Doan on Tuesday December 10, 2024  2:45 p.m for post  hospital follow up located at:  [address] [address]   [phone]    Please contact the clinic if you require anything else.    Addendum: 12/3/24 15:39  Writer notified by Nutrition that patient will need to change from bolus to nocturnal feeds. Writer communicated with [facility] patients current supplier they require documentation for pump/teach. Writer faxed information 12/3 and will follow up 12/4.      Delisa Grover RN, BSN, AC   Care Transitions Specialist  [facility]  Care Transitions Specialist  [address] [place]. [address]  [email or web address]  Office: [phone] Fax: [phone]  [facility]

## 2024-12-03 NOTE — IR NOTE
Interventional Radiology Intra-procedural Nursing Note    Patient Name: Hieu Hughes  Medical Record Number: [id number]  Today's Date: December 3, 2024    Procedure:  gastrostomy tube conversion to gastrojejunostomy tube   Start time: 0838  End time: 0847  Report provided to:   RN  Patient depart time and location: 0850 to 8814    Note: Patient entered Interventional Radiology Suite number 1 via cart. Patient awake, alert and oriented. Assisted onto procedural table in supine position. Prepped and draped.  Dr. Bejarano in room. Time out and procedure started. Vital signs stable.     Procedure well tolerated by patient without complications. Procedure end with debrief by Dr. Bejarano.

## 2024-12-04 LAB — GLUCOSE BLDC GLUCOMTR-MCNC: 156 MG/DL (ref 70–99)

## 2024-12-04 PROCEDURE — 82962 GLUCOSE BLOOD TEST: CPT

## 2024-12-04 PROCEDURE — 99232 SBSQ HOSP IP/OBS MODERATE 35: CPT | Performed by: INTERNAL MEDICINE

## 2024-12-04 PROCEDURE — 250N000013 HC RX MED GY IP 250 OP 250 PS 637: Performed by: INTERNAL MEDICINE

## 2024-12-04 PROCEDURE — 94640 AIRWAY INHALATION TREATMENT: CPT

## 2024-12-04 PROCEDURE — 250N000009 HC RX 250: Performed by: INTERNAL MEDICINE

## 2024-12-04 PROCEDURE — 999N000157 HC STATISTIC RCP TIME EA 10 MIN

## 2024-12-04 PROCEDURE — 96376 TX/PRO/DX INJ SAME DRUG ADON: CPT

## 2024-12-04 PROCEDURE — G0378 HOSPITAL OBSERVATION PER HR: HCPCS

## 2024-12-04 RX ORDER — FAMOTIDINE 40 MG/5ML
20 POWDER, FOR SUSPENSION ORAL DAILY
Status: DISCONTINUED | OUTPATIENT
Start: 2024-12-05 | End: 2024-12-06 | Stop reason: HOSPADM

## 2024-12-04 RX ADMIN — METOCLOPRAMIDE HYDROCHLORIDE 5 MG: 5 SOLUTION ORAL at 06:31

## 2024-12-04 RX ADMIN — PANTOPRAZOLE SODIUM 40 MG: 40 INJECTION, POWDER, FOR SOLUTION INTRAVENOUS at 08:37

## 2024-12-04 RX ADMIN — ALBUTEROL SULFATE 2.5 MG: 2.5 SOLUTION RESPIRATORY (INHALATION) at 10:45

## 2024-12-04 RX ADMIN — METOCLOPRAMIDE HYDROCHLORIDE 5 MG: 5 SOLUTION ORAL at 11:32

## 2024-12-04 RX ADMIN — METOCLOPRAMIDE HYDROCHLORIDE 5 MG: 5 SOLUTION ORAL at 22:32

## 2024-12-04 RX ADMIN — METOCLOPRAMIDE HYDROCHLORIDE 5 MG: 5 SOLUTION ORAL at 18:08

## 2024-12-04 RX ADMIN — LEVOTHYROXINE SODIUM 100 MCG: 100 TABLET ORAL at 09:40

## 2024-12-04 ASSESSMENT — ACTIVITIES OF DAILY LIVING (ADL)
ADLS_ACUITY_SCORE: 58
ADLS_ACUITY_SCORE: 59
ADLS_ACUITY_SCORE: 58
ADLS_ACUITY_SCORE: 59
ADLS_ACUITY_SCORE: 59
ADLS_ACUITY_SCORE: 58
ADLS_ACUITY_SCORE: 59
ADLS_ACUITY_SCORE: 58
ADLS_ACUITY_SCORE: 59

## 2024-12-04 NOTE — DISCHARGE INSTRUCTIONS
TF via [employer]-port:  Jevity 1.5 or equivalent (ok to use Isosource 1.5 formula)  (12/4) run TF at 85 mL/hr x 14 hrs (6pm-8am)  (12/5) run TF at goal of 105 mL/hr x 14 hrs (6pm-8am)  Free water flushes of 100 mL 6 times per day - be sure to give 1 flush before and 1 flush after cycle    Pt may choose 14 hr cycle timing once home, depending on preference

## 2024-12-04 NOTE — PROGRESS NOTES
Chart reviewed  Visited with pt this morning  Tells me that he tolerated TF of Jevity 1.5 @ 60 mL/hr  Currently off in preparation of starting night cycle this evening  Pt agreeable to advancement plan  Discussed with Care Coordinator - entered discharge orders (as below)    PLAN:  1) Tonight (12/4) start TF at 85 mL/hr x 14 hrs (6pm-8am)  2) Tomorrow night (12/5) run TF at goal of 105 mL/hr x 14 hrs (6pm-8am)  3) Free water flushes of 100 mL 6 times per day - be sure to give 1 flush before and 1 flush after cycle    OK for pt to use Isosource 1.5 formula (was running this PTA)  Pt may also choose cycle timing once home    Katlyn Travis RD, LD  Clinical Dietitian - [facility]   Pager - [phone]

## 2024-12-04 NOTE — PLAN OF CARE
Orientation: A&O x4, unable to speak d/t trach, uses notebook for communication  Aggression Stop Light: Green  Activity: ind in room- refuses bed alarm, education provided.   Diet/BS Checks: NPO: No Exceptions. Meds  given via GJ-Tube  Tele:  N/A  IV Access/Drains: L PIV SL  Pain Management: Denies  Abnormal VS/Results: VSS on RA ex bradycardia  Bowel/Bladder: Continent of B/B  Skin/Wounds: Skin dry and flaky, scattered scabs  Consults: IR  D/C Disposition: Pending  Other Info:   - Pt had G-tube conversion to GJ this shift.   - TF running at 20ml/hr with Q4H 60ml flushes- Goal rate= 60ml/hr  - Plan to stop TF tomorrow 12/4/2024 at 8am

## 2024-12-04 NOTE — CONSULTS
Care Management Follow Up    Length of Stay (days): 0    Expected Discharge Date: 12/04/2024     Concerns to be Addressed: discharge planning     Patient plan of care discussed at interdisciplinary rounds: Yes    Anticipated Discharge Disposition: Home Infusion (resumption of Tube feeds and supplies [employer])              Anticipated Discharge Services: None  Anticipated Discharge DME: None    Patient/family educated on Medicare website which has current facility and service quality ratings:    Education Provided on the Discharge Plan: Yes  Patient/Family in Agreement with the Plan: yes    Referrals Placed by CM/SW: External Care Coordination, Communication hand-offs to next level of Care Providers  Private pay costs discussed: Not applicable    Discussed  Partnership in Safe Discharge Planning  document with patient/family: No     Handoff Completed: No, handoff not indicated or clinically appropriate    Additional Information:  Writer met with the patient at the bedside. Writer gave update on [employer] we have submitted the notes and documentation for new orders of nocturnal feed and pump.  Per [employer] phone [phone] fax [phone] called x2 today 08:48 a.m. and 10:50 a.m.   Talked to Kwadwo she noted that they received the initial paperwork 12/3 and this is in review with their MD.  She noted that they are also working on securing an RN for teach with new pump and nocturnal cycle.  Per Nutrition patient would be able to use his same isosource 1.5 at discharge.  Kwadwo is unable to confirm is this is able to be accomplished by today. Writer asked that they call the CCRN number ASAP with update to provide to discharging provider and the patient.  Patient noted difficulty in working with [employer] and is aware that if we receive additional information for discharge today 12/4 we will let him know. Patient stated he is feeling more tired but declined the need for any homecare.  Care Transitions will continue to  follow for further discharge planning needs as identified.  Addendum: 12/4 14:21  Received a call from Helen at [facility] she is attempting to fax the orders for pump and supplies for insurance coverage to be signed by provider.   She noted that [facility] should be able to drop off the pump to the hospital 12/5 she asked which pump the hospital was using writer gave information on kangaroo pump.   Writer asked Helen if they have a rep coming to the patient's home for hook up and teach 12/5 she noted they do not have coverage in that area and they would ask our staff to teach, writer noted our staff does not do teaching for home tube feedings. Helen to [employer] back on the above.  Writer submitted for homecare RN through [facility] to see if we can get an RN for 12/5 for teach if [facility] is unable to do so.  Updated rounding provider will update the patient when more is known.  Addendum: 12/4 16:00  Writer awaiting a call back from [facility] they have to see if they can work with insurance and will update this writer.  [facility] will not be able to outsource to [place] so writer called the following homecare agencies:  -[facility] (patient open to them in 5/24) unable to staff  -[facility] faxed facesheet and updated notes attalan Gretta fax [phone] await a call back they noted they may have a start of care on Friday. They have 88 CCRN number for follow up.  -Writer reached out to [facility] they have everything they need for the supplies they will deliver the pump to the hospital and IV pole to the home. They also mentioned they could work with the patient over the phone. Writer will check with the patient tomorrow to see if he is comfortable with this option and possible website instructions.        Delisa Grover RN, BSN, ACM   Care Transitions Specialist  [facility]  Care Transitions Specialist  [address] [place]. [address]  [email or web address]  Office: [phone] Fax: [phone]  [facility]

## 2024-12-04 NOTE — PROGRESS NOTES
[facility]    Internal Medicine Hospitalist Progress Note  12/04/2024  I evaluated patient on the above date.    Elvis Jerez Jr., MD  [phone] (p)  Text Page  Vocera      [New actions/orders today (12/04/2024) are underlined in the assessment and plan. 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 Directly admitted to [facility] 12/2/2024 for further management including potential G-tube to GJ-tube conversion.      G-tube malfunction - s/p GJ-tube conversion 12/3/2024.  Moderate protein calorie malnutrition related to of intolerance of gastric feedings.  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  Increasing postvoid residuals as well as episodes of emesis resulting in respiratory failure and prolonged hospitalization in November.  Has been monitoring his postvoid residuals, and because of increased residuals has only been receiving 4 feedings per day rather than his prior 6 feedings per day.  Often will have no residual prior to his morning feeding, but then will have residuals of 200 mL 4 to 5 hours later.  This is not always the case.  Last G-tube exchange approximately 2 weeks ago, and it does not appear that he has had improvement in his residual since that time.  * Initial presentation as above. IR consulted on admit. Started on IV pantoprazole on admit (PTA on famotidine). Given IVF's on admit.  * 12/3: Underwent IR G-tube to GJ-tube conversion. Nutrition  consulted; started continuous TF's via J-tube as cannot give bolus feed through the G-tube (previously on bolus feedings as noted).  * 12/4: Improved; no residuals noted.  - Continue continuous TF's, plan nocturnal at discharge.  - Continue PTA famotidine.  - Continue PTA metoclopramide, PRN ondansetron and PRN prochlorperazine.  - Will need additional home supplies including pump for continuous feedings at home.    Generalized weakness, suspect multifactorial primarily driven by volume depletion with inability to tolerate his tube feeds and free water flushes.  * Initial presentation as above. Given IVF's on admit.  - Will need to ensure ability to tolerate postpyloric feedings prior to discharge.  - Ambulate 4 times daily with assist.    Squamous cell carcinoma base of tongue.  S/p tracheostomy and G-tube.  * Initial diagnosis in 2009. Noted to be unresectable. Currently on palliative chemotherapy with carboplatin, paclitaxel and pembrolizumab. Has completed 2 cycles (prior treatment w/ pembrolizumab alone w/ progression). Next due for chemotherapy on 12/6.  * Initial presentation as above. On admit, noted he typically replaces his inner 14 Macedonian cannula 4-8 times per day, cleaning used cannulas with saline and brushing with hydrogen peroxide.   - RT consulted for tracheostomy cares.  - Continue humidified trach dome.  - Continue PRN bedside suctioning (on admit, patient reports he rarely uses suction).  - Continue PRN albuterol nebs and sodium chloride nebs.    Hypothyroidism.  - Continue levothyroxine.    Right sided carotid artery stenosis.  * On admit, noted he has an appointment with neurosurgery via telephone visit 12/3.    - Try to call in to appointment if able today 12/3, ?set up video appointment as likely will not be able to communicate effectively with tracheostomy.  - Note also he has a history of epistaxis with aspirin, so this would need to be addressed if there would be a plan for surgical  intervention on his carotid.        Clinically Significant Risk Factors Present on Admission               # Hypoalbuminemia: Lowest albumin = 3.1 g/dL at 12/3/2024  9:51 AM, will monitor as appropriate                # Moderate Malnutrition: based on nutrition assessment     # Financial/Environmental Concerns: none         Diet: NPO for Medical/Clinical Reasons Except for: No Exceptions  Adult Formula Drip Feeding: Continuous Jevity 1.5; Jejunostomy; Goal Rate: 105 mL/hr x 14 hrs (6pm-8am); mL/hr; (12/4) Run TF at 85 mL/hr x 14 hrs (6pm-8am).  On (12/5), run TF at goal rate of 105 mL/hr x 14 hrs (6pm-8am); Do not advance tube feed...    Prophylaxis: PCD's and ambulation  Topete Catheter: Not present  Lines: None     Code Status: Full Code    Disposition Plan   Medically Ready for Discharge: Anticipated Tomorrow  Expected discharge to home pending above.    Entered: Elvis Jerez MD 12/04/2024, 1:58 PM               Interval History   Feels weaker today.  Feels it is due to starting TF's late yesterday with initial lower rate.  Otherwise, no residuals so far today.    * Data reviewed today: I reviewed all new labs and imaging over the last 24 hours. I personally reviewed no images or ECG's today.    Physical Exam   Most recent vitals:   , Blood pressure 105/53, pulse 70, temperature 99.2  F (37.3  C), temperature source Oral, resp. rate 18, weight 71.6 kg (157 lb 14.4 oz), SpO2 91%. O2 Device: None (Room air) Oxygen Delivery: 2 LPM  Vitals:    12/04/24 0600   Weight: 71.6 kg (157 lb 14.4 oz)     Vital signs with ranges:  Temp:  [97.6  F (36.4  C)-99.2  F (37.3  C)] 99.2  F (37.3  C)  Pulse:  [56-70] 70  Resp:  [16-18] 18  BP: ()/(51-56) 105/53  SpO2:  [91 %-96 %] 91 %  Patient Vitals for the past 24 hrs:   BP Temp Temp src Pulse Resp SpO2 Weight   12/04/24 1136 105/53 99.2  F (37.3  C) Oral 70 18 91 % --   12/04/24 1045 -- -- -- -- -- 94 % --   12/04/24 0731 122/56 99  F (37.2  C) Oral 59 16 96 % --  "  12/04/24 0600 -- -- -- -- -- -- 71.6 kg (157 lb 14.4 oz)   12/03/24 2237 95/51 98.9  F (37.2  C) Oral 58 16 95 % --   12/03/24 1942 103/53 97.6  F (36.4  C) Oral 56 16 93 % --   12/03/24 1524 103/52 98.6  F (37  C) Oral 61 16 95 % --     I/O's last 24 hours:  I/O last 3 completed shifts:  In: 100   Out: -     Constitutional: awake, alert, oriented, pleasant   Head:   Eyes:   ENT:   Neck:   Cardiovascular: regular rate and rhythm; no murmurs, rubs or gallops  Lungs: diminished in the bases, no crackles or wheezes  Gastrointestinal/Abdomen: soft, non-tender, non-distended, positive bowel sounds  :   Musculoskeletal:   Skin/Extremities:   Neurologic:   Psychiatric:   Hematologic/Lymphatic/Immunologic:        Labs reviewed.  Recent Labs   Lab 12/03/24  0951 11/29/24  1020   WBC  --  4.0   HGB  --  9.9*   MCV  --  97   PLT  --  298    141   POTASSIUM 4.5 4.4   CHLORIDE 103 100   CO2 30* 33*   BUN 18.4 18.8   CR 0.67 0.76   ANIONGAP 6* 8   MANPREET 8.7* 9.4   GLC 91 117*   ALBUMIN 3.1* 3.6   PROTTOTAL 6.1* 7.2   BILITOTAL 0.7 0.2   ALKPHOS 88 108   ALT 15 17   AST 17 19     No lab results found.  Recent Labs   Lab 12/03/24  0951 11/29/24  1020   GLC 91 117*     No lab results found.    No results for input(s): \"INR\", \"[id number]\" in the last 168 hours.  Recent Labs   Lab 11/29/24  1020   WBC 4.0       MICRO:  Cultures (including blood and urine):  No lab results found in last 7 days.    No results found for this or any previous visit (from the past 24 hours).      Medications   All medications were reviewed. MAR.    Infusions:  Current Facility-Administered Medications   Medication Dose Route Frequency Provider Last Rate Last Admin    dextrose 10% infusion   Intravenous Continuous PRN Elvis Jerez MD         Scheduled Medications:  Current Facility-Administered Medications   Medication Dose Route Frequency Provider Last Rate Last Admin    levothyroxine (SYNTHROID/LEVOTHROID) tablet 100 mcg  100 mcg Oral " Daily Elvis Jerez MD   100 mcg at 12/04/24 0940    metoclopramide (REGLAN) solution 5 mg  5 mg Per PEG tube 4x Daily AC & HS Elvis Jerez MD   5 mg at 12/04/24 1132    pantoprazole (PROTONIX) IV push injection 40 mg  40 mg Intravenous BID Talavera, Benjamin Ortega MD   40 mg at 12/04/24 0837    sodium chloride (PF) 0.9% PF flush 3 mL  3 mL Intracatheter Q8H Talavera, Benjamin Ortega MD   3 mL at 12/04/24 0837     PRN Medications:  Current Facility-Administered Medications   Medication Dose Route Frequency Provider Last Rate Last Admin    acetaminophen (TYLENOL) oral liquid 650 mg  650 mg Per Feeding Tube Q4H PRN Ilan, Benjamin Ortega MD        albuterol (PROVENTIL) neb solution 2.5 mg  2.5 mg Nebulization Q4H PRN Ilan, Benjamin Ortega MD   2.5 mg at 12/04/24 1045    dextrose 10% infusion   Intravenous Continuous PRN Elvis Jerez MD        HYDROmorphone (DILAUDID) injection 0.2 mg  0.2 mg Intravenous Q2H PRN Benjamin Talavera MD        HYDROmorphone (DILAUDID) injection 0.4 mg  0.4 mg Intravenous Q2H PRN Ilan, Benjamin Ortega MD        lidocaine (LMX4) cream   Topical Q1H PRN Ilan, Benjamin Ortega MD        lidocaine 1 % 0.1-1 mL  0.1-1 mL Other Q1H PRN Ilan, Benjamin Ortega MD        naloxone (NARCAN) injection 0.2 mg  0.2 mg Intravenous Q2 Min PRN Norma Hubbard DO        Or    naloxone (NARCAN) injection 0.4 mg  0.4 mg Intravenous Q2 Min PRN Norma Hubbard, DO        Or    naloxone (NARCAN) injection 0.2 mg  0.2 mg Intramuscular Q2 Min PRN Norma Hubbard DO        Or    naloxone (NARCAN) injection 0.4 mg  0.4 mg Intramuscular Q2 Min PRN Norma Hubbard DO        ondansetron (ZOFRAN) injection 4 mg  4 mg Intravenous Q6H PRN Ilan, Benjamin Ortega MD        ondansetron (ZOFRAN) tablet 8 mg  8 mg Oral Q8H PRN Elvis Jerez MD        prochlorperazine (COMPAZINE) injection 5 mg  5 mg Intravenous Q6H PRN Benjamin Talavera MD        prochlorperazine (COMPAZINE) tablet 5 mg  5 mg Oral Q6H PRN Meri  Elvis Schulte MD        senna-docusate (SENOKOT-S/PERICOLACE) 8.6-50 MG per tablet 1 tablet  1 tablet Per Feeding Tube BID PRN Benjamin Talavera MD        Or    senna-docusate (SENOKOT-S/PERICOLACE) 8.6-50 MG per tablet 2 tablet  2 tablet Per Feeding Tube BID PRN Benjamin Talavera MD        sodium chloride (PF) 0.9% PF flush 3 mL  3 mL Intracatheter q1 min prn Benjamin Talavera MD   3 mL at 12/03/24 2102    sodium chloride 0.9 % neb solution 3 mL  3 mL Nebulization Q3H PRN Elvis Jerez MD

## 2024-12-05 VITALS
HEART RATE: 59 BPM | WEIGHT: 157.14 LBS | RESPIRATION RATE: 18 BRPM | SYSTOLIC BLOOD PRESSURE: 104 MMHG | BODY MASS INDEX: 22.55 KG/M2 | OXYGEN SATURATION: 97 % | DIASTOLIC BLOOD PRESSURE: 52 MMHG | TEMPERATURE: 98.2 F

## 2024-12-05 DIAGNOSIS — Z78.9 ENCOUNTER FOR GASTROJEJUNAL (GJ) TUBE PLACEMENT: Primary | ICD-10-CM

## 2024-12-05 PROCEDURE — 99232 SBSQ HOSP IP/OBS MODERATE 35: CPT | Performed by: INTERNAL MEDICINE

## 2024-12-05 PROCEDURE — 250N000013 HC RX MED GY IP 250 OP 250 PS 637: Performed by: INTERNAL MEDICINE

## 2024-12-05 PROCEDURE — G0378 HOSPITAL OBSERVATION PER HR: HCPCS

## 2024-12-05 RX ADMIN — LEVOTHYROXINE SODIUM 100 MCG: 100 TABLET ORAL at 09:14

## 2024-12-05 RX ADMIN — METOCLOPRAMIDE HYDROCHLORIDE 5 MG: 5 SOLUTION ORAL at 22:03

## 2024-12-05 RX ADMIN — METOCLOPRAMIDE HYDROCHLORIDE 5 MG: 5 SOLUTION ORAL at 06:55

## 2024-12-05 RX ADMIN — METOCLOPRAMIDE HYDROCHLORIDE 5 MG: 5 SOLUTION ORAL at 16:51

## 2024-12-05 RX ADMIN — SENNOSIDES AND DOCUSATE SODIUM 1 TABLET: 50; 8.6 TABLET ORAL at 09:23

## 2024-12-05 RX ADMIN — METOCLOPRAMIDE HYDROCHLORIDE 5 MG: 5 SOLUTION ORAL at 10:30

## 2024-12-05 RX ADMIN — FAMOTIDINE 20 MG: 40 POWDER, FOR SUSPENSION ORAL at 09:14

## 2024-12-05 ASSESSMENT — ACTIVITIES OF DAILY LIVING (ADL)
ADLS_ACUITY_SCORE: 58

## 2024-12-05 NOTE — DISCHARGE SUMMARY
"[facility]  Discharge Summary        Hieu Hughes MRN# [id number]   YOB: 1940 Age: 84 year old     Date of Admission: 12/2/2024  Date of Discharge: 12/06/2024  Admitting Physician: Benjamin Talavera MD  Discharge Physician: Elvis Jerez MD     Primary Provider: Remington Doan  Primary Care Physician Phone Number: [phone]         Discharge Diagnoses:   G-tube malfunction - s/p GJ-tube conversion 12/3/2024.  Moderate protein calorie malnutrition related to of intolerance of gastric feedings.  Generalized weakness, suspect multifactorial primarily driven by volume depletion with inability to tolerate his tube feeds and free water flushes.        Other Chronic Medical Problems:      Squamous cell carcinoma base of tongue.  S/p tracheostomy and G-tube.  Hypothyroidism.  Right sided carotid artery stenosis.       Allergies:         Allergies   Allergen Reactions    Mold Shortness Of Breath    Molds & Smuts Difficulty breathing    No Clinical Screening - See Comments Shortness Of Breath    Aspirin Other (See Comments)     Nose bleeds  Nose bleeds  Nose bleeds  Nose bleeds  Nose bleeds  Nose bleeds      Penicillins Other (See Comments)     Comment:  , Description:   Currently denies allergy (2017)  Reports having received PCN on multiple occassions with no adverse reactions;  Was simply advised of \"risk\" of reaction due to \"enormous\" dose he was once given for a thigh infection  Comment:  , Description:   Currently denies allergy (2017)  Comment:  , Description:   Currently denies allergy (2017)  Reports having received PCN on multiple occassions with no adverse reactions;  Was simply advised of \"risk\" of reaction due to \"enormous\" dose he was once given for a thigh infection           Discharge Medications:        Current Discharge Medication List        CONTINUE these medications which have NOT CHANGED    Details   albuterol (PROVENTIL) (2.5 MG/3ML) 0.083% neb solution " 2.5 mg every 4 hours as needed.      famotidine (PEPCID) 40 MG/5ML suspension Take 2.5 mLs (20 mg) by mouth daily.  Qty: 150 mL, Refills: 1    Associated Diagnoses: Gastroesophageal reflux disease without esophagitis      ferrous sulfate 220 (44 Fe) MG/5ML SOLN Place 7.4 mLs into G tube every other day.      Levothyroxine Sodium (SYNTHROID PO) Take 100 mcg by mouth daily Crushes to take in water      metoclopramide (REGLAN) 5 MG/5ML solution Place 5 mLs (5 mg) into PEG tube 4 times daily (before meals and nightly).  Qty: 240 mL, Refills: 1    Associated Diagnoses: Gastroesophageal reflux disease without esophagitis      ondansetron (ZOFRAN) 8 MG tablet Take 1 tablet (8 mg) by mouth every 8 hours as needed for nausea (vomiting).  Qty: 30 tablet, Refills: 11    Associated Diagnoses: Cancer of base of tongue (H); 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)      prochlorperazine (COMPAZINE) 10 MG tablet Take 0.5 tablets (5 mg) by mouth every 6 hours as needed for nausea or vomiting.  Qty: 30 tablet, Refills: 11    Associated Diagnoses: Cancer of base of tongue (H); 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)      sodium chloride 0.9 % neb solution Take 3 mLs by nebulization every 3 hours as needed for wheezing Use for tracheostomy lavage every 2-4 hours as needed  Qty: 500 mL, Refills: 11    Associated Diagnoses: Tracheostomy care (H); Bilateral vocal fold paralysis      sodium chloride 0.9%, bottle, 0.9 % irrigation Irrigate with 30 mLs as directed 3 times daily Use for routine tracheostomy care and cleaning  Qty: 2000 mL, Refills: 11    Associated Diagnoses: Bilateral vocal fold paralysis; Tracheostomy care (H)                 Discharge Instructions and Follow-Up:      Discharge Orders      Home Care Referral      Follow-up and recommended labs and tests     Follow-up with primary care provider, Remington Doan, within 7 days for hospital follow- up.  The following labs/tests are recommended: CBC, BMP.     Activity     Your activity upon discharge: activity as tolerated     Reason for your hospital stay    1. G-tube malfunction - s/p GJ-tube conversion 12/3/2024.  2. Moderate protein calorie malnutrition related to of intolerance of gastric feedings.  3. Generalized weakness, suspect multifactorial primarily driven by volume depletion with inability to tolerate his tube feeds and free water flushes.     Diet    Follow this diet upon discharge: Orders Placed This Encounter      Adult Formula Drip Feeding: Continuous Jevity 1.5; Jejunostomy; Goal Rate: 105 mL/hr x 14 hrs (6pm-8am); mL/hr; (12/4) Run TF at 85 mL/hr x 14 hrs (6pm-8am).  On (12/5), run TF at goal rate of 105 mL/hr x 14 hrs (6pm-8am); Do not advance tube feed...      NPO for Medical/Clinical Reasons Except for: No Exceptions             Consultations This Hospital Stay:      INTERVENTIONAL RADIOLOGY ADULT/PEDS IP CONSULT  CARE MANAGEMENT / SOCIAL WORK IP CONSULT  NUTRITION SERVICES ADULT IP CONSULT  PHARMACY IP CONSULT        Admission History:      Please see the H&P by Benjamin Talavera MD on 12/2/2024 for complete details. 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 Directly admitted to [facility] 12/2/2024 for further management including potential G-tube to GJ-tube conversion.        Problem Oriented Hospital Course:        G-tube malfunction - s/p GJ-tube conversion 12/3/2024.  Moderate protein calorie malnutrition related to of intolerance of gastric feedings.  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   Increasing postvoid residuals as well as episodes of emesis resulting in respiratory failure and prolonged hospitalization in November.  Has been monitoring his postvoid residuals, and because of increased residuals has only been receiving 4 feedings per day rather than his prior 6 feedings per day.  Often will have no residual prior to his morning feeding, but then will have residuals of 200 mL 4 to 5 hours later.  This is not always the case.  Last G-tube exchange approximately 2 weeks ago, and it does not appear that he has had improvement in his residual since that time.  * Initial presentation as above. IR consulted on admit. Started on IV pantoprazole on admit (PTA on famotidine). Given IVF's on admit.  * 12/3: Underwent IR G-tube to GJ-tube conversion. Nutrition consulted; started continuous TF's via J-tube as cannot give bolus feed through the G-tube (previously on bolus feedings as noted).  * 12/4: Improved; no residuals noted.  - Continue continuous TF's, plan nocturnal at discharge.  - Continue PTA famotidine.  - Continue PTA metoclopramide, PRN ondansetron and PRN prochlorperazine.  - Arrange for additional home supplies including pump for continuous feedings at home.    Generalized weakness, suspect multifactorial primarily driven by volume depletion with inability to tolerate his tube feeds and free water flushes.  * Initial presentation as above. Given IVF's on admit.  * Weakness improved 12/5.  - Continue to increase activity as able.  - Continue to treat other issues as noted.    Squamous cell carcinoma base of tongue.  S/p tracheostomy and G-tube.  * Initial diagnosis in 2009. Noted to be unresectable. Currently on palliative chemotherapy with carboplatin, paclitaxel and pembrolizumab. Has completed 2 cycles (prior treatment w/ pembrolizumab alone w/ progression). Next due for chemotherapy on 12/6.  * Initial presentation as above. On admit, noted he typically replaces his inner 14 Faroese cannula  4-8 times per day, cleaning used cannulas with saline and brushing with hydrogen peroxide.   - RT consulted for tracheostomy cares.  - Continue humidified trach dome.  - Continue PRN bedside suctioning (on admit, patient reports he rarely uses suction).  - Continue PRN albuterol nebs and sodium chloride nebs.    Hypothyroidism.  - Continue levothyroxine.    Right sided carotid artery stenosis.  * On admit, noted he has an appointment with neurosurgery via telephone visit 12/3.    - Follow-up outpatient.      Clinically Significant Risk Factors Present on Admission               # Hypoalbuminemia: Lowest albumin = 3.1 g/dL at 12/3/2024  9:51 AM, will monitor as appropriate                # Moderate Malnutrition: based on nutrition assessment     # Financial/Environmental Concerns: none               Pending Results:        Unresulted Labs Ordered in the Past 30 Days of this Admission       No orders found from 11/2/2024 to 12/3/2024.                  Discharge Disposition:      Discharge to home with home health services.        Discharge Time:      Less than 30 minutes.          Condition and Physical on Discharge:    See progress note on the same date as this discharge summary.          Key Imaging Studies, Lab Findings and Procedures/Surgeries:        Results for orders placed or performed during the hospital encounter of 12/02/24   IR Gastro Tube To Gastrojejuno Convert    [facility] RADIOLOGY  DATE: 12/3/2024    PROCEDURE: FLUOROSCOPIC GUIDED GASTROSTOMY TO GASTROJEJUNOSTOMY  CONVERSION    INTERVENTIONAL RADIOLOGIST: Mariana Bejarano D.O.    INDICATION: Large residual volumes. Squamous cell carcinoma in the  head and neck. Conversion from G to GJ requested.    MODERATE SEDATION: None.    CONTRAST: 25 mL Omnipaque enteric.  ANTIBIOTICS: None.  ADDITIONAL MEDICATIONS: None.    FLUOROSCOPIC TIME: 1.4 minutes.  RADIATION DOSE: Air Kerma: 10.53 mGy.    COMPLICATIONS: No immediate complications.    STERILE  BARRIER TECHNIQUE: Maximum sterile barrier technique was used.  Cutaneous antisepsis was performed at the operative site with  application of 2% chlorhexidine and large sterile drape. Prior to the  procedure, the  and assistant performed hand hygiene and wore  hat, mask, sterile gown, and sterile gloves during the entire  procedure.    PROCEDURE: Under fluoroscopic guidance, the lumen of the pre-existing  gastrostomy was injected with contrast and images were obtained.     The gastrostomy was removed over a wire. A catheter and wire were  manipulated through the gastric pylorus and duodenum and positioned in  the proximal jejunum.    Following tract dilatation, over wire exchange was made for a new 18F  45 cm SURENDRA gastrojejunostomy which was positioned under fluoroscopic  guidance with its distal tip in the proximal jejunum. The retention  balloon was inflated with 10 mL of saline. A post placement contrast  injection of both the gastric and jejunal lumens was performed.    FINDINGS:  The initial injection demonstrates that the gastric lumen  is patent and in appropriate position.    After exchange/conversion, the new gastrojejunostomy is in appropriate  position with the gastric lumen emptying into the stomach and jejunal  lumen emptying near the ligament of Treitz.      Impression    IMPRESSION:    1.  Successful conversion of the pre-existing gastrostomy to a new  gastrojejunostomy as detailed above.   2.  The patient initially had a 20 Monegasque 2.5 cm stoma length  gastrostomy tube, however, the stoma length was too long. An 18 Monegasque  regular GJ tube was placed, because GJ tubes do not come in 20 Monegasque  from our vendor and we also did not have 2.0 stoma length 18 Albanian GJ  tubes in stock.  3.  Both the gastric and jejunal lumens are ready for use immediately.    PORFIRIO RODRIGUEZ DO         SYSTEM ID:  [id number]

## 2024-12-05 NOTE — PROGRESS NOTES
[facility]    Internal Medicine Hospitalist Progress Note  12/05/2024  I evaluated patient on the above date.    Elvis Jerez Jr., MD  [phone] (p)  Text Page  Vocera      [New actions/orders today (12/05/2024) are underlined in the assessment and plan. 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 Directly admitted to [facility] 12/2/2024 for further management including potential G-tube to GJ-tube conversion.      G-tube malfunction - s/p GJ-tube conversion 12/3/2024.  Moderate protein calorie malnutrition related to of intolerance of gastric feedings.  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  Increasing postvoid residuals as well as episodes of emesis resulting in respiratory failure and prolonged hospitalization in November.  Has been monitoring his postvoid residuals, and because of increased residuals has only been receiving 4 feedings per day rather than his prior 6 feedings per day.  Often will have no residual prior to his morning feeding, but then will have residuals of 200 mL 4 to 5 hours later.  This is not always the case.  Last G-tube exchange approximately 2 weeks ago, and it does not appear that he has had improvement in his residual since that time.  * Initial presentation as above. IR consulted on admit. Started on IV pantoprazole on admit (PTA on famotidine). Given IVF's on admit.  * 12/3: Underwent IR G-tube to GJ-tube conversion. Nutrition  consulted; started continuous TF's via J-tube as cannot give bolus feed through the G-tube (previously on bolus feedings as noted).  * 12/4: Improved; no residuals noted.  - Continue continuous TF's, plan nocturnal at discharge.  - Continue PTA famotidine.  - Continue PTA metoclopramide, PRN ondansetron and PRN prochlorperazine.  - Will need additional home supplies including pump for continuous feedings at home.    Generalized weakness, suspect multifactorial primarily driven by volume depletion with inability to tolerate his tube feeds and free water flushes.  * Initial presentation as above. Given IVF's on admit.  * Weakness improved 12/5.  - Continue to increase activity as able.  - Continue to treat other issues as noted.    Squamous cell carcinoma base of tongue.  S/p tracheostomy and G-tube.  * Initial diagnosis in 2009. Noted to be unresectable. Currently on palliative chemotherapy with carboplatin, paclitaxel and pembrolizumab. Has completed 2 cycles (prior treatment w/ pembrolizumab alone w/ progression). Next due for chemotherapy on 12/6.  * Initial presentation as above. On admit, noted he typically replaces his inner 14 Tamazight cannula 4-8 times per day, cleaning used cannulas with saline and brushing with hydrogen peroxide.   - RT consulted for tracheostomy cares.  - Continue humidified trach dome.  - Continue PRN bedside suctioning (on admit, patient reports he rarely uses suction).  - Continue PRN albuterol nebs and sodium chloride nebs.    Hypothyroidism.  - Continue levothyroxine.    Right sided carotid artery stenosis.  * On admit, noted he has an appointment with neurosurgery via telephone visit 12/3.    - Try to call in to appointment if able today 12/3, ?set up video appointment as likely will not be able to communicate effectively with tracheostomy.  - Note also he has a history of epistaxis with aspirin, so this would need to be addressed if there would be a plan for surgical intervention on  his carotid.        Clinically Significant Risk Factors Present on Admission               # Hypoalbuminemia: Lowest albumin = 3.1 g/dL at 12/3/2024  9:51 AM, will monitor as appropriate                # Moderate Malnutrition: based on nutrition assessment     # Financial/Environmental Concerns: none         Diet: NPO for Medical/Clinical Reasons Except for: No Exceptions  Adult Formula Drip Feeding: Continuous Jevity 1.5; Jejunostomy; Goal Rate: 105 mL/hr x 14 hrs (6pm-8am); mL/hr; (12/4) Run TF at 85 mL/hr x 14 hrs (6pm-8am).  On (12/5), run TF at goal rate of 105 mL/hr x 14 hrs (6pm-8am); Do not advance tube feed...  Diet    Prophylaxis: PCD's and ambulation  Topete Catheter: Not present  Lines: None     Code Status: Full Code    Disposition Plan   Medically Ready for Discharge: Anticipated Today  Expected discharge to home pending  arrangement of supplies to allow for continuous tube feedings .    Entered: Elvis Jerez MD 12/05/2024, 10:23 AM           Interval History   Doing better.  Feeling stronger.    * Data reviewed today: I reviewed all new labs and imaging over the last 24 hours. I personally reviewed no images or ECG's today.    Physical Exam   Most recent vitals:   , Blood pressure 95/46, pulse 60, temperature 99  F (37.2  C), temperature source Oral, resp. rate 18, weight 71.3 kg (157 lb 2.2 oz), SpO2 94%. O2 Device: None (Room air) Oxygen Delivery: 2 LPM  Vitals:    12/04/24 0600 12/05/24 0445   Weight: 71.6 kg (157 lb 14.4 oz) 71.3 kg (157 lb 2.2 oz)     Vital signs with ranges:  Temp:  [99  F (37.2  C)-99.5  F (37.5  C)] 99  F (37.2  C)  Pulse:  [60-74] 60  Resp:  [18] 18  BP: ()/(46-57) 95/46  FiO2 (%):  [28 %] 28 %  SpO2:  [89 %-97 %] 94 %  Patient Vitals for the past 24 hrs:   BP Temp Temp src Pulse Resp SpO2 Weight   12/05/24 0810 95/46 99  F (37.2  C) Oral 60 18 94 % --   12/05/24 0445 -- -- -- -- -- -- 71.3 kg (157 lb 2.2 oz)   12/05/24 0020 104/52 99.1  F (37.3  C) Oral 61 18 95  "% --   12/04/24 2201 -- -- -- -- -- 97 % --   12/04/24 1936 102/57 99.5  F (37.5  C) Oral 74 18 92 % --   12/04/24 1647 104/56 -- -- 62 -- 92 % --   12/04/24 1532 (!) 88/48 99.1  F (37.3  C) Oral 69 18 (!) 89 % --   12/04/24 1136 105/53 99.2  F (37.3  C) Oral 70 18 91 % --   12/04/24 1045 -- -- -- -- -- 94 % --     I/O's last 24 hours:  I/O last 3 completed shifts:  In: 1090 [NG/GT:1090]  Out: -     Constitutional: awake, alert, oriented, pleasant   Head:   Eyes:   ENT:   Neck:   Cardiovascular: regular rate and rhythm; no murmurs, rubs or gallops  Lungs: diminished in the bases, no crackles or wheezes  Gastrointestinal/Abdomen: soft, non-tender, non-distended, positive bowel sounds  :   Musculoskeletal:   Skin/Extremities:   Neurologic:   Psychiatric:   Hematologic/Lymphatic/Immunologic:        Labs reviewed.  Recent Labs   Lab 12/04/24 2141 12/03/24 0951 11/29/24  1020   WBC  --   --  4.0   HGB  --   --  9.9*   MCV  --   --  97   PLT  --   --  298   NA  --  139 141   POTASSIUM  --  4.5 4.4   CHLORIDE  --  103 100   CO2  --  30* 33*   BUN  --  18.4 18.8   CR  --  0.67 0.76   ANIONGAP  --  6* 8   MANPREET  --  8.7* 9.4   * 91 117*   ALBUMIN  --  3.1* 3.6   PROTTOTAL  --  6.1* 7.2   BILITOTAL  --  0.7 0.2   ALKPHOS  --  88 108   ALT  --  15 17   AST  --  17 19     No lab results found.  Recent Labs   Lab 12/04/24 2141 12/03/24 0951 11/29/24  1020   * 91 117*     No lab results found.    No results for input(s): \"INR\", \"[id number]\" in the last 168 hours.  Recent Labs   Lab 11/29/24  1020   WBC 4.0       MICRO:  Cultures (including blood and urine):  No lab results found in last 7 days.    No results found for this or any previous visit (from the past 24 hours).      Medications   All medications were reviewed. MAR.    Infusions:  Current Facility-Administered Medications   Medication Dose Route Frequency Provider Last Rate Last Admin    dextrose 10% infusion   Intravenous Continuous PRN Elvis Jerze " MD Eris         Scheduled Medications:  Current Facility-Administered Medications   Medication Dose Route Frequency Provider Last Rate Last Admin    famotidine (PEPCID) suspension 20 mg  20 mg Oral Daily Elvis Jerez MD   20 mg at 12/05/24 0914    levothyroxine (SYNTHROID/LEVOTHROID) tablet 100 mcg  100 mcg Oral Daily Elvis Jerez MD   100 mcg at 12/05/24 0914    metoclopramide (REGLAN) solution 5 mg  5 mg Per PEG tube 4x Daily AC & HS Elvis Jerez MD   5 mg at 12/05/24 0655    sodium chloride (PF) 0.9% PF flush 3 mL  3 mL Intracatheter Q8H Talavrea, Benjamin Ortega MD   3 mL at 12/05/24 0931     PRN Medications:  Current Facility-Administered Medications   Medication Dose Route Frequency Provider Last Rate Last Admin    acetaminophen (TYLENOL) oral liquid 650 mg  650 mg Per Feeding Tube Q4H PRN Ilan, Benjamin Ortega MD        albuterol (PROVENTIL) neb solution 2.5 mg  2.5 mg Nebulization Q4H PRN Talavera, Benjamin Ortega MD   2.5 mg at 12/04/24 1045    dextrose 10% infusion   Intravenous Continuous PRN Elvis Jerez MD        HYDROmorphone (DILAUDID) injection 0.2 mg  0.2 mg Intravenous Q2H PRN Ilan, Benjamin Ortega MD        HYDROmorphone (DILAUDID) injection 0.4 mg  0.4 mg Intravenous Q2H PRN Ilan, Benjamin Ortega MD        lidocaine (LMX4) cream   Topical Q1H PRN Talavera, Benjamin Ortega MD        lidocaine 1 % 0.1-1 mL  0.1-1 mL Other Q1H PRN Talavrea, Benjamin Ortega MD        naloxone (NARCAN) injection 0.2 mg  0.2 mg Intravenous Q2 Min PRN Norma Hubbard DO        Or    naloxone (NARCAN) injection 0.4 mg  0.4 mg Intravenous Q2 Min PRN Norma Hubbard DO        Or    naloxone (NARCAN) injection 0.2 mg  0.2 mg Intramuscular Q2 Min PRN Norma Hubbard DO        Or    naloxone (NARCAN) injection 0.4 mg  0.4 mg Intramuscular Q2 Min PRN Norma Hubbard DO        ondansetron (ZOFRAN) injection 4 mg  4 mg Intravenous Q6H PRN Talavera, Benjamin Ortega MD        ondansetron (ZOFRAN) tablet 8 mg  8 mg Oral  Q8H PRN Elvis Jerez MD        prochlorperazine (COMPAZINE) injection 5 mg  5 mg Intravenous Q6H PRN Benjamin Talavera MD        prochlorperazine (COMPAZINE) tablet 5 mg  5 mg Oral Q6H PRN Elvis Jerez MD        senna-docusate (SENOKOT-S/PERICOLACE) 8.6-50 MG per tablet 1 tablet  1 tablet Per Feeding Tube BID PRN Benjamin Talavera MD   1 tablet at 12/05/24 0923    Or    senna-docusate (SENOKOT-S/PERICOLACE) 8.6-50 MG per tablet 2 tablet  2 tablet Per Feeding Tube BID PRN Benjamin Talavera MD        sodium chloride (PF) 0.9% PF flush 3 mL  3 mL Intracatheter q1 min prn Benjamin Talavera MD   3 mL at 12/03/24 2102    sodium chloride 0.9 % neb solution 3 mL  3 mL Nebulization Q3H PRN Elvis Jerez MD

## 2024-12-05 NOTE — PLAN OF CARE
Goal Outcome Evaluation:    Date&Time: 12/4/24 9029-4333  Orientation: A&O x4, unable to speak d/t trach, uses notebook for communication  Aggression Stop Light: Green  Activity: Independent in room (steady). SBA in halls. Ambulated in halls x2 this shift  Diet/BS Checks: NPO: No Exceptions. Meds given via GJ-Tube  Tele: N/A  IV Access/Drains: L PIV SL  Pain Management: Denies  Abnormal VS/Results: VSS on RA, ex bradycardia at times Trache capped. Uses Trache dome overnight with humidification  Bowel/Bladder: Continent of B/B. No BM this shift  Skin/Wounds: Skin dry and flaky, scattered scabs  Consults: RT  D/C Disposition: Pending  Other Info:   - Pt had G-tube conversion to GJ 12/3  -RT administered PRN Neb through Trache  -Q4 residuals (1 mL, 2 mL, 2 mL) tolerating TF  -switched to NOC TF from 6p-8a (12/4 to run at 85 mL/hr for 14 hours) with 100 mL Q4h flushes -see orders (TF rate changes daily)

## 2024-12-05 NOTE — PLAN OF CARE
[id number]    Orientation: A&Ox4, communicates through writing on notepad  Aggression Stop Light: green  Activity: independent   Diet/BS Checks: NPO. Tube feedings through J tube. G tube clamped.   Tele:  none  IV Access/Drains: L PIV SL  Pain Management: denied pain   Abnormal VS/Results: VSS on RA, soft Bps. Trach capped during day, 2 L O2 and humidification at night.  Bowel/Bladder: Continent B/B. No BM this shift.   Skin/Wounds: trach. Scattered bruising and scabs.   Consults: IR, RT  D/C Disposition: pending    Other Info:   A&Ox4, communications through writing on notepad. VSS on RA, soft Bps. Trach capped during day, trache dome with 2L O2 and humidification at night. Denied pain. Independent. Continent B/B. No BM this shift. L PIV SL. NPO, tube feedings through J tube. G tube clamped. Discharge pending.

## 2024-12-05 NOTE — CONSULTS
Care Management Discharge Note    Discharge Date: 12/05/2024       Discharge Disposition: Home Infusion (resumption of Tube feeds and supplies [facility])    Discharge Services: Homecare RN for teach of pump vs online option for patient    Discharge DME: per [facility] pump to be delivered to bedside 12/5    Discharge Transportation: family or friend will provide    Private pay costs discussed: Not applicable    Does the patient's insurance plan have a 3 day qualifying hospital stay waiver?  No    PAS Confirmation Code:  n/a  Patient/family educated on Medicare website which has current facility and service quality ratings:  no    Education Provided on the Discharge Plan: Yes  Persons Notified of Discharge Plans: rounding provider, patient.  Patient/Family in Agreement with the Plan: yes    Handoff Referral Completed: No, handoff not indicated or clinically appropriate    Additional Information:  Writer awaiting call from [facility] to see if they are able to provide a homecare RN for initial assessment set up of TF pump and teach.  Per Gretta they are checking on insurance and would most likely not have a start of care until 12/6.  Writer also awaiting [facility] to see if they can provide an RN for 12/5.  Writer received an email from [facility] they are available to work with the patient through text and have website FYI and instructions if we are unable to secure a homecare RN. Patient would like a homecare RN for teach as he does not have access to internet at home.  Writer will await call back from [facility] and meet with the patient for further options.  Addendum: 12/5 12:30  Writer received a call from [facility] Gretta they are able to do a start of care 12/6 at 12:00 at the latest.  With permission of patient writer called daughter in law Peña as she would be providing the ride home 12/6.  She will come up to the room 12/6 between 09:00-09:30 to pick the patient up.   She is aware that they need to be home by  12:00 for homecare teach and that [facility] will be moving appointments.  Writer called [facility] RN  as patient is scheduled for labs and possible infusion 12/6, she noted that they could move this visit to 12/9 as it is unlikely they would be able to schedule a later time for 12/6 she will work on this follow up.   Updated discharging provider, patient and bedside RN of the above.  Faxed the discharge orders to homecare in anticipation of early discharge 12/6 pending final medical clearance.      Delisa Grover RN, BSN, ACM   Care Transitions Specialist  [facility]  Care Transitions Specialist  [id number] [place] [address]  [email or web address]  Office: [phone] Fax: [phone]  [facility]  Delisa Grover RN

## 2024-12-05 NOTE — PLAN OF CARE
Goal Outcome Evaluation:  Orientation: A/O x4  Aggression Stop Light: Green  Activity: Ind  Diet/BS Checks: NPO; TF running thru j-tube @ 85ml  Tele: N/A  IV Access/Drains: L PIV SL  Pain Management: Denies pain   Abnormal VS/Results: VSS; trach dome with 2L o2 and humidification at night   Bowel/Bladder: cont B/B  Skin/Wounds: Scattered brusing   Consults: IR, RT  D/C Disposition: Pending   Other Info:  -pt has trach, communicated with note pad

## 2024-12-06 VITALS
SYSTOLIC BLOOD PRESSURE: 114 MMHG | WEIGHT: 158 LBS | RESPIRATION RATE: 16 BRPM | HEART RATE: 62 BPM | DIASTOLIC BLOOD PRESSURE: 58 MMHG | TEMPERATURE: 98.5 F | OXYGEN SATURATION: 99 % | BODY MASS INDEX: 22.67 KG/M2

## 2024-12-06 LAB — GLUCOSE BLDC GLUCOMTR-MCNC: 115 MG/DL (ref 70–99)

## 2024-12-06 PROCEDURE — 82962 GLUCOSE BLOOD TEST: CPT

## 2024-12-06 PROCEDURE — 99239 HOSP IP/OBS DSCHRG MGMT >30: CPT | Performed by: INTERNAL MEDICINE

## 2024-12-06 PROCEDURE — G0378 HOSPITAL OBSERVATION PER HR: HCPCS

## 2024-12-06 PROCEDURE — 250N000013 HC RX MED GY IP 250 OP 250 PS 637: Performed by: INTERNAL MEDICINE

## 2024-12-06 RX ADMIN — FAMOTIDINE 20 MG: 40 POWDER, FOR SUSPENSION ORAL at 08:57

## 2024-12-06 RX ADMIN — METOCLOPRAMIDE HYDROCHLORIDE 5 MG: 5 SOLUTION ORAL at 06:35

## 2024-12-06 RX ADMIN — LEVOTHYROXINE SODIUM 100 MCG: 100 TABLET ORAL at 08:56

## 2024-12-06 ASSESSMENT — ACTIVITIES OF DAILY LIVING (ADL)
ADLS_ACUITY_SCORE: 58

## 2024-12-06 NOTE — DISCHARGE SUMMARY
[facility]    Discharge Summary  Hospitalist    Date of Admission:  12/2/2024  Date of Discharge:  12/6/2024  Discharging Provider: Michael Davis MD, MD    Discharge Diagnoses   G-tube malfunction - s/p GJ-tube conversion 12/3/2024.  Moderate protein calorie malnutrition related to of intolerance of gastric feedings.  Generalized weakness, suspect multifactorial primarily driven by volume depletion with inability to tolerate his tube feeds and free water flushes.    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  He has had a feeding tube in place since 2020, but more recently has been having difficulty with elevated residuals after his feedings.     Patient was hospitalized at [facility] 11/10/2024 - 11/20/2024 after episodes of emesis.  He tells me that Saturday, November 9 at approximately 8:30 PM he had an episode of emesis out of his mouth which was brownish in color and looked similar to his Isosource.  At approximately 1:30 AM on November 10 he was brought to the emergency department by his family.  Was subsequently hospitalized with acute hypoxic respiratory failure in the setting of aspiration pneumonia.  Had additional episodes of emesis November 11 and November 13 while hospitalized.  Cultures grew Pseudomonas and he was treated with Augmentin then levofloxacin.  Completed his anti-infectives during his hospital stay.     During this admission at [facility] he was noted to have fluid in his esophagus 11/15 CT, and even at that time, it was thought that he would likely require a postpyloric feeding tube at some point.     Since November 24, following his hospital discharge, patient has been documenting his residuals.  He typically awakens in the morning with 0 mL of residual, but after his a.m. feeding he often will have  150-200 mL of residual 4 to 5 hours later prior to his second attempted feeding.  He does not report any difficulty with his feeding going through the G-tube into his stomach; it is run via gravity.  Does not have abdominal pain with this.  Last G-tube exchange was 2 weeks ago.     12/2/2024, patient was feeling more weak and shaky.  Rather than receiving his 6 feedings per day, he really has only been able to tolerate 4 feedings.  He tells me that he had chemotherapy on Friday.  Felt well on Saturday and Sunday, but somewhat woozy on Monday leading to ER visit.  He received 500 mL of fluid at outside emergency department, and tells me that he now feels stronger.  No longer feels lightheaded.     While at the outside emergency department, it was felt that he likely needed an exchange of his G-tube for a GJ tube.  His primary oncology team was contacted and recommended transfer to the [facility] to have this completed.  Beds were not available at the [facility], so he was transferred to [facility].  ER in [facility] felt that he was too weak to go home, so he was transported by his daughter-in-law here to [facility].  Currently, patient feels as though he could be at his home.  He has not had any falls.  He does live alone.     No fevers, describes more tremulousness than rigors.  Laboratory studies and vitals reassuring from outside facility with white count of 6.6, hemoglobin 9.6, platelet count of 250.  Sodium 141, potassium 4.0, chloride 100, creatinine of 0.69.  Calcium 9.1.             Hospital Course   Hieu Hughes was admitted on 12/2/2024.  The following problems were addressed during his hospitalization:    Active Problems:    Malnutrition (H)  G-tube malfunction - s/p GJ-tube conversion 12/3/2024.  Moderate protein calorie malnutrition related to of intolerance of gastric feedings.  * From H&P: G-tube dependent in the setting of squamous cell carcinoma of the base of the  tongue.  Increasing postvoid residuals as well as episodes of emesis resulting in respiratory failure and prolonged hospitalization in November.  Has been monitoring his postvoid residuals, and because of increased residuals has only been receiving 4 feedings per day rather than his prior 6 feedings per day.  Often will have no residual prior to his morning feeding, but then will have residuals of 200 mL 4 to 5 hours later.  This is not always the case.  Last G-tube exchange approximately 2 weeks ago, and it does not appear that he has had improvement in his residual since that time.  * Initial presentation as above. IR consulted on admit. Started on IV pantoprazole on admit (PTA on famotidine). Given IVF's on admit.  * 12/3: Underwent IR G-tube to GJ-tube conversion. Nutrition consulted; started continuous TF's via J-tube as cannot give bolus feed through the G-tube (previously on bolus feedings as noted).  * 12/4: Improved; no residuals noted.  - Continue continuous TF's, plan nocturnal at discharge.  - Continue PTA famotidine.  - Continue PTA metoclopramide, PRN ondansetron and PRN prochlorperazine.  - Arranged for additional home supplies including pump for continuous feedings at home.  -Resume home care.  Discharge orders in place.  Outpatient labs and follow-up arranged.  Patient doing well tolerating tube feeds breathing easily no complaints at time of discharge  -     Generalized weakness, suspect multifactorial primarily driven by volume depletion with inability to tolerate his tube feeds and free water flushes.  * Initial presentation as above. Given IVF's on admit.  * Weakness improved 12/5.  -Patient doing well and ambulating in the room without difficulty     Squamous cell carcinoma base of tongue.  S/p tracheostomy and G-tube.  * Initial diagnosis in 2009. Noted to be unresectable. Currently on palliative chemotherapy with carboplatin, paclitaxel and pembrolizumab. Has completed 2 cycles (prior  treatment w/ pembrolizumab alone w/ progression). Next due for chemotherapy on 12/6.  * Initial presentation as above. On admit, noted he typically replaces his inner 14 Turkmen cannula 4-8 times per day, cleaning used cannulas with saline and brushing with hydrogen peroxide.   - RT consulted for tracheostomy cares.  -Patient doing well at time of discharge.  - Continue humidified trach dome.  - Continue PRN bedside suctioning (on admit, patient reports he rarely uses suction).  - Continue PRN albuterol nebs and sodium chloride nebs.  -     Hypothyroidism.  - Continue levothyroxine.     Right sided carotid artery stenosis.  * On admit, noted he has an appointment with neurosurgery via telephone visit 12/3.    - Follow-up outpatient.     Disposition-discharge home with home care.  Discharge orders in place.       Michael Davis MD, MD    Significant Results and Procedures   See hospital course    Pending Results   None  Unresulted Labs Ordered in the Past 30 Days of this Admission       No orders found from 11/2/2024 to 12/3/2024.            Code Status   Full Code       Primary Care Physician   Remington Doan    Physical Exam   Temp: 98.5  F (36.9  C) Temp src: Oral BP: 114/58 Pulse: 62   Resp: 16 SpO2: 99 % O2 Device: Trach dome Oxygen Delivery: 4 LPM  Vitals:    12/04/24 0600 12/05/24 0445 12/06/24 0700   Weight: 71.6 kg (157 lb 14.4 oz) 71.3 kg (157 lb 2.2 oz) 71.7 kg (158 lb)     Vital Signs with Ranges  Temp:  [97  F (36.1  C)-99.5  F (37.5  C)] 98.5  F (36.9  C)  Pulse:  [57-72] 62  Resp:  [16-18] 16  BP: (101-114)/(49-58) 114/58  SpO2:  [91 %-99 %] 99 %  I/O last 3 completed shifts:  In: 120 [NG/GT:120]  Out: -     Constitutional: Sitting up in bed and walking in the room  ENT: Trach in place.  Site appears fine  Respiratory: Breathing easily, clear to auscultation bilaterally  Cardiovascular: Regular rate and rhythm no rubs gallops or murmurs appreciated  GI: Soft nontender nondistended feeding tube site  appears normal  Skin: No rash or edema  Musculoskeletal: No focal joint swelling erythema  Neurologic: He is alert coherent.  He writes out his answers to questions in a coherent manner.  Ambulating in the room  Neuropsychiatric: Calm and cooperative    Discharge Disposition   Discharged to home  Condition at discharge: Good    Consultations This Hospital Stay   INTERVENTIONAL RADIOLOGY ADULT/PEDS IP CONSULT  CARE MANAGEMENT / SOCIAL WORK IP CONSULT  NUTRITION SERVICES ADULT IP CONSULT  PHARMACY IP CONSULT    Time Spent on this Encounter   IMichael MD, personally saw the patient today and spent greater than 30 minutes discharging this patient.    Discharge Orders      Home Care Referral      Follow-up and recommended labs and tests     Follow-up with primary care provider, Remington Doan, within 7 days for hospital follow- up.  The following labs/tests are recommended: CBC, BMP.     Activity    Your activity upon discharge: activity as tolerated     Reason for your hospital stay    1. G-tube malfunction - s/p GJ-tube conversion 12/3/2024.  2. Moderate protein calorie malnutrition related to of intolerance of gastric feedings.  3. Generalized weakness, suspect multifactorial primarily driven by volume depletion with inability to tolerate his tube feeds and free water flushes.     Diet    Follow this diet upon discharge: Orders Placed This Encounter      Adult Formula Drip Feeding: Continuous Jevity 1.5; Jejunostomy; Goal Rate: 105 mL/hr x 14 hrs (6pm-8am); mL/hr; (12/4) Run TF at 85 mL/hr x 14 hrs (6pm-8am).  On (12/5), run TF at goal rate of 105 mL/hr x 14 hrs (6pm-8am); Do not advance tube feed...      NPO for Medical/Clinical Reasons Except for: No Exceptions       Discharge Medications   Discharge Medication List as of 12/6/2024  9:11 AM        CONTINUE these medications which have NOT CHANGED    Details   albuterol (PROVENTIL) (2.5 MG/3ML) 0.083% neb solution 2.5 mg every 4 hours as needed., Historical  "     famotidine (PEPCID) 40 MG/5ML suspension Take 2.5 mLs (20 mg) by mouth daily., Disp-150 mL, R-1, E-Prescribe      ferrous sulfate 220 (44 Fe) MG/5ML SOLN Place 7.4 mLs into G tube every other day., Historical      Levothyroxine Sodium (SYNTHROID PO) Take 100 mcg by mouth daily Crushes to take in water, Historical      metoclopramide (REGLAN) 5 MG/5ML solution Place 5 mLs (5 mg) into PEG tube 4 times daily (before meals and nightly)., Disp-240 mL, R-1, E-Prescribe      ondansetron (ZOFRAN) 8 MG tablet Take 1 tablet (8 mg) by mouth every 8 hours as needed for nausea (vomiting)., Disp-30 tablet, R-11, E-Prescribe      prochlorperazine (COMPAZINE) 10 MG tablet Take 0.5 tablets (5 mg) by mouth every 6 hours as needed for nausea or vomiting., Disp-30 tablet, R-11, E-Prescribe      sodium chloride 0.9 % neb solution Take 3 mLs by nebulization every 3 hours as needed for wheezing Use for tracheostomy lavage every 2-4 hours as needed, Disp-500 mL, R-11, E-Prescribe      sodium chloride 0.9%, bottle, 0.9 % irrigation Irrigate with 30 mLs as directed 3 times daily Use for routine tracheostomy care and cleaning, Disp-2000 mL, R-11, E-Prescribe           Allergies   Allergies   Allergen Reactions    Mold Shortness Of Breath    Molds & Smuts Difficulty breathing    No Clinical Screening - See Comments Shortness Of Breath    Aspirin Other (See Comments)     Nose bleeds  Nose bleeds  Nose bleeds  Nose bleeds  Nose bleeds  Nose bleeds      Penicillins Other (See Comments)     Comment:  , Description:   Currently denies allergy (2017)  Reports having received PCN on multiple occassions with no adverse reactions;  Was simply advised of \"risk\" of reaction due to \"enormous\" dose he was once given for a thigh infection  Comment:  , Description:   Currently denies allergy (2017)  Comment:  , Description:   Currently denies allergy (2017)  Reports having received PCN on multiple occassions with no adverse reactions;  Was simply " "advised of \"risk\" of reaction due to \"enormous\" dose he was once given for a thigh infection     Data   Most Recent 3 CBC's:  Recent Labs   Lab Test 11/29/24  1020 11/22/24  1052 11/08/24  1023   WBC 4.0 7.1 6.6   HGB 9.9* 9.6* 9.8*   MCV 97 94 96    248 217      Most Recent 3 BMP's:  Recent Labs   Lab Test 12/06/24  0109 12/04/24  2141 12/03/24  0951 11/29/24  1020 11/22/24  1052   NA  --   --  139 141 137   POTASSIUM  --   --  4.5 4.4 4.7   CHLORIDE  --   --  103 100 96*   CO2  --   --  30* 33* 33*   BUN  --   --  18.4 18.8 20.1   CR  --   --  0.67 0.76 0.86   ANIONGAP  --   --  6* 8 8   MANPREET  --   --  8.7* 9.4 9.2   * 156* 91 117* 104*     Most Recent 2 LFT's:  Recent Labs   Lab Test 12/03/24  0951 11/29/24  1020   AST 17 19   ALT 15 17   ALKPHOS 88 108   BILITOTAL 0.7 0.2     Most Recent INR's and Anticoagulation Dosing History:  Anticoagulation Dose History          Latest Ref Rng & Units 12/6/2011 9/26/2013 6/4/2021 8/21/2024 9/1/2024   Recent Dosing and Labs   INR 0.85 - 1.15 1.10  1.14  1.15  1.09  1.07       Details                 Most Recent 3 Troponin's:No lab results found.  Most Recent Cholesterol Panel:No lab results found.  Most Recent 6 Bacteria Isolates From Any Culture (See EPIC Reports for Culture Details):No lab results found.  Most Recent TSH, T4 and A1c Labs:  Recent Labs   Lab Test 11/29/24  1020 11/22/24  1052   TSH 3.69 5.09*   T4  --  1.16     Results for orders placed or performed during the hospital encounter of 12/02/24   IR Gastro Tube To Gastrojejuno Convert    [facility] RADIOLOGY  DATE: 12/3/2024    PROCEDURE: FLUOROSCOPIC GUIDED GASTROSTOMY TO GASTROJEJUNOSTOMY  CONVERSION    INTERVENTIONAL RADIOLOGIST: Mariana Bejarano D.O.    INDICATION: Large residual volumes. Squamous cell carcinoma in the  head and neck. Conversion from G to GJ requested.    MODERATE SEDATION: None.    CONTRAST: 25 mL Omnipaque enteric.  ANTIBIOTICS: None.  ADDITIONAL MEDICATIONS: " None.    FLUOROSCOPIC TIME: 1.4 minutes.  RADIATION DOSE: Air Kerma: 10.53 mGy.    COMPLICATIONS: No immediate complications.    STERILE BARRIER TECHNIQUE: Maximum sterile barrier technique was used.  Cutaneous antisepsis was performed at the operative site with  application of 2% chlorhexidine and large sterile drape. Prior to the  procedure, the  and assistant performed hand hygiene and wore  hat, mask, sterile gown, and sterile gloves during the entire  procedure.    PROCEDURE: Under fluoroscopic guidance, the lumen of the pre-existing  gastrostomy was injected with contrast and images were obtained.     The gastrostomy was removed over a wire. A catheter and wire were  manipulated through the gastric pylorus and duodenum and positioned in  the proximal jejunum.    Following tract dilatation, over wire exchange was made for a new 18F  45 cm SURENDRA gastrojejunostomy which was positioned under fluoroscopic  guidance with its distal tip in the proximal jejunum. The retention  balloon was inflated with 10 mL of saline. A post placement contrast  injection of both the gastric and jejunal lumens was performed.    FINDINGS:  The initial injection demonstrates that the gastric lumen  is patent and in appropriate position.    After exchange/conversion, the new gastrojejunostomy is in appropriate  position with the gastric lumen emptying into the stomach and jejunal  lumen emptying near the ligament of Treitz.      Impression    IMPRESSION:    1.  Successful conversion of the pre-existing gastrostomy to a new  gastrojejunostomy as detailed above.   2.  The patient initially had a 20 Serbian 2.5 cm stoma length  gastrostomy tube, however, the stoma length was too long. An 18 Serbian  regular GJ tube was placed, because GJ tubes do not come in 20 Serbian  from our vendor and we also did not have 2.0 stoma length 18 Beninese GJ  tubes in stock.  3.  Both the gastric and jejunal lumens are ready for use  immediately.    PORFIRIO RODRIGUEZ DO         SYSTEM ID:  [id number]

## 2024-12-06 NOTE — PROGRESS NOTES
Pt's nurse called and asked for RT to place pt on trach dome for humidification. I advised the nurse I would come by to see pt. After entering the room, introducing myself and advising the pt of what I was going to do for him. After setting up and attempting to attach the trach dome, the pt advised me that he did not want the trach dome attached. Pt instead insisted on being hooked up to the trach collar and humidification bottle already at his bedside. Pt was placed on 2L TC. Nurse later called to say pt was complaining of dryness. Nurse was advised to turn flowmeter to 4-5 LPM and I also advised the nurse the trach dome would supply the appropriate amount of humidification but pt refused.    Shonna Fonseca, RRT

## 2024-12-06 NOTE — PLAN OF CARE
[id number]     Orientation: A&Ox4, communicates through writing on notepad  Aggression Stop Light: green  Activity: independent   Diet/BS Checks: NPO. Tube feedings through J tube. G tube clamped.   Tele:  none  IV Access/Drains: no IV access, MD aware  Pain Management: denied pain   Abnormal VS/Results: VSS on RA, soft Bps, bradycardia.  2-4L O2 and humidification at night.  Bowel/Bladder: Continent B/B. 1 BM this shift per pt report   Skin/Wounds: trach. Scattered bruising and scabs.   Consults: IR, RT  D/C Disposition: discharge home today 12/6 around 0900.      Other Info:   A&Ox4, communications through writing on notepad. VSS on RA, soft Bps and intermittent bradycardia. trach dome with 2-4L O2 and humidification at night. Denied pain. Independent. Continent B/B. 1 BM this shift per pt report. NPO, tube feedings through J tube. G tube clamped. Discharge home today 12/6 around 0900.

## 2024-12-06 NOTE — CONSULTS
Care Management Discharge Note    Discharge Date: 12/06/2024       Discharge Disposition: Home Infusion (resumption of Tube feeds and supplies [facility])    Discharge Services: None    Discharge DME: None    Discharge Transportation: family or friend will provide    Private pay costs discussed: Not applicable    Does the patient's insurance plan have a 3 day qualifying hospital stay waiver?  No    PAS Confirmation Code:    Patient/family educated on Medicare website which has current facility and service quality ratings:      Education Provided on the Discharge Plan: Yes  Persons Notified of Discharge Plans: Patient, bedside RN homecare agency  Patient/Family in Agreement with the Plan: yes    Handoff Referral Completed: No, handoff not indicated or clinically appropriate    Additional Information:  Patient cleared for discharge to home with [facility], prior to admission appointments.  Writer met with the patient. Confirmed that he has tube feeds at home and that the delivery of poles/bags/lines was delivered to his home 12/5/24. Patient has infusion pump at bedside.  Writer confirmed with [facility] that they have communicated with Daughter in Law Peña who will call when they arrive home as they are scheduled for start of care 12/6 at 12:00.   Writer faxed final orders to [facility] today.  Writer received a call from [facility] RN  and the clinic has left a message with Mariana to add the patient onto the [place] infusion schedule for week of 12/9.  They left their call information on Mariana's phone.  Patient has no further questions or needs.            Delisa Grover RN, BSN, ACM   Care Transitions Specialist  [facility]  Care Transitions Specialist  [address] [place]. [address]  [email or web address]  Office: [phone] Fax: [phone]  [facility] [place]  Delisa Grover RN

## 2024-12-06 NOTE — PROGRESS NOTES
Resumed care from 0700 to 0930. Pt nodded for understanding for all discharge instructions. MD saw pt before he left. No PIV. Pt left with all belongings. Confirmed with Care coordinator that the home care team would come with supplies today for tube feedings.

## 2024-12-06 NOTE — PLAN OF CARE
Orientation: A&O x4, Trached-uses notebook for communicattion  Aggression Stop Light: Green  Activity: Ind in room, ambulated hallways this shift  Diet/BS Checks: NPO, TF running through J-Tube @ 105ml/hr with Q4h 100ml flushes. Meds through G-tube, otherwise clamped.   Tele: N/A  IV Access/Drains: No IV access- MD aware  Pain Management: Denies pain   Abnormal VS/Results: VSS on RA.   Bowel/Bladder: Continent of  B/B  Skin/Wounds: Scattered brusing   Consults: RT  D/C Disposition: Plan to discharge tomorrow between 0900- 0930   Other Info:  - Tube feeds to be stopped at 0800

## 2024-12-08 ENCOUNTER — PATIENT OUTREACH (OUTPATIENT)
Dept: CARE COORDINATION | Facility: CLINIC | Age: 84
End: 2024-12-08
Payer: COMMERCIAL

## 2024-12-08 NOTE — PROGRESS NOTES
Connected Care Resource Center:   [facility] Contact  [facility]/Voicemail     Clinical Data: Post-Discharge Outreach     Outreach attempted x 2.  Left message on the voicemail of patient's daughter in law, providing [facility]'s central phone number of [phone] ([phone]) for questions/concerns and/or to schedule an appt with an [facility] provider, if they do not have a PCP.      Plan:  [facility] will do no further outreaches at this time.       Jeannie Stahl MA  Connected Care Resource Center, [facility]    *Connected Care Resource Team does NOT follow patient ongoing. Referrals are identified based on internal discharge reports and the outreach is to ensure patient has an understanding of their discharge instructions.

## 2024-12-11 NOTE — PROGRESS NOTES
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 Bhatia ([facility])      Assessment/Plan  Base of tongue SCC ([id number])  Not candidate for definitive RT or surgery; on palliative systemic treatment  Chemo-induced neutropenia, anemia  He completed 2 cycles of single agent pembrolizumab with progression on Oct CT.   Therefore, with his last cycle, carbo (AUC 5) day 1 + Taxol (100 mg/m2) days 1 + 8 was added to pembro.     Tolerated cylce 1 fairly well with moderate cytopenias at soren, but was complicated by hospitalization mid-cycle for aspiration pneumonia related to reflux/regurgitation after tube feedings.      Cycle 2 was delayed x 1 week. He was due for day 8 Taxol last week, which was delayed for hospitalization for feeding tube conversion to GJ tube. He returns to reassess for day 8.He's doing generally well at home since dismissal.     He has had some dehydration (dizzy, weak) a few days after chemo. Got IVF locally last week.     Labs: Hgb 9.4 (down slightly from base 10-11), WBC 2.2/ANC 1.1 and plat WNL. CMP unremarkable.     Cancer related symptoms are stable and hemoptysis from tumor is improved (streaks on occasion). Chronic dysphagia, unchanged. No pain.     Neutropenia is borderline for treatment today, but since only getting single-agent Taxol and will have off-week next week will proceed. He has no fever/infectious symptoms.    Grade 1 diarrhea more likely related to his new Jejunal feedings + Reglan use rather than chemo/immunotherapy side effect.    Plan:  -Proceed with day 8 Taxol today. Will also give 1L NS with chemo today since he tends towards dehydration after chemo and is not at goal with his new feeding tube flushes. May need IVF as needed post chemo. Will see if his  local provider can help arrange standing PRN orders locally ([facility]HE[facility]) as he does not desire coming here for that.  -Neutropenic precautions reviewed  -Repeat CT neck/chest/abd/pelvis and follow-up Dr. Bhatia after next cycle to assess response following 2 cycles chemo + immunotherapy.    -Due for Cycle 3 in 2 weeks, pending response and tolerance. May need dose-reduction or supportive IVF following chemo with subsequent cycles.   -Role/benefit of establishing care with Palliative Care previously discussed, but deferred for now.   -Hemoptysis improved and minimal. If progressive, will refer to neuro IR to discuss embolization although I anticipate they will hold off until he has another bleeding episode.     Nutrition/hydration  Aspiration pneumonia risk  Diarrhea, related to jejunal feeding adjustment + Reglan use  Chronic dysphagia, feeding tube dependent.    He had recent reflux/emesis and aspiration pneumonia. He was having trouble with higher pre-feeding residuals, so he had his G-tube converted to GJ tube last week. He's working his way to goal of 1500 ml continuous feedings overnight (up to 1200 ml). Weight stable/up. He has goal of 600 ml free water flushes through day, but is estimating ~300 ml currently.    He has continued Reglan QID (previously this frequency was for use prior to his Gtube bolus feedings). He continues Pepcid.     No nausea, but still having some reflux symptoms. Worse at end of feeding or within hour of completing his overnight feeding in AM.     Having new diarrhea following the feedings. Starts within 2 hrs and has 2-3 loose/watery stools/day.     Plan:  -Continues all nutritional/hydration through feeding tube. Recently converted to GJ tube with continuous overnight feedings, working towards goal 1500 ml/night. Works with dietician.   -Estimates meeting 50% of his free water flush goal/day (getting 300 ml/day, goal 600 ml). Work on increasing gradually as able to ensure adequate  hydration.   -Increase Pepcid to 20 mg BID (at start and end of his feeding)  -Decrease Reglan to 5 mg pre-feeding and at completion of feeding to minimize reflux/regurgitation. Omit daytime doses to reduce diarrhea   -Imodium as needed. Suggested he take in AM when diarrhea seems worse post-feeing. Rx for liquid imodium through [facility] sent.      R carotic artery stenosis  Will see Vascular in December for recommendations on this.     Macrocytic anemia, mild  Mildly macrocytic anemia: B12 in 2023 was in the 700s. TSH as above. Monitor and further evaluation if worsens.     Hypothyroidism, on replacement  On Synthroid 100 mcg. TSH mildly elevated, T4 WNL.     Plan:  -Continue same dose   -Monitor on immunotherapy; adjust dose as needed    Chronic dyspnea  Worse since January. Trach in place.   Consider PFTs and Pulmonary at some point once his cancer is more stable.            ______________________________________________________________________________    History of Present Illness/ Interval History    Mr. Hieu Hughes  is a 84 year old with unresectable head/neck cancer.   He completed 2 cycles of single-agent pembro through September, with some progression on CT. Therefore, carob/taxol was added. He presents ahead of Cycle 2, day 8 Taxol.    During cycle 1, he had complications of aspiration pneumonia following tube feeding requiring hospitalization and antibiotics. Cycle 2 was delayed x 1 week to allow optimal recovery, he got day 1 two weeks ago. He was due for day 8 last week but was hospitalized for PEG malfunction, returns for assessment ahead of C2D8 Taxol.      He had G-tube exchange and was trying Reglan ahead of meals, but still only tolerating 4 feedings/day (rather than 6) due to high residuals and early satiety. He needed IVF (500 ml) for some dizziness a few days after his day 1 chemo. It was assessed he'd benefit from conversion of his G tube to GJ tube and had this completed last week. Started  continuous feedings overnight with good tolerance. It was recommended he get free water flushes 100 ml 6x/day (with 2 of those being before and after the feeding). He's not meeting the additional water goals - estimates getting about 50% of that or feels too full.     Continues Pepcid every evening (at start of feeding) + Reglan QID (even though feedings have changed to overnight/continuous). Has been gradually increasing to goal of 1500 ml (up to 1200 ml). Has had diarrhea with the new feedings - 2 hrs upon completion of feeding in AM will start and having 2-3 loose/liquid stools/day.     He got dehydrated and felt quite weak the few days after his day 1 chemo, got IVF locally before being admitted for his feeding tube exchange.     He's doing well this week. Feels hydrated. No fevers. No peripheral neuropathy.     Mild streaks of blood in phlegm - improved form prior. Using nebs.        ECOG Performance    1      Oncology History/Treatment  Diagnosis/Stage:   2009: Head/neck cancer    4/2024: [id number] R base of tongue SCC, PDL1 TPS 10%, CPS 15%  -4/14/24CT neck (HP). 1.1 x 1.2 x 2.2 cm R lateral BOT ulceration and enhancement. Mod-sev R TOM, 50% stenosis mid R cervical ICA, 65% stenosis L ICA with adjacent ulcerated atheromatous plaque.  -7/22/24DL with bx (Dr. Ospina). R BOT mass extending to midline, R lateral pharyngeal wall, submucosal extension to the posterior oral tongue. Path: SCC, TPS 10%, CPS 12%  -8/1/24PET/CT. FDG avid R tongue base mass extending to R oral tongue, hyoid bone, mylohyoid muscle, lingual mucosal surface of the suprahyoid epiglottis, R lateral oropharyngeal wall, overall 2.8 x 1.8 cm (SUV 21.5). 1.5 x 0.7 cm lesion deep to R thyroid cartilate in the R parapharyngeal fat of the false cord extending to the submucosal surface of the true cord (SUV 10.1). R common carotid calcified plaque extending to the carotid bifurcation resulting in residual lumen measuring 3 mm.     Treatment:  2009:  Initial diagnosis  -Chemoradiation with weekly carboplatin paclitaxel, 7400 cGy (Dr. Bolton[facility] 37 fractions. Hearing loss precluded cisplatin    2024: 2nd occurrence  -Repeat definitive RT not feasible  -Extensive surgery not desired by patient  -Palliative systemic therapy was decided on.     8/27/2024 - 9/17/2024: pembrolizumab x2 cycles  -CT showed progression with lingual artery involvement. Indeterminate for pseudoprogression/inadequate time on immunotherapy vs true progression.     11/1/2024 - present: carbo AUC 5 (day 1) and Taxol 100 mgm2 days 1, 8 added to pembro  --C2 delayed x 1 week for aspiration pneumonia hospitalization  ---C2D8 delayed x 1 week for admission for feeding tube conversion.        Physical Exam    GENERAL: Alert and oriented to time place and person. Seated comfortably. In no distress. Son accompanied. Pt communicated by writing.   HEAD: Atraumatic and normocephalic. No alopecia.  EYES: PARISH, EOMI. No erythema. No icterus.  ORAL CAVITY: Moist. No mucosal lesion or tonsillar enlargement.  NECK: supple. No thyroid enlargement. Trach.  LYMPH NODES: No palpable supraclavicular, cervical lymphadenopathy.  CHEST: clear to auscultation bilaterally. Resonant to percussion throughout bilaterally. Symmetrical breath movements bilaterally.  CVS: RRR  ABDOMEN: Soft. Not tender. Not distended. Bowel sounds present. Feeding tube in place.  EXTREMITIES: Warm. No peripheral edema.  SKIN: no rash, or bruising or purpura.   NEURO: No gross deficit noted. Non-antalgic gait.      Lab Results    Recent Results (from the past week)   Glucose by meter   Result Value Ref Range    GLUCOSE BY METER POCT 115 (H) 70 - 99 mg/dL   Comprehensive metabolic panel   Result Value Ref Range    Sodium 144 135 - 145 mmol/L    Potassium 4.7 3.4 - 5.3 mmol/L    Carbon Dioxide (CO2) 32 (H) 22 - 29 mmol/L    Anion Gap 6 (L) 7 - 15 mmol/L    Urea Nitrogen 31.6 (H) 8.0 - 23.0 mg/dL    Creatinine 0.71  0.67 - 1.17 mg/dL    GFR Estimate >90 >60 mL/min/1.73m2    Calcium 9.2 8.8 - 10.4 mg/dL    Chloride 106 98 - 107 mmol/L    Glucose 123 (H) 70 - 99 mg/dL    Alkaline Phosphatase 93 40 - 150 U/L    AST 21 0 - 45 U/L    ALT 21 0 - 70 U/L    Protein Total 6.8 6.4 - 8.3 g/dL    Albumin 3.5 3.5 - 5.2 g/dL    Bilirubin Total 0.2 <=1.2 mg/dL   CBC with platelets and differential   Result Value Ref Range    WBC Count 2.2 (L) 4.0 - 11.0 10e3/uL    RBC Count 3.13 (L) 4.40 - 5.90 10e6/uL    Hemoglobin 9.4 (L) 13.3 - 17.7 g/dL    Hematocrit 31.7 (L) 40.0 - 53.0 %     (H) 78 - 100 fL    MCH 30.0 26.5 - 33.0 pg    MCHC 29.7 (L) 31.5 - 36.5 g/dL    RDW 19.5 (H) 10.0 - 15.0 %    Platelet Count 204 150 - 450 10e3/uL    % Neutrophils 51 %    % Lymphocytes 17 %    % Monocytes 30 %    % Eosinophils 1 %    % Basophils 1 %    % Immature Granulocytes 1 %    NRBCs per 100 WBC 2 (H) <1 /100    Absolute Neutrophils 1.1 (L) 1.6 - 8.3 10e3/uL    Absolute Lymphocytes 0.4 (L) 0.8 - 5.3 10e3/uL    Absolute Monocytes 0.7 0.0 - 1.3 10e3/uL    Absolute Eosinophils 0.0 0.0 - 0.7 10e3/uL    Absolute Basophils 0.0 0.0 - 0.2 10e3/uL    Absolute Immature Granulocytes 0.0 <=0.4 10e3/uL    Absolute NRBCs 0.1 10e3/uL       Imaging    IR Gastro Tube To Gastrojejuno Convert    Result Date: 12/3/2024  [place] [facility] DATE: 12/3/2024 PROCEDURE: FLUOROSCOPIC GUIDED GASTROSTOMY TO GASTROJEJUNOSTOMY CONVERSION INTERVENTIONAL RADIOLOGIST: Mariana Bejarano D.O. INDICATION: Large residual volumes. Squamous cell carcinoma in the head and neck. Conversion from G to GJ requested. MODERATE SEDATION: None. CONTRAST: 25 mL Omnipaque enteric. ANTIBIOTICS: None. ADDITIONAL MEDICATIONS: None. FLUOROSCOPIC TIME: 1.4 minutes. RADIATION DOSE: Air Kerma: 10.53 mGy. COMPLICATIONS: No immediate complications. STERILE BARRIER TECHNIQUE: Maximum sterile barrier technique was used. Cutaneous antisepsis was performed at the operative site with application of 2% chlorhexidine  and large sterile drape. Prior to the procedure, the  and assistant performed hand hygiene and wore hat, mask, sterile gown, and sterile gloves during the entire procedure. PROCEDURE: Under fluoroscopic guidance, the lumen of the pre-existing gastrostomy was injected with contrast and images were obtained. The gastrostomy was removed over a wire. A catheter and wire were manipulated through the gastric pylorus and duodenum and positioned in the proximal jejunum. Following tract dilatation, over wire exchange was made for a new 18F 45 cm SURENDRA gastrojejunostomy which was positioned under fluoroscopic guidance with its distal tip in the proximal jejunum. The retention balloon was inflated with 10 mL of saline. A post placement contrast injection of both the gastric and jejunal lumens was performed. FINDINGS:  The initial injection demonstrates that the gastric lumen is patent and in appropriate position. After exchange/conversion, the new gastrojejunostomy is in appropriate position with the gastric lumen emptying into the stomach and jejunal lumen emptying near the ligament of Treitz.     IMPRESSION:  1.  Successful conversion of the pre-existing gastrostomy to a new gastrojejunostomy as detailed above. 2.  The patient initially had a 20 Turkmen 2.5 cm stoma length gastrostomy tube, however, the stoma length was too long. An 18 Turkmen regular GJ tube was placed, because GJ tubes do not come in 20 Turkmen from our vendor and we also did not have 2.0 stoma length 18 Portuguese GJ tubes in stock. 3.  Both the gastric and jejunal lumens are ready for use immediately. PORFIRIO RODRIGUEZ DO   SYSTEM ID:  [id number]    XR Chest 2 Views    Result Date: 12/2/2024  EXAM: XR CHEST 2 VIEWS LOCATION: [facility] DATE: 12/2/2024 INDICATION: Cough and shortness of breath. COMPARISON: 11/11/2024 and chest CT 11/14/2024 IMPRESSION: The heart is normal in size with mild aortic calcifications. Tracheostomy tube overlying the trachea.  Mild left basilar opacities with improvement from prior imaging, possibly residual scarring or recurrent atelectasis/pneumonia. The remainder of the lungs appear clear. No pneumothorax. Mild degenerative changes of the spine.    CT Chest Abd Pelvis W IV Cont    Result Date: 11/14/2024  EXAM: CT CHEST ABD PELVIS W IV CONT LOCATION: [facility] DATE: 11/14/2024 INDICATION: Unable to tolerate enteral intake without emesis. Several possible episodes of aspiration. Vomiting. Coughing. Bloated and abdominal distention. Acute hypoxic respiratory failure with tracheostomy. Oropharyngeal cancer. COMPARISON: CT chest 04/14/2024. No prior CT abdomen and pelvis. TECHNIQUE: Helical CT scan of the chest, abdomen, and pelvis was performed following injection of IV contrast. Multiplanar reformats were obtained. Dose reduction techniques were used. CONTRAST: Iohexol 350 mg/ml IV soln 75 mL. FINDINGS: LUNGS AND PLEURA: Patchy opacity in the left lower lobe consistent with a combination of atelectasis and infiltrate/pneumonia. There is also a patchy infiltrate in the right lower lobe and to a lesser extent in the right middle lobe. Minimal patchy infiltrate in the left upper lobe. Findings could certainly be due to pneumonia. Trace left pleural fluid. No pulmonary nodules or mass lesions. MEDIASTINUM/AXILLAE: Right hilar lymph node on image 51 measures 2.7 x 1.3 cm and is new from prior exam. No axillary adenopathy. Heart is normal in size. No pericardial effusion. Tracheostomy noted. CORONARY ARTERY CALCIFICATION: Moderate to severe. HEPATOBILIARY: Liver is unremarkable. Gallbladder contains stones. PANCREAS: Normal. SPLEEN: Calcifications in the spleen consistent with prior granulomatous disease. ADRENAL GLANDS: Normal. KIDNEYS/BLADDER: Nonobstructing stones seen in the left kidney. No hydronephrosis or hydroureter. Right kidney is unremarkable. Bladder is unremarkable. BOWEL: Diverticulosis without evidence of diverticulitis.  Small bowel loops are unremarkable. LYMPH NODES: No retroperitoneal adenopathy. No pelvic adenopathy. VASCULATURE: Vascular calcification involving the aorta without aneurysm. PELVIC ORGANS: Normal. MUSCULOSKELETAL: Degenerative changes in the spine. IMPRESSION: 1.  Patchy infiltrate in both lower lobes, left worse than right, as well as minimally in the right middle lobe and left upper lobe. 2.  Right hilar lymph node visualized, which may be reactive. 3.  No evidence of bowel obstruction. No definite etiology for vomiting.    XR Abdomen 2 Views    Result Date: 11/14/2024  EXAM: XR ABD 2 VIEWS ROUTINE LOCATION: [facility] DATE: 11/14/2024 INDICATION: Abdominal distension, DISTENDED ABDOMEN COMPARISON: None. IMPRESSION: No free air. Gas and moderate amount of stool throughout the visualized colon. Nonobstructive bowel gas pattern. 6 mm calculi at the bilateral kidneys. Left upper quadrant calcifications likely representing calcified splenic granulomas. G button. Moderate degenerative changes throughout the visualized spine. Mild degenerative changes, bilateral hips. Blunting of the left costophrenic angle due to small effusion versus pleural thickening.     Billing  Total time 55 minutes, to include face to face visit, review of EMR, ordering, documentation and coordination of care on date of service   complexity modifier for longitudinal care.       Signed by: Noris Lovelace NP

## 2024-12-12 ENCOUNTER — INFUSION THERAPY VISIT (OUTPATIENT)
Dept: INFUSION THERAPY | Facility: CLINIC | Age: 84
End: 2024-12-12
Attending: INTERNAL MEDICINE
Payer: COMMERCIAL

## 2024-12-12 ENCOUNTER — ONCOLOGY VISIT (OUTPATIENT)
Dept: ONCOLOGY | Facility: CLINIC | Age: 84
End: 2024-12-12
Attending: NURSE PRACTITIONER
Payer: COMMERCIAL

## 2024-12-12 ENCOUNTER — APPOINTMENT (OUTPATIENT)
Dept: LAB | Facility: CLINIC | Age: 84
DRG: 177 | End: 2024-12-12
Attending: NURSE PRACTITIONER
Payer: COMMERCIAL

## 2024-12-12 VITALS — HEART RATE: 68 BPM | SYSTOLIC BLOOD PRESSURE: 156 MMHG | DIASTOLIC BLOOD PRESSURE: 72 MMHG

## 2024-12-12 VITALS
TEMPERATURE: 97.9 F | BODY MASS INDEX: 22.9 KG/M2 | HEIGHT: 70 IN | DIASTOLIC BLOOD PRESSURE: 56 MMHG | OXYGEN SATURATION: 95 % | RESPIRATION RATE: 18 BRPM | HEART RATE: 66 BPM | SYSTOLIC BLOOD PRESSURE: 119 MMHG | WEIGHT: 160 LBS

## 2024-12-12 DIAGNOSIS — C01 CANCER OF BASE OF TONGUE (H): Primary | ICD-10-CM

## 2024-12-12 DIAGNOSIS — K21.9 GASTROESOPHAGEAL REFLUX DISEASE WITHOUT ESOPHAGITIS: ICD-10-CM

## 2024-12-12 DIAGNOSIS — R19.7 DIARRHEA, UNSPECIFIED TYPE: ICD-10-CM

## 2024-12-12 DIAGNOSIS — E86.0 DEHYDRATION: Primary | ICD-10-CM

## 2024-12-12 DIAGNOSIS — C01 CANCER OF BASE OF TONGUE (H): ICD-10-CM

## 2024-12-12 DIAGNOSIS — C01 SQUAMOUS CELL CARCINOMA OF BASE OF TONGUE (H): ICD-10-CM

## 2024-12-12 DIAGNOSIS — C01 SQUAMOUS CELL CARCINOMA OF BASE OF TONGUE (H): Primary | ICD-10-CM

## 2024-12-12 DIAGNOSIS — E86.0 DEHYDRATION: ICD-10-CM

## 2024-12-12 LAB
ALBUMIN SERPL BCG-MCNC: 3.5 G/DL (ref 3.5–5.2)
ALP SERPL-CCNC: 93 U/L (ref 40–150)
ALT SERPL W P-5'-P-CCNC: 21 U/L (ref 0–70)
ANION GAP SERPL CALCULATED.3IONS-SCNC: 6 MMOL/L (ref 7–15)
AST SERPL W P-5'-P-CCNC: 21 U/L (ref 0–45)
BASOPHILS # BLD AUTO: 0 10E3/UL (ref 0–0.2)
BASOPHILS NFR BLD AUTO: 1 %
BILIRUB SERPL-MCNC: 0.2 MG/DL
BUN SERPL-MCNC: 31.6 MG/DL (ref 8–23)
CALCIUM SERPL-MCNC: 9.2 MG/DL (ref 8.8–10.4)
CHLORIDE SERPL-SCNC: 106 MMOL/L (ref 98–107)
CREAT SERPL-MCNC: 0.71 MG/DL (ref 0.67–1.17)
EGFRCR SERPLBLD CKD-EPI 2021: >90 ML/MIN/1.73M2
EOSINOPHIL # BLD AUTO: 0 10E3/UL (ref 0–0.7)
EOSINOPHIL NFR BLD AUTO: 1 %
ERYTHROCYTE [DISTWIDTH] IN BLOOD BY AUTOMATED COUNT: 19.5 % (ref 10–15)
GLUCOSE SERPL-MCNC: 123 MG/DL (ref 70–99)
HCO3 SERPL-SCNC: 32 MMOL/L (ref 22–29)
HCT VFR BLD AUTO: 31.7 % (ref 40–53)
HGB BLD-MCNC: 9.4 G/DL (ref 13.3–17.7)
IMM GRANULOCYTES # BLD: 0 10E3/UL
IMM GRANULOCYTES NFR BLD: 1 %
LYMPHOCYTES # BLD AUTO: 0.4 10E3/UL (ref 0.8–5.3)
LYMPHOCYTES NFR BLD AUTO: 17 %
MCH RBC QN AUTO: 30 PG (ref 26.5–33)
MCHC RBC AUTO-ENTMCNC: 29.7 G/DL (ref 31.5–36.5)
MCV RBC AUTO: 101 FL (ref 78–100)
MONOCYTES # BLD AUTO: 0.7 10E3/UL (ref 0–1.3)
MONOCYTES NFR BLD AUTO: 30 %
NEUTROPHILS # BLD AUTO: 1.1 10E3/UL (ref 1.6–8.3)
NEUTROPHILS NFR BLD AUTO: 51 %
NRBC # BLD AUTO: 0.1 10E3/UL
NRBC BLD AUTO-RTO: 2 /100
PLATELET # BLD AUTO: 204 10E3/UL (ref 150–450)
POTASSIUM SERPL-SCNC: 4.7 MMOL/L (ref 3.4–5.3)
PROT SERPL-MCNC: 6.8 G/DL (ref 6.4–8.3)
RBC # BLD AUTO: 3.13 10E6/UL (ref 4.4–5.9)
SODIUM SERPL-SCNC: 144 MMOL/L (ref 135–145)
WBC # BLD AUTO: 2.2 10E3/UL (ref 4–11)

## 2024-12-12 PROCEDURE — 80053 COMPREHEN METABOLIC PANEL: CPT | Performed by: NURSE PRACTITIONER

## 2024-12-12 PROCEDURE — 258N000003 HC RX IP 258 OP 636: Performed by: NURSE PRACTITIONER

## 2024-12-12 PROCEDURE — 36415 COLL VENOUS BLD VENIPUNCTURE: CPT | Performed by: NURSE PRACTITIONER

## 2024-12-12 PROCEDURE — 3E04305 INTRODUCTION OF OTHER ANTINEOPLASTIC INTO CENTRAL VEIN, PERCUTANEOUS APPROACH: ICD-10-PCS | Performed by: NURSE PRACTITIONER

## 2024-12-12 PROCEDURE — G0463 HOSPITAL OUTPT CLINIC VISIT: HCPCS | Performed by: NURSE PRACTITIONER

## 2024-12-12 PROCEDURE — 85025 COMPLETE CBC W/AUTO DIFF WBC: CPT | Performed by: NURSE PRACTITIONER

## 2024-12-12 PROCEDURE — 82310 ASSAY OF CALCIUM: CPT | Performed by: NURSE PRACTITIONER

## 2024-12-12 PROCEDURE — 250N000011 HC RX IP 250 OP 636: Performed by: NURSE PRACTITIONER

## 2024-12-12 RX ORDER — METHYLPREDNISOLONE SODIUM SUCCINATE 40 MG/ML
40 INJECTION INTRAMUSCULAR; INTRAVENOUS
Status: CANCELLED
Start: 2024-12-12

## 2024-12-12 RX ORDER — EPINEPHRINE 1 MG/ML
0.3 INJECTION, SOLUTION, CONCENTRATE INTRAVENOUS EVERY 5 MIN PRN
Status: CANCELLED | OUTPATIENT
Start: 2024-12-12

## 2024-12-12 RX ORDER — LORAZEPAM 2 MG/ML
0.5 INJECTION INTRAMUSCULAR EVERY 4 HOURS PRN
Status: CANCELLED | OUTPATIENT
Start: 2024-12-12

## 2024-12-12 RX ORDER — HEPARIN SODIUM (PORCINE) LOCK FLUSH IV SOLN 100 UNIT/ML 100 UNIT/ML
5 SOLUTION INTRAVENOUS
Status: CANCELLED | OUTPATIENT
Start: 2024-12-12

## 2024-12-12 RX ORDER — METOCLOPRAMIDE HYDROCHLORIDE 5 MG/5ML
5 SOLUTION ORAL 2 TIMES DAILY PRN
Qty: 240 ML | Refills: 1 | Status: SHIPPED | OUTPATIENT
Start: 2024-12-12

## 2024-12-12 RX ORDER — ALBUTEROL SULFATE 90 UG/1
1-2 INHALANT RESPIRATORY (INHALATION)
Status: CANCELLED
Start: 2024-12-12

## 2024-12-12 RX ORDER — DIPHENHYDRAMINE HYDROCHLORIDE 50 MG/ML
25 INJECTION INTRAMUSCULAR; INTRAVENOUS
Status: CANCELLED
Start: 2024-12-12

## 2024-12-12 RX ORDER — MEPERIDINE HYDROCHLORIDE 25 MG/ML
25 INJECTION INTRAMUSCULAR; INTRAVENOUS; SUBCUTANEOUS
Status: CANCELLED | OUTPATIENT
Start: 2024-12-12

## 2024-12-12 RX ORDER — HEPARIN SODIUM (PORCINE) LOCK FLUSH IV SOLN 100 UNIT/ML 100 UNIT/ML
5 SOLUTION INTRAVENOUS
OUTPATIENT
Start: 2024-12-16

## 2024-12-12 RX ORDER — LOPERAMIDE HCL 1 MG/5ML
2 SOLUTION ORAL 4 TIMES DAILY PRN
Qty: 118 ML | Refills: 2 | Status: SHIPPED | OUTPATIENT
Start: 2024-12-12

## 2024-12-12 RX ORDER — HEPARIN SODIUM,PORCINE 10 UNIT/ML
5-20 VIAL (ML) INTRAVENOUS DAILY PRN
Status: CANCELLED | OUTPATIENT
Start: 2024-12-12

## 2024-12-12 RX ORDER — DIPHENHYDRAMINE HYDROCHLORIDE 50 MG/ML
50 INJECTION INTRAMUSCULAR; INTRAVENOUS
Status: CANCELLED
Start: 2024-12-12

## 2024-12-12 RX ORDER — ALBUTEROL SULFATE 0.83 MG/ML
2.5 SOLUTION RESPIRATORY (INHALATION)
Status: CANCELLED | OUTPATIENT
Start: 2024-12-12

## 2024-12-12 RX ORDER — FAMOTIDINE 40 MG/5ML
20 POWDER, FOR SUSPENSION ORAL 2 TIMES DAILY
Qty: 150 ML | Refills: 1 | Status: SHIPPED | OUTPATIENT
Start: 2024-12-12

## 2024-12-12 RX ORDER — DIPHENHYDRAMINE HCL 25 MG
50 CAPSULE ORAL ONCE
Status: CANCELLED
Start: 2024-12-12

## 2024-12-12 RX ORDER — HEPARIN SODIUM,PORCINE 10 UNIT/ML
5-20 VIAL (ML) INTRAVENOUS DAILY PRN
OUTPATIENT
Start: 2024-12-16

## 2024-12-12 RX ADMIN — FAMOTIDINE 20 MG: 10 INJECTION INTRAVENOUS at 11:17

## 2024-12-12 RX ADMIN — DIPHENHYDRAMINE HYDROCHLORIDE 50 MG: 50 INJECTION, SOLUTION INTRAMUSCULAR; INTRAVENOUS at 11:19

## 2024-12-12 RX ADMIN — SODIUM CHLORIDE 1000 ML: 9 INJECTION, SOLUTION INTRAVENOUS at 11:16

## 2024-12-12 RX ADMIN — PACLITAXEL 189 MG: 6 INJECTION, SOLUTION INTRAVENOUS at 11:59

## 2024-12-12 RX ADMIN — DEXAMETHASONE SODIUM PHOSPHATE 20 MG: 10 INJECTION, SOLUTION INTRAMUSCULAR; INTRAVENOUS at 11:35

## 2024-12-12 ASSESSMENT — PAIN SCALES - GENERAL: PAINLEVEL_OUTOF10: NO PAIN (0)

## 2024-12-12 NOTE — LETTER
12/12/2024      Hieu Hughes  [address] [address]      Dear Colleague,    Thank you for referring your patient, Hieu Hughes, to the [facility]. Please see a copy of my visit note below.    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 Bhatia ([facility])      Assessment/Plan  Base of tongue SCC ([id number])  Not candidate for definitive RT or surgery; on palliative systemic treatment  Chemo-induced neutropenia, anemia  He completed 2 cycles of single agent pembrolizumab with progression on Oct CT.   Therefore, with his last cycle, carbo (AUC 5) day 1 + Taxol (100 mg/m2) days 1 + 8 was added to pembro.     Tolerated cylce 1 fairly well with moderate cytopenias at soren, but was complicated by hospitalization mid-cycle for aspiration pneumonia related to reflux/regurgitation after tube feedings.      Cycle 2 was delayed x 1 week. He was due for day 8 Taxol last week, which was delayed for hospitalization for feeding tube conversion to GJ tube. He returns to reassess for day 8.He's doing generally well at home since dismissal.     He has had some dehydration (dizzy, weak) a few days after chemo. Got IVF locally last week.     Labs: Hgb 9.4 (down slightly from base 10-11), WBC 2.2/ANC 1.1 and plat WNL. CMP unremarkable.     Cancer related symptoms are stable and hemoptysis from tumor is improved (streaks on occasion). Chronic dysphagia, unchanged. No pain.     Neutropenia is borderline for treatment today, but since only getting single-agent Taxol and will have off-week next week will proceed. He has no fever/infectious symptoms.    Grade 1 diarrhea more likely related to his new Jejunal feedings + Reglan use rather than chemo/immunotherapy side  effect.    Plan:  -Proceed with day 8 Taxol today. Will also give 1L NS with chemo today since he tends towards dehydration after chemo and is not at goal with his new feeding tube flushes. May need IVF as needed post chemo. Will see if his local provider can help arrange standing PRN orders locally (MICHELE Jackson) as he does not desire coming here for that.  -Neutropenic precautions reviewed  -Repeat CT neck/chest/abd/pelvis and follow-up Dr. Bhatia after next cycle to assess response following 2 cycles chemo + immunotherapy.    -Due for Cycle 3 in 2 weeks, pending response and tolerance. May need dose-reduction or supportive IVF following chemo with subsequent cycles.   -Role/benefit of establishing care with Palliative Care previously discussed, but deferred for now.   -Hemoptysis improved and minimal. If progressive, will refer to neuro IR to discuss embolization although I anticipate they will hold off until he has another bleeding episode.     Nutrition/hydration  Aspiration pneumonia risk  Diarrhea, related to jejunal feeding adjustment + Reglan use  Chronic dysphagia, feeding tube dependent.    He had recent reflux/emesis and aspiration pneumonia. He was having trouble with higher pre-feeding residuals, so he had his G-tube converted to GJ tube last week. He's working his way to goal of 1500 ml continuous feedings overnight (up to 1200 ml). Weight stable/up. He has goal of 600 ml free water flushes through day, but is estimating ~300 ml currently.    He has continued Reglan QID (previously this frequency was for use prior to his Gtube bolus feedings). He continues Pepcid.     No nausea, but still having some reflux symptoms. Worse at end of feeding or within hour of completing his overnight feeding in AM.     Having new diarrhea following the feedings. Starts within 2 hrs and has 2-3 loose/watery stools/day.     Plan:  -Continues all nutritional/hydration through feeding tube. Recently converted to GJ tube with  continuous overnight feedings, working towards goal 1500 ml/night. Works with dietician.   -Estimates meeting 50% of his free water flush goal/day (getting 300 ml/day, goal 600 ml). Work on increasing gradually as able to ensure adequate hydration.   -Increase Pepcid to 20 mg BID (at start and end of his feeding)  -Decrease Reglan to 5 mg pre-feeding and at completion of feeding to minimize reflux/regurgitation. Omit daytime doses to reduce diarrhea   -Imodium as needed. Suggested he take in AM when diarrhea seems worse post-feeing. Rx for liquid imodium through [facility] sent.      R carotic artery stenosis  Will see Vascular in December for recommendations on this.     Macrocytic anemia, mild  Mildly macrocytic anemia: B12 in 2023 was in the 700s. TSH as above. Monitor and further evaluation if worsens.     Hypothyroidism, on replacement  On Synthroid 100 mcg. TSH mildly elevated, T4 WNL.     Plan:  -Continue same dose   -Monitor on immunotherapy; adjust dose as needed    Chronic dyspnea  Worse since January. Trach in place.   Consider PFTs and Pulmonary at some point once his cancer is more stable.            ______________________________________________________________________________    History of Present Illness/ Interval History    Mr. Hieu Hughes  is a 84 year old with unresectable head/neck cancer.   He completed 2 cycles of single-agent pembro through September, with some progression on CT. Therefore, carob/taxol was added. He presents ahead of Cycle 2, day 8 Taxol.    During cycle 1, he had complications of aspiration pneumonia following tube feeding requiring hospitalization and antibiotics. Cycle 2 was delayed x 1 week to allow optimal recovery, he got day 1 two weeks ago. He was due for day 8 last week but was hospitalized for PEG malfunction, returns for assessment ahead of C2D8 Taxol.      He had G-tube exchange and was trying Reglan ahead of meals, but still only tolerating 4 feedings/day (rather than  6) due to high residuals and early satiety. He needed IVF (500 ml) for some dizziness a few days after his day 1 chemo. It was assessed he'd benefit from conversion of his G tube to GJ tube and had this completed last week. Started continuous feedings overnight with good tolerance. It was recommended he get free water flushes 100 ml 6x/day (with 2 of those being before and after the feeding). He's not meeting the additional water goals - estimates getting about 50% of that or feels too full.     Continues Pepcid every evening (at start of feeding) + Reglan QID (even though feedings have changed to overnight/continuous). Has been gradually increasing to goal of 1500 ml (up to 1200 ml). Has had diarrhea with the new feedings - 2 hrs upon completion of feeding in AM will start and having 2-3 loose/liquid stools/day.     He got dehydrated and felt quite weak the few days after his day 1 chemo, got IVF locally before being admitted for his feeding tube exchange.     He's doing well this week. Feels hydrated. No fevers. No peripheral neuropathy.     Mild streaks of blood in phlegm - improved form prior. Using nebs.        ECOG Performance    1      Oncology History/Treatment  Diagnosis/Stage:   2009: Head/neck cancer    4/2024: [id number] R base of tongue SCC, PDL1 TPS 10%, CPS 15%  -4/14/24CT neck (HP). 1.1 x 1.2 x 2.2 cm R lateral BOT ulceration and enhancement. Mod-sev R TOM, 50% stenosis mid R cervical ICA, 65% stenosis L ICA with adjacent ulcerated atheromatous plaque.  -7/22/24DL with bx (Dr. Ospina). R BOT mass extending to midline, R lateral pharyngeal wall, submucosal extension to the posterior oral tongue. Path: SCC, TPS 10%, CPS 12%  -8/1/24PET/CT. FDG avid R tongue base mass extending to R oral tongue, hyoid bone, mylohyoid muscle, lingual mucosal surface of the suprahyoid epiglottis, R lateral oropharyngeal wall, overall 2.8 x 1.8 cm (SUV 21.5). 1.5 x 0.7 cm lesion deep to R thyroid cartilate in the R  parapharyngeal fat of the false cord extending to the submucosal surface of the true cord (SUV 10.1). R common carotid calcified plaque extending to the carotid bifurcation resulting in residual lumen measuring 3 mm.     Treatment:  2009: Initial diagnosis  -Chemoradiation with weekly carboplatin paclitaxel, 7400 cGy (Dr. Bolton, Dr. Arun Jerez at [facility]), 37 fractions. Hearing loss precluded cisplatin    2024: 2nd occurrence  -Repeat definitive RT not feasible  -Extensive surgery not desired by patient  -Palliative systemic therapy was decided on.     8/27/2024 - 9/17/2024: pembrolizumab x2 cycles  -CT showed progression with lingual artery involvement. Indeterminate for pseudoprogression/inadequate time on immunotherapy vs true progression.     11/1/2024 - present: carbo AUC 5 (day 1) and Taxol 100 mgm2 days 1, 8 added to pembro  --C2 delayed x 1 week for aspiration pneumonia hospitalization  ---C2D8 delayed x 1 week for admission for feeding tube conversion.        Physical Exam    GENERAL: Alert and oriented to time place and person. Seated comfortably. In no distress. Son accompanied. Pt communicated by writing.   HEAD: Atraumatic and normocephalic. No alopecia.  EYES: PARISH, EOMI. No erythema. No icterus.  ORAL CAVITY: Moist. No mucosal lesion or tonsillar enlargement.  NECK: supple. No thyroid enlargement. Trach.  LYMPH NODES: No palpable supraclavicular, cervical lymphadenopathy.  CHEST: clear to auscultation bilaterally. Resonant to percussion throughout bilaterally. Symmetrical breath movements bilaterally.  CVS: RRR  ABDOMEN: Soft. Not tender. Not distended. Bowel sounds present. Feeding tube in place.  EXTREMITIES: Warm. No peripheral edema.  SKIN: no rash, or bruising or purpura.   NEURO: No gross deficit noted. Non-antalgic gait.      Lab Results    Recent Results (from the past week)   Glucose by meter   Result Value Ref Range    GLUCOSE BY METER POCT 115 (H) 70 - 99 mg/dL   Comprehensive metabolic  panel   Result Value Ref Range    Sodium 144 135 - 145 mmol/L    Potassium 4.7 3.4 - 5.3 mmol/L    Carbon Dioxide (CO2) 32 (H) 22 - 29 mmol/L    Anion Gap 6 (L) 7 - 15 mmol/L    Urea Nitrogen 31.6 (H) 8.0 - 23.0 mg/dL    Creatinine 0.71 0.67 - 1.17 mg/dL    GFR Estimate >90 >60 mL/min/1.73m2    Calcium 9.2 8.8 - 10.4 mg/dL    Chloride 106 98 - 107 mmol/L    Glucose 123 (H) 70 - 99 mg/dL    Alkaline Phosphatase 93 40 - 150 U/L    AST 21 0 - 45 U/L    ALT 21 0 - 70 U/L    Protein Total 6.8 6.4 - 8.3 g/dL    Albumin 3.5 3.5 - 5.2 g/dL    Bilirubin Total 0.2 <=1.2 mg/dL   CBC with platelets and differential   Result Value Ref Range    WBC Count 2.2 (L) 4.0 - 11.0 10e3/uL    RBC Count 3.13 (L) 4.40 - 5.90 10e6/uL    Hemoglobin 9.4 (L) 13.3 - 17.7 g/dL    Hematocrit 31.7 (L) 40.0 - 53.0 %     (H) 78 - 100 fL    MCH 30.0 26.5 - 33.0 pg    MCHC 29.7 (L) 31.5 - 36.5 g/dL    RDW 19.5 (H) 10.0 - 15.0 %    Platelet Count 204 150 - 450 10e3/uL    % Neutrophils 51 %    % Lymphocytes 17 %    % Monocytes 30 %    % Eosinophils 1 %    % Basophils 1 %    % Immature Granulocytes 1 %    NRBCs per 100 WBC 2 (H) <1 /100    Absolute Neutrophils 1.1 (L) 1.6 - 8.3 10e3/uL    Absolute Lymphocytes 0.4 (L) 0.8 - 5.3 10e3/uL    Absolute Monocytes 0.7 0.0 - 1.3 10e3/uL    Absolute Eosinophils 0.0 0.0 - 0.7 10e3/uL    Absolute Basophils 0.0 0.0 - 0.2 10e3/uL    Absolute Immature Granulocytes 0.0 <=0.4 10e3/uL    Absolute NRBCs 0.1 10e3/uL       Imaging    IR Gastro Tube To Gastrojejuno Convert    Result Date: 12/3/2024  [facility] DATE: 12/3/2024 PROCEDURE: FLUOROSCOPIC GUIDED GASTROSTOMY TO GASTROJEJUNOSTOMY CONVERSION INTERVENTIONAL RADIOLOGIST: Mariana Bejarano D.O. INDICATION: Large residual volumes. Squamous cell carcinoma in the head and neck. Conversion from G to GJ requested. MODERATE SEDATION: None. CONTRAST: 25 mL Omnipaque enteric. ANTIBIOTICS: None. ADDITIONAL MEDICATIONS: None. FLUOROSCOPIC TIME: 1.4 minutes. RADIATION  DOSE: Air Kerma: 10.53 mGy. COMPLICATIONS: No immediate complications. STERILE BARRIER TECHNIQUE: Maximum sterile barrier technique was used. Cutaneous antisepsis was performed at the operative site with application of 2% chlorhexidine and large sterile drape. Prior to the procedure, the  and assistant performed hand hygiene and wore hat, mask, sterile gown, and sterile gloves during the entire procedure. PROCEDURE: Under fluoroscopic guidance, the lumen of the pre-existing gastrostomy was injected with contrast and images were obtained. The gastrostomy was removed over a wire. A catheter and wire were manipulated through the gastric pylorus and duodenum and positioned in the proximal jejunum. Following tract dilatation, over wire exchange was made for a new 18F 45 cm SURENDRA gastrojejunostomy which was positioned under fluoroscopic guidance with its distal tip in the proximal jejunum. The retention balloon was inflated with 10 mL of saline. A post placement contrast injection of both the gastric and jejunal lumens was performed. FINDINGS:  The initial injection demonstrates that the gastric lumen is patent and in appropriate position. After exchange/conversion, the new gastrojejunostomy is in appropriate position with the gastric lumen emptying into the stomach and jejunal lumen emptying near the ligament of Treitz.     IMPRESSION:  1.  Successful conversion of the pre-existing gastrostomy to a new gastrojejunostomy as detailed above. 2.  The patient initially had a 20 Martiniquais 2.5 cm stoma length gastrostomy tube, however, the stoma length was too long. An 18 Martiniquais regular GJ tube was placed, because GJ tubes do not come in 20 Martiniquais from our vendor and we also did not have 2.0 stoma length 18 Greenlandic GJ tubes in stock. 3.  Both the gastric and jejunal lumens are ready for use immediately. PORFIRIO RODRIGUEZ DO   SYSTEM ID:  [id number]    XR Chest 2 Views    Result Date: 12/2/2024  EXAM: XR CHEST 2 VIEWS LOCATION:  [facility] DATE: 12/2/2024 INDICATION: Cough and shortness of breath. COMPARISON: 11/11/2024 and chest CT 11/14/2024 IMPRESSION: The heart is normal in size with mild aortic calcifications. Tracheostomy tube overlying the trachea. Mild left basilar opacities with improvement from prior imaging, possibly residual scarring or recurrent atelectasis/pneumonia. The remainder of the lungs appear clear. No pneumothorax. Mild degenerative changes of the spine.    CT Chest Abd Pelvis W IV Cont    Result Date: 11/14/2024  EXAM: CT CHEST ABD PELVIS W IV CONT LOCATION: [facility] DATE: 11/14/2024 INDICATION: Unable to tolerate enteral intake without emesis. Several possible episodes of aspiration. Vomiting. Coughing. Bloated and abdominal distention. Acute hypoxic respiratory failure with tracheostomy. Oropharyngeal cancer. COMPARISON: CT chest 04/14/2024. No prior CT abdomen and pelvis. TECHNIQUE: Helical CT scan of the chest, abdomen, and pelvis was performed following injection of IV contrast. Multiplanar reformats were obtained. Dose reduction techniques were used. CONTRAST: Iohexol 350 mg/ml IV soln 75 mL. FINDINGS: LUNGS AND PLEURA: Patchy opacity in the left lower lobe consistent with a combination of atelectasis and infiltrate/pneumonia. There is also a patchy infiltrate in the right lower lobe and to a lesser extent in the right middle lobe. Minimal patchy infiltrate in the left upper lobe. Findings could certainly be due to pneumonia. Trace left pleural fluid. No pulmonary nodules or mass lesions. MEDIASTINUM/AXILLAE: Right hilar lymph node on image 51 measures 2.7 x 1.3 cm and is new from prior exam. No axillary adenopathy. Heart is normal in size. No pericardial effusion. Tracheostomy noted. CORONARY ARTERY CALCIFICATION: Moderate to severe. HEPATOBILIARY: Liver is unremarkable. Gallbladder contains stones. PANCREAS: Normal. SPLEEN: Calcifications in the spleen consistent with prior granulomatous disease.  ADRENAL GLANDS: Normal. KIDNEYS/BLADDER: Nonobstructing stones seen in the left kidney. No hydronephrosis or hydroureter. Right kidney is unremarkable. Bladder is unremarkable. BOWEL: Diverticulosis without evidence of diverticulitis. Small bowel loops are unremarkable. LYMPH NODES: No retroperitoneal adenopathy. No pelvic adenopathy. VASCULATURE: Vascular calcification involving the aorta without aneurysm. PELVIC ORGANS: Normal. MUSCULOSKELETAL: Degenerative changes in the spine. IMPRESSION: 1.  Patchy infiltrate in both lower lobes, left worse than right, as well as minimally in the right middle lobe and left upper lobe. 2.  Right hilar lymph node visualized, which may be reactive. 3.  No evidence of bowel obstruction. No definite etiology for vomiting.    XR Abdomen 2 Views    Result Date: 11/14/2024  EXAM: XR ABD 2 VIEWS ROUTINE LOCATION: [facility] DATE: 11/14/2024 INDICATION: Abdominal distension, DISTENDED ABDOMEN COMPARISON: None. IMPRESSION: No free air. Gas and moderate amount of stool throughout the visualized colon. Nonobstructive bowel gas pattern. 6 mm calculi at the bilateral kidneys. Left upper quadrant calcifications likely representing calcified splenic granulomas. G button. Moderate degenerative changes throughout the visualized spine. Mild degenerative changes, bilateral hips. Blunting of the left costophrenic angle due to small effusion versus pleural thickening.     Billing  Total time 55 minutes, to include face to face visit, review of EMR, ordering, documentation and coordination of care on date of service   complexity modifier for longitudinal care.       Signed by: Noris Lovelace NP      Oncology Rooming Note    December 12, 2024 8:44 AM   Hieu Hughes is a 84 year old male who presents for:    Chief Complaint   Patient presents with     Oncology Clinic Visit     Squamous cell carcinoma of base of tongue - Labs provider and infusion     Initial Vitals: /56 (BP Location: Right  "arm, Patient Position: Sitting, Cuff Size: Adult Regular)   Pulse 66   Temp 97.9  F (36.6  C) (Tympanic)   Resp 18   Ht 1.778 m (5' 10\")   Wt 72.6 kg (160 lb)   SpO2 95%   BMI 22.96 kg/m   Estimated body mass index is 22.96 kg/m  as calculated from the following:    Height as of this encounter: 1.778 m (5' 10\").    Weight as of this encounter: 72.6 kg (160 lb). Body surface area is 1.89 meters squared.  No Pain (0) Comment: Data Unavailable   No LMP for male patient.  Allergies reviewed: Yes  Medications reviewed: Yes    Medications: Medication refills not needed today.  Pharmacy name entered into [employer]:    [facility] - [place], [place] - [address] [facility] - [place], [place] - [address] [facility] - [place], [place] - [address] [facility] - [place], [place] - [address] [facility] - [place], [place] - [address]    Frailty Screening:   Is the patient here for a new oncology consult visit in cancer care? 2. No      Clinical concerns: Patient has been having diarrhea since starting the feedings at night.       Nancy Toribio CMA                Again, thank you for allowing me to participate in the care of your patient.        Sincerely,        Noris Lovelace NP  "

## 2024-12-12 NOTE — PROGRESS NOTES
Infusion Nursing Note:  Hieu Hughes presents today for C2D8 Taxol.    Patient seen by provider today: Yes: Noris Lovelace NP   present during visit today: Not Applicable.    Note: N/A.      Intravenous Access:  Labs drawn without difficulty.  Peripheral IV placed.    Treatment Conditions:  Lab Results   Component Value Date    HGB 9.4 (L) 12/12/2024    WBC 2.2 (L) 12/12/2024    ANEU 4.8 05/20/2009    ANEUTAUTO 1.1 (L) 12/12/2024     12/12/2024        Lab Results   Component Value Date     12/12/2024    POTASSIUM 4.7 12/12/2024    MAG 2.1 12/03/2024    CR 0.71 12/12/2024    MANPREET 9.2 12/12/2024    BILITOTAL 0.2 12/12/2024    ALBUMIN 3.5 12/12/2024    ALT 21 12/12/2024    AST 21 12/12/2024       Results reviewed, labs did not meet treatment parameters: ANC is 1.1 today which is below parameters but okay to proceed with chemotherapy today per Noris Lovelace NP.      Post Infusion Assessment:  Patient tolerated infusion without incident.  Blood return noted pre and post infusion.  Site patent and intact, free from redness, edema or discomfort.  No evidence of extravasations.  Access discontinued per protocol.       Discharge Plan:   Patient discharged in stable condition accompanied by: family.  Departure Mode: Ambulatory.  Pt to return 12/27/24 at 10:50 am for labs followed by C3D1 Keytruda, Taxol, and Carbo.       Jenniffer Aguirre RN

## 2024-12-12 NOTE — PROGRESS NOTES
"Oncology Rooming Note    December 12, 2024 8:44 AM   Hieu Hughes is a 84 year old male who presents for:    Chief Complaint   Patient presents with    Oncology Clinic Visit     Squamous cell carcinoma of base of tongue - Labs provider and infusion     Initial Vitals: /56 (BP Location: Right arm, Patient Position: Sitting, Cuff Size: Adult Regular)   Pulse 66   Temp 97.9  F (36.6  C) (Tympanic)   Resp 18   Ht 1.778 m (5' 10\")   Wt 72.6 kg (160 lb)   SpO2 95%   BMI 22.96 kg/m   Estimated body mass index is 22.96 kg/m  as calculated from the following:    Height as of this encounter: 1.778 m (5' 10\").    Weight as of this encounter: 72.6 kg (160 lb). Body surface area is 1.89 meters squared.  No Pain (0) Comment: Data Unavailable   No LMP for male patient.  Allergies reviewed: Yes  Medications reviewed: Yes    Medications: Medication refills not needed today.  Pharmacy name entered into [employer]:    [facility] - [place], [place] - [address] [facility] [place], [place] - [address] [facility] - [place], [place] - [address] [facility] - [place], [place] - [address] [facility] - [place], [place] - [address]    Frailty Screening:   Is the patient here for a new oncology consult visit in cancer care? 2. No      Clinical concerns: Patient has been having diarrhea since starting the feedings at night.       Nancy Toribio, [facility]              "

## 2024-12-13 ENCOUNTER — HOSPITAL ENCOUNTER (INPATIENT)
Facility: CLINIC | Age: 84
LOS: 2 days | Discharge: HOME | DRG: 178 | End: 2024-12-15
Attending: STUDENT IN AN ORGANIZED HEALTH CARE EDUCATION/TRAINING PROGRAM | Admitting: STUDENT IN AN ORGANIZED HEALTH CARE EDUCATION/TRAINING PROGRAM
Payer: COMMERCIAL

## 2024-12-13 DIAGNOSIS — J69.0 ASPIRATION PNEUMONITIS (H): Primary | ICD-10-CM

## 2024-12-13 PROBLEM — J18.9 PNEUMONIA: Status: ACTIVE | Noted: 2024-12-13

## 2024-12-13 PROCEDURE — 96375 TX/PRO/DX INJ NEW DRUG ADDON: CPT

## 2024-12-13 PROCEDURE — 87641 MR-STAPH DNA AMP PROBE: CPT | Performed by: STUDENT IN AN ORGANIZED HEALTH CARE EDUCATION/TRAINING PROGRAM

## 2024-12-13 PROCEDURE — G0379 DIRECT REFER HOSPITAL OBSERV: HCPCS

## 2024-12-13 PROCEDURE — 99223 1ST HOSP IP/OBS HIGH 75: CPT | Performed by: STUDENT IN AN ORGANIZED HEALTH CARE EDUCATION/TRAINING PROGRAM

## 2024-12-13 PROCEDURE — 250N000013 HC RX MED GY IP 250 OP 250 PS 637: Performed by: STUDENT IN AN ORGANIZED HEALTH CARE EDUCATION/TRAINING PROGRAM

## 2024-12-13 PROCEDURE — 250N000011 HC RX IP 250 OP 636: Performed by: STUDENT IN AN ORGANIZED HEALTH CARE EDUCATION/TRAINING PROGRAM

## 2024-12-13 PROCEDURE — 120N000001 HC R&B MED SURG/OB

## 2024-12-13 PROCEDURE — G0378 HOSPITAL OBSERVATION PER HR: HCPCS

## 2024-12-13 RX ORDER — AMOXICILLIN 250 MG
1 CAPSULE ORAL 2 TIMES DAILY PRN
Status: DISCONTINUED | OUTPATIENT
Start: 2024-12-13 | End: 2024-12-15 | Stop reason: HOSPADM

## 2024-12-13 RX ORDER — SODIUM CHLORIDE FOR INHALATION 0.9 %
3 VIAL, NEBULIZER (ML) INHALATION
Status: DISCONTINUED | OUTPATIENT
Start: 2024-12-13 | End: 2024-12-15 | Stop reason: HOSPADM

## 2024-12-13 RX ORDER — MAGNESIUM HYDROXIDE 1200 MG/15ML
30 LIQUID ORAL 3 TIMES DAILY
Status: DISCONTINUED | OUTPATIENT
Start: 2024-12-13 | End: 2024-12-14

## 2024-12-13 RX ORDER — CEFEPIME HYDROCHLORIDE 1 G/1
1 INJECTION, POWDER, FOR SOLUTION INTRAMUSCULAR; INTRAVENOUS EVERY 8 HOURS
Status: DISCONTINUED | OUTPATIENT
Start: 2024-12-14 | End: 2024-12-15

## 2024-12-13 RX ORDER — AMOXICILLIN 250 MG
2 CAPSULE ORAL 2 TIMES DAILY PRN
Status: DISCONTINUED | OUTPATIENT
Start: 2024-12-13 | End: 2024-12-15 | Stop reason: HOSPADM

## 2024-12-13 RX ORDER — ACETAMINOPHEN 650 MG/1
650 SUPPOSITORY RECTAL EVERY 4 HOURS PRN
Status: DISCONTINUED | OUTPATIENT
Start: 2024-12-13 | End: 2024-12-15 | Stop reason: HOSPADM

## 2024-12-13 RX ORDER — CALCIUM CARBONATE 500 MG/1
1000 TABLET, CHEWABLE ORAL 4 TIMES DAILY PRN
Status: DISCONTINUED | OUTPATIENT
Start: 2024-12-13 | End: 2024-12-15 | Stop reason: HOSPADM

## 2024-12-13 RX ORDER — FAMOTIDINE 40 MG/5ML
20 POWDER, FOR SUSPENSION ORAL 2 TIMES DAILY
Status: DISCONTINUED | OUTPATIENT
Start: 2024-12-13 | End: 2024-12-15 | Stop reason: HOSPADM

## 2024-12-13 RX ORDER — ONDANSETRON 2 MG/ML
4 INJECTION INTRAMUSCULAR; INTRAVENOUS EVERY 6 HOURS PRN
Status: DISCONTINUED | OUTPATIENT
Start: 2024-12-13 | End: 2024-12-15 | Stop reason: HOSPADM

## 2024-12-13 RX ORDER — LIDOCAINE 40 MG/G
CREAM TOPICAL
Status: DISCONTINUED | OUTPATIENT
Start: 2024-12-13 | End: 2024-12-15 | Stop reason: HOSPADM

## 2024-12-13 RX ORDER — LOPERAMIDE HCL 1 MG/7.5ML
2 SOLUTION ORAL 4 TIMES DAILY PRN
Status: DISCONTINUED | OUTPATIENT
Start: 2024-12-13 | End: 2024-12-15 | Stop reason: HOSPADM

## 2024-12-13 RX ORDER — PROCHLORPERAZINE MALEATE 5 MG/1
5 TABLET ORAL EVERY 6 HOURS PRN
Status: DISCONTINUED | OUTPATIENT
Start: 2024-12-13 | End: 2024-12-15 | Stop reason: HOSPADM

## 2024-12-13 RX ORDER — ONDANSETRON 4 MG/1
4 TABLET, ORALLY DISINTEGRATING ORAL EVERY 6 HOURS PRN
Status: DISCONTINUED | OUTPATIENT
Start: 2024-12-13 | End: 2024-12-15 | Stop reason: HOSPADM

## 2024-12-13 RX ORDER — METOCLOPRAMIDE HYDROCHLORIDE 5 MG/5ML
5 SOLUTION ORAL 2 TIMES DAILY
Status: DISCONTINUED | OUTPATIENT
Start: 2024-12-13 | End: 2024-12-15 | Stop reason: HOSPADM

## 2024-12-13 RX ORDER — LOPERAMIDE HCL 1 MG/7.5ML
2 SOLUTION ORAL 4 TIMES DAILY PRN
Status: DISCONTINUED | OUTPATIENT
Start: 2024-12-13 | End: 2024-12-13

## 2024-12-13 RX ORDER — DOXYCYCLINE 100 MG/10ML
100 INJECTION, POWDER, LYOPHILIZED, FOR SOLUTION INTRAVENOUS EVERY 12 HOURS
Status: DISCONTINUED | OUTPATIENT
Start: 2024-12-14 | End: 2024-12-15

## 2024-12-13 RX ORDER — HYDRALAZINE HYDROCHLORIDE 10 MG/1
10 TABLET, FILM COATED ORAL EVERY 4 HOURS PRN
Status: DISCONTINUED | OUTPATIENT
Start: 2024-12-13 | End: 2024-12-15 | Stop reason: HOSPADM

## 2024-12-13 RX ORDER — LEVOTHYROXINE SODIUM 100 UG/1
100 TABLET ORAL DAILY
Status: DISCONTINUED | OUTPATIENT
Start: 2024-12-14 | End: 2024-12-15 | Stop reason: HOSPADM

## 2024-12-13 RX ORDER — IPRATROPIUM BROMIDE AND ALBUTEROL SULFATE 2.5; .5 MG/3ML; MG/3ML
3 SOLUTION RESPIRATORY (INHALATION) EVERY 4 HOURS PRN
Status: DISCONTINUED | OUTPATIENT
Start: 2024-12-13 | End: 2024-12-15 | Stop reason: HOSPADM

## 2024-12-13 RX ORDER — HYDRALAZINE HYDROCHLORIDE 20 MG/ML
10 INJECTION INTRAMUSCULAR; INTRAVENOUS EVERY 4 HOURS PRN
Status: DISCONTINUED | OUTPATIENT
Start: 2024-12-13 | End: 2024-12-15 | Stop reason: HOSPADM

## 2024-12-13 RX ORDER — ACETAMINOPHEN 325 MG/1
650 TABLET ORAL EVERY 4 HOURS PRN
Status: DISCONTINUED | OUTPATIENT
Start: 2024-12-13 | End: 2024-12-15 | Stop reason: HOSPADM

## 2024-12-13 RX ADMIN — METOCLOPRAMIDE HYDROCHLORIDE 5 MG: 5 SOLUTION ORAL at 23:43

## 2024-12-13 RX ADMIN — DOXYCYCLINE 100 MG: 100 INJECTION, POWDER, LYOPHILIZED, FOR SOLUTION INTRAVENOUS at 23:31

## 2024-12-13 RX ADMIN — FAMOTIDINE 20 MG: 40 POWDER, FOR SUSPENSION ORAL at 23:43

## 2024-12-13 ASSESSMENT — ACTIVITIES OF DAILY LIVING (ADL)
ADLS_ACUITY_SCORE: 56
ADLS_ACUITY_SCORE: 56

## 2024-12-14 ENCOUNTER — APPOINTMENT (OUTPATIENT)
Dept: ULTRASOUND IMAGING | Facility: CLINIC | Age: 84
DRG: 177 | End: 2024-12-14
Attending: STUDENT IN AN ORGANIZED HEALTH CARE EDUCATION/TRAINING PROGRAM
Payer: COMMERCIAL

## 2024-12-14 LAB
ALBUMIN SERPL BCG-MCNC: 3 G/DL (ref 3.5–5.2)
ALBUMIN UR-MCNC: NEGATIVE MG/DL
ALP SERPL-CCNC: 84 U/L (ref 40–150)
ALT SERPL W P-5'-P-CCNC: 20 U/L (ref 0–70)
ANION GAP SERPL CALCULATED.3IONS-SCNC: 6 MMOL/L (ref 7–15)
APPEARANCE UR: CLEAR
AST SERPL W P-5'-P-CCNC: 35 U/L (ref 0–45)
BACTERIA SPT CULT: NORMAL
BACTERIA SPT CULT: NORMAL
BASE EXCESS BLDV CALC-SCNC: 10.9 MMOL/L (ref -3–3)
BASOPHILS # BLD AUTO: 0 10E3/UL (ref 0–0.2)
BASOPHILS NFR BLD AUTO: 1 %
BILIRUB SERPL-MCNC: 0.3 MG/DL
BILIRUB UR QL STRIP: NEGATIVE
BUN SERPL-MCNC: 22.1 MG/DL (ref 8–23)
CALCIUM SERPL-MCNC: 8.6 MG/DL (ref 8.8–10.4)
CHLORIDE SERPL-SCNC: 104 MMOL/L (ref 98–107)
CHLORIDE UR-SCNC: 182 MMOL/L
COLOR UR AUTO: ABNORMAL
CREAT SERPL-MCNC: 0.6 MG/DL (ref 0.67–1.17)
EGFRCR SERPLBLD CKD-EPI 2021: >90 ML/MIN/1.73M2
EOSINOPHIL # BLD AUTO: 0 10E3/UL (ref 0–0.7)
EOSINOPHIL NFR BLD AUTO: 1 %
ERYTHROCYTE [DISTWIDTH] IN BLOOD BY AUTOMATED COUNT: 19.3 % (ref 10–15)
ERYTHROCYTE [DISTWIDTH] IN BLOOD BY AUTOMATED COUNT: 19.4 % (ref 10–15)
GLUCOSE BLDC GLUCOMTR-MCNC: 75 MG/DL (ref 70–99)
GLUCOSE BLDC GLUCOMTR-MCNC: 79 MG/DL (ref 70–99)
GLUCOSE SERPL-MCNC: 80 MG/DL (ref 70–99)
GLUCOSE UR STRIP-MCNC: NEGATIVE MG/DL
GRAM STAIN RESULT: NORMAL
HCO3 BLDV-SCNC: 37 MMOL/L (ref 21–28)
HCO3 SERPL-SCNC: 31 MMOL/L (ref 22–29)
HCT VFR BLD AUTO: 27.9 % (ref 40–53)
HCT VFR BLD AUTO: 28.2 % (ref 40–53)
HGB BLD-MCNC: 8.7 G/DL (ref 13.3–17.7)
HGB BLD-MCNC: 8.7 G/DL (ref 13.3–17.7)
HGB UR QL STRIP: NEGATIVE
IMM GRANULOCYTES # BLD: 0 10E3/UL
IMM GRANULOCYTES NFR BLD: 0 %
KETONES UR STRIP-MCNC: NEGATIVE MG/DL
LEUKOCYTE ESTERASE UR QL STRIP: NEGATIVE
LYMPHOCYTES # BLD AUTO: 0.5 10E3/UL (ref 0.8–5.3)
LYMPHOCYTES NFR BLD AUTO: 15 %
MAGNESIUM SERPL-MCNC: 2.2 MG/DL (ref 1.7–2.3)
MCH RBC QN AUTO: 30 PG (ref 26.5–33)
MCH RBC QN AUTO: 30 PG (ref 26.5–33)
MCHC RBC AUTO-ENTMCNC: 30.9 G/DL (ref 31.5–36.5)
MCHC RBC AUTO-ENTMCNC: 31.2 G/DL (ref 31.5–36.5)
MCV RBC AUTO: 96 FL (ref 78–100)
MCV RBC AUTO: 97 FL (ref 78–100)
MONOCYTES # BLD AUTO: 0.3 10E3/UL (ref 0–1.3)
MONOCYTES NFR BLD AUTO: 9 %
MRSA DNA SPEC QL NAA+PROBE: NEGATIVE
MUCOUS THREADS #/AREA URNS LPF: PRESENT /LPF
NEUTROPHILS # BLD AUTO: 2.3 10E3/UL (ref 1.6–8.3)
NEUTROPHILS NFR BLD AUTO: 74 %
NITRATE UR QL: NEGATIVE
NRBC # BLD AUTO: 0 10E3/UL
NRBC BLD AUTO-RTO: 0 /100
O2/TOTAL GAS SETTING VFR VENT: 2 %
OXYHGB MFR BLDV: 60 % (ref 70–75)
PCO2 BLDV: 60 MM HG (ref 40–50)
PH BLDV: 7.4 [PH] (ref 7.32–7.43)
PH UR STRIP: 6.5 [PH] (ref 5–7)
PHOSPHATE SERPL-MCNC: 4.1 MG/DL (ref 2.5–4.5)
PLATELET # BLD AUTO: 157 10E3/UL (ref 150–450)
PLATELET # BLD AUTO: 159 10E3/UL (ref 150–450)
PO2 BLDV: 37 MM HG (ref 25–47)
POTASSIUM SERPL-SCNC: 4.8 MMOL/L (ref 3.4–5.3)
PROCALCITONIN SERPL IA-MCNC: 0.07 NG/ML
PROT SERPL-MCNC: 6 G/DL (ref 6.4–8.3)
RBC # BLD AUTO: 2.9 10E6/UL (ref 4.4–5.9)
RBC # BLD AUTO: 2.9 10E6/UL (ref 4.4–5.9)
RBC URINE: 1 /HPF
SA TARGET DNA: NEGATIVE
SAO2 % BLDV: 61.5 % (ref 70–75)
SODIUM SERPL-SCNC: 141 MMOL/L (ref 135–145)
SP GR UR STRIP: 1.03 (ref 1–1.03)
UROBILINOGEN UR STRIP-MCNC: NORMAL MG/DL
WBC # BLD AUTO: 3 10E3/UL (ref 4–11)
WBC # BLD AUTO: 3.1 10E3/UL (ref 4–11)
WBC URINE: <1 /HPF

## 2024-12-14 PROCEDURE — 250N000011 HC RX IP 250 OP 636: Performed by: STUDENT IN AN ORGANIZED HEALTH CARE EDUCATION/TRAINING PROGRAM

## 2024-12-14 PROCEDURE — G0378 HOSPITAL OBSERVATION PER HR: HCPCS

## 2024-12-14 PROCEDURE — 96375 TX/PRO/DX INJ NEW DRUG ADDON: CPT

## 2024-12-14 PROCEDURE — 87070 CULTURE OTHR SPECIMN AEROBIC: CPT | Performed by: STUDENT IN AN ORGANIZED HEALTH CARE EDUCATION/TRAINING PROGRAM

## 2024-12-14 PROCEDURE — 250N000013 HC RX MED GY IP 250 OP 250 PS 637: Performed by: STUDENT IN AN ORGANIZED HEALTH CARE EDUCATION/TRAINING PROGRAM

## 2024-12-14 PROCEDURE — 93970 EXTREMITY STUDY: CPT

## 2024-12-14 PROCEDURE — 99223 1ST HOSP IP/OBS HIGH 75: CPT | Performed by: INTERNAL MEDICINE

## 2024-12-14 PROCEDURE — 85048 AUTOMATED LEUKOCYTE COUNT: CPT | Performed by: STUDENT IN AN ORGANIZED HEALTH CARE EDUCATION/TRAINING PROGRAM

## 2024-12-14 PROCEDURE — 84100 ASSAY OF PHOSPHORUS: CPT | Performed by: INTERNAL MEDICINE

## 2024-12-14 PROCEDURE — 85014 HEMATOCRIT: CPT | Performed by: STUDENT IN AN ORGANIZED HEALTH CARE EDUCATION/TRAINING PROGRAM

## 2024-12-14 PROCEDURE — 87070 CULTURE OTHR SPECIMN AEROBIC: CPT | Performed by: INTERNAL MEDICINE

## 2024-12-14 PROCEDURE — 93005 ELECTROCARDIOGRAM TRACING: CPT

## 2024-12-14 PROCEDURE — 99233 SBSQ HOSP IP/OBS HIGH 50: CPT | Performed by: INTERNAL MEDICINE

## 2024-12-14 PROCEDURE — 83735 ASSAY OF MAGNESIUM: CPT | Performed by: INTERNAL MEDICINE

## 2024-12-14 PROCEDURE — 82805 BLOOD GASES W/O2 SATURATION: CPT | Performed by: STUDENT IN AN ORGANIZED HEALTH CARE EDUCATION/TRAINING PROGRAM

## 2024-12-14 PROCEDURE — 85025 COMPLETE CBC W/AUTO DIFF WBC: CPT | Performed by: INTERNAL MEDICINE

## 2024-12-14 PROCEDURE — 999N000157 HC STATISTIC RCP TIME EA 10 MIN

## 2024-12-14 PROCEDURE — 81001 URINALYSIS AUTO W/SCOPE: CPT | Performed by: STUDENT IN AN ORGANIZED HEALTH CARE EDUCATION/TRAINING PROGRAM

## 2024-12-14 PROCEDURE — 93010 ELECTROCARDIOGRAM REPORT: CPT | Performed by: INTERNAL MEDICINE

## 2024-12-14 PROCEDURE — 120N000001 HC R&B MED SURG/OB

## 2024-12-14 PROCEDURE — 82436 ASSAY OF URINE CHLORIDE: CPT | Performed by: STUDENT IN AN ORGANIZED HEALTH CARE EDUCATION/TRAINING PROGRAM

## 2024-12-14 PROCEDURE — 36415 COLL VENOUS BLD VENIPUNCTURE: CPT | Performed by: STUDENT IN AN ORGANIZED HEALTH CARE EDUCATION/TRAINING PROGRAM

## 2024-12-14 PROCEDURE — 80053 COMPREHEN METABOLIC PANEL: CPT | Performed by: STUDENT IN AN ORGANIZED HEALTH CARE EDUCATION/TRAINING PROGRAM

## 2024-12-14 PROCEDURE — 96376 TX/PRO/DX INJ SAME DRUG ADON: CPT

## 2024-12-14 PROCEDURE — 99222 1ST HOSP IP/OBS MODERATE 55: CPT | Performed by: SPECIALIST

## 2024-12-14 PROCEDURE — 84145 PROCALCITONIN (PCT): CPT | Performed by: SPECIALIST

## 2024-12-14 PROCEDURE — 250N000009 HC RX 250: Performed by: STUDENT IN AN ORGANIZED HEALTH CARE EDUCATION/TRAINING PROGRAM

## 2024-12-14 RX ORDER — DEXTROSE MONOHYDRATE 25 G/50ML
25-50 INJECTION, SOLUTION INTRAVENOUS
Status: DISCONTINUED | OUTPATIENT
Start: 2024-12-14 | End: 2024-12-15 | Stop reason: HOSPADM

## 2024-12-14 RX ORDER — NICOTINE POLACRILEX 4 MG
15-30 LOZENGE BUCCAL
Status: DISCONTINUED | OUTPATIENT
Start: 2024-12-14 | End: 2024-12-15 | Stop reason: HOSPADM

## 2024-12-14 RX ORDER — AMINO ACIDS/PROTEIN HYDROLYS 11G-40/45
1 LIQUID IN PACKET (ML) ORAL DAILY
Status: DISCONTINUED | OUTPATIENT
Start: 2024-12-14 | End: 2024-12-15 | Stop reason: HOSPADM

## 2024-12-14 RX ORDER — DEXTROSE MONOHYDRATE 100 MG/ML
INJECTION, SOLUTION INTRAVENOUS CONTINUOUS PRN
Status: DISCONTINUED | OUTPATIENT
Start: 2024-12-14 | End: 2024-12-15 | Stop reason: HOSPADM

## 2024-12-14 RX ADMIN — CEFEPIME 1 G: 1 INJECTION, POWDER, FOR SOLUTION INTRAMUSCULAR; INTRAVENOUS at 18:19

## 2024-12-14 RX ADMIN — IPRATROPIUM BROMIDE AND ALBUTEROL SULFATE 3 ML: .5; 3 SOLUTION RESPIRATORY (INHALATION) at 06:58

## 2024-12-14 RX ADMIN — METOCLOPRAMIDE HYDROCHLORIDE 5 MG: 5 SOLUTION ORAL at 18:18

## 2024-12-14 RX ADMIN — DOXYCYCLINE 100 MG: 100 INJECTION, POWDER, LYOPHILIZED, FOR SOLUTION INTRAVENOUS at 13:49

## 2024-12-14 RX ADMIN — LEVOTHYROXINE SODIUM 100 MCG: 100 TABLET ORAL at 08:43

## 2024-12-14 RX ADMIN — FAMOTIDINE 20 MG: 40 POWDER, FOR SUSPENSION ORAL at 08:43

## 2024-12-14 RX ADMIN — CEFEPIME 1 G: 1 INJECTION, POWDER, FOR SOLUTION INTRAMUSCULAR; INTRAVENOUS at 00:58

## 2024-12-14 RX ADMIN — Medication 60 ML: at 18:19

## 2024-12-14 RX ADMIN — MAGNESIUM HYDROXIDE 30 ML: 1200 LIQUID ORAL at 02:00

## 2024-12-14 RX ADMIN — CEFEPIME 1 G: 1 INJECTION, POWDER, FOR SOLUTION INTRAMUSCULAR; INTRAVENOUS at 08:44

## 2024-12-14 RX ADMIN — METOCLOPRAMIDE HYDROCHLORIDE 5 MG: 5 SOLUTION ORAL at 08:48

## 2024-12-14 RX ADMIN — FAMOTIDINE 20 MG: 40 POWDER, FOR SUSPENSION ORAL at 18:18

## 2024-12-14 RX ADMIN — DOXYCYCLINE 100 MG: 100 INJECTION, POWDER, LYOPHILIZED, FOR SOLUTION INTRAVENOUS at 23:38

## 2024-12-14 ASSESSMENT — ACTIVITIES OF DAILY LIVING (ADL)
ADLS_ACUITY_SCORE: 56
ADLS_ACUITY_SCORE: 57
ADLS_ACUITY_SCORE: 56
ADLS_ACUITY_SCORE: 56
ADLS_ACUITY_SCORE: 57
ADLS_ACUITY_SCORE: 57
ADLS_ACUITY_SCORE: 56
ADLS_ACUITY_SCORE: 57
ADLS_ACUITY_SCORE: 56
ADLS_ACUITY_SCORE: 57
ADLS_ACUITY_SCORE: 56

## 2024-12-14 NOTE — PROGRESS NOTES
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 No dyspnea/fevers/chills/coughs. In the ED, VSS. Labs significant only for leukopenia (2.2k). Hgb stable within baseline (mid 9-range). CTA negative for PE, but showed a moderate to large amount of retained fluid or mucous secretions in the left segmental and subsegmental airways of the left lower lobe with left lower lobe consolidation due to resorptive atelectasis and likely pneumonia, Trace left pleural effusion, Additional bibasilar atelectasis with groundglass mosaic attenuation suggesting small airways disease.     On admission, he was initiated on IV cefepime, and ID was consulted. ID noted that while he is at risk of aspiration events, he does not appear to have an acute pneumonia. Recommended transition to oral levofloxacin.    Aspiration pneumonitis  Chronic dyspnea  Squamous cell carcinoma base of tongue  Vocal cord paralysis  s/p tracheostomy and G-tube placement  Moderate protein calorie malnutrition  Chemo induced leukopenia  Chronic macrocytic anemia  Hypothyroidism  Chronic alkalosis   R ICA stenosis    - Leukopenia improving; will monitor  - EKG ordered  - plan to switch to levofloxacin pending QTc  - currently on supplemental O2 mainly for comfort; wean as able       Diet: NPO per Anesthesia Guidelines for Procedure/Surgery Except for: NPO but receiving Tube Feeding    DVT Prophylaxis: Pneumatic Compression Devices  Topete Catheter: Not present  Lines: None     Cardiac Monitoring: None  Code Status: Full  Code      Disposition Plan     Medically Ready for Discharge: Anticipated Tomorrow           Nahomy Bejarano MD  Hospitalist Service  [facility]  Securely message with [employer] (more info)  Text page via [employer] Paging/Directory   ______________________________________________________________________    Interval History   Admitted last night. Offers no complaints. No significant hemoptysis since admission, and unable to expectorate much secretions. ID recs reviewed; low suspicion for pneumonia. Transition to levofloxacin pending QTc; EKG ordered    Physical Exam   Vital Signs: Temp: 98.2  F (36.8  C) Temp src: Oral BP: 132/74 Pulse: 69   Resp: 27 SpO2: 95 % O2 Device: Trach dome Oxygen Delivery: 2 LPM  Weight: 156 lbs 3.2 oz  Constitutional: Resting in bed, NAD  HEENT: Tracheostomy intact  Respiratory: Breathing non-labored. Lungs CTAB - no wheezes, crackles, or rhonchi  Cardiovascular: Heart RRR, no m/r/g. No pedal edema  GI: +BS, abd soft/NT  Skin/Integument: No rash  Musculoskeletal: +cachexia  Neuro: Alert and appropriate, MAHAN  Psych: Calm and cooperative    Data reviewed today: I reviewed all medications, new labs and imaging results over the last 24 hours. I personally reviewed no images or EKG's today.    Data   Recent Labs   Lab 12/14/24  0644 12/14/24  0227 12/14/24  0038 12/12/24  0834   WBC 3.1*  3.0*  --   --  2.2*   HGB 8.7*  8.7*  --   --  9.4*   MCV 96  97  --   --  101*     159  --   --  204     --   --  144   POTASSIUM 4.8  --   --  4.7   CHLORIDE 104  --   --  106   CO2 31*  --   --  32*   BUN 22.1  --   --  31.6*   CR 0.60*  --   --  0.71   ANIONGAP 6*  --   --  6*   MANPREET 8.6*  --   --  9.2   GLC 80 75 79 123*   ALBUMIN 3.0*  --   --  3.5   PROTTOTAL 6.0*  --   --  6.8   BILITOTAL 0.3  --   --  0.2   ALKPHOS 84  --   --  93   ALT 20  --   --  21   AST 35  --   --  21         Recent Results (from the past 24 hours)   US Lower Extremity Venous Duplex  Bilateral    Narrative    EXAM: US LOWER EXTREMITY VENOUS DUPLEX BILATERAL  LOCATION: [facility]  DATE: 12/14/2024    INDICATION: LE edema L>R,r o DVT  COMPARISON: None.  TECHNIQUE: Venous Duplex ultrasound of bilateral lower extremities with and without compression, augmentation and duplex. Color flow and spectral Doppler with waveform analysis performed.    FINDINGS: Exam includes the common femoral, femoral, popliteal veins as well as segmentally visualized deep calf veins and greater saphenous vein.     RIGHT: No deep vein thrombosis. No superficial thrombophlebitis. No popliteal cyst.    LEFT: No deep vein thrombosis. No superficial thrombophlebitis. No popliteal cyst.      Impression    IMPRESSION:  1.  No deep venous thrombosis in the bilateral lower extremities.       Medications   Current Facility-Administered Medications   Medication Dose Route Frequency Provider Last Rate Last Admin     Current Facility-Administered Medications   Medication Dose Route Frequency Provider Last Rate Last Admin    ceFEPIme (MAXIPIME) 1 g vial to attach to  mL bag for ADULTS or NS 50 mL bag for PEDS  1 g Intravenous Q8H Bere Ashraf MD   1 g at 12/14/24 0844    doxycycline (VIBRAMYCIN) 100 mg vial to attach to  mL bag  100 mg Intravenous Q12H Bere Ashraf MD   100 mg at 12/13/24 2331    famotidine (PEPCID) suspension 20 mg  20 mg Per GJ tube BID Bere Ashraf MD   20 mg at 12/14/24 0843    levothyroxine (SYNTHROID/LEVOTHROID) tablet 100 mcg  100 mcg Oral or Feeding Tube Daily Bere Ashraf MD   100 mcg at 12/14/24 0843    metoclopramide (REGLAN) solution 5 mg  5 mg Per GJ tube BID Bere Ashraf MD   5 mg at 12/14/24 0848    sodium chloride (PF) 0.9% PF flush 3 mL  3 mL Intracatheter Q8H Bere Ashraf MD   3 mL at 12/13/24 2336

## 2024-12-14 NOTE — CONSULTS
CLINICAL NUTRITION SERVICES  -  ASSESSMENT NOTE    Recommendations Ordered by Registered Dietitian (RD):     Nutrition Support Enteral:  Type of Feeding Tube: GASTROJEJUNOSTOMY  (IR placed on 12/3, partially visualized on 12/13)  Enteral Frequency:  14 hr nocturnal cycle  Enteral Regimen: 100 mL/hr x 14 hrs (6pm-8am, 1400 ml/day) will provide: 2100 kcals, 90 g PRO, 1064 ml free H2O, 302 g CHO, and 29 g fiber daily.  + 1 ptk Prosource TF20 (80 kcal, 20 g PRO)  Free Water Flush: 130 ml q 4 hrs (780 ml/day)   Total Enteral Provisions:  2180 kcals (30 kcal/kg), 110 g PRO (1.51 g/kg), 1844 ml free water     - 12/14: Initiate @ 50 ml/hr and advance by 50 ml q 2 hr as tolerated. Subsequent nights can restart at goal rate   Malnutrition:   % Weight Loss:  7.5% in 3 months (moderate malnutrition) - 7.5% wt loss in 3 months  % Intake:  Decreased intake does not meet criteria for malnutrition  Subcutaneous Fat Loss:  Orbital region mild depletion  Muscle Loss:  Clavicle bone region moderate depletion, Upper arm region mild-moderate depletion, and Dorsal hand region mild depletion  Fluid Retention:  None noted    Malnutrition Diagnosis: Moderate malnutrition  In Context of:  Acute illness or injury  Chronic illness or disease     REASON FOR ASSESSMENT  Hieu Hughes is a 84 year old male seen by Registered Dietitian for Provider Order - Registered Dietitian to Assess and Order TF per Medical Nutrition Therapy Protocol    PMH of: 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     NUTRITION HISTORY  - Information obtained from chart review and pt in bed  - Tube feeding history - pt was recently discharged from [facility] after replacing his PEG tube to a PEJ tube. His last feeding was two night ago.   - Pt was doing 6 cans of Isosource 1.5 over night from 6pm-8am at 105 ml/hr, totaling 1500 ml of formula and FWF of  60 ml before/after the cycle.    - Pt reported some issues with his home TF supplies where he only had 100 ml bags and daughter in law had to refill them over night.    - Pt has been losing wt, and reported a stable wt around 165-170 lbs    - Messaged w/ provider via [employer] | Anywhere about home TF challenges. Also called care coordinator and left a message, will re-try calling tomorrow.     CURRENT NUTRITION ORDERS  Diet Order:   NPO     Current Intake/Tolerance: NPO    NUTRITION FOCUSED PHYSICAL ASSESSMENT FOR DIAGNOSING MALNUTRITION)  Yes      Observed:    Muscle wasting (refer to documentation in Malnutrition section)    ANTHROPOMETRICS  Height: Data Unavailable  Weight: 0 lbs 0 oz   There is no height or weight on file to calculate BMI.  Weight Status:  Normal BMI  IBW: 73 kg  % IBW: 99%  Weight History: 7.5% wt loss in 3 months  Wt Readings from Last 20 Encounters:   12/14/24 70.9 kg (156 lb 3.2 oz)   12/12/24 72.6 kg (160 lb)   12/06/24 71.7 kg (158 lb)   11/29/24 76.7 kg (169 lb)   11/22/24 78.2 kg (172 lb 4.8 oz)   11/08/24 79.9 kg (176 lb 3.2 oz)   11/01/24 81.5 kg (179 lb 9.6 oz)   10/30/24 81.7 kg (180 lb 1.6 oz)   09/17/24 79.2 kg (174 lb 9.6 oz)   09/16/24 77.1 kg (170 lb)   09/02/24 76.6 kg (168 lb 14 oz)   08/27/24 79.6 kg (175 lb 6.4 oz)   08/14/24 79.4 kg (175 lb)   07/31/24 79.1 kg (174 lb 6.4 oz)   07/22/24 77.5 kg (170 lb 13.7 oz)   07/16/24 78.9 kg (174 lb)   05/23/24 79 kg (174 lb 1.6 oz)   10/12/23 75.8 kg (167 lb)   03/14/23 78.5 kg (173 lb)   08/09/22 78.8 kg (173 lb 11.2 oz)     LABS  Cr 0.60 (L)     MEDICATIONS  Synthroid    ASSESSED NUTRITION NEEDS PER APPROVED PRACTICE GUIDELINES:  Dosing Weight 72.6 kg   Estimated Energy Needs: 5372-1151 kcals (25-30 Kcal/Kg)  Justification: maintenance  Estimated Protein Needs:  grams protein (1.2-1.5 g pro/Kg)  Justification: preservation of lean body mass  Estimated Fluid Needs: 1449-5279  mL (1 mL/Kcal)  Justification: maintenance    MALNUTRITION:   %  Weight Loss:  7.5% in 3 months (moderate malnutrition) - 7.5% wt loss in 3 months  % Intake:  Decreased intake does not meet criteria for malnutrition  Subcutaneous Fat Loss:  Orbital region mild depletion  Muscle Loss:  Clavicle bone region moderate depletion, Upper arm region mild-moderate depletion, and Dorsal hand region mild depletion  Fluid Retention:  None noted    Malnutrition Diagnosis: Moderate malnutrition  In Context of:  Acute illness or injury  Chronic illness or disease    NUTRITION DIAGNOSIS:  Inadequate protein-energy intake related to NPO status as evidenced by pt reliant on EN support to meet nutrition needs.    NUTRITION INTERVENTIONS  Recommendations / Nutrition Prescription    Nutrition Support Enteral:  Type of Feeding Tube: GASTROJEJUNOSTOMY  (IR placed on 12/3, partially visualized on 12/13)  Enteral Frequency:  14 hr nocturnal cycle  Enteral Regimen: 100 mL/hr x 14 hrs (6pm-8am, 1400 ml/day) will provide: 2100 kcals, 90 g PRO, 1064 ml free H2O, 302 g CHO, and 29 g fiber daily.  + 1 ptk Prosource TF20 (80 kcal, 20 g PRO)  Free Water Flush: 130 ml q 4 hrs (780 ml/day)   Total Enteral Provisions:  2180 kcals (30 kcal/kg), 110 g PRO (1.51 g/kg), 1844 ml free water     - 12/14: Initiate @ 50 ml/hr and advance by 50 ml q 2 hr as tolerated. Subsequent nights can restart at goal rate    Implementation  Nutrition education: Per Provider order if indicated   EN Composition, EN Schedule, and Feeding Tube Flush    Nutrition Goals  EN - tolerate   EN - to meet % estimated needs within 48-72 hours    MONITORING AND EVALUATION:  Progress towards goals will be monitored and evaluated per protocol and Practice Guidelines    Bassem Merino MS, RD, LD

## 2024-12-14 NOTE — PROGRESS NOTES
2230h: Direct admit from [facility] for trach bleeding. Alert oriented x4, communicates with pen & paper. Hard of hearing, uses hearing aids at home. 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 Trach in situ, w/ inner cannula, cuff deflated, w/ thick trach secretion, with lavage order, blood clots present. Pt can cough out secretions, needs assistance at times when secretions is too thick. Coarse breath sound in lower lobes. Desaturation as low as 86%, trach mask to 2lpm w/ humidification. Last Hgb 9.4. Voiding to urinal. Normoactive BS x4. GJ tube in situ, clamped. NPO. Independent. Blanchable redness on coccyx area, blister in gluteal fold. Initial skin assessment done w/ 2 RN.    Pending results:  MRSA nasal swab, & sputum culture.  Urinalysis: negative
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
## 2024-12-14 NOTE — PHARMACY-ADMISSION MEDICATION HISTORY
Pharmacist Admission Medication History    Admission medication history is complete. The information provided in this note is only as accurate as the sources available at the time of the update.    Information Source(s): Patient and CareEverywhere/SureScripts via in-person    Pertinent Information: He feeds from 6pm-8am. He has only had famotidine and metoclopramide at 7:45am this morning but no other meds today.  -In Oncology clinic 12/12. Metoclopramide was decreased to 5mg BID, famotidine increased to 20mg BID, loperamide prn added but he reports not having taken    Changes made to PTA medication list:  Added: None  Deleted: None  Changed: None    Allergies reviewed with patient and updates made in EHR: yes    Medication History Completed By: Amber Painting [facility] 12/13/2024 10:45 PM    PTA Med List   Medication Sig Last Dose/Taking    albuterol (PROVENTIL) (2.5 MG/3ML) 0.083% neb solution 2.5 mg every 4 hours as needed. Taking As Needed    famotidine (PEPCID) 40 MG/5ML suspension Place 2.5 mLs (20 mg) into GJ tube 2 times daily. 12/13/2024 at  7:45 AM    ferrous sulfate 220 (44 Fe) MG/5ML SOLN Place 7.4 mLs into G tube every other day. Past Week    Levothyroxine Sodium (SYNTHROID PO) Take 100 mcg by mouth daily Crushes to take in water 12/12/2024    loperamide (IMODIUM) 1 MG/5ML solution Place 10 mLs (2 mg) into GJ tube 4 times daily as needed for diarrhea. Taking As Needed    metoclopramide (REGLAN) 5 MG/5ML solution Place 5 mLs (5 mg) into GJ tube 2 times daily as needed for heartburn or nausea. Take prior to starting feeding each evening and in AM, as needed for reflux/nausea 12/13/2024 at  7:45 AM    ondansetron (ZOFRAN) 8 MG tablet Take 1 tablet (8 mg) by mouth every 8 hours as needed for nausea (vomiting). Taking As Needed    prochlorperazine (COMPAZINE) 10 MG tablet Take 0.5 tablets (5 mg) by mouth every 6 hours as needed for nausea or vomiting. Taking As Needed    sodium chloride 0.9 % neb solution Take  3 mLs by nebulization every 3 hours as needed for wheezing Use for tracheostomy lavage every 2-4 hours as needed Taking As Needed    sodium chloride 0.9%, bottle, 0.9 % irrigation Irrigate with 30 mLs as directed 3 times daily Use for routine tracheostomy care and cleaning Taking
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
## 2024-12-14 NOTE — PLAN OF CARE
[id number]  A&O X4, non-verbal communicates with paper and pen.VSS 2L trach dome with humidification.Lung Sounds coarse, moderate amount of secretions blood tinged secretions of trach.Bowel Sounds active, -BM.Voiding freely. Redness to coccyx. Up IND. Denies pain. NPO meds in G/J tube.

## 2024-12-14 NOTE — H&P
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     --Hx: Patient presenting w/ acute on chronic hemoptysis for 1 day. No dyspnea/fevers/chills/coughs. He replaces his inner cannula multiple times a day, and showed the admitting provider how they are all drenched in blood. Otherwise ROS negative with no other acute symptoms. Decreased L basilar lung sounds. R ear is erythematous and warm. Bilateral LE edema L>>R. Of note patient hospitalaed for aspiration from 11/10 to 11/20/2024 at [facility]. Also hospitalised at [facility] for G to GJ conversion due to high residuals.   --Vitals/Exam: /56   Pulse 77   Temp 99  F (37.2  C) (Oral)   SpO2 100%     Hemoptysis 2/2 Aspiration Pneumonia  --CTA negative for PE, but showed moderate to large amount of retained fluid or mucous secretions in the left segmental and subsegmental airways of the left lower lobe with left lower lobe consolidation due to resorptive atelectasis and likely pneumonia, Trace left pleural effusion, Additional bibasilar atelectasis with groundglass mosaic attenuation suggesting small airways disease.   --Duonebs, Aspiration precautions  --Oximetry  --Sputum culture  --Cefepime due to high risk of Pseudomonas w/ his tracheostomy and neutropenia, Doxycycline for atypicals+MRSA. No indication for anaerobic coverage.   --MRSA swab  --ID consult    Moderate protein calorie malnutrition related to of intolerance of gastric feedings s/p GJ conversion 12/3/2024  Chronic diarrhea  - CT showed  Mid esophageal wall thickening that may be related to esophageal inflammation or retained luminal ingested materia  - Despite daily diarrhea with tube feeds, patient does not use imodium daily.   - Nutrition consult to continue Tube feeds  - Continue PTA  famotidine.  - Continue PTA metoclopramide, PRN ondansetron and PRN prochlorperazine.  - Imodium as needed for postfeeding diarrhea    Chemo Induced neutropenia  Chronic macrocytic anemia, stable  --Recently received day 8 taxol 1 day prior to arrival. WBC dropped to 2.2, Hb relatively stable at 9.4  --Neutropenic precautions  [facility]    Squamous cell carcinoma base of tongue  Vocal cord paralysis  Chronic Dyspnea  S/p tracheostomy and G-tube.  --Initial diagnosis in 2009. Noted to be unresectable. Currently on palliative chemotherapy with carboplatin, paclitaxel and pembrolizumab.  Initial presentation as above.  --He typically replaces his inner 14 French cannula multiple times per day, cleaning used cannulas himself. Majority of them at bedside are drenched in blood  --RT consulted for tracheostomy cares.  --Continue humidified trach dome.  --Continue PRN bedside suctioning  --Continue PRN albuterol nebs and sodium chloride nebs     Hypothyroidism.  --Continue levothyroxine.     Chronic alkalosis  --At baseline Co2 Levels  --UA,VBG and Urine chloride    Right sided carotid artery stenosis.  --has Neurosx appt 12/17    Supportive Care  --DVT ppx: SCDs  --Diet: Tube feeds  --Pain Control: tylenol  --Upper GI ppx: zofran  --Lower GI ppx: senna  -- ppx: none  --Lines/Catheters: IV  --TTE/Dopplers: None  --Contact/Seizure/Fall/Delerium Precautions: None  --PT/OT/Social/Wound/Palliative Consults: None  --Code Status: Full    History of Present Illness  --Hx: Patient presenting w/ acute on chronic hemoptysis for 1 day. No dyspnea/fevers/chills/coughs. He replaces his inner cannula multiple times a day, and showed the admitting provider how they are all drenched in blood. Otherwise ROS negative with no other acute symptoms. Decreased L basilar lung sounds. R ear is erythematous and warm. Bilateral LE edema L>>R. Of note patient hospitalaed for aspiration from 11/10 to 11/20/2024 at [facility]. Also  hospitalised at [facility] for G to GJ conversion due to high residuals.     ROS: 10 point ROS neg other than the symptoms noted above in the HPI.     Objective History  There were no vitals taken for this visit.    Constitutional: Alert and oriented to person, place and time; no apparent distress.   HEENT: Normocephalic, no scleral icterus, R ear erythematous and warm without tenderness  Abdomen: soft, no distention/tenderness/guarding, GJ site clean  Lungs: decreased L basilar breath sounds  Heart: Regular s1s2, no evidence of murmurs  Neuro:No focal strength/sensation deficits  Skin/Extremities: No obvious signs of skin breakdown, LE edema L>R  Psychiatric: appropriate affect, insight and judgment  Back: No midline tenderness, no CVA tenderness    I have personally spent 82 minutes total time today in preparing to see the patient (eg, review of tests), obtaining and/or reviewing separately obtained history, performing a medically appropriate examination and/or evaluation, counseling and educating the patient/family/caregiver, ordering medications, tests, or procedures, referring and communicating with other health care professionals, documenting clinical information in the electronic or other health record, independently interpreting results and communicating results to the patient/ family/caregiver and care coordination.

## 2024-12-15 ENCOUNTER — APPOINTMENT (OUTPATIENT)
Dept: GENERAL RADIOLOGY | Facility: CLINIC | Age: 84
DRG: 177 | End: 2024-12-15
Payer: COMMERCIAL

## 2024-12-15 VITALS
WEIGHT: 156.2 LBS | HEART RATE: 82 BPM | OXYGEN SATURATION: 98 % | BODY MASS INDEX: 22.41 KG/M2 | TEMPERATURE: 99.1 F | DIASTOLIC BLOOD PRESSURE: 65 MMHG | SYSTOLIC BLOOD PRESSURE: 105 MMHG | RESPIRATION RATE: 18 BRPM

## 2024-12-15 LAB
ANION GAP SERPL CALCULATED.3IONS-SCNC: 10 MMOL/L (ref 7–15)
BASE EXCESS BLDV CALC-SCNC: 11.9 MMOL/L (ref -3–3)
BUN SERPL-MCNC: 28 MG/DL (ref 8–23)
CALCIUM SERPL-MCNC: 9 MG/DL (ref 8.8–10.4)
CHLORIDE SERPL-SCNC: 101 MMOL/L (ref 98–107)
CREAT SERPL-MCNC: 0.6 MG/DL (ref 0.67–1.17)
EGFRCR SERPLBLD CKD-EPI 2021: >90 ML/MIN/1.73M2
ERYTHROCYTE [DISTWIDTH] IN BLOOD BY AUTOMATED COUNT: 18.6 % (ref 10–15)
GLUCOSE SERPL-MCNC: 108 MG/DL (ref 70–99)
HCO3 BLDV-SCNC: 37 MMOL/L (ref 21–28)
HCO3 SERPL-SCNC: 32 MMOL/L (ref 22–29)
HCT VFR BLD AUTO: 29.2 % (ref 40–53)
HGB BLD-MCNC: 9.2 G/DL (ref 13.3–17.7)
MAGNESIUM SERPL-MCNC: 2.2 MG/DL (ref 1.7–2.3)
MCH RBC QN AUTO: 29.4 PG (ref 26.5–33)
MCHC RBC AUTO-ENTMCNC: 31.5 G/DL (ref 31.5–36.5)
MCV RBC AUTO: 93 FL (ref 78–100)
O2/TOTAL GAS SETTING VFR VENT: 0 %
OXYHGB MFR BLDV: 65 % (ref 70–75)
PCO2 BLDV: 46 MM HG (ref 40–50)
PH BLDV: 7.51 [PH] (ref 7.32–7.43)
PHOSPHATE SERPL-MCNC: 4.1 MG/DL (ref 2.5–4.5)
PLATELET # BLD AUTO: 174 10E3/UL (ref 150–450)
PO2 BLDV: 35 MM HG (ref 25–47)
POTASSIUM SERPL-SCNC: 4.2 MMOL/L (ref 3.4–5.3)
RBC # BLD AUTO: 3.13 10E6/UL (ref 4.4–5.9)
SAO2 % BLDV: 65.6 % (ref 70–75)
SODIUM SERPL-SCNC: 143 MMOL/L (ref 135–145)
WBC # BLD AUTO: 3.8 10E3/UL (ref 4–11)

## 2024-12-15 PROCEDURE — 250N000011 HC RX IP 250 OP 636: Performed by: INTERNAL MEDICINE

## 2024-12-15 PROCEDURE — 99291 CRITICAL CARE FIRST HOUR: CPT

## 2024-12-15 PROCEDURE — 85048 AUTOMATED LEUKOCYTE COUNT: CPT | Performed by: INTERNAL MEDICINE

## 2024-12-15 PROCEDURE — 250N000011 HC RX IP 250 OP 636: Performed by: STUDENT IN AN ORGANIZED HEALTH CARE EDUCATION/TRAINING PROGRAM

## 2024-12-15 PROCEDURE — 84100 ASSAY OF PHOSPHORUS: CPT | Performed by: INTERNAL MEDICINE

## 2024-12-15 PROCEDURE — 36415 COLL VENOUS BLD VENIPUNCTURE: CPT | Performed by: INTERNAL MEDICINE

## 2024-12-15 PROCEDURE — 250N000009 HC RX 250: Performed by: STUDENT IN AN ORGANIZED HEALTH CARE EDUCATION/TRAINING PROGRAM

## 2024-12-15 PROCEDURE — G0378 HOSPITAL OBSERVATION PER HR: HCPCS

## 2024-12-15 PROCEDURE — 272N000272 HC CONTINUOUS NEBULIZER MICRO PUMP

## 2024-12-15 PROCEDURE — 250N000013 HC RX MED GY IP 250 OP 250 PS 637: Performed by: STUDENT IN AN ORGANIZED HEALTH CARE EDUCATION/TRAINING PROGRAM

## 2024-12-15 PROCEDURE — 80048 BASIC METABOLIC PNL TOTAL CA: CPT | Performed by: INTERNAL MEDICINE

## 2024-12-15 PROCEDURE — 94640 AIRWAY INHALATION TREATMENT: CPT | Mod: 76

## 2024-12-15 PROCEDURE — 96376 TX/PRO/DX INJ SAME DRUG ADON: CPT

## 2024-12-15 PROCEDURE — 96375 TX/PRO/DX INJ NEW DRUG ADDON: CPT

## 2024-12-15 PROCEDURE — 82805 BLOOD GASES W/O2 SATURATION: CPT

## 2024-12-15 PROCEDURE — 999N000157 HC STATISTIC RCP TIME EA 10 MIN

## 2024-12-15 PROCEDURE — 71045 X-RAY EXAM CHEST 1 VIEW: CPT

## 2024-12-15 PROCEDURE — 85018 HEMOGLOBIN: CPT | Performed by: INTERNAL MEDICINE

## 2024-12-15 PROCEDURE — 83735 ASSAY OF MAGNESIUM: CPT | Performed by: INTERNAL MEDICINE

## 2024-12-15 PROCEDURE — 94640 AIRWAY INHALATION TREATMENT: CPT

## 2024-12-15 PROCEDURE — 99239 HOSP IP/OBS DSCHRG MGMT >30: CPT | Mod: FS | Performed by: INTERNAL MEDICINE

## 2024-12-15 RX ORDER — LEVOFLOXACIN 500 MG/1
500 TABLET, FILM COATED ORAL DAILY
Qty: 7 TABLET | Refills: 0 | Status: SHIPPED | OUTPATIENT
Start: 2024-12-15 | End: 2024-12-22

## 2024-12-15 RX ORDER — LEVOFLOXACIN 5 MG/ML
500 INJECTION, SOLUTION INTRAVENOUS EVERY 24 HOURS
Status: DISCONTINUED | OUTPATIENT
Start: 2024-12-15 | End: 2024-12-15 | Stop reason: HOSPADM

## 2024-12-15 RX ADMIN — METOCLOPRAMIDE HYDROCHLORIDE 5 MG: 5 SOLUTION ORAL at 09:49

## 2024-12-15 RX ADMIN — FAMOTIDINE 20 MG: 40 POWDER, FOR SUSPENSION ORAL at 09:49

## 2024-12-15 RX ADMIN — LEVOFLOXACIN 500 MG: 5 INJECTION, SOLUTION INTRAVENOUS at 09:49

## 2024-12-15 RX ADMIN — IPRATROPIUM BROMIDE AND ALBUTEROL SULFATE 3 ML: .5; 3 SOLUTION RESPIRATORY (INHALATION) at 10:34

## 2024-12-15 RX ADMIN — IPRATROPIUM BROMIDE AND ALBUTEROL SULFATE 3 ML: .5; 3 SOLUTION RESPIRATORY (INHALATION) at 07:21

## 2024-12-15 RX ADMIN — IPRATROPIUM BROMIDE AND ALBUTEROL SULFATE 3 ML: .5; 3 SOLUTION RESPIRATORY (INHALATION) at 05:32

## 2024-12-15 RX ADMIN — LEVOTHYROXINE SODIUM 100 MCG: 100 TABLET ORAL at 09:49

## 2024-12-15 RX ADMIN — CEFEPIME 1 G: 1 INJECTION, POWDER, FOR SOLUTION INTRAMUSCULAR; INTRAVENOUS at 00:50

## 2024-12-15 ASSESSMENT — ACTIVITIES OF DAILY LIVING (ADL)
ADLS_ACUITY_SCORE: 56
DEPENDENT_IADLS:: INDEPENDENT
ADLS_ACUITY_SCORE: 58
ADLS_ACUITY_SCORE: 56
ADLS_ACUITY_SCORE: 58
ADLS_ACUITY_SCORE: 56

## 2024-12-15 NOTE — PROGRESS NOTES
Pt received discharge instructions and medications, questions answered, left floor in a wheelchair.

## 2024-12-15 NOTE — PROGRESS NOTES
[id number]  Orientations: A&O x4, makes needs known with pen and paper   Vitals/Pain: VSS on 2L trach dome. Denies pain  Tele: n/a  Lines/Drains: G/J tube NOC feeding @ 100 ml/hr with q4h 130ml FWF.  1 PIV, SL  Skin/Wounds: Redness to sacrum.   GI/: 1 loose BM. Voiding without difficulty  Ambulation/Assist: Independent in room  Diet: NPO  Plan: Possible discharge today. Continue plan of care
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
## 2024-12-15 NOTE — PLAN OF CARE
"Patient Name: Karen  MRN: [id number]  Date of Admission: 12/13/2024  Reason for Admission: Hemoptysis, pneumonia.   Level of Care: Medical.     Vitals:   BP Readings from Last 1 Encounters:   12/15/24 124/58     Pulse Readings from Last 1 Encounters:   12/15/24 78     Weight: 156 lb 3 oz    Ht Readings from Last 1 Encounters:   12/12/24 1.778 m (5' 10\")     Estimated body mass index is 22.41 kg/m  as calculated from the following:    Height as of 12/12/24: 1.778 m (5' 10\").    Weight as of this encounter: 70.9 kg (156 lb 3.2 oz).  Temp Readings from Last 1 Encounters:   12/15/24 98.3  F (36.8  C) (Oral)     [id number]    Pain: Pain goal 0. Pain Rating 0/10. Effective pain medication/regimen N/A, denies pain.     CV Surgery Patient: No    RRT called @ 0545 for increased O2 needs and SOB. See APRN note.     Resp: LS coarse. Trach dome @ 8 L 30% NOC. Frequent, productive cough. Sputum is thick and is blood-tinged at times. Denies SOB and chest pain.   Telemetry: N/A.  Neuro: A&O x4, very pleasant. Communicates with pen and paper.   GI/: Adequate urine output via urinal. No BM this shift, passing flatus. Denies nausea. NPO. TF running at goal rate of 100 mL/hr w/ 130 mL Q4H free water flushes.   Skin/Wounds: Blanchable redness to coccyx.   Lines/Drains: PIV SL w/ int abx.   Activity: Independent.  Sleep: Fair.   Abnormal Labs: WBC 3.8. Hgb 9.2.     Aggression Stop Light: Green          Patient Care Plan: Continue IV abx and TF. Monitor for bleeding from trach. Plan to discharge home when medically ready.       "

## 2024-12-15 NOTE — PLAN OF CARE
Goal Outcome Evaluation:    Pt denies pain, able to clear his secretions, 2 LPM trach done. Up independently. TF started 50 ml/hr until 1800, then will move to goal rate with 130 ml flushes Q 4, Able to make his needs known and communicates with writing.

## 2024-12-15 NOTE — CONSULTS
Care Management Initial Consult    General Information  Assessment completed with: Patient, Family,    Type of CM/SW Visit: Initial Assessment    Primary Care Provider verified and updated as needed: Yes   Readmission within the last 30 days: unable to assess      Reason for Consult: discharge planning  Advance Care Planning:            Communication Assessment  Patient's communication style: spoken language (English or Bilingual)             Cognitive  Cognitive/Neuro/Behavioral: WDL                      Living Environment:   People in home: alone     Current living Arrangements: house      Able to return to prior arrangements: yes       Family/Social Support:  Care provided by: self, child(shannon), homecare agency  Provides care for: no one     Support system:            Description of Support System:           Current Resources:   Patient receiving home care services: Yes  Skilled Home Care Services: Skilled Nursing (not sure other services he receives)     Community Resources:    Equipment currently used at home: none  Supplies currently used at home: Oxygen Tubing/Supplies    Employment/Financial:  Employment Status: retired        Financial Concerns: none           Does the patient's insurance plan have a 3 day qualifying hospital stay waiver?  No    Lifestyle & Psychosocial Needs:  Social Drivers of Health     Food Insecurity: No Food Insecurity (11/10/2024)    Received from "[employer]"    Hunger Vital Sign     Worried About Running Out of Food in the Last Year: Never true     Ran Out of Food in the Last Year: Never true   Depression: Not at risk (7/18/2024)    Received from "[employer]"    PHQ-2     PHQ-2 Score: 0   Housing Stability: Low Risk  (11/10/2024)    Received from "[employer]"    Housing Stability Vital Sign     Unable to Pay for Housing in the Last Year: No     Number of Times Moved in the Last Year: 0     Homeless in the Last Year: No   Tobacco Use: Medium Risk (12/13/2024)    Received from  [facility]    Patient History     Smoking Tobacco Use: Former     Smokeless Tobacco Use: Never     Passive Exposure: Never   Financial Resource Strain: Low Risk  (9/2/2024)    Financial Resource Strain     Within the past 12 months, have you or your family members you live with been unable to get utilities (heat, electricity) when it was really needed?: No   Alcohol Use: Not on file   Transportation Needs: No Transportation Needs (11/10/2024)    [facility]    PRAPARE - Transportation     Lack of Transportation (Medical): No     Lack of Transportation (Non-Medical): No   Physical Activity: Not on file   Interpersonal Safety: Not At Risk (12/13/2024)    Received from [facility]    Humiliation, Afraid, Rape, and Kick questionnaire     Fear of Current or Ex-Partner: No     Emotionally Abused: No     Physically Abused: No     Sexually Abused: No   Stress: Not on file   Social Connections: Unknown (5/4/2023)    [facility]    Social Connections     Frequency of Communication with Friends and Family: Not on file   Health Literacy: Not on file       Functional Status:  Prior to admission patient needed assistance:   Dependent ADLs:: Independent  Dependent IADLs:: Independent       Mental Health Status:  Mental Health Status: No Current Concerns       Chemical Dependency Status:                Values/Beliefs:  Spiritual, Cultural Beliefs, Anglican Practices, Values that affect care:                 Discussed  Partnership in Safe Discharge Planning  document with patient/family: No    Additional Information:  CM consult for elevated risk score. 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 Patient unable to speak but was able to write answers to writer's questions. He asked writer  to call daughter-in-law Rody ([phone]) to discuss consult. Writer left message for call back and per chart review writer was able to obtain basic information.     Writer found patient receives home care from [facility]. Writer called and spoke with [facility] liaison Chastity ([phone]) who states that patient is open to their home care services in [place]. Unfortunately she was driving and was unable to tell writer what particular services he was getting. Chastity asked writer to fax discharge orders to resume home care services to [phone] and writer completed. Writer updated charge RN. No further needs seen at this time.    Next Steps: discharge home to [place] with family later today.      Emigdio Mccarthy RN  [facility]  Inpatient Care Management - FLOAT  CM RN Mobile: [phone] daily 7:30-4:00

## 2024-12-15 NOTE — DISCHARGE SUMMARY
[facility]  Hospitalist Discharge Summary      Date of Admission:  12/13/2024  Date of Discharge:  12/15/2024  Discharging Provider: Nahomy Bejarano MD    Discharge Diagnoses   Aspiration pneumonitis  Chronic dyspnea  Squamous cell carcinoma base of tongue  Vocal cord paralysis  s/p tracheostomy and G-tube placement  Moderate protein calorie malnutrition  Chemo induced leukopenia  Chronic macrocytic anemia  Hypothyroidism  Chronic alkalosis   R ICA stenosis    Follow-ups Needed After Discharge   Follow-up Appointments       Follow-up and recommended labs and tests       Follow up with your oncologist as scheduled              Discharge Disposition   Discharged to home  Condition at discharge: Stable    Hospital Course   Hieu Hughes is a 84 year old male with hx of squamous cell carcinoma at the base of the tongue s/p tracheostomy and GJ-tube,  on palliative chemotherapy, and chronic aspiration who presented as a direct admission from [facility] on 12/13/2024 due to concern for aspiration pneumonia. He was treated with antibiotics and aggressive pulmonary hygiene during this hospitalization, and is now discharging home with resumption of home cares      Presented to the ED with 1-day history of acute on chronic hemoptysis. No dyspnea/fevers/chills/coughs. In the ED, VSS. Labs significant only for leukopenia (2.2k). Hgb stable within baseline (mid 9-range). CTA negative for PE, but showed a moderate to large amount of retained fluid or mucous secretions in the left segmental and subsegmental airways of the left lower lobe with left lower lobe consolidation due to resorptive atelectasis and likely pneumonia, Trace left pleural effusion, Additional bibasilar atelectasis with groundglass mosaic attenuation suggesting small airways disease.     On admission, he was initiated on IV cefepime, and ID was consulted. ID noted that while he is at risk of aspiration events, he does not appear to  have an acute pneumonia. Recommended transition to oral levofloxacin.    At the time of discharge, his leukopenia is improving, and he is comfortable on room air. He will be discharged with a 7-d course of empiric levofloxacin. No changes were made to his home medications.    Consultations This Hospital Stay     HEMATOLOGY & ONCOLOGY IP CONSULT  INFECTIOUS DISEASES IP CONSULT  RESPIRATORY CARE IP CONSULT  NUTRITION SERVICES ADULT IP CONSULT  PHARMACY IP CONSULT  CARE MANAGEMENT / SOCIAL WORK IP CONSULT    Code Status   Full Code    Time Spent on this Encounter   I, Nahomy Bejarano MD, personally saw the patient today and 35 minutes discharging this patient.       Nahomy Bejarano MD  [facility]  [address] [place] [address]  Phone: [phone]  ______________________________________________________________________    Physical Exam   Vital Signs: Temp: 99.1  F (37.3  C) Temp src: Oral BP: 105/65 Pulse: 82   Resp: 18 SpO2: 98 % O2 Device: None (Room air) (HME) Oxygen Delivery: 40 LPM  Weight: 156 lbs 3.2 oz    Constitutional: Resting in bed, NAD  HEENT: Tracheostomy intact  Respiratory: Breathing non-labored. Lungs CTAB - no wheezes, crackles, or rhonchi  Cardiovascular: Heart RRR, no m/r/g. No pedal edema  GI: +BS, abd soft/NT  Skin/Integument: No rash  Musculoskeletal: +cachexia  Neuro: Alert and appropriate, communicates through writing; MAHAN  Psych: Calm and cooperative    Primary Care Physician   Remington Doan    Discharge Orders      Reason for your hospital stay    Aspiration from trach secretions. You have been started on antibiotics     Follow-up and recommended labs and tests     Follow up with your oncologist as scheduled     Activity    Your activity upon discharge: activity as tolerated     Resume Home Care Services     Diet    Follow this diet upon discharge: Tube feeds per home regimen       Significant Results and Procedures   Most Recent 3  CBC's:  Recent Labs   Lab Test 12/15/24  0635 12/14/24  0644 12/12/24  0834   WBC 3.8* 3.1*  3.0* 2.2*   HGB 9.2* 8.7*  8.7* 9.4*   MCV 93 96  97 101*    157  159 204     Most Recent 3 BMP's:  Recent Labs   Lab Test 12/15/24  0635 12/14/24  0644 12/14/24  0227 12/14/24  0038 12/12/24  0834    141  --   --  144   POTASSIUM 4.2 4.8  --   --  4.7   CHLORIDE 101 104  --   --  106   CO2 32* 31*  --   --  32*   BUN 28.0* 22.1  --   --  31.6*   CR 0.60* 0.60*  --   --  0.71   ANIONGAP 10 6*  --   --  6*   MANPREET 9.0 8.6*  --   --  9.2   * 80 75   < > 123*    < > = values in this interval not displayed.   ,   Results for orders placed or performed during the hospital encounter of 12/13/24   US Lower Extremity Venous Duplex Bilateral    Narrative    EXAM: US LOWER EXTREMITY VENOUS DUPLEX BILATERAL  LOCATION: [facility]  DATE: 12/14/2024    INDICATION: LE edema L>R,r o DVT  COMPARISON: None.  TECHNIQUE: Venous Duplex ultrasound of bilateral lower extremities with and without compression, augmentation and duplex. Color flow and spectral Doppler with waveform analysis performed.    FINDINGS: Exam includes the common femoral, femoral, popliteal veins as well as segmentally visualized deep calf veins and greater saphenous vein.     RIGHT: No deep vein thrombosis. No superficial thrombophlebitis. No popliteal cyst.    LEFT: No deep vein thrombosis. No superficial thrombophlebitis. No popliteal cyst.      Impression    IMPRESSION:  1.  No deep venous thrombosis in the bilateral lower extremities.   XR Chest Port 1 View    Narrative    EXAM: XR CHEST PORT 1 VIEW  LOCATION: [facility]  DATE: 12/15/2024    INDICATION: desat  COMPARISON: Two-view chest radiograph 12/02/2024      Impression    IMPRESSION: Tracheostomy tube terminates over the mid to upper tracheal air column. Slightly worsened opacity at the left lung base favoring atelectasis however infiltrate  could also have a similar appearance. Consider attention on follow-up. Right lung   remains clear. Stable cardiomediastinal silhouette without pulmonary vascular congestion. No pneumothorax. Hypertrophic changes of the thoracic spine.       Discharge Medications   Discharge Medication List as of 12/15/2024 12:14 PM        START taking these medications    Details   levofloxacin (LEVAQUIN) 500 MG tablet Take 1 tablet (500 mg) by mouth daily for 7 days., Disp-7 tablet, R-0, E-Prescribe           CONTINUE these medications which have NOT CHANGED    Details   albuterol (PROVENTIL) (2.5 MG/3ML) 0.083% neb solution 2.5 mg every 4 hours as needed., Historical      famotidine (PEPCID) 40 MG/5ML suspension Place 2.5 mLs (20 mg) into GJ tube 2 times daily., Disp-150 mL, R-1, E-Prescribe      ferrous sulfate 220 (44 Fe) MG/5ML SOLN Place 7.4 mLs into G tube every other day., Historical      Levothyroxine Sodium (SYNTHROID PO) Take 100 mcg by mouth daily Crushes to take in water, Historical      loperamide (IMODIUM) 1 MG/5ML solution Place 10 mLs (2 mg) into GJ tube 4 times daily as needed for diarrhea., Disp-118 mL, R-2, E-Prescribe      metoclopramide (REGLAN) 5 MG/5ML solution Place 5 mLs (5 mg) into GJ tube 2 times daily as needed for heartburn or nausea. Take prior to starting feeding each evening and in AM, as needed for reflux/nausea, Disp-240 mL, R-1, E-Prescribe      ondansetron (ZOFRAN) 8 MG tablet Take 1 tablet (8 mg) by mouth every 8 hours as needed for nausea (vomiting)., Disp-30 tablet, R-11, E-Prescribe      prochlorperazine (COMPAZINE) 10 MG tablet Take 0.5 tablets (5 mg) by mouth every 6 hours as needed for nausea or vomiting., Disp-30 tablet, R-11, E-Prescribe      sodium chloride 0.9 % neb solution Take 3 mLs by nebulization every 3 hours as needed for wheezing Use for tracheostomy lavage every 2-4 hours as needed, Disp-500 mL, R-11, E-Prescribe      sodium chloride 0.9%, bottle, 0.9 % irrigation Irrigate  "with 30 mLs as directed 3 times daily Use for routine tracheostomy care and cleaning, Disp-2000 mL, R-11, E-Prescribe           Allergies   Allergies   Allergen Reactions    Mold Shortness Of Breath    Molds & Smuts Difficulty breathing    No Clinical Screening - See Comments Shortness Of Breath    Aspirin Other (See Comments)     Nose bleeds    Penicillins Other (See Comments)     Comment:  , Description:   Currently denies allergy (2017)  Reports having received PCN on multiple occassions with no adverse reactions;  Was simply advised of \"risk\" of reaction due to \"enormous\" dose he was once given for a thigh infection      "

## 2024-12-17 ENCOUNTER — VIRTUAL VISIT (OUTPATIENT)
Dept: NEUROSURGERY | Facility: CLINIC | Age: 84
End: 2024-12-17
Attending: INTERNAL MEDICINE
Payer: COMMERCIAL

## 2024-12-17 DIAGNOSIS — I65.23 BILATERAL CAROTID ARTERY STENOSIS: Primary | ICD-10-CM

## 2024-12-17 PROCEDURE — 99442 PR PHYSICIAN TELEPHONE EVALUATION 11-20 MIN: CPT | Mod: 93 | Performed by: NEUROLOGICAL SURGERY

## 2024-12-17 NOTE — PATIENT INSTRUCTIONS
Please follow-up with Dr. Morrow in March 2025 with carotid ultrasound prior.     Stroke & Endovascular RN Care Coordinators:    Roula Andrews, RN, BSN  Mayda Hubbard, RN, CNRN, SCRN    If you have any questions please contact the RN Care Coordinators at [phone], option 1.    After business hours call the  at [phone] and have the Neuro-Interventional Fellow paged.    Thank you for choosing [facility] for your health care needs.

## 2024-12-17 NOTE — PROGRESS NOTES
Virtual Visit Details  Date of service: 12/17/2024  Type of service:  Telephone Visit   Phone call duration: 15 minutes   Originating Location (pt. Location): Home    Distant Location (provider location):  On-site    DO Renetta Huizar [address] [address]      Dear Dr. Doan:    We spoke to Mr. Hughes as part of a telephone visit in cerebrovascular clinic today.  His daughter-in-law accompanied him on this visit to facilitate communication.  We have known of Mr. Hughes for a few weeks from our discussions with Dr. Ospina from ENT for two concerns - 1.  Bleeding from the tracheostomy site and 2.  carotid stenosis.  He was recently hospitalized for aspiration pneumonitis.  He has hemoptysis with old blood clots, but his daughter-in-law describes no active bleeding from the tracheostomy site.  He has generalized weakness but has not had any obvious symptoms or signs of stroke/TIA.  He is not on any antiplatelet therapy.  His most recent hemoglobin was 9.2 on 12/15/2024.    Over the phone, his voice was inaudible.  His daughter-in-law communicated for him and it appears that his language function is intact.    We reviewed the CTA of the neck from September 2024.  This shows bilateral carotid stenosis, worse on the left side at about 60%.  There are no obvious pseudoaneurysms in the branches of the external carotid artery.    Mr. Hughes appears to have asymptomatic bilateral carotid stenosis.  Because he is asymptomatic and because his life expectancy is limited, there is no need for carotid revascularization at this time.  Ideally, he would be on antiplatelet therapy with 81 mg aspirin.  But the intermittent hemoptysis and anemia are prohibitive.  The squamous cell carcinoma does not appear to infiltrate either carotid artery.  Therefore stent is not necessary to reinforce the carotid artery.  We will repeat a carotid ultrasound in March 2025 (6 months from his last CTA).  This can be  done in [place], [place].  We reviewed signs and symptoms of TIA and stroke with his daughter-in-law.  If he develops severe bleeding from the tracheostomy site, we can perform sclerotherapy.    Please do not hesitate to contact us with questions.    Sincerely,        Williams Morrow MD  Department of Neurosurgery

## 2024-12-17 NOTE — NURSING NOTE
Current patient location: [address] [place] [address]  [place] [address]    Is the patient currently in the state of [place]? NO    Visit mode:TELEPHONE    If the visit is dropped, the patient can be reconnected by:TELEPHONE VISIT: Phone number:   Telephone Information:   Mobile [phone]       Will anyone else be joining the visit? NO  (If patient encounters technical issues they should call [phone])    Are changes needed to the allergy or medication list? Pt stated no med changes    Are refills needed on medications prescribed by this physician? NO    Rooming Documentation:  Questionnaire(s) completed    Reason for visit: Telephone (Eval for emobolization )    Milena HODGSON       General Clinic Visit  Date: 3/10/2019  PCP: No Pcp    Chief Complaint   Patient presents with   • Cough   • Fever 9 Weeks to 74 years       HPI: Dorinda Savage is a 29 month old year old female presenting to urgent care for fever and cough.  She presents with her mother today.    Started with light cold symptoms 4 days ago (rhinorrhea, congestion) but had normal energy and appetite.   Starting yesterday she was more tired, had a temperature to 102 with decreased energy and appetite, has just been drinking fluids since yesterday.  Heavy breathing last night.  Has had more of a dry cough.      Motrin given at 8 AM.    Is in day care.  Got her flu shot this season.    No past medical history on file.  ALLERGIES:  No Known Allergies    Soc Hx:  Social History     Tobacco Use   • Smoking status: Not on file   Substance Use Topics   • Alcohol use: Not on file   • Drug use: Not on file       MEDS:  Current Outpatient Medications   Medication Sig   • polyethylene glycol (MIRALAX) powder Take 0.4 g/kg by mouth daily.     No current facility-administered medications for this visit.        PHYSICAL EXAM:  Visit Vitals  Pulse 121   Temp 99.2 °F (37.3 °C) (Tympanic)   Resp 30   Wt 11.2 kg   SpO2 100%       General: no acute distress, well hydrated, well groomed, appears tired  HEENT:   Nasal turbinates injected with scant discharge, scant crusting of the nares.  Ears: external canals patent bilaterally with clear TMs, no effusion  Throat injected, palatal patechiae, tonsils 2 without exudate  Neck supple, shotty cervical LAD.  Resp: good air entry, CTAB, no wheeze/rales/rhonchi      ASSESSMENT & PLAN:  This patient is a 29 month old female presenting with viral URI symptoms.  Rapid strep and rapid flu both negative.  Recommend encouraging fluids, ibuprofen or tylenol PRN for fever or discomfort.  Awaiting strep culture.      Warning signs (fevers that persist beyond the next 2-3 days, shortness of breath, or if she seems to be  worsening) discussed - seek medical attention immediately if present.    FOLLOW-UP:  Patient is to return PRYVONNE Boyle MD  3/10/2019

## 2024-12-17 NOTE — LETTER
12/17/2024       RE: Hieu Hughes  [address] [facility] [address]     Dear Colleague,    Thank you for referring your patient, Hieu Hughes, to the [facility]. Please see a copy of my visit note below.    Virtual Visit Details  Date of service: 12/17/2024  Type of service:  Telephone Visit   Phone call duration: 15 minutes   Originating Location (pt. Location): Home    Distant Location (provider location):  On-site    DO Renetta Huizar [address] [address]      Dear Dr. Doan:    We spoke to Mr. Hughes as part of a telephone visit in cerebrovascular clinic today.  His daughter-in-law accompanied him on this visit to facilitate communication.  We have known of Mr. Hughes for a few weeks from our discussions with Dr. Ospina from ENT for two concerns - 1.  Bleeding from the tracheostomy site and 2.  carotid stenosis.  He was recently hospitalized for aspiration pneumonitis.  He has hemoptysis with old blood clots, but his daughter-in-law describes no active bleeding from the tracheostomy site.  He has generalized weakness but has not had any obvious symptoms or signs of stroke/TIA.  He is not on any antiplatelet therapy.  His most recent hemoglobin was 9.2 on 12/15/2024.    Over the phone, his voice was inaudible.  His daughter-in-law communicated for him and it appears that his language function is intact.    We reviewed the CTA of the neck from September 2024.  This shows bilateral carotid stenosis, worse on the left side at about 60%.  There are no obvious pseudoaneurysms in the branches of the external carotid artery.    Mr. Hughes appears to have asymptomatic bilateral carotid stenosis.  Because he is asymptomatic and because his life expectancy is limited, there is no need for carotid revascularization at this time.  Ideally, he would be on antiplatelet therapy with 81 mg aspirin.   But the intermittent hemoptysis and anemia are prohibitive.  The squamous cell carcinoma does not appear to infiltrate either carotid artery.  Therefore stent is not necessary to reinforce the carotid artery.  We will repeat a carotid ultrasound in March 2025 (6 months from his last CTA).  This can be done in [place], [place].  We reviewed signs and symptoms of TIA and stroke with his daughter-in-law.  If he develops severe bleeding from the tracheostomy site, we can perform sclerotherapy.    Please do not hesitate to contact us with questions.    Sincerely,        Williams Morrow MD  Department of Neurosurgery       Again, thank you for allowing me to participate in the care of your patient.      Sincerely,    Williams Morrow MD

## 2024-12-18 ENCOUNTER — HOSPITAL ENCOUNTER (OUTPATIENT)
Dept: CT IMAGING | Facility: CLINIC | Age: 84
Discharge: HOME | End: 2024-12-18
Attending: INTERNAL MEDICINE
Payer: COMMERCIAL

## 2024-12-18 DIAGNOSIS — C01 CANCER OF BASE OF TONGUE (H): ICD-10-CM

## 2024-12-18 LAB
ATRIAL RATE - MUSE: 63 BPM
DIASTOLIC BLOOD PRESSURE - MUSE: NORMAL MMHG
INTERPRETATION ECG - MUSE: NORMAL
P AXIS - MUSE: 58 DEGREES
PR INTERVAL - MUSE: 152 MS
QRS DURATION - MUSE: 78 MS
QT - MUSE: 424 MS
QTC - MUSE: 433 MS
R AXIS - MUSE: 4 DEGREES
SYSTOLIC BLOOD PRESSURE - MUSE: NORMAL MMHG
T AXIS - MUSE: 55 DEGREES
VENTRICULAR RATE- MUSE: 63 BPM

## 2024-12-18 PROCEDURE — 71260 CT THORAX DX C+: CPT

## 2024-12-18 PROCEDURE — 70491 CT SOFT TISSUE NECK W/DYE: CPT

## 2024-12-18 PROCEDURE — 74170 CT ABD WO CNTRST FLWD CNTRST: CPT

## 2024-12-18 PROCEDURE — 250N000009 HC RX 250: Performed by: RADIOLOGY

## 2024-12-18 PROCEDURE — 250N000011 HC RX IP 250 OP 636: Performed by: RADIOLOGY

## 2024-12-18 RX ORDER — IOPAMIDOL 755 MG/ML
115 INJECTION, SOLUTION INTRAVASCULAR ONCE
Status: COMPLETED | OUTPATIENT
Start: 2024-12-18 | End: 2024-12-18

## 2024-12-18 RX ADMIN — IOPAMIDOL 115 ML: 755 INJECTION, SOLUTION INTRAVENOUS at 14:11

## 2024-12-18 RX ADMIN — SODIUM CHLORIDE 80 ML: 9 INJECTION, SOLUTION INTRAVENOUS at 14:11

## 2024-12-19 ENCOUNTER — NURSE TRIAGE (OUTPATIENT)
Dept: ONCOLOGY | Facility: CLINIC | Age: 84
End: 2024-12-19
Payer: COMMERCIAL

## 2024-12-19 ENCOUNTER — PATIENT OUTREACH (OUTPATIENT)
Dept: ONCOLOGY | Facility: CLINIC | Age: 84
End: 2024-12-19
Payer: COMMERCIAL

## 2024-12-19 ENCOUNTER — ONCOLOGY VISIT (OUTPATIENT)
Dept: ONCOLOGY | Facility: CLINIC | Age: 84
End: 2024-12-19
Attending: INTERNAL MEDICINE
Payer: COMMERCIAL

## 2024-12-19 VITALS
RESPIRATION RATE: 16 BRPM | SYSTOLIC BLOOD PRESSURE: 139 MMHG | WEIGHT: 164.6 LBS | DIASTOLIC BLOOD PRESSURE: 71 MMHG | BODY MASS INDEX: 23.62 KG/M2 | HEART RATE: 78 BPM | OXYGEN SATURATION: 96 %

## 2024-12-19 DIAGNOSIS — C01 CANCER OF BASE OF TONGUE (H): ICD-10-CM

## 2024-12-19 DIAGNOSIS — J38.02 BILATERAL VOCAL FOLD PARALYSIS: ICD-10-CM

## 2024-12-19 DIAGNOSIS — Z43.0 TRACHEOSTOMY CARE (H): ICD-10-CM

## 2024-12-19 DIAGNOSIS — C01 SQUAMOUS CELL CARCINOMA OF BASE OF TONGUE (H): ICD-10-CM

## 2024-12-19 DIAGNOSIS — K29.00 ACUTE GASTRITIS WITHOUT HEMORRHAGE, UNSPECIFIED GASTRITIS TYPE: Primary | ICD-10-CM

## 2024-12-19 PROCEDURE — G0463 HOSPITAL OUTPT CLINIC VISIT: HCPCS | Performed by: INTERNAL MEDICINE

## 2024-12-19 PROCEDURE — G2211 COMPLEX E/M VISIT ADD ON: HCPCS | Performed by: INTERNAL MEDICINE

## 2024-12-19 PROCEDURE — 99417 PROLNG OP E/M EACH 15 MIN: CPT | Performed by: INTERNAL MEDICINE

## 2024-12-19 PROCEDURE — 99215 OFFICE O/P EST HI 40 MIN: CPT | Performed by: INTERNAL MEDICINE

## 2024-12-19 RX ORDER — SODIUM CHLORIDE FOR INHALATION 0.9 %
3 VIAL, NEBULIZER (ML) INHALATION 2 TIMES DAILY
Qty: 500 ML | Refills: 11 | Status: SHIPPED | OUTPATIENT
Start: 2024-12-19

## 2024-12-19 ASSESSMENT — PAIN SCALES - GENERAL: PAINLEVEL_OUTOF10: NO PAIN (0)
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
## 2024-12-19 NOTE — PROGRESS NOTES
[facility]: Cancer Care                                                                                          Contacted Hieu's daughter Cindy per the request of the triage team.    They went to  the omeprazole suspension that Dr. Bhatia prescribed today. The medication requires a prior authorization so they were unable to fill the medication.    Spoke with Cindy. Discussed that (per chart review) a request has been sent to the prior authorization team to submit a PA to insurance. I don't have a clear timeline on how long this will take, but oftentimes it'll take at least one to a few days.     They have a long commute home and already spoke with the pharmacist about having the medication overnighted to their house once the prior auth comes through.     Requested Cindy reach out with any further issues in obtaining the medication.    Laura Headley, RN, BSN  RN Care Coordinator  [facility]

## 2024-12-19 NOTE — TELEPHONE ENCOUNTER
Hieu's daughter Cindy is calling about a medication prescribed by Dr. Bhatia today. Requesting to speak with Laura Headley. Family would like to pick it  up before they leave the clinic. They do have a long drive home. Medication requires a PA.   is in a meeting today and will call them back soon.  Informed caller who voiced understanding.

## 2024-12-19 NOTE — NURSING NOTE
"Oncology Rooming Note    December 19, 2024 11:41 AM   Hieu Hughes is a 84 year old male who presents for:    Chief Complaint   Patient presents with    Oncology Clinic Visit     Squamous cell carcinoma of base of tongue     Initial Vitals: There were no vitals taken for this visit. Estimated body mass index is 22.41 kg/m  as calculated from the following:    Height as of 12/12/24: 1.778 m (5' 10\").    Weight as of 12/14/24: 70.9 kg (156 lb 3.2 oz). There is no height or weight on file to calculate BSA.  Data Unavailable Comment: Data Unavailable   No LMP for male patient.  Allergies reviewed: Yes  Medications reviewed: Yes    Medications: Medication refills not needed today.  Pharmacy name entered into [employer]:    [facility] - [place], [place] - [address] [facility] - [place], [place] - [address] [facility] - [place], [place] - [address] [facility] - [place], [place] - [address] [facility] - [place], [place] - [address]    Frailty Screening:   Is the patient here for a new oncology consult visit in cancer care? 2. No      Clinical concerns: Pt reports recent issues with increased stomach acid - causes vomiting. Pt states the symptoms started around the time of their last visit. Pt also notes multiple hospital visits within the last month. Pt wants to know if the medications that were given at the hospital can be used long term. Pt notes increase in fatigue and episodes of phlegm that interfere with breathing/swallowing. Pt states that they have been given different information from multiple providers at the hospital and would like to clarification on the best form of treatment. Pt has medication tracking notes with them. Pt also reports interference with sleep - waking up every 45 minutes (either from needing to use the bathroom or from coughing).      Elle Horn              "

## 2025-01-02 ENCOUNTER — DOCUMENTATION ONLY (OUTPATIENT)
Dept: OTHER | Facility: CLINIC | Age: 85
End: 2025-01-02
Payer: COMMERCIAL

## 2025-01-02 ENCOUNTER — PATIENT OUTREACH (OUTPATIENT)
Dept: ONCOLOGY | Facility: CLINIC | Age: 85
End: 2025-01-02
Payer: COMMERCIAL

## 2025-01-02 NOTE — PROGRESS NOTES
"[facility]/Voicemail    Clinical Data: Care Coordinator Outreach    Received VM from Rody requesting a return call.    Outreach attempted x 1.  Left message on  Rody's  voicemail with call back information and requested return call. Left message that our schedulers will be working on getting Hieu set up for more oncology appointments. Additionally left phone number for the nutrition department scheduling phone number so he can arrange a visit with Madison.    Addendum 2:25 PM  Received call back from Rody.     [facility] switched Hieu to Nutren 1.5 and provided him 2 days worth. Per their discharge note:  \"We will provide you with 2 days worth of Nutren 2.0 (the new tube feeding formula). We have sent the order to [facility] to fill ASAP. IF by chance they do not get the tube feeds to you by Thursday, in the interim, you could use the previous formula (isosource) but I would decrease to 80 mL/H to ensure your tolerate this. This would ONLY be if [facility] can't get your new feeds to you on time.\"    Rody contacted [facility] this morning and they have not received the Nutren orders. They do have isosource on hand at home. Hieu ultimately would like to be on Jevity.    I offered to reach out to [facility] to request they resend the Nutren orders to [facility]. Rody said that Hieu ultimately would like to switch to Jevity. They are going to call the nutrition scheduling phone number to see if they can get an appointment with Madison to reestablish care with her and see if Hieu can switch to Jevity. I asked them to contact me if they have problems getting a soon appointment with her and I can contact [facility] about resending the nutren orders.    Laura Headley, RN, BSN  RN Care Coordinator  [facility]      "

## 2025-01-06 ENCOUNTER — PATIENT OUTREACH (OUTPATIENT)
Dept: ONCOLOGY | Facility: CLINIC | Age: 85
End: 2025-01-06
Payer: MEDICARE

## 2025-01-06 NOTE — PROGRESS NOTES
[facility]: Cancer Care                                                                                          Received message from Madison ARREGUIN asking I refax Jevity orders to [facility]. They did not receive my fax from Friday. Madison provided a different fax number - [phone]     Facesheet, DME order, and RD office visit note faxed to [employer] @[phone]         Laura Headley, RN, BSN  RN Care Coordinator  [facility]

## 2025-01-10 ENCOUNTER — LAB (OUTPATIENT)
Dept: LAB | Facility: CLINIC | Age: 85
End: 2025-01-10
Attending: INTERNAL MEDICINE
Payer: MEDICARE

## 2025-01-10 DIAGNOSIS — C01 CANCER OF BASE OF TONGUE (H): Primary | ICD-10-CM

## 2025-01-10 DIAGNOSIS — C01 SQUAMOUS CELL CARCINOMA OF BASE OF TONGUE (H): ICD-10-CM

## 2025-01-10 LAB
ALBUMIN SERPL BCG-MCNC: 3.3 G/DL (ref 3.5–5.2)
ALP SERPL-CCNC: 105 U/L (ref 40–150)
ALT SERPL W P-5'-P-CCNC: 13 U/L (ref 0–70)
ANION GAP SERPL CALCULATED.3IONS-SCNC: 9 MMOL/L (ref 7–15)
AST SERPL W P-5'-P-CCNC: 17 U/L (ref 0–45)
BASOPHILS # BLD AUTO: 0.1 10E3/UL (ref 0–0.2)
BASOPHILS NFR BLD AUTO: 1 %
BILIRUB SERPL-MCNC: <0.2 MG/DL
BUN SERPL-MCNC: 18.8 MG/DL (ref 8–23)
CALCIUM SERPL-MCNC: 9.1 MG/DL (ref 8.8–10.4)
CHLORIDE SERPL-SCNC: 101 MMOL/L (ref 98–107)
CREAT SERPL-MCNC: 0.74 MG/DL (ref 0.67–1.17)
EGFRCR SERPLBLD CKD-EPI 2021: 90 ML/MIN/1.73M2
EOSINOPHIL # BLD AUTO: 0.2 10E3/UL (ref 0–0.7)
EOSINOPHIL NFR BLD AUTO: 4 %
ERYTHROCYTE [DISTWIDTH] IN BLOOD BY AUTOMATED COUNT: 20.6 % (ref 10–15)
GLUCOSE SERPL-MCNC: 124 MG/DL (ref 70–99)
HCO3 SERPL-SCNC: 31 MMOL/L (ref 22–29)
HCT VFR BLD AUTO: 31.9 % (ref 40–53)
HGB BLD-MCNC: 9.8 G/DL (ref 13.3–17.7)
IMM GRANULOCYTES # BLD: 0 10E3/UL
IMM GRANULOCYTES NFR BLD: 0 %
LYMPHOCYTES # BLD AUTO: 0.4 10E3/UL (ref 0.8–5.3)
LYMPHOCYTES NFR BLD AUTO: 8 %
MCH RBC QN AUTO: 31.4 PG (ref 26.5–33)
MCHC RBC AUTO-ENTMCNC: 30.7 G/DL (ref 31.5–36.5)
MCV RBC AUTO: 102 FL (ref 78–100)
MONOCYTES # BLD AUTO: 0.5 10E3/UL (ref 0–1.3)
MONOCYTES NFR BLD AUTO: 9 %
NEUTROPHILS # BLD AUTO: 4.4 10E3/UL (ref 1.6–8.3)
NEUTROPHILS NFR BLD AUTO: 78 %
NRBC # BLD AUTO: 0 10E3/UL
NRBC BLD AUTO-RTO: 0 /100
PLATELET # BLD AUTO: 247 10E3/UL (ref 150–450)
POTASSIUM SERPL-SCNC: 4.4 MMOL/L (ref 3.4–5.3)
PROT SERPL-MCNC: 6.9 G/DL (ref 6.4–8.3)
RBC # BLD AUTO: 3.12 10E6/UL (ref 4.4–5.9)
SODIUM SERPL-SCNC: 141 MMOL/L (ref 135–145)
TSH SERPL DL<=0.005 MIU/L-ACNC: 4.03 UIU/ML (ref 0.3–4.2)
WBC # BLD AUTO: 5.6 10E3/UL (ref 4–11)

## 2025-01-10 PROCEDURE — 82040 ASSAY OF SERUM ALBUMIN: CPT | Performed by: INTERNAL MEDICINE

## 2025-01-10 PROCEDURE — 80053 COMPREHEN METABOLIC PANEL: CPT | Performed by: INTERNAL MEDICINE

## 2025-01-10 PROCEDURE — 84443 ASSAY THYROID STIM HORMONE: CPT | Performed by: INTERNAL MEDICINE

## 2025-01-10 PROCEDURE — 85025 COMPLETE CBC W/AUTO DIFF WBC: CPT | Performed by: INTERNAL MEDICINE

## 2025-01-10 PROCEDURE — 84132 ASSAY OF SERUM POTASSIUM: CPT | Performed by: INTERNAL MEDICINE

## 2025-01-10 PROCEDURE — 36415 COLL VENOUS BLD VENIPUNCTURE: CPT | Performed by: INTERNAL MEDICINE

## 2025-01-13 DIAGNOSIS — J38.02 BILATERAL VOCAL FOLD PARALYSIS: ICD-10-CM

## 2025-01-13 DIAGNOSIS — Z43.0 TRACHEOSTOMY CARE (H): ICD-10-CM

## 2025-01-15 ENCOUNTER — TELEPHONE (OUTPATIENT)
Dept: ONCOLOGY | Facility: CLINIC | Age: 85
End: 2025-01-15

## 2025-01-15 NOTE — TELEPHONE ENCOUNTER
Patient needs to be rescheduled for their virtual visit due to Reason for Reschedule: Out-of-State    Scheduling team, please refer to service line late cancellation/no-show policies and reach out to patient at a later date for rescheduling.    Appointment mode: Video  Provider: Domonique Hutson    Please call daughter in Law Mariana to reschedule.

## 2025-01-17 ENCOUNTER — LAB (OUTPATIENT)
Dept: LAB | Facility: CLINIC | Age: 85
End: 2025-01-17
Payer: MEDICARE

## 2025-01-17 PROCEDURE — 84439 ASSAY OF FREE THYROXINE: CPT | Performed by: INTERNAL MEDICINE

## 2025-01-17 PROCEDURE — 82947 ASSAY GLUCOSE BLOOD QUANT: CPT | Performed by: INTERNAL MEDICINE

## 2025-01-17 PROCEDURE — 85041 AUTOMATED RBC COUNT: CPT | Performed by: NURSE PRACTITIONER

## 2025-01-17 PROCEDURE — 84443 ASSAY THYROID STIM HORMONE: CPT | Performed by: INTERNAL MEDICINE

## 2025-01-17 PROCEDURE — 85004 AUTOMATED DIFF WBC COUNT: CPT | Performed by: NURSE PRACTITIONER

## 2025-01-19 ENCOUNTER — MYC REFILL (OUTPATIENT)
Dept: OTOLARYNGOLOGY | Facility: CLINIC | Age: 85
End: 2025-01-19
Payer: COMMERCIAL

## 2025-01-19 DIAGNOSIS — J38.02 BILATERAL VOCAL FOLD PARALYSIS: ICD-10-CM

## 2025-01-19 DIAGNOSIS — Z43.0 TRACHEOSTOMY CARE (H): ICD-10-CM

## 2025-01-20 RX ORDER — MAGNESIUM HYDROXIDE 1200 MG/15ML
30 LIQUID ORAL 3 TIMES DAILY
Qty: 2000 ML | Refills: 11 | Status: SHIPPED | OUTPATIENT
Start: 2025-01-20

## 2025-01-21 ENCOUNTER — MYC MEDICAL ADVICE (OUTPATIENT)
Dept: ONCOLOGY | Facility: CLINIC | Age: 85
End: 2025-01-21
Payer: COMMERCIAL

## 2025-01-21 DIAGNOSIS — K29.00 ACUTE GASTRITIS WITHOUT HEMORRHAGE, UNSPECIFIED GASTRITIS TYPE: Primary | ICD-10-CM

## 2025-01-21 RX ORDER — FERROUS SULFATE 220 (44)/5
325.6 ELIXIR ORAL EVERY OTHER DAY
Qty: 473 ML | Refills: 4 | Status: SHIPPED | OUTPATIENT
Start: 2025-01-21

## 2025-01-21 NOTE — CONFIDENTIAL NOTE
[place] Sulfate Refill   Last prescribing provider: historical medication     Last clinic visit date: 1/10/25 Noris Lovelace     Recommendations for requested medication (if none, N/A): N/A    Any other pertinent information (if none, N/A): Call placed to Rody. Omar sulfate was taking every other day 7.4 ml's     Refilled: Y/N, if NO, why?

## 2025-01-31 ENCOUNTER — LAB (OUTPATIENT)
Dept: LAB | Facility: CLINIC | Age: 85
End: 2025-01-31
Attending: NURSE PRACTITIONER
Payer: MEDICARE

## 2025-01-31 DIAGNOSIS — C01 CANCER OF BASE OF TONGUE (H): Primary | ICD-10-CM

## 2025-01-31 DIAGNOSIS — C01 SQUAMOUS CELL CARCINOMA OF BASE OF TONGUE (H): ICD-10-CM

## 2025-01-31 LAB
ALBUMIN SERPL BCG-MCNC: 3.4 G/DL (ref 3.5–5.2)
ALP SERPL-CCNC: 99 U/L (ref 40–150)
ALT SERPL W P-5'-P-CCNC: 20 U/L (ref 0–70)
ANION GAP SERPL CALCULATED.3IONS-SCNC: 9 MMOL/L (ref 7–15)
AST SERPL W P-5'-P-CCNC: 20 U/L (ref 0–45)
BASOPHILS # BLD AUTO: 0.1 10E3/UL (ref 0–0.2)
BASOPHILS NFR BLD AUTO: 1 %
BILIRUB SERPL-MCNC: 0.2 MG/DL
BUN SERPL-MCNC: 24.1 MG/DL (ref 8–23)
CALCIUM SERPL-MCNC: 9 MG/DL (ref 8.8–10.4)
CHLORIDE SERPL-SCNC: 100 MMOL/L (ref 98–107)
CREAT SERPL-MCNC: 0.64 MG/DL (ref 0.67–1.17)
EGFRCR SERPLBLD CKD-EPI 2021: >90 ML/MIN/1.73M2
EOSINOPHIL # BLD AUTO: 0.1 10E3/UL (ref 0–0.7)
EOSINOPHIL NFR BLD AUTO: 1 %
ERYTHROCYTE [DISTWIDTH] IN BLOOD BY AUTOMATED COUNT: 18.8 % (ref 10–15)
GLUCOSE SERPL-MCNC: 125 MG/DL (ref 70–99)
HCO3 SERPL-SCNC: 33 MMOL/L (ref 22–29)
HCT VFR BLD AUTO: 29.2 % (ref 40–53)
HGB BLD-MCNC: 8.9 G/DL (ref 13.3–17.7)
IMM GRANULOCYTES # BLD: 0 10E3/UL
IMM GRANULOCYTES NFR BLD: 0 %
LYMPHOCYTES # BLD AUTO: 0.4 10E3/UL (ref 0.8–5.3)
LYMPHOCYTES NFR BLD AUTO: 7 %
MCH RBC QN AUTO: 31.6 PG (ref 26.5–33)
MCHC RBC AUTO-ENTMCNC: 30.5 G/DL (ref 31.5–36.5)
MCV RBC AUTO: 104 FL (ref 78–100)
MONOCYTES # BLD AUTO: 0.7 10E3/UL (ref 0–1.3)
MONOCYTES NFR BLD AUTO: 13 %
NEUTROPHILS # BLD AUTO: 4.2 10E3/UL (ref 1.6–8.3)
NEUTROPHILS NFR BLD AUTO: 77 %
NRBC # BLD AUTO: 0 10E3/UL
NRBC BLD AUTO-RTO: 0 /100
PLATELET # BLD AUTO: 297 10E3/UL (ref 150–450)
POTASSIUM SERPL-SCNC: 4.4 MMOL/L (ref 3.4–5.3)
PROT SERPL-MCNC: 6.6 G/DL (ref 6.4–8.3)
RBC # BLD AUTO: 2.82 10E6/UL (ref 4.4–5.9)
SODIUM SERPL-SCNC: 142 MMOL/L (ref 135–145)
TSH SERPL DL<=0.005 MIU/L-ACNC: 2.62 UIU/ML (ref 0.3–4.2)
WBC # BLD AUTO: 5.4 10E3/UL (ref 4–11)

## 2025-01-31 PROCEDURE — 82947 ASSAY GLUCOSE BLOOD QUANT: CPT | Performed by: NURSE PRACTITIONER

## 2025-01-31 PROCEDURE — 82310 ASSAY OF CALCIUM: CPT | Performed by: NURSE PRACTITIONER

## 2025-01-31 PROCEDURE — 85025 COMPLETE CBC W/AUTO DIFF WBC: CPT | Performed by: NURSE PRACTITIONER

## 2025-01-31 PROCEDURE — 36415 COLL VENOUS BLD VENIPUNCTURE: CPT | Performed by: NURSE PRACTITIONER

## 2025-01-31 PROCEDURE — 84443 ASSAY THYROID STIM HORMONE: CPT | Performed by: NURSE PRACTITIONER

## 2025-01-31 PROCEDURE — 82247 BILIRUBIN TOTAL: CPT | Performed by: NURSE PRACTITIONER

## 2025-02-07 ENCOUNTER — APPOINTMENT (OUTPATIENT)
Dept: LAB | Facility: CLINIC | Age: 85
End: 2025-02-07
Attending: NURSE PRACTITIONER
Payer: MEDICARE

## 2025-02-07 PROCEDURE — 82947 ASSAY GLUCOSE BLOOD QUANT: CPT | Performed by: NURSE PRACTITIONER

## 2025-02-07 PROCEDURE — 85014 HEMATOCRIT: CPT | Performed by: NURSE PRACTITIONER

## 2025-02-07 PROCEDURE — 82435 ASSAY OF BLOOD CHLORIDE: CPT | Performed by: NURSE PRACTITIONER

## 2025-02-07 PROCEDURE — 80053 COMPREHEN METABOLIC PANEL: CPT | Performed by: NURSE PRACTITIONER

## 2025-02-07 PROCEDURE — 85048 AUTOMATED LEUKOCYTE COUNT: CPT | Performed by: NURSE PRACTITIONER

## 2025-02-07 PROCEDURE — 85004 AUTOMATED DIFF WBC COUNT: CPT | Performed by: NURSE PRACTITIONER

## 2025-02-10 ENCOUNTER — TELEPHONE (OUTPATIENT)
Dept: DERMATOLOGY | Facility: CLINIC | Age: 85
End: 2025-02-10
Payer: MEDICARE

## 2025-02-10 DIAGNOSIS — B36.9 FUNGAL DERMATITIS: Primary | ICD-10-CM

## 2025-02-10 RX ORDER — CLOTRIMAZOLE 1 %
CREAM (GRAM) TOPICAL 2 TIMES DAILY
Qty: 60 G | Refills: 1 | Status: SHIPPED | OUTPATIENT
Start: 2025-02-10

## 2025-02-10 NOTE — TELEPHONE ENCOUNTER
This encounter is being sent to inform the clinic that this patient has a referral from Noris Lovelace NP in [facility] for the diagnoses of Cellulitis of lower extremity, unspecified laterality [L03.119] (Bilateral LE dermatitis/cellulitis. Hx fungal dermatitis ?recurrent. Started on antibiotic for possible cellulitis in meantime, need Derm assessment/mgmt) and has requested that this patient be seen within 1-2 weeks.  Based on the availability of our provider(s), we are unable to accommodate this request.    Were all sites offered this patient?  Yes    Does scheduling algorithm request to schedule next available?  Patient has been scheduled for the first available opening with Bony Escoto MD on 3/11/25.  We have informed the patient that the clinic will review their referral and reach out if a sooner appointment is medically necessary. If calling back and there is no answer, a detailed message may be left.

## 2025-02-10 NOTE — TELEPHONE ENCOUNTER
Called daughter- move appointment up to next Monday- advised this was an add on as well. Advised to continue all treatments that were started until seen by Dr. Escoto  She verbalized understanding    Thank you,    Jennifer CEDENORN BSN  [facility]- [phone]

## 2025-02-11 ENCOUNTER — HOSPITAL ENCOUNTER (OUTPATIENT)
Dept: CT IMAGING | Facility: CLINIC | Age: 85
Discharge: HOME | End: 2025-02-11
Attending: INTERNAL MEDICINE
Payer: MEDICARE

## 2025-02-11 DIAGNOSIS — C01 SQUAMOUS CELL CARCINOMA OF BASE OF TONGUE (H): ICD-10-CM

## 2025-02-11 PROCEDURE — 250N000009 HC RX 250: Performed by: INTERNAL MEDICINE

## 2025-02-11 PROCEDURE — 250N000011 HC RX IP 250 OP 636: Performed by: INTERNAL MEDICINE

## 2025-02-11 PROCEDURE — 70491 CT SOFT TISSUE NECK W/DYE: CPT

## 2025-02-11 PROCEDURE — 74177 CT ABD & PELVIS W/CONTRAST: CPT

## 2025-02-11 RX ORDER — IOPAMIDOL 755 MG/ML
115 INJECTION, SOLUTION INTRAVASCULAR ONCE
Status: COMPLETED | OUTPATIENT
Start: 2025-02-11 | End: 2025-02-11

## 2025-02-11 RX ADMIN — SODIUM CHLORIDE 80 ML: 9 INJECTION, SOLUTION INTRAVENOUS at 13:13

## 2025-02-11 RX ADMIN — IOPAMIDOL 115 ML: 755 INJECTION, SOLUTION INTRAVENOUS at 13:12

## 2025-02-12 ENCOUNTER — ONCOLOGY VISIT (OUTPATIENT)
Dept: ONCOLOGY | Facility: CLINIC | Age: 85
End: 2025-02-12
Attending: INTERNAL MEDICINE
Payer: MEDICARE

## 2025-02-12 ENCOUNTER — HOSPITAL ENCOUNTER (OUTPATIENT)
Dept: ULTRASOUND IMAGING | Facility: HOSPITAL | Age: 85
Discharge: HOME | End: 2025-02-12
Attending: INTERNAL MEDICINE
Payer: MEDICARE

## 2025-02-12 VITALS
TEMPERATURE: 98.2 F | BODY MASS INDEX: 24.34 KG/M2 | WEIGHT: 169.6 LBS | SYSTOLIC BLOOD PRESSURE: 146 MMHG | OXYGEN SATURATION: 95 % | DIASTOLIC BLOOD PRESSURE: 66 MMHG | HEART RATE: 71 BPM

## 2025-02-12 DIAGNOSIS — R60.0 LOCALIZED EDEMA: ICD-10-CM

## 2025-02-12 DIAGNOSIS — L03.119 CELLULITIS OF LOWER EXTREMITY, UNSPECIFIED LATERALITY: ICD-10-CM

## 2025-02-12 DIAGNOSIS — M79.89 LEG SWELLING: ICD-10-CM

## 2025-02-12 DIAGNOSIS — C01 CANCER OF BASE OF TONGUE (H): ICD-10-CM

## 2025-02-12 DIAGNOSIS — M79.89 OTHER SPECIFIED SOFT TISSUE DISORDERS: ICD-10-CM

## 2025-02-12 DIAGNOSIS — C01 SQUAMOUS CELL CARCINOMA OF BASE OF TONGUE (H): Primary | ICD-10-CM

## 2025-02-12 DIAGNOSIS — K57.32 DIVERTICULITIS OF COLON: ICD-10-CM

## 2025-02-12 PROCEDURE — 93970 EXTREMITY STUDY: CPT

## 2025-02-12 PROCEDURE — G0463 HOSPITAL OUTPT CLINIC VISIT: HCPCS | Performed by: INTERNAL MEDICINE

## 2025-02-12 RX ORDER — ALBUTEROL SULFATE 90 UG/1
1-2 INHALANT RESPIRATORY (INHALATION)
Start: 2025-02-21

## 2025-02-12 RX ORDER — DIPHENHYDRAMINE HYDROCHLORIDE 50 MG/ML
50 INJECTION INTRAMUSCULAR; INTRAVENOUS
Start: 2025-02-21

## 2025-02-12 RX ORDER — LORAZEPAM 2 MG/ML
0.5 INJECTION INTRAMUSCULAR EVERY 4 HOURS PRN
OUTPATIENT
Start: 2025-02-21

## 2025-02-12 RX ORDER — HEPARIN SODIUM (PORCINE) LOCK FLUSH IV SOLN 100 UNIT/ML 100 UNIT/ML
5 SOLUTION INTRAVENOUS
OUTPATIENT
Start: 2025-02-21

## 2025-02-12 RX ORDER — CEPHALEXIN 250 MG/5ML
500 POWDER, FOR SUSPENSION ORAL 2 TIMES DAILY
Qty: 140 ML | Refills: 0 | Status: SHIPPED | OUTPATIENT
Start: 2025-02-12

## 2025-02-12 RX ORDER — EPINEPHRINE 1 MG/ML
0.3 INJECTION, SOLUTION INTRAMUSCULAR; SUBCUTANEOUS EVERY 5 MIN PRN
OUTPATIENT
Start: 2025-02-21

## 2025-02-12 RX ORDER — HEPARIN SODIUM,PORCINE 10 UNIT/ML
5-20 VIAL (ML) INTRAVENOUS DAILY PRN
OUTPATIENT
Start: 2025-02-21

## 2025-02-12 RX ORDER — MEPERIDINE HYDROCHLORIDE 25 MG/ML
25 INJECTION INTRAMUSCULAR; INTRAVENOUS; SUBCUTANEOUS
OUTPATIENT
Start: 2025-02-21

## 2025-02-12 RX ORDER — CIPROFLOXACIN 500 MG/5ML
500 KIT ORAL 2 TIMES DAILY
Qty: 100 ML | Refills: 0 | Status: SHIPPED | OUTPATIENT
Start: 2025-02-12

## 2025-02-12 RX ORDER — METHYLPREDNISOLONE SODIUM SUCCINATE 40 MG/ML
40 INJECTION INTRAMUSCULAR; INTRAVENOUS
Start: 2025-02-21

## 2025-02-12 RX ORDER — ALBUTEROL SULFATE 0.83 MG/ML
2.5 SOLUTION RESPIRATORY (INHALATION)
OUTPATIENT
Start: 2025-02-21

## 2025-02-12 RX ORDER — DIPHENHYDRAMINE HYDROCHLORIDE 50 MG/ML
25 INJECTION INTRAMUSCULAR; INTRAVENOUS
Start: 2025-02-21

## 2025-02-12 ASSESSMENT — PAIN SCALES - GENERAL: PAINLEVEL_OUTOF10: NO PAIN (0)

## 2025-02-12 NOTE — NURSING NOTE
"Oncology Rooming Note    February 12, 2025 12:47 PM   Hieu Hughes is a 84 year old male who presents for:    Chief Complaint   Patient presents with    Oncology Clinic Visit     Squamous cell carcinoma of base of tongue     Initial Vitals: BP (!) 146/66 (BP Location: Right arm, Patient Position: Sitting, Cuff Size: Adult Regular)   Pulse 71   Temp 98.2  F (36.8  C) (Oral)   Wt 76.9 kg (169 lb 9.6 oz)   SpO2 95%   BMI 24.34 kg/m   Estimated body mass index is 24.34 kg/m  as calculated from the following:    Height as of 2/7/25: 1.778 m (5' 10\").    Weight as of this encounter: 76.9 kg (169 lb 9.6 oz). Body surface area is 1.95 meters squared.  No Pain (0) Comment: Data Unavailable   No LMP for male patient.  Allergies reviewed: Yes  Medications reviewed: Yes    Medications: Medication refills not needed today.  Pharmacy name entered into [employer]Know[employer]:    [facility] - [place], [place] - [address] [facility] - [place], [place] - [address] [facility] - [place], [place] - [address] [facility] - [place], [place] - [address] [facility] - [place], [place] - [address]    Frailty Screening:   Is the patient here for a new oncology consult visit in cancer care? 2. No    PHQ9:  Did this patient require a PHQ9?: No      Clinical concerns: numbness in both hands last 2 - 3 weeks. Getting worse this past week.       John Casey"
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
## 2025-02-17 ENCOUNTER — OFFICE VISIT (OUTPATIENT)
Dept: DERMATOLOGY | Facility: CLINIC | Age: 85
End: 2025-02-17
Attending: NURSE PRACTITIONER
Payer: MEDICARE

## 2025-02-17 DIAGNOSIS — I87.2 STASIS DERMATITIS OF BOTH LEGS: Primary | ICD-10-CM

## 2025-02-17 PROCEDURE — 99203 OFFICE O/P NEW LOW 30 MIN: CPT | Performed by: DERMATOLOGY

## 2025-02-17 RX ORDER — BETAMETHASONE DIPROPIONATE 0.5 MG/G
CREAM TOPICAL
Qty: 300 G | Refills: 3 | Status: SHIPPED | OUTPATIENT
Start: 2025-02-17
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
## 2025-02-17 NOTE — PATIENT INSTRUCTIONS
-Referral placed for Edema clinic. They can work with you on proper fitting compression stockings and education.    -Continue your antibiotic for your legs    -Stop the Clotrimazole cream    -Start new prescription steroid cream     To reduce swelling in the leg:  Don't stand for long periods.   Take regular walks.   Elevate your feet when sitting: if your legs are swollen they need to be above your hips to drain effectively.   Elevate the foot of your bed overnight.   Once the dermatitis is under control, wear special graduated compression stockings long term.    To treat the dermatitis:   Dry up oozing patches with dilute vinegar on gauze as compresses.   Topical steroid cream daily  Return to clinic in 4 weeks. (Stop at desk for appointment)  Use a moisturizer daily.  Try not to scratch: it keeps the dermatitis going.   Protect your skin from injury: this can result in infection or ulceration.  Vinegar is 5% acetic acid. Make a 1% solution by adding   cup of vinegar (white or brown) to 1 pint of water.     Proper skin care from [facility]:    -Eliminate harsh soaps as they strip the natural oils from the skin, often resulting in dry itchy skin ( i.e. Dial, Zest, Sheri Spring)  -Use mild soaps such as Cetaphil or Dove Sensitive Skin in the shower. You do not need to use soap on arms, legs, and trunk every time you shower unless visibly soiled.   -Avoid hot or cold showers.  -After showering, lightly dry off and apply moisturizing within 2-3 minutes. This will help trap moisture in the skin.   -Aggressive use of a moisturizer at least 1-2 times a day to the entire body (including -Vanicream, Cetaphil, Aquaphor or Cerave) and moisturize hands after every washing.  -We recommend using moisturizers that come in a tub that needs to be scooped out, not a pump. This has more of an oil base. It will hold moisture in your skin much better than a water base moisturizer. The above recommended are non-pore  clogging.      Wear a sunscreen with at least SPF 30 on your face, ears, neck and V of the chest daily. Wear sunscreen on other areas of the body if those areas are exposed to the sun throughout the day. Sunscreens can contain physical and/or chemical blockers. Physical blockers are less likely to clog pores, these include zinc oxide and titanium dioxide. Reapply every two hour and after swimming.     Sunscreen examples: https://www.ewg.org/sunscreen/    UV radiation  UVA radiation remains constant throughout the day and throughout the year. It is a longer wavelength than UVB and therefore penetrates deeper into the skin leading to immediate and delayed tanning, photoaging, and skin cancer. 70-80% of UVA and UVB radiation occurs between the hours of 10am-2pm.  UVB radiation  UVB radiation causes the most harmful effects and is more significant during the summer months. However, snow and ice can reflect UVB radiation leading to skin damage during the winter months as well. UVB radiation is responsible for tanning, burning, inflammation, delayed erythema (pinkness), pigmentation (brown spots), and skin cancer.     I recommend self monthly full body exams and yearly full body exams with a dermatology provider. If you develop a new or changing lesion please follow up for examination. Most skin cancers are pink and scaly or pink and pearly. However, we do see blue/brown/black skin cancers.  Consider the ABCDEs of melanoma when giving yourself your monthly full body exam ( don't forget the groin, buttocks, feet, toes, etc). A-asymmetry, B-borders, C-color, D-diameter, E-elevation or evolving. If you see any of these changes please follow up in clinic. If you cannot see your back I recommend purchasing a hand held mirror to use with a larger wall mirror.       Checking for Skin Cancer  You can find cancer early by checking your skin each month. There are 3 kinds of skin cancer. They are melanoma, basal cell carcinoma, and  squamous cell carcinoma. Doing monthly skin checks is the best way to find new marks or skin changes. Follow the instructions below for checking your skin.   The ABCDEs of checking moles for melanoma   Check your moles or growths for signs of melanoma using ABCDE:   Asymmetry: the sides of the mole or growth don t match  Border: the edges are ragged, notched, or blurred  Color: the color within the mole or growth varies  Diameter: the mole or growth is larger than 6 mm (size of a pencil eraser)  Evolving: the size, shape, or color of the mole or growth is changing (evolving is not shown in the images below)    Checking for other types of skin cancer  Basal cell carcinoma or squamous cell carcinoma have symptoms such as:     A spot or mole that looks different from all other marks on your skin  Changes in how an area feels, such as itching, tenderness, or pain  Changes in the skin's surface, such as oozing, bleeding, or scaliness  A sore that does not heal  New swelling or redness beyond the border of a mole    Who s at risk?  Anyone can get skin cancer. But you are at greater risk if you have:   Fair skin, light-colored hair, or light-colored eyes  Many moles or abnormal moles on your skin  A history of sunburns from sunlight or tanning beds  A family history of skin cancer  A history of exposure to radiation or chemicals  A weakened immune system  If you have had skin cancer in the past, you are at risk for recurring skin cancer.   How to check your skin  Do your monthly skin checkups in front of a full-length mirror. Check all parts of your body, including your:   Head (ears, face, neck, and scalp)  Torso (front, back, and sides)  Arms (tops, undersides, upper, and lower armpits)  Hands (palms, backs, and fingers, including under the nails)  Buttocks and genitals  Legs (front, back, and sides)  Feet (tops, soles, toes, including under the nails, and between toes)  If you have a lot of moles, take digital photos of  them each month. Make sure to take photos both up close and from a distance. These can help you see if any moles change over time.   Most skin changes are not cancer. But if you see any changes in your skin, call your doctor right away. Only he or she can diagnose a problem. If you have skin cancer, seeing your doctor can be the first step toward getting the treatment that could save your life.   [employer] last reviewed this educational content on 4/1/2019 2000-2020 [employer]. [address] [place] [address], [place] [address]. All rights reserved. This information is not intended as a substitute for professional medical care. Always follow your healthcare professional's instructions.       When should I call my doctor?  If you are worsening or not improving, please, contact us or seek urgent care as noted below.     Who should I call with questions (adults)?    [facility] [phone]  For urgent needs outside of business hours call the [facility] at [phone] and ask for the dermatology resident on call to be paged  If this is a medical emergency and you are unable to reach an ER, Call [phone]      If you need a prescription refill, please contact your pharmacy. Refills are approved or denied by our Physicians during normal business hours, Monday through Friday.  Per office policy, refills will not be granted if you have not been seen within the past year (or sooner depending on the condition).
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
## 2025-02-19 ENCOUNTER — MYC MEDICAL ADVICE (OUTPATIENT)
Dept: ONCOLOGY | Facility: CLINIC | Age: 85
End: 2025-02-19
Payer: MEDICARE

## 2025-02-20 NOTE — TELEPHONE ENCOUNTER
Call placed to pt and spoke to daughter-in-law Rody. She reports pt legs are improving. He did meet with dermatology this past Monday. She states she was just sending the pictures to update .

## 2025-02-21 ENCOUNTER — LAB (OUTPATIENT)
Dept: LAB | Facility: CLINIC | Age: 85
End: 2025-02-21
Payer: MEDICARE

## 2025-02-21 DIAGNOSIS — M79.89 LEG SWELLING: ICD-10-CM

## 2025-02-21 DIAGNOSIS — C01 CANCER OF BASE OF TONGUE (H): Primary | ICD-10-CM

## 2025-02-21 DIAGNOSIS — C01 SQUAMOUS CELL CARCINOMA OF BASE OF TONGUE (H): ICD-10-CM

## 2025-02-21 LAB
ALBUMIN SERPL BCG-MCNC: 3.3 G/DL (ref 3.5–5.2)
ALBUMIN UR-MCNC: 10 MG/DL
ALP SERPL-CCNC: 93 U/L (ref 40–150)
ALT SERPL W P-5'-P-CCNC: 12 U/L (ref 0–70)
ANION GAP SERPL CALCULATED.3IONS-SCNC: 8 MMOL/L (ref 7–15)
APPEARANCE UR: CLEAR
AST SERPL W P-5'-P-CCNC: 21 U/L (ref 0–45)
BASOPHILS # BLD AUTO: 0 10E3/UL (ref 0–0.2)
BASOPHILS NFR BLD AUTO: 0 %
BILIRUB SERPL-MCNC: <0.2 MG/DL
BILIRUB UR QL STRIP: NEGATIVE
BUN SERPL-MCNC: 17.1 MG/DL (ref 8–23)
CALCIUM SERPL-MCNC: 8.9 MG/DL (ref 8.8–10.4)
CHLORIDE SERPL-SCNC: 100 MMOL/L (ref 98–107)
COLOR UR AUTO: YELLOW
CREAT SERPL-MCNC: 0.59 MG/DL (ref 0.67–1.17)
EGFRCR SERPLBLD CKD-EPI 2021: >90 ML/MIN/1.73M2
EOSINOPHIL # BLD AUTO: 0.1 10E3/UL (ref 0–0.7)
EOSINOPHIL NFR BLD AUTO: 2 %
ERYTHROCYTE [DISTWIDTH] IN BLOOD BY AUTOMATED COUNT: 17.9 % (ref 10–15)
GLUCOSE SERPL-MCNC: 96 MG/DL (ref 70–99)
GLUCOSE UR STRIP-MCNC: NEGATIVE MG/DL
HCO3 SERPL-SCNC: 31 MMOL/L (ref 22–29)
HCT VFR BLD AUTO: 28.3 % (ref 40–53)
HGB BLD-MCNC: 8.4 G/DL (ref 13.3–17.7)
HGB UR QL STRIP: NEGATIVE
IMM GRANULOCYTES # BLD: 0 10E3/UL
IMM GRANULOCYTES NFR BLD: 0 %
KETONES UR STRIP-MCNC: NEGATIVE MG/DL
LEUKOCYTE ESTERASE UR QL STRIP: NEGATIVE
LYMPHOCYTES # BLD AUTO: 0.5 10E3/UL (ref 0.8–5.3)
LYMPHOCYTES NFR BLD AUTO: 12 %
MCH RBC QN AUTO: 31.9 PG (ref 26.5–33)
MCHC RBC AUTO-ENTMCNC: 29.7 G/DL (ref 31.5–36.5)
MCV RBC AUTO: 108 FL (ref 78–100)
MONOCYTES # BLD AUTO: 0.4 10E3/UL (ref 0–1.3)
MONOCYTES NFR BLD AUTO: 12 %
MUCOUS THREADS #/AREA URNS LPF: PRESENT /LPF
NEUTROPHILS # BLD AUTO: 2.7 10E3/UL (ref 1.6–8.3)
NEUTROPHILS NFR BLD AUTO: 73 %
NITRATE UR QL: NEGATIVE
NRBC # BLD AUTO: 0 10E3/UL
NRBC BLD AUTO-RTO: 0 /100
PH UR STRIP: 7 [PH] (ref 5–7)
PLATELET # BLD AUTO: 227 10E3/UL (ref 150–450)
POTASSIUM SERPL-SCNC: 4.5 MMOL/L (ref 3.4–5.3)
PROT SERPL-MCNC: 6.3 G/DL (ref 6.4–8.3)
RBC # BLD AUTO: 2.63 10E6/UL (ref 4.4–5.9)
RBC URINE: 1 /HPF
SODIUM SERPL-SCNC: 139 MMOL/L (ref 135–145)
SP GR UR STRIP: 1.02 (ref 1–1.03)
SQUAMOUS EPITHELIAL: <1 /HPF
T4 FREE SERPL-MCNC: 1.14 NG/DL (ref 0.9–1.7)
TSH SERPL DL<=0.005 MIU/L-ACNC: 4.88 UIU/ML (ref 0.3–4.2)
UROBILINOGEN UR STRIP-MCNC: NORMAL MG/DL
WBC # BLD AUTO: 3.7 10E3/UL (ref 4–11)
WBC URINE: 1 /HPF

## 2025-02-21 PROCEDURE — 85004 AUTOMATED DIFF WBC COUNT: CPT

## 2025-02-21 PROCEDURE — 84443 ASSAY THYROID STIM HORMONE: CPT | Performed by: NURSE PRACTITIONER

## 2025-02-21 PROCEDURE — 82435 ASSAY OF BLOOD CHLORIDE: CPT | Performed by: NURSE PRACTITIONER

## 2025-02-21 PROCEDURE — 85014 HEMATOCRIT: CPT

## 2025-02-21 PROCEDURE — 84155 ASSAY OF PROTEIN SERUM: CPT | Performed by: NURSE PRACTITIONER

## 2025-02-21 PROCEDURE — 81003 URINALYSIS AUTO W/O SCOPE: CPT

## 2025-02-21 PROCEDURE — 84439 ASSAY OF FREE THYROXINE: CPT | Performed by: NURSE PRACTITIONER

## 2025-02-21 PROCEDURE — 36415 COLL VENOUS BLD VENIPUNCTURE: CPT

## 2025-02-24 ENCOUNTER — MYC REFILL (OUTPATIENT)
Dept: ONCOLOGY | Facility: CLINIC | Age: 85
End: 2025-02-24
Payer: MEDICARE

## 2025-02-24 ENCOUNTER — MYC REFILL (OUTPATIENT)
Dept: OTOLARYNGOLOGY | Facility: CLINIC | Age: 85
End: 2025-02-24
Payer: MEDICARE

## 2025-02-24 DIAGNOSIS — Z43.0 TRACHEOSTOMY CARE (H): ICD-10-CM

## 2025-02-24 DIAGNOSIS — J38.02 BILATERAL VOCAL FOLD PARALYSIS: ICD-10-CM

## 2025-02-24 DIAGNOSIS — K21.9 GASTROESOPHAGEAL REFLUX DISEASE WITHOUT ESOPHAGITIS: ICD-10-CM

## 2025-02-24 RX ORDER — MAGNESIUM HYDROXIDE 1200 MG/15ML
30 LIQUID ORAL 3 TIMES DAILY
Qty: 2000 ML | Refills: 11 | Status: SHIPPED | OUTPATIENT
Start: 2025-02-24

## 2025-02-24 RX ORDER — METOCLOPRAMIDE HYDROCHLORIDE 5 MG/5ML
5 SOLUTION ORAL 2 TIMES DAILY PRN
Qty: 240 ML | Refills: 1 | Status: SHIPPED | OUTPATIENT
Start: 2025-02-24

## 2025-02-27 ENCOUNTER — HOSPITAL ENCOUNTER (OUTPATIENT)
Dept: CARDIOLOGY | Facility: CLINIC | Age: 85
End: 2025-02-27
Attending: INTERNAL MEDICINE
Payer: MEDICARE

## 2025-02-27 DIAGNOSIS — I34.0 MITRAL VALVE INSUFFICIENCY, UNSPECIFIED ETIOLOGY: Primary | ICD-10-CM

## 2025-02-27 DIAGNOSIS — R60.0 LOCALIZED EDEMA: ICD-10-CM

## 2025-02-27 DIAGNOSIS — M79.89 LEG SWELLING: ICD-10-CM

## 2025-02-27 LAB — LVEF ECHO: NORMAL

## 2025-02-27 PROCEDURE — 93306 TTE W/DOPPLER COMPLETE: CPT

## 2025-02-27 RX ORDER — LORAZEPAM 2 MG/ML
0.5 INJECTION INTRAMUSCULAR EVERY 4 HOURS PRN
OUTPATIENT
Start: 2025-03-14

## 2025-02-27 RX ORDER — EPINEPHRINE 1 MG/ML
0.3 INJECTION, SOLUTION INTRAMUSCULAR; SUBCUTANEOUS EVERY 5 MIN PRN
OUTPATIENT
Start: 2025-03-14

## 2025-02-27 RX ORDER — DIPHENHYDRAMINE HYDROCHLORIDE 50 MG/ML
50 INJECTION INTRAMUSCULAR; INTRAVENOUS
Start: 2025-03-14

## 2025-02-27 RX ORDER — DIPHENHYDRAMINE HYDROCHLORIDE 50 MG/ML
25 INJECTION INTRAMUSCULAR; INTRAVENOUS
Start: 2025-03-14

## 2025-02-27 RX ORDER — ALBUTEROL SULFATE 0.83 MG/ML
2.5 SOLUTION RESPIRATORY (INHALATION)
OUTPATIENT
Start: 2025-03-14

## 2025-02-27 RX ORDER — ALBUTEROL SULFATE 90 UG/1
1-2 INHALANT RESPIRATORY (INHALATION)
Start: 2025-03-14

## 2025-02-27 RX ORDER — HEPARIN SODIUM (PORCINE) LOCK FLUSH IV SOLN 100 UNIT/ML 100 UNIT/ML
5 SOLUTION INTRAVENOUS
OUTPATIENT
Start: 2025-03-14

## 2025-02-27 RX ORDER — MEPERIDINE HYDROCHLORIDE 25 MG/ML
25 INJECTION INTRAMUSCULAR; INTRAVENOUS; SUBCUTANEOUS
OUTPATIENT
Start: 2025-03-14

## 2025-02-27 RX ORDER — HEPARIN SODIUM,PORCINE 10 UNIT/ML
5-20 VIAL (ML) INTRAVENOUS DAILY PRN
OUTPATIENT
Start: 2025-03-14

## 2025-02-27 RX ORDER — METHYLPREDNISOLONE SODIUM SUCCINATE 40 MG/ML
40 INJECTION INTRAMUSCULAR; INTRAVENOUS
Start: 2025-03-14

## 2025-03-04 ENCOUNTER — TELEPHONE (OUTPATIENT)
Dept: NEUROSURGERY | Facility: CLINIC | Age: 85
End: 2025-03-04
Payer: MEDICARE

## 2025-03-04 NOTE — TELEPHONE ENCOUNTER
Called patient and scheduled appointments w/ Debra Corral and an [facility] prior per Roula Andrews via task. -KB

## 2025-03-06 ENCOUNTER — THERAPY VISIT (OUTPATIENT)
Dept: PHYSICAL THERAPY | Facility: CLINIC | Age: 85
End: 2025-03-06
Attending: DERMATOLOGY
Payer: MEDICARE

## 2025-03-06 DIAGNOSIS — I89.0 LYMPHEDEMA: Primary | Chronic | ICD-10-CM

## 2025-03-06 PROCEDURE — 97140 MANUAL THERAPY 1/> REGIONS: CPT | Mod: GP

## 2025-03-11 ENCOUNTER — THERAPY VISIT (OUTPATIENT)
Dept: PHYSICAL THERAPY | Facility: CLINIC | Age: 85
End: 2025-03-11
Attending: DERMATOLOGY
Payer: MEDICARE

## 2025-03-11 DIAGNOSIS — I89.0 LYMPHEDEMA: Primary | Chronic | ICD-10-CM

## 2025-03-11 PROCEDURE — 97140 MANUAL THERAPY 1/> REGIONS: CPT | Mod: GP | Performed by: REHABILITATION PRACTITIONER

## 2025-03-12 ENCOUNTER — HOSPITAL ENCOUNTER (OUTPATIENT)
Facility: CLINIC | Age: 85
End: 2025-03-12
Admitting: RADIOLOGY
Payer: MEDICARE

## 2025-03-12 PROBLEM — D64.9 ANEMIA: Status: ACTIVE | Noted: 2020-10-08

## 2025-03-13 ENCOUNTER — LAB (OUTPATIENT)
Dept: LAB | Facility: CLINIC | Age: 85
End: 2025-03-13
Attending: INTERNAL MEDICINE
Payer: MEDICARE

## 2025-03-13 ENCOUNTER — THERAPY VISIT (OUTPATIENT)
Dept: PHYSICAL THERAPY | Facility: CLINIC | Age: 85
End: 2025-03-13
Attending: DERMATOLOGY
Payer: MEDICARE

## 2025-03-13 ENCOUNTER — ONCOLOGY VISIT (OUTPATIENT)
Dept: ONCOLOGY | Facility: CLINIC | Age: 85
End: 2025-03-13
Attending: NURSE PRACTITIONER
Payer: MEDICARE

## 2025-03-13 ENCOUNTER — INFUSION THERAPY VISIT (OUTPATIENT)
Dept: INFUSION THERAPY | Facility: CLINIC | Age: 85
End: 2025-03-13
Attending: INTERNAL MEDICINE
Payer: MEDICARE

## 2025-03-13 VITALS
SYSTOLIC BLOOD PRESSURE: 102 MMHG | DIASTOLIC BLOOD PRESSURE: 48 MMHG | RESPIRATION RATE: 22 BRPM | BODY MASS INDEX: 23.59 KG/M2 | WEIGHT: 164.8 LBS | HEIGHT: 70 IN | OXYGEN SATURATION: 95 % | TEMPERATURE: 97.8 F | HEART RATE: 64 BPM

## 2025-03-13 DIAGNOSIS — I89.0 LYMPHEDEMA: Chronic | ICD-10-CM

## 2025-03-13 DIAGNOSIS — C01 SQUAMOUS CELL CARCINOMA OF BASE OF TONGUE (H): ICD-10-CM

## 2025-03-13 DIAGNOSIS — E03.9 HYPOTHYROIDISM, UNSPECIFIED TYPE: ICD-10-CM

## 2025-03-13 DIAGNOSIS — C01 CANCER OF BASE OF TONGUE (H): ICD-10-CM

## 2025-03-13 DIAGNOSIS — C01 SQUAMOUS CELL CARCINOMA OF BASE OF TONGUE (H): Primary | ICD-10-CM

## 2025-03-13 DIAGNOSIS — E44.0 MODERATE PROTEIN-CALORIE MALNUTRITION: ICD-10-CM

## 2025-03-13 DIAGNOSIS — I89.0 LYMPHEDEMA: Primary | Chronic | ICD-10-CM

## 2025-03-13 DIAGNOSIS — C01 CANCER OF BASE OF TONGUE (H): Primary | ICD-10-CM

## 2025-03-13 LAB
ALBUMIN SERPL BCG-MCNC: 3.5 G/DL (ref 3.5–5.2)
ALP SERPL-CCNC: 109 U/L (ref 40–150)
ALT SERPL W P-5'-P-CCNC: 11 U/L (ref 0–70)
ANION GAP SERPL CALCULATED.3IONS-SCNC: 8 MMOL/L (ref 7–15)
AST SERPL W P-5'-P-CCNC: 16 U/L (ref 0–45)
BILIRUB SERPL-MCNC: 0.2 MG/DL
BUN SERPL-MCNC: 20 MG/DL (ref 8–23)
CALCIUM SERPL-MCNC: 9.1 MG/DL (ref 8.8–10.4)
CHLORIDE SERPL-SCNC: 100 MMOL/L (ref 98–107)
CREAT SERPL-MCNC: 0.61 MG/DL (ref 0.67–1.17)
EGFRCR SERPLBLD CKD-EPI 2021: >90 ML/MIN/1.73M2
GLUCOSE SERPL-MCNC: 103 MG/DL (ref 70–99)
HCO3 SERPL-SCNC: 31 MMOL/L (ref 22–29)
POTASSIUM SERPL-SCNC: 4.8 MMOL/L (ref 3.4–5.3)
PROT SERPL-MCNC: 6.7 G/DL (ref 6.4–8.3)
SODIUM SERPL-SCNC: 139 MMOL/L (ref 135–145)
TSH SERPL DL<=0.005 MIU/L-ACNC: 3.16 UIU/ML (ref 0.3–4.2)

## 2025-03-13 PROCEDURE — 84443 ASSAY THYROID STIM HORMONE: CPT | Performed by: INTERNAL MEDICINE

## 2025-03-13 PROCEDURE — 36415 COLL VENOUS BLD VENIPUNCTURE: CPT | Performed by: INTERNAL MEDICINE

## 2025-03-13 PROCEDURE — 84155 ASSAY OF PROTEIN SERUM: CPT | Performed by: INTERNAL MEDICINE

## 2025-03-13 PROCEDURE — 97140 MANUAL THERAPY 1/> REGIONS: CPT | Mod: GP

## 2025-03-13 PROCEDURE — 82040 ASSAY OF SERUM ALBUMIN: CPT | Performed by: INTERNAL MEDICINE

## 2025-03-13 PROCEDURE — G0463 HOSPITAL OUTPT CLINIC VISIT: HCPCS | Performed by: NURSE PRACTITIONER

## 2025-03-13 PROCEDURE — 258N000003 HC RX IP 258 OP 636: Performed by: INTERNAL MEDICINE

## 2025-03-13 PROCEDURE — 82247 BILIRUBIN TOTAL: CPT | Performed by: INTERNAL MEDICINE

## 2025-03-13 RX ADMIN — SODIUM CHLORIDE 1000 ML: 0.9 INJECTION, SOLUTION INTRAVENOUS at 13:53

## 2025-03-13 RX ADMIN — SODIUM CHLORIDE 400 MG: 9 INJECTION, SOLUTION INTRAVENOUS at 14:12

## 2025-03-13 ASSESSMENT — PAIN SCALES - GENERAL: PAINLEVEL_OUTOF10: NO PAIN (0)

## 2025-03-13 NOTE — LETTER
3/13/2025      Hieu Hughes  [address] [facility] [address]      Dear Colleague,    Thank you for referring your patient, Hieu Hughes, to the [facility]. Please see a copy of my visit note below.    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 Bhatia ([facility])      Assessment/Plan  Base of tongue SCC (iC9-[id number]-dQ8)  Not candidate for definitive RT or surgery; on palliative systemic treatment  Chemo-induced anemia  Chemo-induced neuropathy hands, gr 1  Immunotherapy/chemotherapy- induced arthralgias (hands), gr 1  He completed 4 cycles of carbo/taxol/pembro - required multiple interruptions and dosing modifications. Resulted in OR on recent CT.      Is now on maintenance pembrolizumab. Tolerated his last cycle well, no side effects.    Labs: CMP unremarkable/stable. TSH WNL.     Cancer related symptoms are stable and hemoptysis from tumor is improved (streaks on occasion). Chronic dysphagia, unchanged. No pain.     Plan:  -Continue maintenance pembrolizumab. Will now transition to 400 mg every 6 weeks. today   -Repeat CT neck, chest/abd in 6 weeks with Dr. Bhatia ahead of next cycle  -Will see Noris/GONZALEZ alternating cycles  -Palliative Care following     Bilateral LE venous stasis/lymphedema dermatitis - improved  Last month, had significant erythema with weeping/skin breakdown both legs.   Doppler negative for DVT.   He completed Keflex for possible secondary cellulitis.    Derm recommended topical betamethasone and Lymphedema consult for compression mgmt, which was initiated.      Legs are significantly improved. No more redness/drainage. Degree of edema markely improved.     Plan:  -Continue Derm recommendations with Vanicream and betamethasone. Diluted vinegar  dressings for open/weeping areas PRN. Has Derm follow-up scheduled  -Continue Lymphedema mgmt for compression wraps  -Elevate legs at rest  -Stay active  -Optimize nutrition/protein    Mitral valve regurgitation (mod severe)  Noted on ECHO that was done for eval of his LE edema. EF 55-60%.    Plan:  -Cardiology consult 3/31/2025    Nutrition/hydration  Aspiration pneumonia risk  Gastritis/reflux/emesis  Diarrhea, related to jejunal feeding adjustment + Reglan use  Chronic dysphagia and aspiration, feeding tube dependent.    Doing better since G-tube converted to GJ tube and formula changes made.   In last few weeks, he's had trouble meeting resitance with his flushes but feedings are going in just fine.     Premedicating with Reglan and using Pepcid for gastritis symptoms with no recurrence.     Occasional/mild diarrhea post-feedings.     Regaining weight. Feels well-hydrated. Has not needed recent IVF supplementation.    Plan:  -Has feeding tube assessment/possible exchange planned next week in IR at [facility]. Will see if this could be done with IR in [place] as this would be considerably closer for them.  -IVF PRN - standing orders at local [facility] ([place], [place]). Since stopping chemo, has not needed  -Continue working with Dietician PRN  -Continue Pepcid BID  -Continue Reglan to 5 mg pre-feeding and at completion of feeding to minimize reflux/regurgitation.   -He will hold off on adding omeprazole given expense. He's been doing well without it for now.  -Imodium as needed. Suggested he take in AM when diarrhea seems worse post-feeding.   -Consider EGD if PPI/change in feeding formula not improving to rule out immune-mediated gastritis      Chronic dyspnea  Increased mucus secretions  LLL infiltrate/aspiration pneumonitis  Trach in place.     He's had more secretions, difficult to clear and requiring more suctioning since his pseudomonas pneumonia in Nov. Scheduled saline nebs has been beneficial. He notes  if he does not maintain these prophylactically, he has more trouble.     Persistent LLL infiltrate/mucus plugging from prior aspiration pneumonia noted on CT last week. Asymptomatic.     Plan:  -Continue saline nebs BID  -Suction/lavage as needed  -Reassess and consider antibiotic for aspiration pneumonia if increased cough, fever, dyspnea    Pressure ulcers, buttocks (resolved)   Early stage last month, resolved.    Plan:  -Cares/assessment by [facility]  -Frequent position changes, stay active  -Nutrition optimization, going better now    R carotid artery stenosis  Met with Vascular in December for recommendations on this. No intervention recommended in setting of cancer, ideally would be on ASA and Plavix.     Plan:  -Will have carotid US and follow-up with Neuro in April    Macrocytic anemia, mild  Mildly macrocytic anemia: B12 in 2023 was in the 700s. Hypothyroidism well-managaed. Monitor and further evaluation if worsens.     Hypothyroidism, on replacement  On Synthroid 100 mcg. TSH WNL.    Plan:  -Continue same dose Synthroid  -Monitor on immunotherapy; adjust dose as needed. If TSH continues rising next check, will increse dose at that time      ______________________________________________________________________________    History of Present Illness/ Interval History    Mr. Hieu Hughes  is a 84 year old with unresectable locally recurrent head/neck cancer.   He completed 2 cycles of single-agent pembro through September, with some progression on CT. Therefore, carbo (day 1)/Taxol (d 1+8) was added. He had multiple interruptions/delays due to various complications/hospitalizations. Cycles 3+4 were modified to weekly chemo dosing (days 1 + 8); day 8 chemo omitted two weeks ago with his last cycle for LE dermatitis/cellulitis.    His 2/2025 CT showed continued partial response.   He was then transitioned to maintenance pembro and returns ahead of next cycle.  Tolerated last cycle very well - no side effects. Feels  well since stopping chemo.     His lower extremity edema with dermatitis is significantly improved. Minimal residual edema and redness/draining resolved. Seeing Lymphedema for compression mgmt.     Recently, has had some trouble flushing his feeding tube (has to apply a lot of pressure) but has been able to maintain his regular feedings without difficulty. The feedings run at same rate and gets same volume as prior without problem.  Feels he's getting adequate hydration. No abd pain.     Bowels regular, occasional mild diarrhea after feedings.    Has had more respiratory secretions since his pseudomonas pneumonia in November; requiring more suctioning and intermittent hypoxia (usually relieved with suctioning). He's added saline nebs BID over last few weeks and this has been better. He notes worsening clearance if he is not consistent with his nebs.    Mild streaks of blood in phlegm - improved from prior.       ECOG Performance    1      Oncology History/Treatment  Diagnosis/Stage:   2009: Head/neck cancer    4/2024: tH1-[id number] R base of tongue SCC, PDL1 TPS 10%, CPS 15%  -4/14/24CT neck (HP). 1.1 x 1.2 x 2.2 cm R lateral BOT ulceration and enhancement. Mod-sev R TOM, 50% stenosis mid R cervical ICA, 65% stenosis L ICA with adjacent ulcerated atheromatous plaque.  -7/22/24DL with bx (Dr. Ospina). R BOT mass extending to midline, R lateral pharyngeal wall, submucosal extension to the posterior oral tongue. Path: SCC, TPS 10%, CPS 12%  -8/1/24PET/CT. FDG avid R tongue base mass extending to R oral tongue, hyoid bone, mylohyoid muscle, lingual mucosal surface of the suprahyoid epiglottis, R lateral oropharyngeal wall, overall 2.8 x 1.8 cm (SUV 21.5). 1.5 x 0.7 cm lesion deep to R thyroid cartilate in the R parapharyngeal fat of the false cord extending to the submucosal surface of the true cord (SUV 10.1). R common carotid calcified plaque extending to the carotid bifurcation resulting in residual lumen measuring 3  mm.     Treatment:  2009: Initial diagnosis  -Chemoradiation with weekly carboplatin paclitaxel, 7400 cGy (Dr. Bolton, Dr. Arun Jerez at [facility]), 37 fractions. Hearing loss precluded cisplatin    2024: 2nd occurrence  -Repeat definitive RT not feasible  -Extensive surgery not desired by patient  -Palliative systemic therapy was decided on.     8/27/2024 - 9/17/2024: pembrolizumab x2 cycles  -CT showed progression with lingual artery involvement. Indeterminate for pseudoprogression/inadequate time on immunotherapy vs true progression.     11/1/2024 - 1/31/2025: carbo/taxol/pembro x 4 cycles (resulted in MT)  -C1 +2: carbo AUC 5 (day 1) and Taxol 100 mgm2 days 1, 8 added to pembro  -C2 delayed x 1 week for aspiration pneumonia hospitalization  ---C2D8 delayed x 1 week for admission for feeding tube conversion.   -C3 delayed a few weeks for failure to thrive  ---C3 + C4: modified to carbo AUC 2 (days 1 + 8) (Taxol and pembro remained same dosing/freq)  ---C4 day 8 omitted for LE cellulitis    2/21/2025 - present: pembrolizumab, maintenance   --3/13/2025: transition to every 6 weeks       Physical Exam    GENERAL: Alert and oriented to time place and person. Seated comfortably. In no distress. son accompanied. Pt communicated by writing.   HEAD: Atraumatic and normocephalic. No alopecia.  EYES: PARISH, EOMI. No erythema. No icterus.  ORAL CAVITY: Moist. No mucosal lesion or tonsillar enlargement.  NECK: supple. No thyroid enlargement. Trach.  LYMPH NODES: No palpable supraclavicular, cervical lymphadenopathy.  CHEST: clear to auscultation bilaterally. Resonant to percussion throughout bilaterally. Symmetrical breath movements bilaterally.  CVS: RRR  ABDOMEN: Soft. Not tender. Not distended. Bowel sounds present. Feeding tube in place, no surrounding redness/swelling..  EXTREMITIES: No further edema. Compression wraps on.   SKIN: no rash  NEURO: No gross deficit noted. In WC      Lab Results    Recent Results (from the  past week)   Comprehensive metabolic panel   Result Value Ref Range    Sodium 139 135 - 145 mmol/L    Potassium 4.8 3.4 - 5.3 mmol/L    Carbon Dioxide (CO2) 31 (H) 22 - 29 mmol/L    Anion Gap 8 7 - 15 mmol/L    Urea Nitrogen 20.0 8.0 - 23.0 mg/dL    Creatinine 0.61 (L) 0.67 - 1.17 mg/dL    GFR Estimate >90 >60 mL/min/1.73m2    Calcium 9.1 8.8 - 10.4 mg/dL    Chloride 100 98 - 107 mmol/L    Glucose 103 (H) 70 - 99 mg/dL    Alkaline Phosphatase 109 40 - 150 U/L    AST 16 0 - 45 U/L    ALT 11 0 - 70 U/L    Protein Total 6.7 6.4 - 8.3 g/dL    Albumin 3.5 3.5 - 5.2 g/dL    Bilirubin Total 0.2 <=1.2 mg/dL   TSH with free T4 reflex   Result Value Ref Range    TSH 3.16 0.30 - 4.20 uIU/mL             Imaging    Echocardiogram Complete    Result Date: 2025  [id number]^DARLINE^PAOLO  [facility] [address] [place] [address]  Name: RADHA MERRITT MRN: [id number] : 1940 Study Date: 2025 12:32 PM Age: 84 yrs Gender: Male Patient Location: [facility] Reason For Study: Leg swelling, Localized edema Ordering Physician: PAOLO GREGORIO Referring Physician: Remington Doan Performed By: Kori Brand RDCS  BSA: 1.9 m2 Height: 70 in Weight: 170 lb HR: 63 BP: 119/57 mmHg ______________________________________________________________________________ Procedure Echocardiogram with two-dimensional, color and spectral Doppler. ______________________________________________________________________________ Interpretation Summary  1. The left ventricle is normal in size. There is normal left ventricular wall thickness. Left ventricular systolic function is normal. The visual ejection fraction is 55-60%. Diastolic Doppler findings (E/E' ratio and/or other parameters) suggest left ventricular filling pressures are indeterminate. No regional wall motion abnormalities noted. 2. The right ventricle is normal size. The right ventricular systolic function is  normal. 3. Mild left atrial enlargement. 4. Mild mitral valve prolapse, posterior leaflet. There is moderately severe (3+) mitral regurgitation. The mitral regurgitant jet is anteriorly directed, which is consistent with posterior leaflet pathology. 5. Mild aortic stenosis. 6. No pericardial effusion. 7. In comparison to the previous OSH report dated 04/19/2024, moderate to severe mitral regurgitation is now present. Further evaluation with [place] would be recommended if clinically appropriate. ______________________________________________________________________________ Left Ventricle The left ventricle is normal in size. There is normal left ventricular wall thickness. Left ventricular systolic function is normal. The visual ejection fraction is 55-60%. Diastolic Doppler findings (E/E' ratio and/or other parameters) suggest left ventricular filling pressures are indeterminate. No regional wall motion abnormalities noted.  Right Ventricle The right ventricle is normal size. The right ventricular systolic function is normal.  Atria The left atrium is mildly dilated. Right atrial size is normal. There is no color Doppler evidence of an atrial shunt.  Mitral Valve Mild mitral valve prolapse, posterior leaflet. There is moderately severe (3+) mitral regurgitation. The mitral regurgitant jet is anteriorly directed, which is consistent with posterior leaflet pathology.  Tricuspid Valve There is mild (1+) tricuspid regurgitation. The right ventricular systolic pressure is approximated at 37.7 mmHg plus the right atrial pressure.  Aortic Valve There is moderate trileaflet aortic sclerosis. No aortic regurgitation is present. Mild valvular aortic stenosis. The mean AoV pressure gradient is 10.0 mmHg. The peak AoV pressure gradient is 20.4 mmHg. The calculated aortic valve are is 1.5 cm^2.  Pulmonic Valve There is trace to mild pulmonic valvular regurgitation. There is no pulmonic valvular stenosis.  Vessels The aortic root is  normal size. Normal size ascending aorta.  Pericardium There is no pericardial effusion.  Rhythm Sinus rhythm was noted. ______________________________________________________________________________ MMode/2D Measurements & Calculations IVSd: 1.1 cm  LVIDd: 5.5 cm LVIDs: 3.8 cm LVPWd: 1.0 cm FS: 30.4 % LV mass(C)d: 223.5 grams LV mass(C)dI: 114.7 grams/m2 Ao root diam: 3.2 cm LA dimension: 3.5 cm asc Aorta Diam: 3.6 cm LA/Ao: 1.1 LVOT diam: 2.0 cm LVOT area: 3.2 cm2 Ao root diam index Ht(cm/m): 1.8 Ao root diam index BSA (cm/m2): 1.6 Asc Ao diam index BSA (cm/m2): 1.8 Asc Ao diam index Ht(cm/m): 2.0 LA Volume (BP): 59.8 ml  LA Volume Index (BP): 30.7 ml/m2 RV Base: 3.5 cm RWT: 0.37 TAPSE: 2.5 cm  Doppler Measurements & Calculations MV E max dewayne: 80.1 cm/sec MV A max dewayne: 81.4 cm/sec MV E/A: 0.98 MV dec slope: 413.8 cm/sec2 MV dec time: 0.19 sec Ao V2 max: 225.8 cm/sec Ao max P.4 mmHg Ao V2 mean: 146.4 cm/sec Ao mean PG: 10.0 mmHg Ao V2 VTI: 46.9 cm RYAN(I,D): 1.5 cm2 RYAN(V,D): 1.5 cm2 LV V1 max P.3 mmHg LV V1 max: 103.8 cm/sec LV V1 VTI: 22.4 cm MR PISA: 7.5 cm2 MR ERO: 0.40 cm2 MR volume: 66.6 ml SV(LVOT): 71.8 ml SI(LVOT): 36.8 ml/m2 PA acc time: 0.11 sec TR max dewayne: 307.0 cm/sec TR max P.7 mmHg AV Dewayne Ratio (DI): 0.46  RYAN Index (cm2/m2): 0.79 E/E' av.8 Lateral E/e': 7.7 Medial E/e': 11.9 RV S Dewayne: 13.6 cm/sec  ______________________________________________________________________________ Report approved by: Johnny Rice MD on 2025 01:51 PM       US Lower Extremity Venous Duplex Bilateral    Result Date: 2025  EXAM: US LOWER EXTREMITY VENOUS DUPLEX BILATERAL LOCATION: [facility] DATE: 2025 INDICATION: LE swelling, bilateral but new L > R, evaluate for DVT COMPARISON: None. TECHNIQUE: Venous Duplex ultrasound of bilateral lower extremities with and without compression, augmentation and duplex. Color flow and spectral Doppler with waveform analysis performed.  "FINDINGS: Exam includes the common femoral, femoral, popliteal veins as well as segmentally visualized deep calf veins and greater saphenous vein. RIGHT: No deep vein thrombosis. No superficial thrombophlebitis. No popliteal cyst. LEFT: No deep vein thrombosis. No superficial thrombophlebitis. No popliteal cyst.     IMPRESSION: 1.  No deep venous thrombosis in the bilateral lower extremities.     Billing  Total time 35 minutes, to include face to face visit, review of EMR, ordering, documentation and coordination of care on date of service   complexity modifier for longitudinal care.       Signed by: Noris Lovelace NP      Oncology Rooming Note    March 13, 2025 12:55 PM   Hieu Hughes is a 84 year old male who presents for:    Chief Complaint   Patient presents with     Oncology Clinic Visit     Cancer of base of tongue - labs, provider, infusion     Initial Vitals: /48 (BP Location: Right arm, Patient Position: Sitting, Cuff Size: Adult Regular)   Pulse 64   Temp 97.8  F (36.6  C) (Tympanic)   Resp 22   Ht 1.778 m (5' 10\")   Wt 74.8 kg (164 lb 12.8 oz)   SpO2 95%   BMI 23.65 kg/m   Estimated body mass index is 23.65 kg/m  as calculated from the following:    Height as of this encounter: 1.778 m (5' 10\").    Weight as of this encounter: 74.8 kg (164 lb 12.8 oz). Body surface area is 1.92 meters squared.  No Pain (0) Comment: Data Unavailable   No LMP for male patient.  Allergies reviewed: Yes  Medications reviewed: Yes    Medications: Medication refills not needed today.  Pharmacy name entered into [employer]:    [facility] - [place], [place] - [address] [facility] - [place], [place] - [address] [facility] - [place], [place] - [address] [facility] - [place], [place] - [address] [facility] - [place], [place] - [address]    Frailty Screening:   Is the patient here for a new oncology consult visit in " cancer care? 2. No    PHQ9:  Did this patient require a PHQ9?: No      Clinical concerns: Discuss ongoing treatments.        Mariana Rodriguez MA                Again, thank you for allowing me to participate in the care of your patient.        Sincerely,        Noris Lovelace NP    Electronically signed

## 2025-03-13 NOTE — PROGRESS NOTES
"Oncology Rooming Note    March 13, 2025 12:55 PM   Hieu Hughes is a 84 year old male who presents for:    Chief Complaint   Patient presents with    Oncology Clinic Visit     Cancer of base of tongue - labs, provider, infusion     Initial Vitals: /48 (BP Location: Right arm, Patient Position: Sitting, Cuff Size: Adult Regular)   Pulse 64   Temp 97.8  F (36.6  C) (Tympanic)   Resp 22   Ht 1.778 m (5' 10\")   Wt 74.8 kg (164 lb 12.8 oz)   SpO2 95%   BMI 23.65 kg/m   Estimated body mass index is 23.65 kg/m  as calculated from the following:    Height as of this encounter: 1.778 m (5' 10\").    Weight as of this encounter: 74.8 kg (164 lb 12.8 oz). Body surface area is 1.92 meters squared.  No Pain (0) Comment: Data Unavailable   No LMP for male patient.  Allergies reviewed: Yes  Medications reviewed: Yes    Medications: Medication refills not needed today.  Pharmacy name entered into "[employer]":    [facility] - [place], [place] - [address] [facility] [place], [place] - [address] [facility] - [place], [place] - [address] [facility] - [place], [place] - [address] [facility] - [place], [place] - [address]    Frailty Screening:   Is the patient here for a new oncology consult visit in cancer care? 2. No    PHQ9:  Did this patient require a PHQ9?: No      Clinical concerns: Discuss ongoing treatments.        Mariana Rodriguez MA              "

## 2025-03-13 NOTE — PROGRESS NOTES
[facility] Hematology and Oncology Outpatient Progress Note    Patient: Hieu Hughes  MRN: [id number]  Date of Service: Mar 13, 2025          Reason for Visit    Base of tongue cancer - on palliative treatment  Remote hx head/neck cancer, WERO    Primary Oncologist: Dr. Bhatia ([facility])      Assessment/Plan  Base of tongue SCC ([id number]-iH4)  Not candidate for definitive RT or surgery; on palliative systemic treatment  Chemo-induced anemia  Chemo-induced neuropathy hands, gr 1  Immunotherapy/chemotherapy- induced arthralgias (hands), gr 1  He completed 4 cycles of carbo/taxol/pembro - required multiple interruptions and dosing modifications. Resulted in [place] on recent CT.      Is now on maintenance pembrolizumab. Tolerated his last cycle well, no side effects.    Labs: CMP unremarkable/stable. TSH WNL.     Cancer related symptoms are stable and hemoptysis from tumor is improved (streaks on occasion). Chronic dysphagia, unchanged. No pain.     Plan:  -Continue maintenance pembrolizumab. Will now transition to 400 mg every 6 weeks. today   -Repeat CT neck, chest/abd in 6 weeks with Dr. Bhatia ahead of next cycle  -Will see Noris/GONZALEZ alternating cycles  -Palliative Care following     Bilateral LE venous stasis/lymphedema dermatitis - improved  Last month, had significant erythema with weeping/skin breakdown both legs.   Doppler negative for DVT.   He completed Keflex for possible secondary cellulitis.    Derm recommended topical betamethasone and Lymphedema consult for compression mgmt, which was initiated.      Legs are significantly improved. No more redness/drainage. Degree of edema markely improved.     Plan:  -Continue Derm recommendations with Vanicream and betamethasone. Diluted vinegar dressings for open/weeping areas PRN. Has Derm follow-up scheduled  -Continue Lymphedema mgmt for compression wraps  -Elevate legs at rest  -Stay active  -Optimize nutrition/protein    Mitral valve regurgitation (mod  severe)  Noted on ECHO that was done for eval of his LE edema. EF 55-60%.    Plan:  -Cardiology consult 3/31/2025    Nutrition/hydration  Aspiration pneumonia risk  Gastritis/reflux/emesis  Diarrhea, related to jejunal feeding adjustment + Reglan use  Chronic dysphagia and aspiration, feeding tube dependent.    Doing better since G-tube converted to GJ tube and formula changes made.   In last few weeks, he's had trouble meeting resitance with his flushes but feedings are going in just fine.     Premedicating with Reglan and using Pepcid for gastritis symptoms with no recurrence.     Occasional/mild diarrhea post-feedings.     Regaining weight. Feels well-hydrated. Has not needed recent IVF supplementation.    Plan:  -Has feeding tube assessment/possible exchange planned next week in IR at [facility]. Will see if this could be done with IR in [place] as this would be considerably closer for them.  -IVF PRN - standing orders at local [facility] ([place], [place]). Since stopping chemo, has not needed  -Continue working with Dietician PRN  -Continue Pepcid BID  -Continue Reglan to 5 mg pre-feeding and at completion of feeding to minimize reflux/regurgitation.   -He will hold off on adding omeprazole given expense. He's been doing well without it for now.  -Imodium as needed. Suggested he take in AM when diarrhea seems worse post-feeding.   -Consider EGD if PPI/change in feeding formula not improving to rule out immune-mediated gastritis      Chronic dyspnea  Increased mucus secretions  LLL infiltrate/aspiration pneumonitis  Trach in place.     He's had more secretions, difficult to clear and requiring more suctioning since his pseudomonas pneumonia in Nov. Scheduled saline nebs has been beneficial. He notes if he does not maintain these prophylactically, he has more trouble.     Persistent LLL infiltrate/mucus plugging from prior aspiration pneumonia noted on CT last week. Asymptomatic.     Plan:  -Continue saline nebs  BID  -Suction/lavage as needed  -Reassess and consider antibiotic for aspiration pneumonia if increased cough, fever, dyspnea    Pressure ulcers, buttocks (resolved)   Early stage last month, resolved.    Plan:  -Cares/assessment by [facility]  -Frequent position changes, stay active  -Nutrition optimization, going better now    R carotid artery stenosis  Met with Vascular in December for recommendations on this. No intervention recommended in setting of cancer, ideally would be on ASA and Plavix.     Plan:  -Will have carotid US and follow-up with Neuro in April    Macrocytic anemia, mild  Mildly macrocytic anemia: B12 in 2023 was in the 700s. Hypothyroidism well-managaed. Monitor and further evaluation if worsens.     Hypothyroidism, on replacement  On Synthroid 100 mcg. TSH WNL.    Plan:  -Continue same dose Synthroid  -Monitor on immunotherapy; adjust dose as needed. If TSH continues rising next check, will increse dose at that time      ______________________________________________________________________________    History of Present Illness/ Interval History    Mr. Hieu Hughes  is a 84 year old with unresectable locally recurrent head/neck cancer.   He completed 2 cycles of single-agent pembro through September, with some progression on CT. Therefore, carbo (day 1)/Taxol (d 1+8) was added. He had multiple interruptions/delays due to various complications/hospitalizations. Cycles 3+4 were modified to weekly chemo dosing (days 1 + 8); day 8 chemo omitted two weeks ago with his last cycle for LE dermatitis/cellulitis.    His 2/2025 CT showed continued partial response.   He was then transitioned to maintenance pembro and returns ahead of next cycle.  Tolerated last cycle very well - no side effects. Feels well since stopping chemo.     His lower extremity edema with dermatitis is significantly improved. Minimal residual edema and redness/draining resolved. Seeing Lymphedema for compression mgmt.     Recently, has had  some trouble flushing his feeding tube (has to apply a lot of pressure) but has been able to maintain his regular feedings without difficulty. The feedings run at same rate and gets same volume as prior without problem.  Feels he's getting adequate hydration. No abd pain.     Bowels regular, occasional mild diarrhea after feedings.    Has had more respiratory secretions since his pseudomonas pneumonia in November; requiring more suctioning and intermittent hypoxia (usually relieved with suctioning). He's added saline nebs BID over last few weeks and this has been better. He notes worsening clearance if he is not consistent with his nebs.    Mild streaks of blood in phlegm - improved from prior.       ECOG Performance    1      Oncology History/Treatment  Diagnosis/Stage:   2009: Head/neck cancer    4/2024: [id number] R base of tongue SCC, PDL1 TPS 10%, CPS 15%  -4/14/24CT neck (HP). 1.1 x 1.2 x 2.2 cm R lateral BOT ulceration and enhancement. Mod-sev R TOM, 50% stenosis mid R cervical ICA, 65% stenosis L ICA with adjacent ulcerated atheromatous plaque.  -7/22/24DL with bx (Dr. Ospina). R BOT mass extending to midline, R lateral pharyngeal wall, submucosal extension to the posterior oral tongue. Path: SCC, TPS 10%, CPS 12%  -8/1/24PET/CT. FDG avid R tongue base mass extending to R oral tongue, hyoid bone, mylohyoid muscle, lingual mucosal surface of the suprahyoid epiglottis, R lateral oropharyngeal wall, overall 2.8 x 1.8 cm (SUV 21.5). 1.5 x 0.7 cm lesion deep to R thyroid cartilate in the R parapharyngeal fat of the false cord extending to the submucosal surface of the true cord (SUV 10.1). R common carotid calcified plaque extending to the carotid bifurcation resulting in residual lumen measuring 3 mm.     Treatment:  2009: Initial diagnosis  -Chemoradiation with weekly carboplatin paclitaxel, 7400 cGy (Dr. Bolton, Dr. Arun Jerez at [facility]), 37 fractions. Hearing loss precluded cisplatin    2024: 2nd  occurrence  -Repeat definitive RT not feasible  -Extensive surgery not desired by patient  -Palliative systemic therapy was decided on.     8/27/2024 - 9/17/2024: pembrolizumab x2 cycles  -CT showed progression with lingual artery involvement. Indeterminate for pseudoprogression/inadequate time on immunotherapy vs true progression.     11/1/2024 - 1/31/2025: carbo/taxol/pembro x 4 cycles (resulted in CO)  -C1 +2: carbo AUC 5 (day 1) and Taxol 100 mgm2 days 1, 8 added to pembro  -C2 delayed x 1 week for aspiration pneumonia hospitalization  ---C2D8 delayed x 1 week for admission for feeding tube conversion.   -C3 delayed a few weeks for failure to thrive  ---C3 + C4: modified to carbo AUC 2 (days 1 + 8) (Taxol and pembro remained same dosing/freq)  ---C4 day 8 omitted for LE cellulitis    2/21/2025 - present: pembrolizumab, maintenance   --3/13/2025: transition to every 6 weeks       Physical Exam    GENERAL: Alert and oriented to time place and person. Seated comfortably. In no distress. son accompanied. Pt communicated by writing.   HEAD: Atraumatic and normocephalic. No alopecia.  EYES: PARISH, EOMI. No erythema. No icterus.  ORAL CAVITY: Moist. No mucosal lesion or tonsillar enlargement.  NECK: supple. No thyroid enlargement. Trach.  LYMPH NODES: No palpable supraclavicular, cervical lymphadenopathy.  CHEST: clear to auscultation bilaterally. Resonant to percussion throughout bilaterally. Symmetrical breath movements bilaterally.  CVS: RRR  ABDOMEN: Soft. Not tender. Not distended. Bowel sounds present. Feeding tube in place, no surrounding redness/swelling..  EXTREMITIES: No further edema. Compression wraps on.   SKIN: no rash  NEURO: No gross deficit noted. In WC      Lab Results    Recent Results (from the past week)   Comprehensive metabolic panel   Result Value Ref Range    Sodium 139 135 - 145 mmol/L    Potassium 4.8 3.4 - 5.3 mmol/L    Carbon Dioxide (CO2) 31 (H) 22 - 29 mmol/L    Anion Gap 8 7 - 15 mmol/L     Urea Nitrogen 20.0 8.0 - 23.0 mg/dL    Creatinine 0.61 (L) 0.67 - 1.17 mg/dL    GFR Estimate >90 >60 mL/min/1.73m2    Calcium 9.1 8.8 - 10.4 mg/dL    Chloride 100 98 - 107 mmol/L    Glucose 103 (H) 70 - 99 mg/dL    Alkaline Phosphatase 109 40 - 150 U/L    AST 16 0 - 45 U/L    ALT 11 0 - 70 U/L    Protein Total 6.7 6.4 - 8.3 g/dL    Albumin 3.5 3.5 - 5.2 g/dL    Bilirubin Total 0.2 <=1.2 mg/dL   TSH with free T4 reflex   Result Value Ref Range    TSH 3.16 0.30 - 4.20 uIU/mL             Imaging    Echocardiogram Complete    Result Date: 2025  [id number]^DARLINE^PAOLO  [facility] Echocardiography [facility] [address] [place] [address]  Name: RADHA MERRITT MRN: [id number] : 1940 Study Date: 2025 12:32 PM Age: 84 yrs Gender: Male Patient Location: [facility] Reason For Study: Leg swelling, Localized edema Ordering Physician: PAOLO GREGORIO Referring Physician: Remington Doan Performed By: Kori Brand RDCS  BSA: 1.9 m2 Height: 70 in Weight: 170 lb HR: 63 BP: 119/57 mmHg ______________________________________________________________________________ Procedure Echocardiogram with two-dimensional, color and spectral Doppler. ______________________________________________________________________________ Interpretation Summary  1. The left ventricle is normal in size. There is normal left ventricular wall thickness. Left ventricular systolic function is normal. The visual ejection fraction is 55-60%. Diastolic Doppler findings (E/E' ratio and/or other parameters) suggest left ventricular filling pressures are indeterminate. No regional wall motion abnormalities noted. 2. The right ventricle is normal size. The right ventricular systolic function is normal. 3. Mild left atrial enlargement. 4. Mild mitral valve prolapse, posterior leaflet. There is moderately severe (3+) mitral regurgitation. The mitral regurgitant jet is anteriorly directed, which is  consistent with posterior leaflet pathology. 5. Mild aortic stenosis. 6. No pericardial effusion. 7. In comparison to the previous OSH report dated 04/19/2024, moderate to severe mitral regurgitation is now present. Further evaluation with [place] would be recommended if clinically appropriate. ______________________________________________________________________________ Left Ventricle The left ventricle is normal in size. There is normal left ventricular wall thickness. Left ventricular systolic function is normal. The visual ejection fraction is 55-60%. Diastolic Doppler findings (E/E' ratio and/or other parameters) suggest left ventricular filling pressures are indeterminate. No regional wall motion abnormalities noted.  Right Ventricle The right ventricle is normal size. The right ventricular systolic function is normal.  Atria The left atrium is mildly dilated. Right atrial size is normal. There is no color Doppler evidence of an atrial shunt.  Mitral Valve Mild mitral valve prolapse, posterior leaflet. There is moderately severe (3+) mitral regurgitation. The mitral regurgitant jet is anteriorly directed, which is consistent with posterior leaflet pathology.  Tricuspid Valve There is mild (1+) tricuspid regurgitation. The right ventricular systolic pressure is approximated at 37.7 mmHg plus the right atrial pressure.  Aortic Valve There is moderate trileaflet aortic sclerosis. No aortic regurgitation is present. Mild valvular aortic stenosis. The mean AoV pressure gradient is 10.0 mmHg. The peak AoV pressure gradient is 20.4 mmHg. The calculated aortic valve are is 1.5 cm^2.  Pulmonic Valve There is trace to mild pulmonic valvular regurgitation. There is no pulmonic valvular stenosis.  Vessels The aortic root is normal size. Normal size ascending aorta.  Pericardium There is no pericardial effusion.  Rhythm Sinus rhythm was noted. ______________________________________________________________________________  MMode/2D Measurements & Calculations IVSd: 1.1 cm  LVIDd: 5.5 cm LVIDs: 3.8 cm LVPWd: 1.0 cm FS: 30.4 % LV mass(C)d: 223.5 grams LV mass(C)dI: 114.7 grams/m2 Ao root diam: 3.2 cm LA dimension: 3.5 cm asc Aorta Diam: 3.6 cm LA/Ao: 1.1 LVOT diam: 2.0 cm LVOT area: 3.2 cm2 Ao root diam index Ht(cm/m): 1.8 Ao root diam index BSA (cm/m2): 1.6 Asc Ao diam index BSA (cm/m2): 1.8 Asc Ao diam index Ht(cm/m): 2.0 LA Volume (BP): 59.8 ml  LA Volume Index (BP): 30.7 ml/m2 RV Base: 3.5 cm RWT: 0.37 TAPSE: 2.5 cm  Doppler Measurements & Calculations MV E max dewayne: 80.1 cm/sec MV A max dewayne: 81.4 cm/sec MV E/A: 0.98 MV dec slope: 413.8 cm/sec2 MV dec time: 0.19 sec Ao V2 max: 225.8 cm/sec Ao max P.4 mmHg Ao V2 mean: 146.4 cm/sec Ao mean PG: 10.0 mmHg Ao V2 VTI: 46.9 cm RYAN(I,D): 1.5 cm2 RYAN(V,D): 1.5 cm2 LV V1 max P.3 mmHg LV V1 max: 103.8 cm/sec LV V1 VTI: 22.4 cm MR PISA: 7.5 cm2 MR ERO: 0.40 cm2 MR volume: 66.6 ml SV(LVOT): 71.8 ml SI(LVOT): 36.8 ml/m2 PA acc time: 0.11 sec TR max dewayne: 307.0 cm/sec TR max P.7 mmHg AV Dewayne Ratio (DI): 0.46  RYAN Index (cm2/m2): 0.79 E/E' av.8 Lateral E/e': 7.7 Medial E/e': 11.9 RV S Dewayne: 13.6 cm/sec  ______________________________________________________________________________ Report approved by: Johnny Rice MD on 2025 01:51 PM       US Lower Extremity Venous Duplex Bilateral    Result Date: 2025  EXAM: US LOWER EXTREMITY VENOUS DUPLEX BILATERAL LOCATION: [facility] DATE: 2025 INDICATION: LE swelling, bilateral but new L > R, evaluate for DVT COMPARISON: None. TECHNIQUE: Venous Duplex ultrasound of bilateral lower extremities with and without compression, augmentation and duplex. Color flow and spectral Doppler with waveform analysis performed. FINDINGS: Exam includes the common femoral, femoral, popliteal veins as well as segmentally visualized deep calf veins and greater saphenous vein. RIGHT: No deep vein thrombosis. No superficial  thrombophlebitis. No popliteal cyst. LEFT: No deep vein thrombosis. No superficial thrombophlebitis. No popliteal cyst.     IMPRESSION: 1.  No deep venous thrombosis in the bilateral lower extremities.     Billing  Total time 35 minutes, to include face to face visit, review of EMR, ordering, documentation and coordination of care on date of service   complexity modifier for longitudinal care.       Signed by: Noris Lovelace NP

## 2025-03-13 NOTE — PROGRESS NOTES
Infusion Nursing Note:  Hieu Hughes presents today for Keytruda.    Patient seen by provider today: Yes: Noris Lovelace NP   present during visit today: Not Applicable.    Note: N/A.      Intravenous Access:  Peripheral IV placed.    Treatment Conditions:  Lab Results   Component Value Date     03/13/2025    POTASSIUM 4.8 03/13/2025    MAG 2.2 12/15/2024    CR 0.61 (L) 03/13/2025    MANPREET 9.1 03/13/2025    BILITOTAL 0.2 03/13/2025    ALBUMIN 3.5 03/13/2025    ALT 11 03/13/2025    AST 16 03/13/2025       Results reviewed, labs MET treatment parameters, ok to proceed with treatment.      Post Infusion Assessment:  Patient tolerated infusion without incident.  Blood return noted pre and post infusion.  Site patent and intact, free from redness, edema or discomfort.  No evidence of extravasations.  Access discontinued per protocol.       Discharge Plan:   Discharge instructions reviewed with: Patient.  Patient discharged in stable condition accompanied by: self.  Departure Mode: Ambulatory.      Jessica Vieira RN

## 2025-03-17 ENCOUNTER — THERAPY VISIT (OUTPATIENT)
Dept: PHYSICAL THERAPY | Facility: CLINIC | Age: 85
End: 2025-03-17
Attending: DERMATOLOGY
Payer: MEDICARE

## 2025-03-17 ENCOUNTER — HOSPITAL ENCOUNTER (OUTPATIENT)
Dept: INTERVENTIONAL RADIOLOGY/VASCULAR | Facility: HOSPITAL | Age: 85
Discharge: HOME | End: 2025-03-17
Attending: NURSE PRACTITIONER | Admitting: NURSE PRACTITIONER
Payer: MEDICARE

## 2025-03-17 VITALS
OXYGEN SATURATION: 92 % | TEMPERATURE: 98.4 F | HEART RATE: 73 BPM | SYSTOLIC BLOOD PRESSURE: 165 MMHG | DIASTOLIC BLOOD PRESSURE: 76 MMHG

## 2025-03-17 DIAGNOSIS — Z78.9 ENCOUNTER FOR GASTROJEJUNAL (GJ) TUBE PLACEMENT: ICD-10-CM

## 2025-03-17 DIAGNOSIS — I89.0 LYMPHEDEMA: Primary | Chronic | ICD-10-CM

## 2025-03-17 PROCEDURE — C1769 GUIDE WIRE: HCPCS

## 2025-03-17 PROCEDURE — 49452 REPLACE G-J TUBE PERC: CPT

## 2025-03-17 PROCEDURE — 97140 MANUAL THERAPY 1/> REGIONS: CPT | Mod: GP

## 2025-03-17 PROCEDURE — 255N000002 HC RX 255 OP 636: Performed by: RADIOLOGY

## 2025-03-17 RX ADMIN — IOHEXOL 10 ML: 350 INJECTION, SOLUTION INTRAVENOUS at 13:44

## 2025-03-17 NOTE — DISCHARGE INSTRUCTIONS
Gastrostomy (G) or Gastrojejunostomy (G/J) Tube Exchange Discharge Instructions:   You had a gastrostomy (G) tube or a Gastrojejunostomy Tube (GJ) exchanged.  This tube is often used for nutritional support and medication administration or it can be used for stomach venting.     Care instructions:  - If you received sedation for your procedure, do not drive or operate heavy machinery for the rest of the day.  - Avoid soaking in stagnant water (tub baths, Jacuzzis, pools, lake, or ocean).   - You may shower beginning the day after the tube was exchanged.   - Clean under the disc daily with soap and water. Pat dry under disc and apply new split gauze dressing under disc. Cleaning tube site daily will help prevent infection and skin irritation.  - A small amount of clear tan drainage from the tube exit site can be normal.  - Make sure the disc on the tube fits slightly snug against the skin so that the tube does not move in or out of the body easily.   - If you experience leaking from around tube exit site, the most common cause is that the disc is not tightened against the skin.   - To tighten the disc, pull gently on the tube so the retention balloon (under the skin) is pulled up against the skin under the tube. Push the disc down until it is tight against the skin without a gap between the skin and the disc.  - Flush your tube with 60cc of water twice a day (using a cath tip syringe) to prevent tube from clogging (or follow recommendations from your doctor or dietician if given).    Giving Feedings and Medications:  - Follow-up with your dietician, primary care provider, or oncologist for instructions on tube feedings and medication administration.    - ONLY use specific enteric feedings with your tube (unless otherwise discussed with your dietitian).    - Flush tube at least twice daily with 60ml of water using cath tip syringe unless otherwise instructed by your doctor or dietician.  - Flush the tube before and  after administrating medications and bolus feedings with 60cc of water.    Follow Up:  - Recommend routine 3 month exchanges of feeding tube. Please contact your primary care provider or speak with your dietitian to obtain an order to have your G/GJ tube exchanged. Then contact [facility]'s Medical Imaging scheduling department at [phone] to schedule your G/GJ tube exchange appointment.    Please seek medical evaluation for:  - Fever (greater than 101 F (38.3C).  - Purulent (yellow/green/foul smelling) drainage from tube exit site.   - Significant or worsening abdominal pain.   - Skin that is hot to the touch or significantly reddened at the tube exit site.   - Bleeding at tube exit site.    Call [facility] IR RN Line at [phone] with questions or if you have any of the following symptoms:  - Tube falls out or felt to be out of position.  - Unable to flush tube.  - Significant leakage around tube site (tube feeding, medications or drainage).  - Significant bleeding at the tube exit site.  - Severe pain at tube exit site.

## 2025-03-17 NOTE — SEDATION DOCUMENTATION
Patient Name: Hieu Hughes  Medical Record Number: [id number]  Today's Date: 3/17/2025    Procedure: G/J tube exchange  Proceduralist: Dr. Townsend    Procedure Start: 1335  Procedure end: 1339  Sedation medications administered: 0 mg midazolam and 0 mcg fentanyl   Sedation time: 0 minutes    0    Other Notes: Pt arrived to IR room 2 from Pre/post bay 1. Consent reviewed. Pt denies any questions or concerns regarding procedure. Pt positioned supine and monitored per protocol. Pt tolerated procedure without any noted complications. Pt transferred back to Pre/post bay 1.

## 2025-03-18 ENCOUNTER — OFFICE VISIT (OUTPATIENT)
Dept: DERMATOLOGY | Facility: CLINIC | Age: 85
End: 2025-03-18
Payer: MEDICARE

## 2025-03-18 DIAGNOSIS — I87.2 STASIS DERMATITIS OF BOTH LEGS: Primary | ICD-10-CM

## 2025-03-18 PROCEDURE — 99212 OFFICE O/P EST SF 10 MIN: CPT | Performed by: DERMATOLOGY
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
## 2025-03-18 NOTE — PATIENT INSTRUCTIONS
Moisturize daily after you are done being wrapped.    Use the prescription cream as needed and vinegar washes 1 to 2 days a week if needed.     -Use mild soaps such as Cetaphil or Dove Sensitive Skin in the shower. You do not need to use soap on arms, legs, and trunk every time you shower unless visibly soiled.   -Avoid hot or cold showers.  -After showering, lightly dry off and apply moisturizing within 2-3 minutes. This will help trap moisture in the skin.   -Aggressive use of a moisturizer at least 1-2 times a day to the entire body (including -Vanicream, Cetaphil, Aquaphor or Cerave) and moisturize hands after every washing.  -We recommend using moisturizers that come in a tub that needs to be scooped out, not a pump. This has more of an oil base. It will hold moisture in your skin much better than a water base moisturizer. The above recommended are non-pore clogging.

## 2025-03-19 ENCOUNTER — THERAPY VISIT (OUTPATIENT)
Dept: PHYSICAL THERAPY | Facility: CLINIC | Age: 85
End: 2025-03-19
Attending: DERMATOLOGY
Payer: MEDICARE

## 2025-03-19 DIAGNOSIS — I89.0 LYMPHEDEMA: Primary | Chronic | ICD-10-CM

## 2025-03-19 PROCEDURE — 97140 MANUAL THERAPY 1/> REGIONS: CPT | Mod: GP

## 2025-03-20 ENCOUNTER — TELEPHONE (OUTPATIENT)
Dept: NEUROSURGERY | Facility: CLINIC | Age: 85
End: 2025-03-20
Payer: MEDICARE

## 2025-03-20 NOTE — TELEPHONE ENCOUNTER
Attempted to reach patient to remind them about appointment scheduled with Debra Corral PA-C on 3/21/25 in our [place] [facility].    A voicemail was left with a call back number if the patient has questions or would like to reschedule.

## 2025-03-21 ENCOUNTER — ANCILLARY PROCEDURE (OUTPATIENT)
Dept: ULTRASOUND IMAGING | Facility: CLINIC | Age: 85
End: 2025-03-21
Attending: NEUROLOGICAL SURGERY
Payer: MEDICARE

## 2025-03-21 DIAGNOSIS — I65.23 BILATERAL CAROTID ARTERY STENOSIS: ICD-10-CM

## 2025-03-21 PROCEDURE — 93880 EXTRACRANIAL BILAT STUDY: CPT

## 2025-03-27 ENCOUNTER — THERAPY VISIT (OUTPATIENT)
Dept: PHYSICAL THERAPY | Facility: CLINIC | Age: 85
End: 2025-03-27
Attending: DERMATOLOGY
Payer: MEDICARE

## 2025-03-27 DIAGNOSIS — I89.0 LYMPHEDEMA: Primary | Chronic | ICD-10-CM

## 2025-03-27 PROCEDURE — 97140 MANUAL THERAPY 1/> REGIONS: CPT | Mod: GP

## 2025-04-01 ENCOUNTER — THERAPY VISIT (OUTPATIENT)
Dept: PHYSICAL THERAPY | Facility: CLINIC | Age: 85
End: 2025-04-01
Attending: DERMATOLOGY
Payer: MEDICARE

## 2025-04-01 DIAGNOSIS — I89.0 LYMPHEDEMA: Primary | Chronic | ICD-10-CM

## 2025-04-01 PROCEDURE — 97140 MANUAL THERAPY 1/> REGIONS: CPT | Mod: GP | Performed by: REHABILITATION PRACTITIONER

## 2025-04-13 RX ORDER — MAGNESIUM HYDROXIDE 1200 MG/15ML
LIQUID ORAL
Qty: 3,000 ML | Refills: 11 | OUTPATIENT
Start: 2025-04-13

## 2025-04-18 ENCOUNTER — HOSPITAL ENCOUNTER (OUTPATIENT)
Dept: CT IMAGING | Facility: CLINIC | Age: 85
Discharge: HOME | End: 2025-04-18
Attending: INTERNAL MEDICINE | Admitting: INTERNAL MEDICINE
Payer: MEDICARE

## 2025-04-18 DIAGNOSIS — C01 SQUAMOUS CELL CARCINOMA OF BASE OF TONGUE (H): ICD-10-CM

## 2025-04-18 LAB
CREAT BLD-MCNC: 0.7 MG/DL (ref 0.7–1.2)
EGFRCR SERPLBLD CKD-EPI 2021: >60 ML/MIN/1.73M2

## 2025-04-18 PROCEDURE — 71260 CT THORAX DX C+: CPT

## 2025-04-18 PROCEDURE — 250N000011 HC RX IP 250 OP 636: Performed by: INTERNAL MEDICINE

## 2025-04-18 PROCEDURE — 250N000009 HC RX 250: Performed by: INTERNAL MEDICINE

## 2025-04-18 PROCEDURE — 70491 CT SOFT TISSUE NECK W/DYE: CPT

## 2025-04-18 PROCEDURE — 82565 ASSAY OF CREATININE: CPT

## 2025-04-18 RX ORDER — IOPAMIDOL 755 MG/ML
115 INJECTION, SOLUTION INTRAVASCULAR ONCE
Status: COMPLETED | OUTPATIENT
Start: 2025-04-18 | End: 2025-04-18

## 2025-04-18 RX ADMIN — IOPAMIDOL 115 ML: 755 INJECTION, SOLUTION INTRAVENOUS at 10:13

## 2025-04-18 RX ADMIN — SODIUM CHLORIDE 80 ML: 9 INJECTION, SOLUTION INTRAVENOUS at 10:14

## 2025-04-23 ENCOUNTER — TELEPHONE (OUTPATIENT)
Dept: NEUROSURGERY | Facility: CLINIC | Age: 85
End: 2025-04-23
Payer: MEDICARE

## 2025-04-23 NOTE — TELEPHONE ENCOUNTER
Call placed to schedule appointment with Dr. Morrow to discuss carotid treatment.     Spoke with Rody, daughter-in-law. Held phone visit on 5/7/25 at 1600. Message sent to scheduling.     Contact information provided and encouraged to call with questions/concerns.     Mayda Hubbard RN 4/23/2025 3:57 PM

## 2025-04-24 ENCOUNTER — ONCOLOGY VISIT (OUTPATIENT)
Dept: ONCOLOGY | Facility: CLINIC | Age: 85
End: 2025-04-24
Attending: INTERNAL MEDICINE
Payer: MEDICARE

## 2025-04-24 ENCOUNTER — TELEPHONE (OUTPATIENT)
Dept: NEUROSURGERY | Facility: CLINIC | Age: 85
End: 2025-04-24
Payer: MEDICARE

## 2025-04-24 VITALS — HEART RATE: 58 BPM | OXYGEN SATURATION: 94 % | RESPIRATION RATE: 20 BRPM

## 2025-04-24 DIAGNOSIS — E03.9 HYPOTHYROIDISM, UNSPECIFIED TYPE: ICD-10-CM

## 2025-04-24 DIAGNOSIS — C01 SQUAMOUS CELL CARCINOMA OF BASE OF TONGUE (H): Primary | ICD-10-CM

## 2025-04-24 DIAGNOSIS — C01 CANCER OF BASE OF TONGUE (H): ICD-10-CM

## 2025-04-24 PROCEDURE — G0463 HOSPITAL OUTPT CLINIC VISIT: HCPCS | Performed by: INTERNAL MEDICINE

## 2025-04-24 RX ORDER — METHYLPREDNISOLONE SODIUM SUCCINATE 40 MG/ML
40 INJECTION INTRAMUSCULAR; INTRAVENOUS
Start: 2025-04-25

## 2025-04-24 RX ORDER — DIPHENHYDRAMINE HYDROCHLORIDE 50 MG/ML
50 INJECTION, SOLUTION INTRAMUSCULAR; INTRAVENOUS
Start: 2025-04-25

## 2025-04-24 RX ORDER — HEPARIN SODIUM (PORCINE) LOCK FLUSH IV SOLN 100 UNIT/ML 100 UNIT/ML
5 SOLUTION INTRAVENOUS
OUTPATIENT
Start: 2025-04-25

## 2025-04-24 RX ORDER — HEPARIN SODIUM,PORCINE 10 UNIT/ML
5-20 VIAL (ML) INTRAVENOUS DAILY PRN
OUTPATIENT
Start: 2025-04-25

## 2025-04-24 RX ORDER — MEPERIDINE HYDROCHLORIDE 25 MG/ML
25 INJECTION INTRAMUSCULAR; INTRAVENOUS; SUBCUTANEOUS
OUTPATIENT
Start: 2025-04-25

## 2025-04-24 RX ORDER — ALBUTEROL SULFATE 0.83 MG/ML
2.5 SOLUTION RESPIRATORY (INHALATION)
OUTPATIENT
Start: 2025-04-25

## 2025-04-24 RX ORDER — LORAZEPAM 2 MG/ML
0.5 INJECTION INTRAMUSCULAR EVERY 4 HOURS PRN
OUTPATIENT
Start: 2025-04-25

## 2025-04-24 RX ORDER — DIPHENHYDRAMINE HYDROCHLORIDE 50 MG/ML
25 INJECTION, SOLUTION INTRAMUSCULAR; INTRAVENOUS
Start: 2025-04-25

## 2025-04-24 RX ORDER — EPINEPHRINE 1 MG/ML
0.3 INJECTION, SOLUTION INTRAMUSCULAR; SUBCUTANEOUS EVERY 5 MIN PRN
OUTPATIENT
Start: 2025-04-25

## 2025-04-24 RX ORDER — ALBUTEROL SULFATE 90 UG/1
1-2 INHALANT RESPIRATORY (INHALATION)
Start: 2025-04-25

## 2025-04-24 ASSESSMENT — PAIN SCALES - GENERAL: PAINLEVEL_OUTOF10: NO PAIN (0)
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
## 2025-04-24 NOTE — NURSING NOTE
"Oncology Rooming Note    April 24, 2025 12:12 PM   Hieu Hughes is a 84 year old male who presents for:    Chief Complaint   Patient presents with    Oncology Clinic Visit     Head and neck cancer     Initial Vitals: Pulse 58   Resp 20   SpO2 94%  Estimated body mass index is 22.96 kg/m  as calculated from the following:    Height as of 4/4/25: 1.778 m (5' 10\").    Weight as of 4/4/25: 72.6 kg (160 lb). There is no height or weight on file to calculate BSA.  No Pain (0) Comment: Data Unavailable   No LMP for male patient.  Allergies reviewed: Yes  Medications reviewed: Yes    Medications: Medication refills not needed today.  Pharmacy name entered into [employer]:    [id number] - [place], [place] - [address] [facility] [place], [place] [address] [facility] - [place], [place] - [address] [facility] - [place], [place] - [address] [facility] - [place], [place] - [address]    Frailty Screening:   Is the patient here for a new oncology consult visit in cancer care? 2. No      Clinical concerns: Pt reports neuropathy is a concern today. Dr. Bhatia was notified via message.       Lesley Max, EMT     "

## 2025-04-25 ENCOUNTER — LAB (OUTPATIENT)
Dept: LAB | Facility: CLINIC | Age: 85
End: 2025-04-25
Payer: MEDICARE

## 2025-04-25 DIAGNOSIS — C01 SQUAMOUS CELL CARCINOMA OF BASE OF TONGUE (H): ICD-10-CM

## 2025-04-25 DIAGNOSIS — C01 CANCER OF BASE OF TONGUE (H): Primary | ICD-10-CM

## 2025-04-25 LAB
ALBUMIN SERPL BCG-MCNC: 3.5 G/DL (ref 3.5–5.2)
ALP SERPL-CCNC: 101 U/L (ref 40–150)
ALT SERPL W P-5'-P-CCNC: 12 U/L (ref 0–70)
ANION GAP SERPL CALCULATED.3IONS-SCNC: 11 MMOL/L (ref 7–15)
AST SERPL W P-5'-P-CCNC: 16 U/L (ref 0–45)
BASOPHILS # BLD AUTO: 0 10E3/UL (ref 0–0.2)
BASOPHILS NFR BLD AUTO: 1 %
BILIRUB SERPL-MCNC: 0.2 MG/DL
BUN SERPL-MCNC: 22.9 MG/DL (ref 8–23)
CALCIUM SERPL-MCNC: 9.4 MG/DL (ref 8.8–10.4)
CHLORIDE SERPL-SCNC: 101 MMOL/L (ref 98–107)
CREAT SERPL-MCNC: 0.62 MG/DL (ref 0.67–1.17)
EGFRCR SERPLBLD CKD-EPI 2021: >90 ML/MIN/1.73M2
EOSINOPHIL # BLD AUTO: 0.4 10E3/UL (ref 0–0.7)
EOSINOPHIL NFR BLD AUTO: 6 %
ERYTHROCYTE [DISTWIDTH] IN BLOOD BY AUTOMATED COUNT: 14.4 % (ref 10–15)
GLUCOSE SERPL-MCNC: 98 MG/DL (ref 70–99)
HCO3 SERPL-SCNC: 29 MMOL/L (ref 22–29)
HCT VFR BLD AUTO: 36.1 % (ref 40–53)
HGB BLD-MCNC: 11.2 G/DL (ref 13.3–17.7)
IMM GRANULOCYTES # BLD: 0 10E3/UL
IMM GRANULOCYTES NFR BLD: 0 %
LYMPHOCYTES # BLD AUTO: 0.4 10E3/UL (ref 0.8–5.3)
LYMPHOCYTES NFR BLD AUTO: 6 %
MCH RBC QN AUTO: 31.8 PG (ref 26.5–33)
MCHC RBC AUTO-ENTMCNC: 31 G/DL (ref 31.5–36.5)
MCV RBC AUTO: 103 FL (ref 78–100)
MONOCYTES # BLD AUTO: 0.5 10E3/UL (ref 0–1.3)
MONOCYTES NFR BLD AUTO: 8 %
NEUTROPHILS # BLD AUTO: 5.1 10E3/UL (ref 1.6–8.3)
NEUTROPHILS NFR BLD AUTO: 79 %
NRBC # BLD AUTO: 0 10E3/UL
NRBC BLD AUTO-RTO: 0 /100
PLATELET # BLD AUTO: 206 10E3/UL (ref 150–450)
POTASSIUM SERPL-SCNC: 4.9 MMOL/L (ref 3.4–5.3)
PROT SERPL-MCNC: 7 G/DL (ref 6.4–8.3)
RBC # BLD AUTO: 3.52 10E6/UL (ref 4.4–5.9)
SODIUM SERPL-SCNC: 141 MMOL/L (ref 135–145)
TSH SERPL DL<=0.005 MIU/L-ACNC: 1.89 UIU/ML (ref 0.3–4.2)
WBC # BLD AUTO: 6.4 10E3/UL (ref 4–11)

## 2025-04-25 PROCEDURE — 82310 ASSAY OF CALCIUM: CPT | Performed by: INTERNAL MEDICINE

## 2025-04-25 PROCEDURE — 85025 COMPLETE CBC W/AUTO DIFF WBC: CPT

## 2025-04-25 PROCEDURE — 84443 ASSAY THYROID STIM HORMONE: CPT | Performed by: INTERNAL MEDICINE

## 2025-04-25 PROCEDURE — 36415 COLL VENOUS BLD VENIPUNCTURE: CPT | Performed by: INTERNAL MEDICINE
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
## 2025-04-28 ENCOUNTER — TELEPHONE (OUTPATIENT)
Dept: NEUROSURGERY | Facility: CLINIC | Age: 85
End: 2025-04-28
Payer: MEDICARE

## 2025-04-28 NOTE — TELEPHONE ENCOUNTER
L/m about rescheduling appointment w/ Dr. Morrow to 04/29/2025 at 12:30 pm per Roula Andrews via task. -KB

## 2025-04-29 ENCOUNTER — TELEPHONE (OUTPATIENT)
Dept: OTOLARYNGOLOGY | Facility: CLINIC | Age: 85
End: 2025-04-29

## 2025-04-29 ENCOUNTER — VIRTUAL VISIT (OUTPATIENT)
Dept: NEUROSURGERY | Facility: CLINIC | Age: 85
End: 2025-04-29
Attending: NEUROLOGICAL SURGERY
Payer: MEDICARE

## 2025-04-29 DIAGNOSIS — I65.23 BILATERAL CAROTID ARTERY STENOSIS: Primary | ICD-10-CM

## 2025-04-29 PROCEDURE — 98013 SYNCH AUDIO-ONLY EST LOW 20: CPT | Performed by: NEUROLOGICAL SURGERY

## 2025-04-29 NOTE — LETTER
4/29/2025       RE: Hieu Hughes  [address] [address]     Dear Colleague,    Thank you for referring your patient, Hieu Hughes, to the [facility] NEUROSURGERY CLINIC [facility]. Please see a copy of my visit note below.    Virtual Visit Details  Date of service: 4/29/2025  Type of service:  Telephone Visit   Phone call duration: 20 minutes   Originating Location (pt. Location): Home  {PROVIDER LOCATION On-site should be selected for visits conducted from your clinic location or adjoining [facility] hospital, academic office, or other nearby [facility] building. Off-site should be selected for all other provider locations, including home:[id number]}  Distant Location (provider location):  On-site  Telephone visit completed due to the patient did not have access to video, while the distant provider did.    Juan Diego Doan DO  [address] [address]       Dear Dr. Doan:    We spoke to Mr. Hughes as part of a telephone follow-up in cerebrovascular clinic today.  He was accompanied by his daughter.  Because of his limited phonation, his daughter served as a liaison and facilitated this visit.  We are following Mr. 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  Please see our prior notes for additional details.  He has not had any recent hemoptysis or bleeding from the tracheostomy site.  His anemia is improved with the recent hemoglobin of 11.2 on 4/25/2025.  He has not had any obvious symptoms or signs of TIA or stroke.     As before, his voice is inaudible but according to his daughter, his comprehension was normal.  He was also expressing himself nonverbally appropriately.    We went over his recent carotid ultrasound which showed peak velocity in the right internal carotid artery of 292 cm/s and a ratio of 0.94.  On the left side, peak velocity is 547 cm/s with a ratio of 6.29  The velocities on the left  side have risen considerably (previously 353 cm/s and a ratio of 2.8 in August 2024).  The two ultrasounds were done in different settings.    Despite the marked increase in velocity on the left side (suggestive of progression of severe left carotid stenosis), he is asymptomatic.  With the improvement in anemia and no active bleeding, we would ideally start him on aspirin 81 mg daily.  However, Mr. Hughes is reluctant to start aspirin.  We are also reluctant to pursue carotid stent placement because he would require dual antiplatelet therapy for a month and aspirin would be absolutely necessary thereafter to prevent stent thrombosis.  Because the most important consideration is that he remains asymptomatic, we favor continued observation.  We will discuss with his oncology and ENT teams regarding whether aspirin can be initiated.  We will convey this information back to his daughter.  We will plan on repeating a carotid ultrasound in 6 months.    Please do not hesitate contact us with questions.    Sincerely,        Williams Morrow MD  Department of Neurosurgery                      Virtual Visit Details  Date of service: 4/29/2025  Type of service:  Telephone Visit   Phone call duration: 20 minutes   Originating Location (pt. Location): Home  {PROVIDER LOCATION On-site should be selected for visits conducted from your clinic location or adjoining [facility] hospital, academic office, or other nearby [facility] building. Off-site should be selected for all other provider locations, including home:[id number]}  Distant Location (provider location):  On-site  Telephone visit completed due to the patient did not have access to video, while the distant provider did.    DO Renetta Huizar [address] [address]       Dear Dr. Doan:    We spoke to Mr. Hughes as part of a telephone follow-up in cerebrovascular clinic today.  He was accompanied by his daughter.  Because of his limited phonation, his  daughter served as a liaison and facilitated this visit.  We are following Mr. 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  Please see our prior notes for additional details.  He has not had any recent hemoptysis or bleeding from the tracheostomy site.  His anemia is improved with the recent hemoglobin of 11.2 on 4/25/2025.  He has not had any obvious symptoms or signs of TIA or stroke.     As before, his voice is inaudible but according to his daughter, his comprehension was normal.  He was also expressing himself nonverbally appropriately.    We went over his recent carotid ultrasound which showed peak velocity in the right internal carotid artery of 292 cm/s and a ratio of 0.94.  On the left side, peak velocity is 547 cm/s with a ratio of 6.29  The velocities on the left side have risen considerably (previously 353 cm/s and a ratio of 2.8 in August 2024).  The two ultrasounds were done in different settings.    Despite the marked increase in velocity on the left side (suggestive of progression of severe left carotid stenosis), he is asymptomatic.  With the improvement in anemia and no active bleeding, we would ideally start him on aspirin 81 mg daily.  However, Mr. Hughes is reluctant to start aspirin.  We are also reluctant to pursue carotid stent placement because he would require dual antiplatelet therapy for a month and aspirin would be absolutely necessary thereafter to prevent stent thrombosis.  Because the most important factor is that he is asymptomatic, we favor continued observation.  We will discuss with his oncology and ENT teams regarding whether aspirin can be initiated.  We will convey this information back to his daughter.  We will plan on repeating a carotid ultrasound in 6 months.    Please do not hesitate contact us with questions.    Sincerely,        Williams Morrow MD  Department of Neurosurgery                       Again, thank you for allowing me to participate in  the care of your patient.      Sincerely,    Williams Morrow MD

## 2025-04-29 NOTE — NURSING NOTE
Current patient location: [address] [place] [address]  [place] [address]    Is the patient currently in the state of [place]? NO    Visit mode: TELEPHONE    If the visit is dropped, the patient can be reconnected by:TELEPHONE VISIT: Phone number:   Telephone Information:   Mobile [phone]       Will anyone else be joining the visit? NO  (If patient encounters technical issues they should call [phone])    Are changes needed to the allergy or medication list? No    Are refills needed on medications prescribed by this physician? NO    Rooming Documentation:  Questionnaire(s) not pre-assigned    Reason for visit: Follow Up    Temitope HODGSON

## 2025-04-29 NOTE — TELEPHONE ENCOUNTER
Left Voicemail (1st Attempt) for the patient to call back and schedule the following:    Appointment Type: Return ENT  Provider: Kanchan Fox PA-C - GONZALEZ   Appt date: June 2025  Specialty phone number: Direct   Additional appointment(s) needed:   Additional Notes: trach change per Dr Holliday/Samantha

## 2025-04-29 NOTE — PROGRESS NOTES
Virtual Visit Details  Date of service: 4/29/2025  Type of service:  Telephone Visit   Phone call duration: 20 minutes   Originating Location (pt. Location): Home  {PROVIDER LOCATION On-site should be selected for visits conducted from your clinic location or adjoining [facility] hospital, academic office, or other nearby [facility] building. Off-site should be selected for all other provider locations, including home:[id number]}  Distant Location (provider location):  On-site  Telephone visit completed due to the patient did not have access to video, while the distant provider did.    Juan Diego Doan DO  [address] [address]       Dear Dr. Doan:    We spoke to Mr. Hughes as part of a telephone follow-up in cerebrovascular clinic today.  He was accompanied by his daughter.  Because of his limited phonation, his daughter served as a liaison and facilitated this visit.  We are following Mr. 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  Please see our prior notes for additional details.  He has not had any recent hemoptysis or bleeding from the tracheostomy site.  His anemia is improved with the recent hemoglobin of 11.2 on 4/25/2025.  He has not had any obvious symptoms or signs of TIA or stroke.     As before, his voice is inaudible but according to his daughter, his comprehension was normal.  He was also expressing himself nonverbally appropriately.    We went over his recent carotid ultrasound which showed peak velocity in the right internal carotid artery of 292 cm/s and a ratio of 0.94.  On the left side, peak velocity is 547 cm/s with a ratio of 6.29  The velocities on the left side have risen considerably (previously 353 cm/s and a ratio of 2.8 in August 2024).  The two ultrasounds were done in different settings.    Despite the marked increase in velocity on the left side (suggestive of progression of severe left carotid stenosis), he is asymptomatic.  With the improvement  in anemia and no active bleeding, we would ideally start him on aspirin 81 mg daily.  However, Mr. Hughes is reluctant to start aspirin.  We are also reluctant to pursue carotid stent placement because he would require dual antiplatelet therapy for a month and aspirin would be absolutely necessary thereafter to prevent stent thrombosis.  Because the most important factor is that he is asymptomatic, we favor continued observation.  We will discuss with his oncology and ENT teams regarding whether aspirin can be initiated.  We will convey this information back to his daughter.  We will plan on repeating a carotid ultrasound in 6 months.    Please do not hesitate contact us with questions.    Sincerely,        Williams Morrow MD  Department of Neurosurgery

## 2025-05-07 NOTE — PATIENT INSTRUCTIONS
Please follow-up with Dr. Morrow in 6 months with a carotid ultrasound prior.    Stroke & Endovascular RN Care Coordinators:    Roula Andrews, RN, BSN  Mayda Hubbard, RN, CNRN, SCRN    If you have any questions please contact the RN Care Coordinators at [phone], option [phone].    After business hours call the  at [phone] and have the Neuro-Interventional Fellow paged.    Thank you for choosing [facility] for your health care needs.

## 2025-05-15 PROBLEM — I89.0 LYMPHEDEMA: Chronic | Status: RESOLVED | Noted: 2025-03-06 | Resolved: 2025-05-15

## 2025-06-04 ENCOUNTER — NURSE TRIAGE (OUTPATIENT)
Dept: ONCOLOGY | Facility: CLINIC | Age: 85
End: 2025-06-04
Payer: MEDICARE

## 2025-06-04 ENCOUNTER — HOSPITAL ENCOUNTER (OUTPATIENT)
Dept: INTERVENTIONAL RADIOLOGY/VASCULAR | Facility: CLINIC | Age: 85
Discharge: HOME | End: 2025-06-04
Attending: NURSE PRACTITIONER | Admitting: NURSE PRACTITIONER
Payer: MEDICARE

## 2025-06-04 DIAGNOSIS — Z43.1 ATTENTION TO G-TUBE (H): Primary | ICD-10-CM

## 2025-06-04 DIAGNOSIS — Z43.1 ATTENTION TO G-TUBE (H): ICD-10-CM

## 2025-06-04 PROCEDURE — 272N000117 HC CATH CR2

## 2025-06-04 PROCEDURE — 250N000009 HC RX 250: Performed by: RADIOLOGY

## 2025-06-04 PROCEDURE — 255N000002 HC RX 255 OP 636: Performed by: NURSE PRACTITIONER

## 2025-06-04 PROCEDURE — 49452 REPLACE G-J TUBE PERC: CPT

## 2025-06-04 PROCEDURE — C1769 GUIDE WIRE: HCPCS

## 2025-06-04 RX ORDER — LIDOCAINE HYDROCHLORIDE 20 MG/ML
JELLY TOPICAL ONCE
Status: COMPLETED | OUTPATIENT
Start: 2025-06-04 | End: 2025-06-04

## 2025-06-04 RX ORDER — IOPAMIDOL 612 MG/ML
30 INJECTION, SOLUTION INTRAVASCULAR ONCE
Status: COMPLETED | OUTPATIENT
Start: 2025-06-04 | End: 2025-06-04

## 2025-06-04 RX ADMIN — IOPAMIDOL 20 ML: 612 INJECTION, SOLUTION INTRAVENOUS at 13:18

## 2025-06-04 RX ADMIN — LIDOCAINE HYDROCHLORIDE: 20 JELLY TOPICAL at 12:45

## 2025-06-04 NOTE — IR NOTE
Patient Name: Hieu Hughes  Medical Record Number: [id number]  Today's Date: 6/4/2025    Procedure: GJ Exchange  Proceduralist: Dr Padilla  Pathology present: No    Procedure Start: 1301  Procedure end: 1309  Sedation medications administered: NA     Report given to: Dodie  : No    Other Notes: Pt arrived to IR room  from pre/post rm 1. Consent reviewed. Pt denies any questions or concerns regarding procedure. Pt positioned supine and monitored per protocol. Pt tolerated procedure without any noted complications. Pt transferred back to pre/post rm 1.    [facility] fr 45cm LOT#[id number] EXP 2027-02-12    Laura Rubio RN

## 2025-06-04 NOTE — DISCHARGE INSTRUCTIONS
Gastrostomy (G) or Gastrojejunostomy (G/J) Tube Exchange Discharge Instructions:   You had a Gastrojejunostomy Tube (GJ) exchanged.  This tube is often used for nutritional support and medication administration or it can be used for stomach venting.     Care instructions:  - If you received sedation for your procedure, do not drive or operate heavy machinery for the rest of the day.  - Avoid soaking in stagnant water (tub baths, Jacuzzis, pools, lake, or ocean).   - You may shower beginning the day after the tube was exchanged.   - Clean under the disc daily with soap and water. Pat dry under disc and apply new split gauze dressing under disc. Cleaning tube site daily will help prevent infection and skin irritation.  - A small amount of clear tan drainage from the tube exit site can be normal.  - Make sure the disc on the tube fits slightly snug against the skin so that the tube does not move in or out of the body easily.   - If you experience leaking from around tube exit site, the most common cause is that the disc is not tightened against the skin.   - To tighten the disc, pull gently on the tube so the retention balloon (under the skin) is pulled up against the skin under the tube. Push the disc down until it is tight against the skin without a gap between the skin and the disc.  - Flush your tube with 60cc of water twice a day (using a cath tip syringe) to prevent tube from clogging (or follow recommendations from your doctor or dietician if given).    Giving Feedings and Medications:  - Follow-up with your dietician, primary care provider, or oncologist for instructions on tube feedings and medication administration.    - ONLY use specific enteric feedings with your tube (unless otherwise discussed with your dietitian).    - Flush tube at least twice daily with 60ml of water using cath tip syringe unless otherwise instructed by your doctor or dietician.  - Flush the tube before and after administrating  medications and bolus feedings with 60cc of water.    Follow Up:  - Recommend routine 3 month exchanges of feeding tube. Please contact your primary care provider or speak with your dietitian to obtain an order to have your G/GJ tube exchanged. Then contact [facility]'s Medical Imaging scheduling department at [phone] to schedule your G/GJ tube exchange appointment.    Please seek medical evaluation for:  - Fever (greater than 101 F (38.3C).  - Purulent (yellow/green/foul smelling) drainage from tube exit site.   - Significant or worsening abdominal pain.   - Skin that is hot to the touch or significantly reddened at the tube exit site.   - Bleeding at tube exit site.    Call [facility] RN Line at [phone] with questions or if you have any of the following symptoms:  - Tube falls out or felt to be out of position.  - Unable to flush tube.  - Significant leakage around tube site (tube feeding, medications or drainage).  - Significant bleeding at the tube exit site.  - Severe pain at tube exit site.

## 2025-06-04 NOTE — TELEPHONE ENCOUNTER
"Rody calling, Hieu's J-gtube is plugged.    Rody is the caregiver who helps maintain the J-gtube.     Feeding stopped around 3:00am this morning.   (950ml was remaining in bag)    Tried to suction, flush,etc with no improvement.     Pt has trachea in place. Pt is currently \"Nothing by mouth\" so needs j-gtube checked as soon as possible.     Last replaced on 3/17/2025 at [facility].     Best call back phone Rody is [phone]    Rody will also call [facility] about possibility available for IR tube check to anticipate transportation needs of patient.    0815 Secure chat sent to Dr. Bhatia and RNCC Laura Headley. Also including GONZALEZ pt is scheduled to see tomorrow 6/5 with Noris Lovelace NP    0823 Per Noris, contact the IR nurses at [facility] who put it in for guidance. They may be able to trouble shoot with pt/family or recommend if he can get in there today or needs ED   (Dr. Bhatia also in agreement with plan)     0825 This writer called IR nurse at [facility], paged their Nurse Practitioner to call this writer back.     0831 Per Fam LEWIS, if pt/family attempted suction and flushing then provider needs to place an IR outpatient referral order opal STAT and they will see if can get patient in.     0840 left message for caregiver Rody on update that working on appt for [facility] IR, caregiver will receive a call if we are able to offer a same day appt today.       As of 0855 Pt scheduled for IR appt at 1:30pm check in with a 2:00pm appt for IR J-Gtube change.   This writer spoke to Rody who is waiting a call back from IR, updated on appt appears to be scheduled for today at [facility], provided address and phone number from appts notes.             "

## 2025-06-05 ENCOUNTER — ONCOLOGY VISIT (OUTPATIENT)
Dept: ONCOLOGY | Facility: CLINIC | Age: 85
End: 2025-06-05
Attending: NURSE PRACTITIONER
Payer: MEDICARE

## 2025-06-05 ENCOUNTER — INFUSION THERAPY VISIT (OUTPATIENT)
Dept: INFUSION THERAPY | Facility: CLINIC | Age: 85
End: 2025-06-05
Attending: INTERNAL MEDICINE
Payer: MEDICARE

## 2025-06-05 ENCOUNTER — APPOINTMENT (OUTPATIENT)
Dept: LAB | Facility: CLINIC | Age: 85
End: 2025-06-05
Payer: MEDICARE

## 2025-06-05 VITALS
HEIGHT: 70 IN | SYSTOLIC BLOOD PRESSURE: 99 MMHG | DIASTOLIC BLOOD PRESSURE: 53 MMHG | BODY MASS INDEX: 24.02 KG/M2 | OXYGEN SATURATION: 95 % | TEMPERATURE: 97.6 F | RESPIRATION RATE: 20 BRPM | HEART RATE: 62 BPM | WEIGHT: 167.8 LBS

## 2025-06-05 VITALS — SYSTOLIC BLOOD PRESSURE: 121 MMHG | DIASTOLIC BLOOD PRESSURE: 69 MMHG

## 2025-06-05 DIAGNOSIS — Z79.899 HIGH RISK MEDICATION USE: ICD-10-CM

## 2025-06-05 DIAGNOSIS — C01 CANCER OF BASE OF TONGUE (H): Primary | ICD-10-CM

## 2025-06-05 DIAGNOSIS — C01 SQUAMOUS CELL CARCINOMA OF BASE OF TONGUE (H): ICD-10-CM

## 2025-06-05 LAB
ALBUMIN SERPL BCG-MCNC: 3.4 G/DL (ref 3.5–5.2)
ALP SERPL-CCNC: 114 U/L (ref 40–150)
ALT SERPL W P-5'-P-CCNC: 12 U/L (ref 0–70)
ANION GAP SERPL CALCULATED.3IONS-SCNC: 10 MMOL/L (ref 7–15)
AST SERPL W P-5'-P-CCNC: 22 U/L (ref 0–45)
BILIRUB SERPL-MCNC: 0.2 MG/DL
BUN SERPL-MCNC: 23.2 MG/DL (ref 8–23)
CALCIUM SERPL-MCNC: 9 MG/DL (ref 8.8–10.4)
CHLORIDE SERPL-SCNC: 100 MMOL/L (ref 98–107)
CREAT SERPL-MCNC: 0.64 MG/DL (ref 0.67–1.17)
EGFRCR SERPLBLD CKD-EPI 2021: >90 ML/MIN/1.73M2
GLUCOSE SERPL-MCNC: 96 MG/DL (ref 70–99)
HCO3 SERPL-SCNC: 30 MMOL/L (ref 22–29)
HOLD SPECIMEN: NORMAL
POTASSIUM SERPL-SCNC: 4.6 MMOL/L (ref 3.4–5.3)
PROT SERPL-MCNC: 6.8 G/DL (ref 6.4–8.3)
SODIUM SERPL-SCNC: 140 MMOL/L (ref 135–145)
TSH SERPL DL<=0.005 MIU/L-ACNC: 4.2 UIU/ML (ref 0.3–4.2)

## 2025-06-05 PROCEDURE — 82247 BILIRUBIN TOTAL: CPT | Performed by: INTERNAL MEDICINE

## 2025-06-05 PROCEDURE — 258N000003 HC RX IP 258 OP 636: Performed by: NURSE PRACTITIONER

## 2025-06-05 PROCEDURE — 36415 COLL VENOUS BLD VENIPUNCTURE: CPT | Performed by: INTERNAL MEDICINE

## 2025-06-05 PROCEDURE — 82435 ASSAY OF BLOOD CHLORIDE: CPT | Performed by: INTERNAL MEDICINE

## 2025-06-05 PROCEDURE — G0463 HOSPITAL OUTPT CLINIC VISIT: HCPCS | Performed by: NURSE PRACTITIONER

## 2025-06-05 PROCEDURE — 84443 ASSAY THYROID STIM HORMONE: CPT | Performed by: INTERNAL MEDICINE

## 2025-06-05 RX ORDER — ALBUTEROL SULFATE 90 UG/1
1-2 INHALANT RESPIRATORY (INHALATION)
Status: CANCELLED
Start: 2025-06-05

## 2025-06-05 RX ORDER — HEPARIN SODIUM,PORCINE 10 UNIT/ML
5-20 VIAL (ML) INTRAVENOUS DAILY PRN
Status: CANCELLED | OUTPATIENT
Start: 2025-06-05

## 2025-06-05 RX ORDER — LORAZEPAM 2 MG/ML
0.5 INJECTION INTRAMUSCULAR EVERY 4 HOURS PRN
Status: CANCELLED | OUTPATIENT
Start: 2025-06-05

## 2025-06-05 RX ORDER — MEPERIDINE HYDROCHLORIDE 25 MG/ML
25 INJECTION INTRAMUSCULAR; INTRAVENOUS; SUBCUTANEOUS
Status: CANCELLED | OUTPATIENT
Start: 2025-06-05

## 2025-06-05 RX ORDER — HEPARIN SODIUM (PORCINE) LOCK FLUSH IV SOLN 100 UNIT/ML 100 UNIT/ML
5 SOLUTION INTRAVENOUS
Status: CANCELLED | OUTPATIENT
Start: 2025-06-05

## 2025-06-05 RX ORDER — EPINEPHRINE 1 MG/ML
0.3 INJECTION, SOLUTION, CONCENTRATE INTRAVENOUS EVERY 5 MIN PRN
Status: CANCELLED | OUTPATIENT
Start: 2025-06-05

## 2025-06-05 RX ORDER — DIPHENHYDRAMINE HYDROCHLORIDE 50 MG/ML
25 INJECTION, SOLUTION INTRAMUSCULAR; INTRAVENOUS
Status: CANCELLED
Start: 2025-06-05

## 2025-06-05 RX ORDER — DIPHENHYDRAMINE HYDROCHLORIDE 50 MG/ML
50 INJECTION, SOLUTION INTRAMUSCULAR; INTRAVENOUS
Status: CANCELLED
Start: 2025-06-05

## 2025-06-05 RX ORDER — METHYLPREDNISOLONE SODIUM SUCCINATE 40 MG/ML
40 INJECTION INTRAMUSCULAR; INTRAVENOUS
Status: CANCELLED
Start: 2025-06-05

## 2025-06-05 RX ORDER — ALBUTEROL SULFATE 0.83 MG/ML
2.5 SOLUTION RESPIRATORY (INHALATION)
Status: CANCELLED | OUTPATIENT
Start: 2025-06-05

## 2025-06-05 RX ADMIN — SODIUM CHLORIDE 1000 ML: 0.9 INJECTION, SOLUTION INTRAVENOUS at 10:48

## 2025-06-05 RX ADMIN — SODIUM CHLORIDE 400 MG: 9 INJECTION, SOLUTION INTRAVENOUS at 11:30

## 2025-06-05 ASSESSMENT — PAIN SCALES - GENERAL: PAINLEVEL_OUTOF10: NO PAIN (0)

## 2025-06-05 NOTE — LETTER
6/5/2025      Hieu Hughes  [address] [address]      Dear Colleague,    Thank you for referring your patient, Hieu Hughes, to the [facility] [place]. Please see a copy of my visit note below.    [facility] Hematology and Oncology Outpatient Progress Note    Patient: Hieu Hughes  MRN: [id number]  Date of Service: Jun 5, 2025          Reason for Visit    Base of tongue cancer - on palliative treatment  Remote hx head/neck cancer, WERO    Primary Oncologist: Dr. Bhatia ([facility])      Assessment/Plan  Base of tongue SCC ([id number])  Not candidate for definitive RT or surgery; on palliative systemic treatment  Chemo-induced anemia, improving  He completed 4 cycles of carbo/taxol/pembro - required multiple interruptions and dosing modifications. Resulted in OH on recent CT.      Has been on maintenance pembrolizumab since 2/2025. Currently, every 6-week dosing.     Tolerating this very well. Feels much better since off chemo with improved PS/QOL.    Labs: CMP unremarkable/stable. TSH WNL.     Cancer related symptoms are stable. Chronic dysphagia, unchanged. No pain. No hemoptysis.    CT 4/2025 was stable.     Plan:  -Continue maintenance pembrolizumab 400 mg every 6 weeks. Will proceed today  -Return in 6 weeks to see me for next cycle  -Repeat CT neck, chest/abd in 6 weeks with Dr. Bhatia is scheduled to occur in late July  -Palliative Care following     Bilateral LE venous stasis/lymphedema dermatitis - improved/resolved  Earlier in year, had significant complications. DVT was ruled out. Required  Keflex for possible secondary cellulitis and Derm recommended topical betamethasone. Lymphedema provided compression mgmt with improvement.     He had a mild flare a few months ago.   No issues since.    Plan:  -Elevate legs at rest  -Stay active  -Optimize nutrition/protein  -Continue Derm recommendations with Vanicream and betamethasone PRN.   -Continue Lymphedema mgmt for compression  wraps PRN    Mitral valve regurgitation (mod severe)  Noted on ECHO that was done for eval of his LE edema. EF 55-60%.    Plan:  -Cardiology following    Nutrition/hydration  Aspiration pneumonia risk  Gastritis/reflux/emesis  Diarrhea, related to jejunal feeding adjustment + Reglan use  Chronic dysphagia and aspiration, feeding tube dependent.    Doing better since G-tube converted to GJ tube and formula changes made. No longer needing Pepcid nor Reglan for reflux.    Occasional/mild diarrhea post-feedings.     Regaining weight. Feels well-hydrated.     He had his GJ tube exchanged yesterday with IR and now working well.    Plan:  -Continue mgmt through IR ([place]). Will need routine exchanges every 3 mths (next due Sept - pt will self-schedule)  -Continue working with Dietician PRN  -Pepcid BID PRN for reflux (is not needing routinely anymore)  -Imodium as needed.     Chronic dyspnea  Increased mucus secretions  LLL infiltrate/aspiration pneumonitis  Trach in place.     He has difficult-to clear secretions. Scheduled saline nebs has been beneficial. He notes if he does not maintain these prophylactically, he has more trouble.     Persistent LLL infiltrate/mucus plugging from prior aspiration pneumonia noted on CT in April but he's been asymptomatic.     Plan:  -Continue saline nebs BID  -Suction/lavage as needed  -Reassess and consider antibiotic for aspiration pneumonia if increased cough, fever, dyspnea    B carotid artery stenosis  Met with Vascular in December for recommendations on this. No intervention recommended in setting of cancer and on chemo, ideally would need to be on ASA and Plavix.     Follow-up with Neuro last month showed progressive L sided stenosis to severe.   He's asymptomatic. ASA 81 mg daily recommended now that his anemia is improved and no further hemoptysis, but pt reluctant. If stent placed, would require dual-antiplatelet therapy x 1 mth.     Plan:  -Neuro following  -Favored  continued observation for now with repeat US in 6 mths  -Is now low risk to start baby ASA since his cancer is better controlled and no hemoptysis. No risk for anemia on his current immunotherapy maintenance     Macrocytic anemia, mild  Mildly macrocytic anemia: B12 in 2023 was in the 700s. Hypothyroidism well-managaed. Monitor and further evaluation if worsens.     Hypothyroidism, on replacement  On Synthroid 100 mcg. TSH WNL.    Plan:  -Continue same dose Synthroid  -Monitor on immunotherapy; adjust dose as needed. If TSH continues rising next check, will increse dose at that time      ______________________________________________________________________________    History of Present Illness/ Interval History    Mr. Hieu Hughes  is a 84 year old with unresectable locally recurrent head/neck cancer. Given prior RT to head/neck, not a candidate for re-irradiation.    He completed 2 cycles of single-agent pembro through September, with some progression on CT. Therefore, carbo (day 1)/Taxol (d 1+8) was added. He had multiple interruptions/delays due to various complications/hospitalizations. Cycles 3+4 were modified to weekly chemo dosing (days 1 + 8) through 1/2025, resulting in HI.   He transitioned to maintenance pembrolizumab 2/2025 and is now getting every 6 weeks. Returns for next cycle.    Tolerating pembro well. Overall stamina and well-being has been notably better since off chemo. No increased dyspnea, diarrhea, rash, arthralgias.    He's had prior problems with LE extremity edema + dermatitis. Has worked with Lymphedema for compression mgmt in past. This flared a bit a few months ago, but he's had no recent issues. Works on keeping legs elevated.    He's dependent on GJ tube for all nutrition, hydration, meds. Yesterday, he had malfunction in that he couldn't flush it. This was evaluated in IR and was exchanged. Tolerating feedings well, weight is stable.    Has trach. Uses routine saline nebs to help  keep his thick respiratory secretions thin. Prior hemoptysis at diagnosis has resolved. No fevers/chills or interval respiratory infections.       ECOG Performance    1      Oncology History/Treatment  Diagnosis/Stage:   2009: Head/neck cancer    4/2024: [id number] R base of tongue SCC, PDL1 TPS 10%, CPS 15%  -4/14/24CT neck (HP). 1.1 x 1.2 x 2.2 cm R lateral BOT ulceration and enhancement. Mod-sev R TOM, 50% stenosis mid R cervical ICA, 65% stenosis L ICA with adjacent ulcerated atheromatous plaque.  -7/22/24DL with bx (Dr. Ospina). R BOT mass extending to midline, R lateral pharyngeal wall, submucosal extension to the posterior oral tongue. Path: SCC, TPS 10%, CPS 12%  -8/1/24PET/CT. FDG avid R tongue base mass extending to R oral tongue, hyoid bone, mylohyoid muscle, lingual mucosal surface of the suprahyoid epiglottis, R lateral oropharyngeal wall, overall 2.8 x 1.8 cm (SUV 21.5). 1.5 x 0.7 cm lesion deep to R thyroid cartilate in the R parapharyngeal fat of the false cord extending to the submucosal surface of the true cord (SUV 10.1). R common carotid calcified plaque extending to the carotid bifurcation resulting in residual lumen measuring 3 mm.     Treatment:  2009: Initial diagnosis  -Chemoradiation with weekly carboplatin paclitaxel, 7400 cGy (Dr. Bolton, Dr. Arun Jerez at [facility]), 37 fractions. Hearing loss precluded cisplatin    2024: 2nd occurrence  -Repeat definitive RT not feasible  -Extensive surgery not desired by patient  -Palliative systemic therapy was decided on.     8/27/2024 - 9/17/2024: pembrolizumab x2 cycles  -CT showed progression with lingual artery involvement. Indeterminate for pseudoprogression/inadequate time on immunotherapy vs true progression.     11/1/2024 - 1/31/2025: carbo/taxol/pembro x 4 cycles (resulted in MT)  -C1 +2: carbo AUC 5 (day 1) and Taxol 100 mgm2 days 1, 8 added to pembro  -C2 delayed x 1 week for aspiration pneumonia hospitalization  ---C2D8 delayed x 1 week  for admission for feeding tube conversion.   -C3 delayed a few weeks for failure to thrive  ---C3 + C4: modified to carbo AUC 2 (days 1 + 8) (Taxol and pembro remained same dosing/freq)  ---C4 day 8 omitted for LE cellulitis    2/21/2025 - present: pembrolizumab, maintenance   --3/13/2025: transition to every 6 weeks       Physical Exam    GENERAL: Alert and oriented to time place and person. Seated comfortably. In no distress. Daughter-in-law accompanied. Pt communicated by writing.   HEAD: Atraumatic and normocephalic. No alopecia.  EYES: PARISH, EOMI. No erythema. No icterus.  ORAL CAVITY: Moist. No mucosal lesion or tonsillar enlargement.  NECK: supple. No thyroid enlargement. Trach.  LYMPH NODES: No palpable supraclavicular, cervical lymphadenopathy.  CHEST: clear to auscultation bilaterally. Resonant to percussion throughout bilaterally. Symmetrical breath movements bilaterally.  CVS: RRR  ABDOMEN: Soft. Not tender. Not distended. Bowel sounds present. Feeding tube in place, no surrounding redness/swelling..  EXTREMITIES: No further edema. Compression wraps on.   SKIN: no rash  NEURO: No gross deficit noted. Ambulatory      Lab Results    Recent Results (from the past week)   Comprehensive metabolic panel   Result Value Ref Range    Sodium 140 135 - 145 mmol/L    Potassium 4.6 3.4 - 5.3 mmol/L    Carbon Dioxide (CO2) 30 (H) 22 - 29 mmol/L    Anion Gap 10 7 - 15 mmol/L    Urea Nitrogen 23.2 (H) 8.0 - 23.0 mg/dL    Creatinine 0.64 (L) 0.67 - 1.17 mg/dL    GFR Estimate >90 >60 mL/min/1.73m2    Calcium 9.0 8.8 - 10.4 mg/dL    Chloride 100 98 - 107 mmol/L    Glucose 96 70 - 99 mg/dL    Alkaline Phosphatase 114 40 - 150 U/L    AST 22 0 - 45 U/L    ALT 12 0 - 70 U/L    Protein Total 6.8 6.4 - 8.3 g/dL    Albumin 3.4 (L) 3.5 - 5.2 g/dL    Bilirubin Total 0.2 <=1.2 mg/dL   TSH with free T4 reflex   Result Value Ref Range    TSH 4.20 0.30 - 4.20 uIU/mL   Extra Purple Top Tube   Result Value Ref Range    Hold Specimen JIC           Imaging    IR Gastro Jejunostomy Tube Change  Result Date: 6/4/2025  [place] [facility] LOCATION: [facility] DATE: 6/4/2025 GASTROJEJUNOSTOMY TUBE REPLACEMENT CLINICAL HISTORY:  Malnutrition. Plugged GJ tube FLUOROSCOPIC TIME: 2.6 mins. DOSE: Air Kerma:19 mGy. Contrast: 20 cc. PROCEDURES PERFORMED: 1.  Contrast injected through the jejunal port shows it to be  plugged.  Fluoroscopic inspection shows the gastric port to be within the stomach. 2.  The gastrojejunostomy tube was removed . 3.  Renegotiation of tract and gastroduodenal junction using 5-Burkinan catheter and floppy guidewire. 4.  New 18-Burkinan  SURENDRA gastrojejunostomy tube passed over a guidewire with the distal tip in the jejunum and the proximal port within the stomach. The patient was brought to the angiographic suite and laid supine on the angiographic table.  The existing gastrojejunostomy tube was removed and new SURENDRA gastrojejunostomy  tube was placed with fluoroscopic guidance.  The balloon was inflated with 8 cc of dilute contrast and snugged against the anterior abdominal wall.  The patient tolerated the procedure well without any immediate complication. RADIOGRAPHIC FINDINGS:  Fluoroscopic inspection shows the gastric port to be within the stomach and the jejunal port to be within the small bowel near the ligament of Treitz.     IMPRESSION: SUCCESSFUL FLUOROSCOPIC REPLACEMENT OF 18-Citizen of [place] GASTROJEJUNOSTOMY TUBE.       Billing  Total time 35 minutes, to include face to face visit, review of EMR, ordering, documentation and coordination of care on date of service   complexity modifier for longitudinal care.       Signed by: Noris Lovelace NP      Oncology Rooming Note    June 5, 2025 9:50 AM   Hieu Hughes is a 84 year old male who presents for:    Chief Complaint   Patient presents with     Oncology Clinic Visit     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) - labs, provider, infusion     Initial Vitals: BP  "99/53 (BP Location: Right arm, Patient Position: Sitting, Cuff Size: Adult Regular)   Pulse 62   Temp 97.6  F (36.4  C) (Tympanic)   Resp 20   Ht 1.778 m (5' 10\")   Wt 76.1 kg (167 lb 12.8 oz)   SpO2 95%   BMI 24.08 kg/m   Estimated body mass index is 24.08 kg/m  as calculated from the following:    Height as of this encounter: 1.778 m (5' 10\").    Weight as of this encounter: 76.1 kg (167 lb 12.8 oz). Body surface area is 1.94 meters squared.  No Pain (0) Comment: Data Unavailable   No LMP for male patient.  Allergies reviewed: Yes  Medications reviewed: Yes    Medications: Medication refills not needed today.  Pharmacy name entered into [employer]:    [facility] - [place], [place] - [address] [facility] - [place], [place] - [address] [facility] - [place], [place] - [address] [facility] - [place], [place] - [facility]    Frailty Screening:   Is the patient here for a new oncology consult visit in cancer care? 2. No    PHQ9:  Did this patient require a PHQ9?: No      Clinical concerns: None today.  Did have to have feeding tube changed yesterday, and only had 1 jevity - wants to make sure will not impact lab work.       Mariana Rodriguez MA                Again, thank you for allowing me to participate in the care of your patient.        Sincerely,        Noris Lovelace NP    Electronically signed"

## 2025-06-05 NOTE — PROGRESS NOTES
"Oncology Rooming Note    June 5, 2025 9:50 AM   Hieu Hughes is a 84 year old male who presents for:    Chief Complaint   Patient presents with    Oncology Clinic Visit     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) - labs, provider, infusion     Initial Vitals: BP 99/53 (BP Location: Right arm, Patient Position: Sitting, Cuff Size: Adult Regular)   Pulse 62   Temp 97.6  F (36.4  C) (Tympanic)   Resp 20   Ht 1.778 m (5' 10\")   Wt 76.1 kg (167 lb 12.8 oz)   SpO2 95%   BMI 24.08 kg/m   Estimated body mass index is 24.08 kg/m  as calculated from the following:    Height as of this encounter: 1.778 m (5' 10\").    Weight as of this encounter: 76.1 kg (167 lb 12.8 oz). Body surface area is 1.94 meters squared.  No Pain (0) Comment: Data Unavailable   No LMP for male patient.  Allergies reviewed: Yes  Medications reviewed: Yes    Medications: Medication refills not needed today.  Pharmacy name entered into [employer]:    [facility] - [place], [place] - [address] [facility] - [place], [place] - [address] [facility] - [place], [place] - [address] [facility] - [place], [place] - [address]    Frailty Screening:   Is the patient here for a new oncology consult visit in cancer care? 2. No    PHQ9:  Did this patient require a PHQ9?: No      Clinical concerns: None today.  Did have to have feeding tube changed yesterday, and only had 1 jevity - wants to make sure will not impact lab work.       Mariana Rodriguez MA              "

## 2025-06-05 NOTE — PROGRESS NOTES
[facility] Hematology and Oncology Outpatient Progress Note    Patient: Hiue Hughes  MRN: [id number]  Date of Service: Jun 5, 2025          Reason for Visit    Base of tongue cancer - on palliative treatment  Remote hx head/neck cancer, WERO    Primary Oncologist: Dr. Bhatia ([facility])      Assessment/Plan  Base of tongue SCC ([id number])  Not candidate for definitive RT or surgery; on palliative systemic treatment  Chemo-induced anemia, improving  He completed 4 cycles of carbo/taxol/pembro - required multiple interruptions and dosing modifications. Resulted in OR on recent CT.      Has been on maintenance pembrolizumab since 2/2025. Currently, every 6-week dosing.     Tolerating this very well. Feels much better since off chemo with improved PS/QOL.    Labs: CMP unremarkable/stable. TSH WNL.     Cancer related symptoms are stable. Chronic dysphagia, unchanged. No pain. No hemoptysis.    CT 4/2025 was stable.     Plan:  -Continue maintenance pembrolizumab 400 mg every 6 weeks. Will proceed today  -Return in 6 weeks to see me for next cycle  -Repeat CT neck, chest/abd in 6 weeks with Dr. Bhatia is scheduled to occur in late July  -Palliative Care following     Bilateral LE venous stasis/lymphedema dermatitis - improved/resolved  Earlier in year, had significant complications. DVT was ruled out. Required  Keflex for possible secondary cellulitis and Derm recommended topical betamethasone. Lymphedema provided compression mgmt with improvement.     He had a mild flare a few months ago.   No issues since.    Plan:  -Elevate legs at rest  -Stay active  -Optimize nutrition/protein  -Continue Derm recommendations with Vanicream and betamethasone PRN.   -Continue Lymphedema mgmt for compression wraps PRN    Mitral valve regurgitation (mod severe)  Noted on ECHO that was done for eval of his LE edema. EF 55-60%.    Plan:  -Cardiology following    Nutrition/hydration  Aspiration pneumonia  risk  Gastritis/reflux/emesis  Diarrhea, related to jejunal feeding adjustment + Reglan use  Chronic dysphagia and aspiration, feeding tube dependent.    Doing better since G-tube converted to GJ tube and formula changes made. No longer needing Pepcid nor Reglan for reflux.    Occasional/mild diarrhea post-feedings.     Regaining weight. Feels well-hydrated.     He had his GJ tube exchanged yesterday with IR and now working well.    Plan:  -Continue mgmt through IR (Depue). Will need routine exchanges every 3 mths (next due Sept - pt will self-schedule)  -Continue working with Dietician PRN  -Pepcid BID PRN for reflux (is not needing routinely anymore)  -Imodium as needed.     Chronic dyspnea  Increased mucus secretions  LLL infiltrate/aspiration pneumonitis  Trach in place.     He has difficult-to clear secretions. Scheduled saline nebs has been beneficial. He notes if he does not maintain these prophylactically, he has more trouble.     Persistent LLL infiltrate/mucus plugging from prior aspiration pneumonia noted on CT in April but he's been asymptomatic.     Plan:  -Continue saline nebs BID  -Suction/lavage as needed  -Reassess and consider antibiotic for aspiration pneumonia if increased cough, fever, dyspnea    B carotid artery stenosis  Met with Vascular in December for recommendations on this. No intervention recommended in setting of cancer and on chemo, ideally would need to be on ASA and Plavix.     Follow-up with Neuro last month showed progressive L sided stenosis to severe.   He's asymptomatic. ASA 81 mg daily recommended now that his anemia is improved and no further hemoptysis, but pt reluctant. If stent placed, would require dual-antiplatelet therapy x 1 mth.     Plan:  -Neuro following  -Favored continued observation for now with repeat US in 6 mths  -Is now low risk to start baby ASA since his cancer is better controlled and no hemoptysis. No risk for anemia on his current immunotherapy  maintenance     Macrocytic anemia, mild  Mildly macrocytic anemia: B12 in 2023 was in the 700s. Hypothyroidism well-managaed. Monitor and further evaluation if worsens.     Hypothyroidism, on replacement  On Synthroid 100 mcg. TSH WNL.    Plan:  -Continue same dose Synthroid  -Monitor on immunotherapy; adjust dose as needed. If TSH continues rising next check, will increse dose at that time      ______________________________________________________________________________    History of Present Illness/ Interval History    Mr. Hieu Hughes  is a 84 year old with unresectable locally recurrent head/neck cancer. Given prior RT to head/neck, not a candidate for re-irradiation.    He completed 2 cycles of single-agent pembro through September, with some progression on CT. Therefore, carbo (day 1)/Taxol (d 1+8) was added. He had multiple interruptions/delays due to various complications/hospitalizations. Cycles 3+4 were modified to weekly chemo dosing (days 1 + 8) through 1/2025, resulting in [place].   He transitioned to maintenance pembrolizumab 2/2025 and is now getting every 6 weeks. Returns for next cycle.    Tolerating pembro well. Overall stamina and well-being has been notably better since off chemo. No increased dyspnea, diarrhea, rash, arthralgias.    He's had prior problems with LE extremity edema + dermatitis. Has worked with Lymphedema for compression mgmt in past. This flared a bit a few months ago, but he's had no recent issues. Works on keeping legs elevated.    He's dependent on GJ tube for all nutrition, hydration, meds. Yesterday, he had malfunction in that he couldn't flush it. This was evaluated in IR and was exchanged. Tolerating feedings well, weight is stable.    Has trach. Uses routine saline nebs to help keep his thick respiratory secretions thin. Prior hemoptysis at diagnosis has resolved. No fevers/chills or interval respiratory infections.       ECOG Performance    1      Oncology  History/Treatment  Diagnosis/Stage:   2009: Head/neck cancer    4/2024: [id number] R base of tongue SCC, PDL1 TPS 10%, CPS 15%  -4/14/24CT neck (HP). 1.1 x 1.2 x 2.2 cm R lateral BOT ulceration and enhancement. Mod-sev R TOM, 50% stenosis mid R cervical ICA, 65% stenosis L ICA with adjacent ulcerated atheromatous plaque.  -7/22/24DL with bx (Dr. Ospina). R BOT mass extending to midline, R lateral pharyngeal wall, submucosal extension to the posterior oral tongue. Path: SCC, TPS 10%, CPS 12%  -8/1/24PET/CT. FDG avid R tongue base mass extending to R oral tongue, hyoid bone, mylohyoid muscle, lingual mucosal surface of the suprahyoid epiglottis, R lateral oropharyngeal wall, overall 2.8 x 1.8 cm (SUV 21.5). 1.5 x 0.7 cm lesion deep to R thyroid cartilate in the R parapharyngeal fat of the false cord extending to the submucosal surface of the true cord (SUV 10.1). R common carotid calcified plaque extending to the carotid bifurcation resulting in residual lumen measuring 3 mm.     Treatment:  2009: Initial diagnosis  -Chemoradiation with weekly carboplatin paclitaxel, 7400 cGy (Dr. Bolton, Dr. Arun Jerez [facility]), 37 fractions. Hearing loss precluded cisplatin    2024: 2nd occurrence  -Repeat definitive RT not feasible  -Extensive surgery not desired by patient  -Palliative systemic therapy was decided on.     8/27/2024 - 9/17/2024: pembrolizumab x2 cycles  -CT showed progression with lingual artery involvement. Indeterminate for pseudoprogression/inadequate time on immunotherapy vs true progression.     11/1/2024 - 1/31/2025: carbo/taxol/pembro x 4 cycles ([facility])  -C1 +2: carbo AUC 5 (day 1) and Taxol 100 mgm2 days 1, 8 added to pembro  -C2 delayed x 1 week for aspiration pneumonia hospitalization  ---C2D8 delayed x 1 week for admission for feeding tube conversion.   -C3 delayed a few weeks for failure to thrive  ---C3 + C4: modified to carbo AUC 2 (days 1 + 8) (Taxol and pembro remained same  dosing/freq)  ---C4 day 8 omitted for LE cellulitis    2/21/2025 - present: pembrolizumab, maintenance   --3/13/2025: transition to every 6 weeks       Physical Exam    GENERAL: Alert and oriented to time place and person. Seated comfortably. In no distress. Daughter-in-law accompanied. Pt communicated by writing.   HEAD: Atraumatic and normocephalic. No alopecia.  EYES: PARISH, EOMI. No erythema. No icterus.  ORAL CAVITY: Moist. No mucosal lesion or tonsillar enlargement.  NECK: supple. No thyroid enlargement. Trach.  LYMPH NODES: No palpable supraclavicular, cervical lymphadenopathy.  CHEST: clear to auscultation bilaterally. Resonant to percussion throughout bilaterally. Symmetrical breath movements bilaterally.  CVS: RRR  ABDOMEN: Soft. Not tender. Not distended. Bowel sounds present. Feeding tube in place, no surrounding redness/swelling..  EXTREMITIES: No further edema. Compression wraps on.   SKIN: no rash  NEURO: No gross deficit noted. Ambulatory      Lab Results    Recent Results (from the past week)   Comprehensive metabolic panel   Result Value Ref Range    Sodium 140 135 - 145 mmol/L    Potassium 4.6 3.4 - 5.3 mmol/L    Carbon Dioxide (CO2) 30 (H) 22 - 29 mmol/L    Anion Gap 10 7 - 15 mmol/L    Urea Nitrogen 23.2 (H) 8.0 - 23.0 mg/dL    Creatinine 0.64 (L) 0.67 - 1.17 mg/dL    GFR Estimate >90 >60 mL/min/1.73m2    Calcium 9.0 8.8 - 10.4 mg/dL    Chloride 100 98 - 107 mmol/L    Glucose 96 70 - 99 mg/dL    Alkaline Phosphatase 114 40 - 150 U/L    AST 22 0 - 45 U/L    ALT 12 0 - 70 U/L    Protein Total 6.8 6.4 - 8.3 g/dL    Albumin 3.4 (L) 3.5 - 5.2 g/dL    Bilirubin Total 0.2 <=1.2 mg/dL   TSH with free T4 reflex   Result Value Ref Range    TSH 4.20 0.30 - 4.20 uIU/mL   Extra Purple Top Tube   Result Value Ref Range    Hold Specimen JIC          Imaging    IR Gastro Jejunostomy Tube Change  Result Date: 6/4/2025  [place] [facility] LOCATION: [facility] DATE: 6/4/2025  GASTROJEJUNOSTOMY TUBE REPLACEMENT CLINICAL HISTORY:  Malnutrition. Plugged GJ tube FLUOROSCOPIC TIME: 2.6 mins. DOSE: Air Kerma:19 mGy. Contrast: 20 cc. PROCEDURES PERFORMED: 1.  Contrast injected through the jejunal port shows it to be  plugged.  Fluoroscopic inspection shows the gastric port to be within the stomach. 2.  The gastrojejunostomy tube was removed . 3.  Renegotiation of tract and gastroduodenal junction using 5-Panamanian catheter and floppy guidewire. 4.  New 18-Panamanian  SURENDRA gastrojejunostomy tube passed over a guidewire with the distal tip in the jejunum and the proximal port within the stomach. The patient was brought to the angiographic suite and laid supine on the angiographic table.  The existing gastrojejunostomy tube was removed and new SURENDRA gastrojejunostomy  tube was placed with fluoroscopic guidance.  The balloon was inflated with 8 cc of dilute contrast and snugged against the anterior abdominal wall.  The patient tolerated the procedure well without any immediate complication. RADIOGRAPHIC FINDINGS:  Fluoroscopic inspection shows the gastric port to be within the stomach and the jejunal port to be within the small bowel near the ligament of Treitz.     IMPRESSION: SUCCESSFUL FLUOROSCOPIC REPLACEMENT OF 18-Iraqi GASTROJEJUNOSTOMY TUBE.       Billing  Total time 35 minutes, to include face to face visit, review of EMR, ordering, documentation and coordination of care on date of service   complexity modifier for longitudinal care.       Signed by: Noris Lovelace NP

## 2025-06-05 NOTE — PROGRESS NOTES
Infusion Nursing Note:  Hieu Hughes presents today for Keytruda and IVF    Patient seen by provider today: Yes: NP Noris Lovelace therefore assessment deferred   present during visit today: Not Applicable.    Note: N/A.      Intravenous Access:  Peripheral IV placed.    Treatment Conditions:  Lab Results   Component Value Date    HGB 11.2 (L) 04/25/2025    WBC 6.4 04/25/2025    ANEU 5.1 04/25/2025     04/25/2025        Lab Results   Component Value Date     06/05/2025    POTASSIUM 4.6 06/05/2025    MAG 2.2 12/15/2024    CR 0.64 (L) 06/05/2025    MANPREET 9.0 06/05/2025    BILITOTAL 0.2 06/05/2025    ALBUMIN 3.4 (L) 06/05/2025    ALT 12 06/05/2025    AST 22 06/05/2025       Results reviewed, labs MET treatment parameters, ok to proceed with treatment.      Post Infusion Assessment:  Patient tolerated infusion without incident.  Blood return noted pre and post infusion.  Site patent and intact, free from redness, edema or discomfort.  No evidence of extravasations.  Access discontinued per protocol.       Discharge Plan:   Copy of AVS reviewed with patient and/or family.  Patient will return 7/17/25 for next appointment.  Patient discharged in stable condition accompanied by: self and daughter in law  Departure Mode: Ambulatory.      RENU SHAW RN

## 2025-07-02 ENCOUNTER — HOSPITAL ENCOUNTER (OUTPATIENT)
Dept: INTERVENTIONAL RADIOLOGY/VASCULAR | Facility: HOSPITAL | Age: 85
Discharge: HOME | End: 2025-07-02
Attending: NURSE PRACTITIONER | Admitting: NURSE PRACTITIONER
Payer: MEDICARE

## 2025-07-02 ENCOUNTER — NURSE TRIAGE (OUTPATIENT)
Dept: ONCOLOGY | Facility: CLINIC | Age: 85
End: 2025-07-02
Payer: MEDICARE

## 2025-07-02 ENCOUNTER — HOSPITAL ENCOUNTER (EMERGENCY)
Facility: CLINIC | Age: 85
Discharge: HOME | End: 2025-07-02
Attending: FAMILY MEDICINE | Admitting: FAMILY MEDICINE
Payer: MEDICARE

## 2025-07-02 VITALS
RESPIRATION RATE: 20 BRPM | OXYGEN SATURATION: 92 % | WEIGHT: 167 LBS | SYSTOLIC BLOOD PRESSURE: 141 MMHG | HEART RATE: 72 BPM | DIASTOLIC BLOOD PRESSURE: 70 MMHG | HEIGHT: 70 IN | TEMPERATURE: 99 F | BODY MASS INDEX: 23.91 KG/M2

## 2025-07-02 VITALS
OXYGEN SATURATION: 94 % | TEMPERATURE: 99.7 F | RESPIRATION RATE: 20 BRPM | HEART RATE: 78 BPM | SYSTOLIC BLOOD PRESSURE: 152 MMHG | DIASTOLIC BLOOD PRESSURE: 72 MMHG

## 2025-07-02 DIAGNOSIS — R13.10 DYSPHAGIA, UNSPECIFIED TYPE: Primary | ICD-10-CM

## 2025-07-02 DIAGNOSIS — R13.10 DYSPHAGIA, UNSPECIFIED TYPE: ICD-10-CM

## 2025-07-02 DIAGNOSIS — T85.528A DISLODGED GASTROSTOMY TUBE: ICD-10-CM

## 2025-07-02 DIAGNOSIS — R63.30 FEEDING DIFFICULTIES: Primary | ICD-10-CM

## 2025-07-02 PROCEDURE — 272N000116 HC CATH CR1

## 2025-07-02 PROCEDURE — 99282 EMERGENCY DEPT VISIT SF MDM: CPT | Performed by: FAMILY MEDICINE

## 2025-07-02 PROCEDURE — 49452 REPLACE G-J TUBE PERC: CPT

## 2025-07-02 PROCEDURE — C1769 GUIDE WIRE: HCPCS

## 2025-07-02 PROCEDURE — 99283 EMERGENCY DEPT VISIT LOW MDM: CPT | Performed by: FAMILY MEDICINE

## 2025-07-02 PROCEDURE — 255N000002 HC RX 255 OP 636: Performed by: RADIOLOGY

## 2025-07-02 RX ORDER — LIDOCAINE HYDROCHLORIDE 20 MG/ML
JELLY TOPICAL ONCE
Status: DISCONTINUED | OUTPATIENT
Start: 2025-07-02 | End: 2025-07-02 | Stop reason: HOSPADM

## 2025-07-02 RX ADMIN — IOHEXOL 15 ML: 350 INJECTION, SOLUTION INTRAVENOUS at 15:15

## 2025-07-02 ASSESSMENT — COLUMBIA-SUICIDE SEVERITY RATING SCALE - C-SSRS
1. IN THE PAST MONTH, HAVE YOU WISHED YOU WERE DEAD OR WISHED YOU COULD GO TO SLEEP AND NOT WAKE UP?: NO
2. HAVE YOU ACTUALLY HAD ANY THOUGHTS OF KILLING YOURSELF IN THE PAST MONTH?: NO
6. HAVE YOU EVER DONE ANYTHING, STARTED TO DO ANYTHING, OR PREPARED TO DO ANYTHING TO END YOUR LIFE?: NO

## 2025-07-02 ASSESSMENT — ACTIVITIES OF DAILY LIVING (ADL)
ADLS_ACUITY_SCORE: 58
ADLS_ACUITY_SCORE: 58

## 2025-07-02 NOTE — IR NOTE
Patient Name: Hieu Hughes  Medical Record Number: [id number]  Today's Date: 7/2/2025    Procedure: G/J tube exchange  Proceduralist: Dr. gandhi        Sedation time: No sedation      Other Notes: Pt arrived to IR room 1 from pre procedure. Consent reviewed. Pt denies any questions or concerns regarding procedure. Pt positioned Supine and monitored per protocol. Pt tolerated procedure without any noted complications.     Successful exchange of G/J feeding tube.  [facility] gastric-Jejunal feeding tube 18fr 45cm REF - [id number]

## 2025-07-02 NOTE — ED TRIAGE NOTES
18fr GJ tube started leaking and whole tube fell out. Last exchanged about 3 weeks ago. Has had for 3 years.     Triage Assessment (Adult)       Row Name 07/02/25 1014          Triage Assessment    Airway WDL X  trach        Respiratory WDL    Respiratory WDL WDL        Skin Circulation/Temperature WDL    Skin Circulation/Temperature WDL WDL        Cardiac WDL    Cardiac WDL WDL        Peripheral/Neurovascular WDL    Peripheral Neurovascular WDL WDL        Cognitive/Neuro/Behavioral WDL    Cognitive/Neuro/Behavioral WDL WDL

## 2025-07-02 NOTE — DISCHARGE INSTRUCTIONS
Proceed to [facility] for interventional radiology to replace your gastrojejunostomy tube

## 2025-07-02 NOTE — TELEPHONE ENCOUNTER
"Tube feeding is \"loose and is leaking\" and LETY Peña, would like to know what to do.   She is not physically with pt, said he sent her a message. Pt lives alone.     Writer informed it is hard to know where it is leaking- if at tube site or connected site (if connected) or if area was tugged or balloon deflating and falling out. Advised someone go to pt's home to look at area and if needed, advised pt go to ED to have tube replaced. Informed [place] [facility] should be able to replace, as LETY said she'd rather not go to [facility].   Mariana voiced understanding.   "

## 2025-07-02 NOTE — DISCHARGE INSTRUCTIONS
Gastrostomy (G) or Gastrojejunostomy (G/J) Tube Exchange Discharge Instructions:   You had a gastrostomy (G) tube or a Gastrojejunostomy Tube (GJ) exchanged.  This tube is often used for nutritional support and medication administration or it can be used for stomach venting.     Care instructions:  - If you received sedation for your procedure, do not drive or operate heavy machinery for the rest of the day.  - Avoid soaking in stagnant water (tub baths, Jacuzzis, pools, lake, or ocean).   - You may shower beginning the day after the tube was exchanged.   - Clean under the disc daily with soap and water. Pat dry under disc and apply new split gauze dressing under disc. Cleaning tube site daily will help prevent infection and skin irritation.  - A small amount of clear tan drainage from the tube exit site can be normal.  - Make sure the disc on the tube fits slightly snug against the skin so that the tube does not move in or out of the body easily.   - If you experience leaking from around tube exit site, the most common cause is that the disc is not tightened against the skin.   - To tighten the disc, pull gently on the tube so the retention balloon (under the skin) is pulled up against the skin under the tube. Push the disc down until it is tight against the skin without a gap between the skin and the disc.  - Flush your tube with 60cc of water twice a day (using a cath tip syringe) to prevent tube from clogging (or follow recommendations from your doctor or dietician if given).    Giving Feedings and Medications:  - Follow-up with your dietician, primary care provider, or oncologist for instructions on tube feedings and medication administration.    - ONLY use specific enteric feedings with your tube (unless otherwise discussed with your dietitian).    - Flush tube at least twice daily with 60ml of water using cath tip syringe unless otherwise instructed by your doctor or dietician.  - Flush the tube before and  after administrating medications and bolus feedings with 60cc of water.    Follow Up:  - Recommend routine 3 month exchanges of feeding tube. Please contact your primary care provider or speak with your dietitian to obtain an order to have your G/GJ tube exchanged. Then contact [facility]'s Medical Imaging scheduling department at [phone] to schedule your G/GJ tube exchange appointment.    Please seek medical evaluation for:  - Fever (greater than 101 F (38.3C).  - Purulent (yellow/green/foul smelling) drainage from tube exit site.   - Significant or worsening abdominal pain.   - Skin that is hot to the touch or significantly reddened at the tube exit site.   - Bleeding at tube exit site.    Call [facility] IR RN Line at [phone] with questions or if you have any of the following symptoms:  - Tube falls out or felt to be out of position.  - Unable to flush tube.  - Significant leakage around tube site (tube feeding, medications or drainage).  - Significant bleeding at the tube exit site.  - Severe pain at tube exit site.

## 2025-07-02 NOTE — ED NOTES
placed 18french patel into g-tube tract temporarily until patient can be seen by IR for GJ tube replacement.

## 2025-07-02 NOTE — ED PROVIDER NOTES
"  History     Chief Complaint   Patient presents with    Gtube Problem     HPI    Hieu Hughes is a 84 year old male who comes in from home with his wife with his gastrojejunostomy tube having fallen out this morning.  He has had one for many years and it was exchanged on June 4 through interventional radiology.  He has this because of a history of head and neck cancer.    Allergies:  Allergies   Allergen Reactions    Mold Shortness Of Breath    Molds & Smuts Difficulty breathing    No Clinical Screening - See Comments Shortness Of Breath    Aspirin Other (See Comments)     Nose bleeds    Penicillins Other (See Comments)     Comment:  , Description:   Currently denies allergy (2017)  Reports having received PCN on multiple occassions with no adverse reactions;  Was simply advised of \"risk\" of reaction due to \"enormous\" dose he was once given for a thigh infection       Problem List:    Patient Active Problem List    Diagnosis Date Noted    Pneumonia 12/13/2024     Priority: Medium    Gastroesophageal reflux disease without esophagitis 12/12/2024     Priority: Medium    Diarrhea, unspecified type 12/12/2024     Priority: Medium    Dehydration 12/12/2024     Priority: Medium    Malnutrition 12/03/2024     Priority: Medium    Tracheostomy hemorrhage (H) 09/01/2024     Priority: Medium    Oropharyngeal bleeding 08/21/2024     Priority: Medium    Oral bleeding 08/21/2024     Priority: Medium    Hemoptysis 08/21/2024     Priority: Medium    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) 08/14/2024     Priority: Medium    Bilateral vocal fold paralysis 05/20/2021     Priority: Medium     Added automatically from request for surgery [id number]      Anemia 10/08/2020     Priority: Medium    Bilateral vocal cord paralysis 01/06/2020     Priority: Medium     Added automatically from request for surgery [id number]      H/O adenomatous polyp of colon 11/26/2018     Priority: Medium    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     Priority: " Medium    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     Priority: Medium    H/O prostate cancer 10/21/2017     Priority: Medium    Hypothyroidism 10/21/2017     Priority: Medium    Left posterior capsular opacification 09/27/2017     Priority: Medium    Pseudophakia, both eyes 09/27/2017     Priority: Medium    Aspirin contraindicated 05/19/2015     Priority: Medium     Overview:   Aspirin contraindicated nosebleeds      Stricture and stenosis of esophagus 09/27/2012     Priority: Medium    Cancer of base of tongue (H) 03/07/2012     Priority: Medium    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     Priority: Medium    Voice hoarseness 03/23/2009     Priority: Medium    Dysphonia 03/23/2009     Priority: Medium    Odynophagia 03/17/2009     Priority: Medium    Disturbance of salivary secretion 03/17/2009     Priority: Medium    Dysphagia 03/17/2009     Priority: Medium    Sensorineural hearing loss, asymmetrical 02/11/2009     Priority: Medium     Overview:   Right greater than left due to radiation      Personal history of poliomyelitis 11/13/2008     Priority: Medium        Past Medical History:    Past Medical History:   Diagnosis Date    Allergies     Anemia     Arthritis     Aspirin contraindicated 05/19/2015    Bilateral vocal cord paralysis 01/06/2020    Burn injury     Cancer of base of tongue (H) 03/07/2012    Difficult intubation     Disturbance of salivary secretion 03/17/2009    Dysphagia     Dysphonia 03/23/2009    H/O adenomatous polyp of colon 11/26/2018    H/O prostate cancer 10/21/2017    Hypothyroidism 10/21/2017    Left posterior capsular opacification 09/27/2017    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    Microscopic hematuria     Odynophagia 03/17/2009    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    Peptic ulcer disease     Personal history of poliomyelitis 11/13/2008    Polio     Pseudophakia, both eyes 09/27/2017    Sensorineural hearing loss, asymmetrical 02/11/2009    Squamous cell  cancer of tongue (H) 11/05/2018    Stricture and stenosis of esophagus 09/27/2012    Thyroid disease     Tongue cancer (H)     Voice hoarseness 03/23/2009       Past Surgical History:    Past Surgical History:   Procedure Laterality Date    BACK SURGERY      BRONCHOSCOPY FLEXIBLE AND RIGID N/A 06/05/2021    Procedure: FLEXIBLE BRONCHOSCOPY;  Surgeon: Kaykay Holliday MD;  Location: [facility]    CATARACT EXTRACTION W/ INTRAOCULAR LENS IMPLANT, BILATERAL      CYSTOSCOPY      Direct Laryngoscopy with Biopsy  07/22/2024    ESOPHAGOSCOPY  09/27/2012    Procedure: ESOPHAGOSCOPY;  Suspension Telescopic Direct Laryngoscopy,  Esophagoscopy and Dilation  *Latex Safe*;  Surgeon: Dexter Dunn MD;  Location: [facility]    ESOPHAGOSCOPY, GASTROSCOPY, DUODENOSCOPY (EGD), COMBINED N/A 06/06/2019    Procedure: ESOPHAGOGASTRODUODENOSCOPY (EGD);  Surgeon: Cuong Julien MD;  Location: [facility]    EXAM UNDER ANESTHESIA EAR(S)  12/09/2013    Procedure: EXAM UNDER ANESTHESIA EAR(S);;  Surgeon: Dexter Dunn MD;  Location: [facility]    HERNIA REPAIR      IR GASTRO JEJUNOSTOMY TUBE CHANGE  3/17/2025    IR GASTRO JEJUNOSTOMY TUBE CHANGE  6/4/2025    IR GASTRO TUBE TO GASTROJEJUNO CONVERT  12/3/2024    LARYNGOSCOPY N/A 9/1/2024    Procedure: Direct Laryngoscopy, control of oral hemorrhage;  Surgeon: Travis Arreola MD;  Location: [facility]    LARYNGOSCOPY WITH BIOPSY(IES) N/A 7/22/2024    Procedure: Direct Laryngoscopy with Biopsy;  Surgeon: Abbey Ospina MD;  Location: [facility]    LARYNGOSCOPY, BRONCHOSCOPY, COMBINED N/A 06/04/2021    Procedure: Direct LARYNGOSCOPY, WITH BRONCHOSCOPY;  Surgeon: Kaykay Holliday MD;  Location: [facility]    LARYNGOSCOPY, ESOPHAGOSCOPY WITH DILATION, COMBINED  09/26/2013    Procedure: COMBINED LARYNGOSCOPY, ESOPHAGOSCOPY WITH DILATION;  Direct Laryngoscopy, Esophagoscopy With Dilation;  Surgeon: Dexter Dunn MD;  Location: [facility]    LARYNGOSCOPY, ESOPHAGOSCOPY WITH DILATION, COMBINED  10/21/2013    Procedure: COMBINED LARYNGOSCOPY,  ESOPHAGOSCOPY WITH DILATION;  Suspension Microlaryngoscopy, Esophagoscopy, Esophageal Dilation ;  Surgeon: Dexter Dunn MD;  Location: [facility]    LARYNGOSCOPY, ESOPHAGOSCOPY WITH DILATION, COMBINED  2013    Procedure: COMBINED LARYNGOSCOPY, ESOPHAGOSCOPY WITH DILATION;  Esophageal Dilation, Bilateral Ear Exam and Cleaning;  Surgeon: Dexter Dunn MD;  Location: [facility]    ODONTECTOMY  2011    Procedure:ODONTECTOMY; Total Odontectomy and Alveoloplasty four quadrant buccal fat pad transfer and Right mandible debridement; Surgeon:ABRIL HINKLE; Location:[facility]    partial lumbar discectomy      SURGICAL HISTORY OF -       R inquinal hernia repair,     SURGICAL HISTORY OF -       R hand surgery, radial n. entrapment    SURGICAL HISTORY OF -       Partial discectomy, L spine    TONSILLECTOMY & ADENOIDECTOMY      bilateral    TRACHEOSTOMY N/A 2021    Procedure: awake tracheotomy;  Surgeon: Kaykay Holliday MD;  Location: [facility]    TRACHEOSTOMY N/A 2021    Procedure: TRACHEOSTOMY EXCHANGE, Control of bleeding;  Surgeon: Kaykay Holliday MD;  Location: [facility]    VASECTOMY         Family History:    Family History   Problem Relation Age of Onset    Cancer Mother         recurrent, ovarian;   age 88    Prostate Cancer Father          age 78    Cancer Father        Social History:  Marital Status:   [2]  Social History     Tobacco Use    Smoking status: Former     Current packs/day: 0.00     Average packs/day: 0.5 packs/day for 20.0 years (10.0 ttl pk-yrs)     Types: Cigarettes     Start date: 1989     Quit date: 2009     Years since quittin.4    Smokeless tobacco: Never   Substance Use Topics    Alcohol use: Yes     Comment: 6-10/week     Drug use: No        Medications:    albuterol (PROVENTIL) (2.5 MG/3ML) 0.083% neb solution  augmented betamethasone dipropionate (DIPROLENE AF) 0.05 % external cream  clotrimazole (LOTRIMIN) 1 % external cream  ferrous sulfate 220 (44 Fe)  "MG/5ML SOLN  Levothyroxine Sodium (SYNTHROID PO)  loperamide (IMODIUM) 1 MG/5ML solution  metroNIDAZOLE 500 MG/5ML SUSP  ondansetron (ZOFRAN) 8 MG tablet  prochlorperazine (COMPAZINE) 10 MG tablet  Pseudoephedrine-DM-GG (ROBITUSSIN CF PO)  sodium chloride 0.9 % neb solution  sodium chloride 0.9%, bottle, 0.9 % irrigation      Review of Systems    All other systems are reviewed and are negative    Physical Exam   BP: (!) 141/70  Pulse: 72  Temp: 99  F (37.2  C)  Resp: 20  Height: 177.8 cm (5' 10\")  Weight: 75.8 kg (167 lb)  SpO2: (!) 90 %      Physical Exam    Nursing note and vitals were reviewed.  Constitutional: Awake and alert, adequately nourished and developed appearing 84-year-old in no apparent discomfort, who does not appear acutely ill, and who answers questions appropriately and cooperates with examination.  HEENT: EOMI.   Neck: Freely mobile.  Cardiovascular: Cardiac examination reveals normal heart rate and regular rhythm without murmur.  Pulmonary/Chest: Breathing is unlabored.  Breath sounds are clear and equal bilaterally.  There no retractions, tachypnea, rales, wheezes, or rhonchi.  Abdomen: Soft, nontender, no HSM or masses rebound or guarding.  G-tube site is intact.  Musculoskeletal: Extremities are warm and well-perfused and without edema  Neurological: Alert, oriented, thought content logical, coherent   Skin: Warm, dry, no rashes.  Psychiatric: Affect broad and appropriate.    ED Course        Procedures              Critical Care time:  none     None         No results found for this or any previous visit (from the past 24 hours).    Medications   lidocaine (XYLOCAINE) 2 % external gel (has no administration in time range)     I cleaned the G-tube site with Betadine and instilled half a tube of lidocaine Uro-Jet into the tract and then inserted an 18 [place] Topete catheter and inflated the balloon.  Assessments & Plan (with Medical Decision Making)     84-year-old presented with dislodgment of " his gastrojejunostomy tube.  I discussed his situation with Dr. Pacheco and general surgery and we do not have the capability to replace this here and no IR to do the procedure.  We arranged for this to be done at [facility] through interventional radiology there and I placed the order for this.  In the interim I placed a Topete into the tube to prevent it from closing off and making it more difficult to place the tube.  Patient was discharged to go directly to [facility] for the procedure.  He and his wife expressed understanding and their questions were answered    I have reviewed the nursing notes.    I have reviewed the findings, diagnosis, plan and need for follow up with the patient.         New Prescriptions    No medications on file       Final diagnoses:   Dislodged gastrostomy tube       7/2/2025   [facility] EMERGENCY DEPT       Rivera Granda MD  07/02/25 [id number]

## 2025-07-06 ENCOUNTER — HOSPITAL ENCOUNTER (EMERGENCY)
Facility: HOSPITAL | Age: 85
Discharge: HOME | End: 2025-07-06
Attending: EMERGENCY MEDICINE | Admitting: EMERGENCY MEDICINE
Payer: MEDICARE

## 2025-07-06 ENCOUNTER — APPOINTMENT (OUTPATIENT)
Dept: INTERVENTIONAL RADIOLOGY/VASCULAR | Facility: HOSPITAL | Age: 85
End: 2025-07-06
Attending: RADIOLOGY
Payer: MEDICARE

## 2025-07-06 VITALS
OXYGEN SATURATION: 97 % | RESPIRATION RATE: 16 BRPM | HEART RATE: 66 BPM | DIASTOLIC BLOOD PRESSURE: 75 MMHG | SYSTOLIC BLOOD PRESSURE: 146 MMHG | HEIGHT: 70 IN | WEIGHT: 166 LBS | TEMPERATURE: 98.7 F | BODY MASS INDEX: 23.77 KG/M2

## 2025-07-06 DIAGNOSIS — Z78.9 ENCOUNTER FOR GASTROJEJUNAL (GJ) TUBE PLACEMENT: ICD-10-CM

## 2025-07-06 PROCEDURE — C1769 GUIDE WIRE: HCPCS

## 2025-07-06 PROCEDURE — 49452 REPLACE G-J TUBE PERC: CPT

## 2025-07-06 PROCEDURE — 99285 EMERGENCY DEPT VISIT HI MDM: CPT | Mod: 25

## 2025-07-06 ASSESSMENT — ACTIVITIES OF DAILY LIVING (ADL)
ADLS_ACUITY_SCORE: 58

## 2025-07-06 ASSESSMENT — COLUMBIA-SUICIDE SEVERITY RATING SCALE - C-SSRS
1. IN THE PAST MONTH, HAVE YOU WISHED YOU WERE DEAD OR WISHED YOU COULD GO TO SLEEP AND NOT WAKE UP?: NO
6. HAVE YOU EVER DONE ANYTHING, STARTED TO DO ANYTHING, OR PREPARED TO DO ANYTHING TO END YOUR LIFE?: NO
2. HAVE YOU ACTUALLY HAD ANY THOUGHTS OF KILLING YOURSELF IN THE PAST MONTH?: NO

## 2025-07-06 NOTE — PROCEDURES
Interventional Radiology Post-Procedure Note     ?   Brief Procedure Note:   Patient name: Hieu Hughes  Pt MRN:[id number]   Date of procedure: 7/6/2025     Procedure(s): Exchange of Gastrojejunostomy feeding tube  Sedation method: None. The patient was monitored by an interventional radiology nurse at all times throughout the procedure under my direct guidance.  Pre Procedure Diagnosis: Clogged GJ tube, need for tube feeds  Post Procedure Diagnosis: Same  Indications: Need for exchange of existing gastrojejunostomy tube for continued feeding/oral medication   ?   Attending: Mk Mcallister M.D.  Specimen(s) removed: None   Additional studies ordered: None  Drains/Tubes: 18 Fr 45 cm SURENDRA Gastrojejunostomy feeding tube.  Estimated Blood Loss: Minimal  Complications: None  Vascular closure method: N/A    Findings/Notes/Comments: Uncomplicated exchange of Gastrojejunostomy feeding tube. New feeding tube in appropriate position. Both the G port and the J port may be used immediately.   ?   Please see dictation in PACS or under the Imaging tab in [facility] for detailed procedure note.     Mk Mcallister M.D.   Vascular and Interventional Radiology   Pager: [phone]   After Hours / Scheduling: [phone]     7/6/2025  2:14 PM
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
## 2025-07-06 NOTE — ED NOTES
Nursing Assessment: Gastrointestinal: Patient presents here for evaluation of displacement of GJ feeding tube. Present for provider's exam, which revealed that the balloon for the tube was deflated and instillation does not inflate it. Gauze dressing applied to insertion site and secured from migrating outward. Patient denies pain.

## 2025-07-06 NOTE — ED TRIAGE NOTES
Patient comes for evaluation of leaking/clogged G-tube since 2000 hours yesterday. Hasn't had anything to eat/drink since. Seen last Tuesday or Wednesday for same issue and G-tube replaced.      Triage Assessment (Adult)       Row Name 07/06/25 1152          Triage Assessment    Airway WDL WDL        Respiratory WDL    Respiratory WDL WDL        Skin Circulation/Temperature WDL    Skin Circulation/Temperature WDL WDL        Cardiac WDL    Cardiac WDL WDL        Peripheral/Neurovascular WDL    Peripheral Neurovascular WDL WDL        Cognitive/Neuro/Behavioral WDL    Cognitive/Neuro/Behavioral WDL WDL

## 2025-07-17 ENCOUNTER — INFUSION THERAPY VISIT (OUTPATIENT)
Dept: INFUSION THERAPY | Facility: CLINIC | Age: 85
End: 2025-07-17
Attending: NURSE PRACTITIONER
Payer: MEDICARE

## 2025-07-17 ENCOUNTER — HOSPITAL ENCOUNTER (OUTPATIENT)
Dept: INTERVENTIONAL RADIOLOGY/VASCULAR | Facility: HOSPITAL | Age: 85
Discharge: HOME | End: 2025-07-17
Attending: NURSE PRACTITIONER
Payer: MEDICARE

## 2025-07-17 ENCOUNTER — APPOINTMENT (OUTPATIENT)
Dept: LAB | Facility: CLINIC | Age: 85
End: 2025-07-17
Attending: NURSE PRACTITIONER
Payer: MEDICARE

## 2025-07-17 ENCOUNTER — TELEPHONE (OUTPATIENT)
Dept: INTERVENTIONAL RADIOLOGY/VASCULAR | Facility: HOSPITAL | Age: 85
End: 2025-07-17

## 2025-07-17 ENCOUNTER — ONCOLOGY VISIT (OUTPATIENT)
Dept: ONCOLOGY | Facility: CLINIC | Age: 85
End: 2025-07-17
Attending: NURSE PRACTITIONER
Payer: MEDICARE

## 2025-07-17 VITALS
HEART RATE: 63 BPM | WEIGHT: 168.2 LBS | BODY MASS INDEX: 24.08 KG/M2 | SYSTOLIC BLOOD PRESSURE: 121 MMHG | OXYGEN SATURATION: 94 % | RESPIRATION RATE: 18 BRPM | HEIGHT: 70 IN | DIASTOLIC BLOOD PRESSURE: 54 MMHG

## 2025-07-17 VITALS — DIASTOLIC BLOOD PRESSURE: 75 MMHG | SYSTOLIC BLOOD PRESSURE: 161 MMHG | HEART RATE: 104 BPM

## 2025-07-17 DIAGNOSIS — C01 SQUAMOUS CELL CARCINOMA OF BASE OF TONGUE (H): ICD-10-CM

## 2025-07-17 DIAGNOSIS — C01 SQUAMOUS CELL CARCINOMA OF BASE OF TONGUE (H): Primary | ICD-10-CM

## 2025-07-17 DIAGNOSIS — C01 CANCER OF BASE OF TONGUE (H): Primary | ICD-10-CM

## 2025-07-17 DIAGNOSIS — E03.9 HYPOTHYROIDISM, UNSPECIFIED TYPE: ICD-10-CM

## 2025-07-17 DIAGNOSIS — R63.30 FEEDING DIFFICULTIES: ICD-10-CM

## 2025-07-17 LAB
ALBUMIN SERPL BCG-MCNC: 3.3 G/DL (ref 3.5–5.2)
ALP SERPL-CCNC: 113 U/L (ref 40–150)
ALT SERPL W P-5'-P-CCNC: 12 U/L (ref 0–70)
ANION GAP SERPL CALCULATED.3IONS-SCNC: 7 MMOL/L (ref 7–15)
AST SERPL W P-5'-P-CCNC: 19 U/L (ref 0–45)
BASOPHILS # BLD AUTO: 0 10E3/UL (ref 0–0.2)
BASOPHILS NFR BLD AUTO: 1 %
BILIRUB SERPL-MCNC: 0.2 MG/DL
BUN SERPL-MCNC: 18.7 MG/DL (ref 8–23)
CALCIUM SERPL-MCNC: 8.9 MG/DL (ref 8.8–10.4)
CHLORIDE SERPL-SCNC: 98 MMOL/L (ref 98–107)
CREAT SERPL-MCNC: 0.64 MG/DL (ref 0.67–1.17)
EGFRCR SERPLBLD CKD-EPI 2021: >90 ML/MIN/1.73M2
EOSINOPHIL # BLD AUTO: 0.4 10E3/UL (ref 0–0.7)
EOSINOPHIL NFR BLD AUTO: 5 %
ERYTHROCYTE [DISTWIDTH] IN BLOOD BY AUTOMATED COUNT: 14.3 % (ref 10–15)
GLUCOSE SERPL-MCNC: 113 MG/DL (ref 70–99)
HCO3 SERPL-SCNC: 32 MMOL/L (ref 22–29)
HCT VFR BLD AUTO: 34.5 % (ref 40–53)
HGB BLD-MCNC: 10.8 G/DL (ref 13.3–17.7)
IMM GRANULOCYTES # BLD: 0 10E3/UL
IMM GRANULOCYTES NFR BLD: 0 %
LYMPHOCYTES # BLD AUTO: 0.6 10E3/UL (ref 0.8–5.3)
LYMPHOCYTES NFR BLD AUTO: 7 %
MCH RBC QN AUTO: 31.2 PG (ref 26.5–33)
MCHC RBC AUTO-ENTMCNC: 31.3 G/DL (ref 31.5–36.5)
MCV RBC AUTO: 100 FL (ref 78–100)
MONOCYTES # BLD AUTO: 0.7 10E3/UL (ref 0–1.3)
MONOCYTES NFR BLD AUTO: 8 %
NEUTROPHILS # BLD AUTO: 6.6 10E3/UL (ref 1.6–8.3)
NEUTROPHILS NFR BLD AUTO: 79 %
NRBC # BLD AUTO: 0 10E3/UL
NRBC BLD AUTO-RTO: 0 /100
PLATELET # BLD AUTO: 201 10E3/UL (ref 150–450)
POTASSIUM SERPL-SCNC: 4.6 MMOL/L (ref 3.4–5.3)
PROT SERPL-MCNC: 6.9 G/DL (ref 6.4–8.3)
RBC # BLD AUTO: 3.46 10E6/UL (ref 4.4–5.9)
SODIUM SERPL-SCNC: 137 MMOL/L (ref 135–145)
T4 FREE SERPL-MCNC: 1.12 NG/DL (ref 0.9–1.7)
TSH SERPL DL<=0.005 MIU/L-ACNC: 6.33 UIU/ML (ref 0.3–4.2)
WBC # BLD AUTO: 8.3 10E3/UL (ref 4–11)

## 2025-07-17 PROCEDURE — C1769 GUIDE WIRE: HCPCS

## 2025-07-17 PROCEDURE — 258N000003 HC RX IP 258 OP 636: Performed by: NURSE PRACTITIONER

## 2025-07-17 PROCEDURE — 84443 ASSAY THYROID STIM HORMONE: CPT | Performed by: NURSE PRACTITIONER

## 2025-07-17 PROCEDURE — G0463 HOSPITAL OUTPT CLINIC VISIT: HCPCS | Performed by: NURSE PRACTITIONER

## 2025-07-17 PROCEDURE — 84439 ASSAY OF FREE THYROXINE: CPT | Performed by: NURSE PRACTITIONER

## 2025-07-17 PROCEDURE — 36415 COLL VENOUS BLD VENIPUNCTURE: CPT | Performed by: NURSE PRACTITIONER

## 2025-07-17 PROCEDURE — 85004 AUTOMATED DIFF WBC COUNT: CPT | Performed by: NURSE PRACTITIONER

## 2025-07-17 PROCEDURE — 255N000002 HC RX 255 OP 636: Performed by: RADIOLOGY

## 2025-07-17 PROCEDURE — 80053 COMPREHEN METABOLIC PANEL: CPT | Performed by: NURSE PRACTITIONER

## 2025-07-17 PROCEDURE — 49452 REPLACE G-J TUBE PERC: CPT

## 2025-07-17 RX ORDER — LEVOTHYROXINE SODIUM 112 UG/1
112 TABLET ORAL DAILY
Qty: 30 TABLET | Refills: 2 | Status: SHIPPED | OUTPATIENT
Start: 2025-07-17 | End: 2025-08-16

## 2025-07-17 RX ORDER — EPINEPHRINE 1 MG/ML
0.3 INJECTION, SOLUTION, CONCENTRATE INTRAVENOUS EVERY 5 MIN PRN
Status: CANCELLED | OUTPATIENT
Start: 2025-07-17

## 2025-07-17 RX ORDER — DIPHENHYDRAMINE HYDROCHLORIDE 50 MG/ML
25 INJECTION, SOLUTION INTRAMUSCULAR; INTRAVENOUS
Status: CANCELLED
Start: 2025-07-17

## 2025-07-17 RX ORDER — DIPHENHYDRAMINE HYDROCHLORIDE 50 MG/ML
50 INJECTION, SOLUTION INTRAMUSCULAR; INTRAVENOUS
Status: CANCELLED
Start: 2025-07-17

## 2025-07-17 RX ORDER — HEPARIN SODIUM,PORCINE 10 UNIT/ML
5-20 VIAL (ML) INTRAVENOUS DAILY PRN
Status: CANCELLED | OUTPATIENT
Start: 2025-07-17

## 2025-07-17 RX ORDER — LORAZEPAM 2 MG/ML
0.5 INJECTION INTRAMUSCULAR EVERY 4 HOURS PRN
Status: CANCELLED | OUTPATIENT
Start: 2025-07-17

## 2025-07-17 RX ORDER — ALBUTEROL SULFATE 0.83 MG/ML
2.5 SOLUTION RESPIRATORY (INHALATION)
Status: CANCELLED | OUTPATIENT
Start: 2025-07-17

## 2025-07-17 RX ORDER — HEPARIN SODIUM (PORCINE) LOCK FLUSH IV SOLN 100 UNIT/ML 100 UNIT/ML
5 SOLUTION INTRAVENOUS
Status: CANCELLED | OUTPATIENT
Start: 2025-07-17

## 2025-07-17 RX ORDER — ALBUTEROL SULFATE 90 UG/1
1-2 INHALANT RESPIRATORY (INHALATION)
Status: CANCELLED
Start: 2025-07-17

## 2025-07-17 RX ORDER — METHYLPREDNISOLONE SODIUM SUCCINATE 40 MG/ML
40 INJECTION INTRAMUSCULAR; INTRAVENOUS
Status: CANCELLED
Start: 2025-07-17

## 2025-07-17 RX ORDER — MEPERIDINE HYDROCHLORIDE 25 MG/ML
25 INJECTION INTRAMUSCULAR; INTRAVENOUS; SUBCUTANEOUS
Status: CANCELLED | OUTPATIENT
Start: 2025-07-17

## 2025-07-17 RX ADMIN — SODIUM CHLORIDE 1000 ML: 0.9 INJECTION, SOLUTION INTRAVENOUS at 10:34

## 2025-07-17 RX ADMIN — IOHEXOL 20 ML: 350 INJECTION, SOLUTION INTRAVENOUS at 15:13

## 2025-07-17 RX ADMIN — SODIUM CHLORIDE 400 MG: 9 INJECTION, SOLUTION INTRAVENOUS at 10:58

## 2025-07-17 ASSESSMENT — PAIN SCALES - GENERAL: PAINLEVEL_OUTOF10: NO PAIN (0)

## 2025-07-17 NOTE — PATIENT INSTRUCTIONS
Your thyroid level is slightly elevated today. Noris ordered a new thyroid medication dose today and sent it to the pharmacy. Please pick this up and start taking it.

## 2025-07-17 NOTE — PROGRESS NOTES
Patient Name: Hieu Hughes  Medical Record Number: [id number]  Today's Date: 7/17/2025    Procedure: GJ Tube check and exchange  Proceduralist: Dr. Osman    Procedure Start: 1506  Procedure end: 1508      Other Notes: Pt arrived to IR room 1 from IR holding. Consent reviewed. Pt denies any questions or concerns regarding procedure. Pt positioned supine and monitored per protocol. Pt tolerated procedure without any noted complications. Pt transferred back to IR holding.    Education provided to pt and son, pt discharged via wheelchair accompanied by his son as his .

## 2025-07-17 NOTE — DISCHARGE INSTRUCTIONS
"Gastrojejunostomy (GJ) Tube Discharge Instructions:   You had a gastrojejunostomy (GJ tube) placed. The purpose of a gastrojejunostomy tube is to provide someone temporary or permanent nutritional support and medication administration and/or for stomach drainage/venting directly though a tube into their stomach. A gastrojejunostomy tube is a \"tube within a tube\" that enters through someone's abdomen into their stomach and extends into the jejunum (small intestine). Typically when someone has a gastrojejunostomy tube it is recommended that nutrition/feedings are given through the J port port and medications be given through the G port.    Care instructions:  - If you received sedation for your procedure, do not drive or operate heavy machinery for the rest of the day.  - Avoid soaking in stagnant water (tub baths, Jacuzzis, pools, lake, or ocean).   - You may shower beginning the day after the tube was placed.   - Clean under the disc daily with soap and water. Pat dry under disc and apply new split gauze dressing under disc. Cleaning tube site daily will help prevent infection and skin irritation.  - A small amount of clear tan drainage from the tube exit site can be normal.  - Make sure the disc on the tube fits slightly snug against the skin so that the tube does not move in or out of the body easily.   - If you experience leaking from around tube exit site, the most common cause is that the disc is not tightened against the skin.   - To tighten the disc, pull gently on the tube so the retention balloon (under the skin) is pulled up against the skin under the tube. Push the disc down until it is tight against the skin without a gap between the skin and the disc.  - Flush your tube with 60cc of water twice a day (using a cath tip syringe) to prevent tube from clogging (or follow recommendations from your dietician if given).    Giving Feedings and Medications:  - Follow-up with your dietician, primary care " provider, or oncologist for instructions on tube feedings and medication administration.    - ONLY use specific enteric feedings with your GJ tube (unless otherwise discussed with your dietitian).    - Flush tube at least twice daily with 60ml of water using cath tip syringe or follow dietician's instructions if given.  - Flush the tube before and after administrating medications and bolus feedings with 60cc of water.  - Note: Most jejunal (J port) feedings are given by a continuous method. A continuous feeding is given with a pump over a period of time, usually 12 to 24 hours based upon your specific situation.    Follow Up:  - Routine 3 month exchanges of feeding tube is recommended. Please contact [facility] Outpatient Scheduling at [phone] to arrange a GJ exchange appointment.  - GJ tubes CANNOT be removed until 6 weeks after date of tube placement (Tube was placed ***).  - T-fasteners/Buttons (suture) should be removed 7-10 days after tube placement. This may have been done while you were still and inpatient, or can be completed in your outpatient clinic or care facility.     Please seek medical evaluation for:  - Fever (greater than 101 F (38.3C)).  - Purulent (yellow/green/foul smelling) drainage from tube exit site.   - Significant or worsening abdominal pain.   - Skin that is hot to the touch or significantly reddened at the tube exit site.   - Bleeding at tube exit site.    Call [facility] IR RN Line at [phone] with questions or if you have any of the following symptoms:  - Tube falls out or felt to be out of position.  - Unable to flush tube.  - Significant leakage around tube site (tube feeding, medications or drainage).  - Significant bleeding at the tube exit site.  - Severe pain at tube exit site.

## 2025-07-17 NOTE — PROGRESS NOTES
[facility] Hematology and Oncology Outpatient Progress Note    Patient: Hieu Hughes  MRN: [id number]  Date of Service: Jul 17, 2025          Reason for Visit    Base of tongue cancer - on palliative treatment  Remote hx head/neck cancer, WERO    Primary Oncologist: Dr. Bhatia ([facility])      Assessment/Plan  Base of tongue SCC ([id number]-[id number])  Not candidate for definitive RT or surgery; on palliative systemic treatment  Chemo-induced anemia, improving  He completed 4 cycles of carbo/taxol/pembro - required multiple interruptions and dosing modifications. Resulted in MN on recent CT.      Has been on maintenance pembrolizumab since 2/2025. Currently, every 6-week dosing.     Tolerating this very well. Feels much better since off chemo with improved PS/QOL.    Labs: Hgb 10.8 (stable), rest CBC WNL. CMP unremarkable/stable.     Cancer related symptoms are stable. Chronic dysphagia, unchanged. No pain. No hemoptysis.    CT 4/2025 was stable.     Plan:  -Continue maintenance pembrolizumab 400 mg every 6 weeks. Will proceed today  -Repeat CT neck, chest/abd in 6 weeks with Dr. Bhatia is scheduled to occur  in 2 weeks.   -Return in 6 weeks to see me for next cycle, assuming plan is to continue after his visit with Dr. Bhatia  -Palliative Care following     Bilateral LE venous stasis/lymphedema dermatitis - improved/resolved  Earlier in year, had significant complications. DVT was ruled out. Required  Keflex for possible secondary cellulitis and Derm recommended topical betamethasone. Lymphedema provided compression mgmt with improvement.     No recurrent issues.    Plan:  -Elevate legs at rest  -Stay active  -Optimize nutrition/protein  -Continue Derm recommendations with Vanicream and betamethasone PRN.   -Continue Lymphedema mgmt for compression wraps PRN    Mitral valve regurgitation (mod severe)  Noted on ECHO that was done for eval of his LE edema. EF 55-60%.    Plan:  -Cardiology  following    Nutrition/hydration  Aspiration pneumonia risk  Gastritis/reflux/emesis  Diarrhea, related to jejunal feeding adjustment + Reglan use  Chronic dysphagia and aspiration, feeding tube dependent.    Doing better since G-tube converted to GJ tube and formula changes made. No longer needing Pepcid nor Reglan for reflux.    Occasional/mild diarrhea post-feedings.     Regaining weight. Feels well-hydrated.     He had his GJ tube exchanged a few weeks ago with IR. He notes it has come out a bit and is loose, he's keeping it in place with tape. Feedings are going well and no leaking nor pain.     Plan:  -Continue mgmt through IR (Goldie). I recommended he notify them of the tube displacement for their assessment/recs - he plans to do so today.   -Will need routine exchanges every 3 mths  -Continue working with Dietician PRN  -Pepcid BID PRN for reflux (is not needing routinely anymore)  -Imodium as needed.     Chronic dyspnea  Increased mucus secretions  LLL infiltrate/aspiration pneumonitis  Trach in place.     He has difficult-to clear secretions. Scheduled saline nebs has been beneficial. He notes if he does not maintain these prophylactically, he has more trouble.     Persistent LLL infiltrate/mucus plugging from prior aspiration pneumonia noted on CT in April but he's been asymptomatic.     Plan:  -Continue saline nebs BID  -Suction/lavage as needed  -Reassess and consider antibiotic for aspiration pneumonia if increased cough, fever, dyspnea    B carotid artery stenosis  Met with Vascular in December for recommendations on this. No intervention recommended in setting of cancer and on chemo, ideally would need to be on ASA and Plavix.     Follow-up with Neuro last month showed progressive L sided stenosis to severe.   He's asymptomatic. ASA 81 mg daily recommended now that his anemia is improved and no further hemoptysis, but pt reluctant. If stent placed, would require dual-antiplatelet therapy x 1 mth.      Plan:  -Neuro following  -Favored continued observation for now with repeat US in 6 mths  -Is now low risk to start baby ASA since his cancer is better controlled and no hemoptysis. No risk for anemia on his current immunotherapy maintenance     Macrocytic anemia, mild  Mildly macrocytic anemia: B12 in 2023 was in the 700s. Hypothyroidism well-managaed. Monitor and further evaluation if worsens.     Hypothyroidism, on replacement  On Synthroid 100 mcg. TSH slightly elevated 6.3, free T4 WNL.     Plan:  -Increase Synthroid 112 mcg, new Rx sent. Allow 6-8 weeks to assess response.   -Monitor on immunotherapy; adjust dose as needed. If TSH continues rising next check, will increse dose at that time      ______________________________________________________________________________    History of Present Illness/ Interval History    Mr. Hieu Hughes  is a 84 year old with unresectable locally recurrent head/neck cancer. Given prior RT to head/neck, not a candidate for re-irradiation.    He completed 2 cycles of single-agent pembro through September, with some progression on CT. Therefore, carbo (day 1)/Taxol (d 1+8) was added. He had multiple interruptions/delays due to various complications/hospitalizations. Cycles 3+4 were modified to weekly chemo dosing (days 1 + 8) through 1/2025, resulting in [place]. He then transitioned to maintenance pembrolizumab 2/2025 and is now getting every 6 weeks. Returns for next cycle.    Tolerating pembro well. Overall stamina and well-being has been notably better since off chemo. 1-2 loose stools each day, no progressive diarrhea. No increased dyspnea, rash, arthralgias.    He's had prior problems with LE extremity edema + dermatitis. Has worked with Lymphedema for compression mgmt in past. No recent issues. Works on keeping legs elevated.    He's dependent on GJ tube for all nutrition, hydration, meds. He's had a few issues with J tube functioning/falling out earlier this month, last  exchanged with IR 7/6. He's concerned the tube is loose and has come out a bit, he is securing it with tape for now. Tolerating feedings well, weight is stable.    Has trach. Uses routine saline nebs to help keep his thick respiratory secretions thin. Prior hemoptysis at diagnosis has resolved. No fevers/chills or interval respiratory infections.       ECOG Performance    1      Oncology History/Treatment  Diagnosis/Stage:   2009: Head/neck cancer    4/2024: [id number] R base of tongue SCC, PDL1 TPS 10%, CPS 15%  -4/14/24CT neck (HP). 1.1 x 1.2 x 2.2 cm R lateral BOT ulceration and enhancement. Mod-sev R TOM, 50% stenosis mid R cervical ICA, 65% stenosis L ICA with adjacent ulcerated atheromatous plaque.  -7/22/24DL with bx (Dr. Ospina). R BOT mass extending to midline, R lateral pharyngeal wall, submucosal extension to the posterior oral tongue. Path: SCC, TPS 10%, CPS 12%  -8/1/24PET/CT. FDG avid R tongue base mass extending to R oral tongue, hyoid bone, mylohyoid muscle, lingual mucosal surface of the suprahyoid epiglottis, R lateral oropharyngeal wall, overall 2.8 x 1.8 cm (SUV 21.5). 1.5 x 0.7 cm lesion deep to R thyroid cartilate in the R parapharyngeal fat of the false cord extending to the submucosal surface of the true cord (SUV 10.1). R common carotid calcified plaque extending to the carotid bifurcation resulting in residual lumen measuring 3 mm.     Treatment:  2009: Initial diagnosis  -Chemoradiation with weekly carboplatin paclitaxel, 7400 cGy (Dr. Bolton, Dr. Arun Jerez at [facility]), 37 fractions. Hearing loss precluded cisplatin    2024: 2nd occurrence  -Repeat definitive RT not feasible  -Extensive surgery not desired by patient  -Palliative systemic therapy was decided on.     8/27/2024 - 9/17/2024: pembrolizumab x2 cycles  -CT showed progression with lingual artery involvement. Indeterminate for pseudoprogression/inadequate time on immunotherapy vs true progression.     11/1/2024 - 1/31/2025:  carbo/taxol/pembro x 4 cycles (resulted in AR)  -C1 +2: carbo AUC 5 (day 1) and Taxol 100 mgm2 days 1, 8 added to pembro  -C2 delayed x 1 week for aspiration pneumonia hospitalization  ---C2D8 delayed x 1 week for admission for feeding tube conversion.   -C3 delayed a few weeks for failure to thrive  ---C3 + C4: modified to carbo AUC 2 (days 1 + 8) (Taxol and pembro remained same dosing/freq)  ---C4 day 8 omitted for LE cellulitis    2/21/2025 - present: pembrolizumab, maintenance   --3/13/2025: transition to every 6 weeks       Physical Exam    GENERAL: Alert and oriented to time place and person. Seated comfortably. In no distress. Son accompanied. Pt communicated by writing.   HEAD: Atraumatic and normocephalic. No alopecia.  EYES: PARISH, EOMI. No erythema. No icterus.  ORAL CAVITY: Moist. No mucosal lesion or tonsillar enlargement.  NECK: supple. No thyroid enlargement. Trach.  LYMPH NODES: No palpable supraclavicular, cervical lymphadenopathy.  CHEST: clear to auscultation bilaterally. Resonant to percussion throughout bilaterally. Symmetrical breath movements bilaterally.  CVS: RRR  ABDOMEN: Soft. Not tender. Not distended. Bowel sounds present. Feeding tube in place, no surrounding redness/swelling..  EXTREMITIES: No further edema. Compression wraps on.   SKIN: no rash  NEURO: No gross deficit noted. Ambulatory      Lab Results    Recent Results (from the past week)   Comprehensive metabolic panel   Result Value Ref Range    Sodium 137 135 - 145 mmol/L    Potassium 4.6 3.4 - 5.3 mmol/L    Carbon Dioxide (CO2) 32 (H) 22 - 29 mmol/L    Anion Gap 7 7 - 15 mmol/L    Urea Nitrogen 18.7 8.0 - 23.0 mg/dL    Creatinine 0.64 (L) 0.67 - 1.17 mg/dL    GFR Estimate >90 >60 mL/min/1.73m2    Calcium 8.9 8.8 - 10.4 mg/dL    Chloride 98 98 - 107 mmol/L    Glucose 113 (H) 70 - 99 mg/dL    Alkaline Phosphatase 113 40 - 150 U/L    AST 19 0 - 45 U/L    ALT 12 0 - 70 U/L    Protein Total 6.9 6.4 - 8.3 g/dL    Albumin 3.3 (L) 3.5  - 5.2 g/dL    Bilirubin Total 0.2 <=1.2 mg/dL   TSH with free T4 reflex   Result Value Ref Range    TSH 6.33 (H) 0.30 - 4.20 uIU/mL   CBC with platelets and differential   Result Value Ref Range    WBC Count 8.3 4.0 - 11.0 10e3/uL    RBC Count 3.46 (L) 4.40 - 5.90 10e6/uL    Hemoglobin 10.8 (L) 13.3 - 17.7 g/dL    Hematocrit 34.5 (L) 40.0 - 53.0 %     78 - 100 fL    MCH 31.2 26.5 - 33.0 pg    MCHC 31.3 (L) 31.5 - 36.5 g/dL    RDW 14.3 10.0 - 15.0 %    Platelet Count 201 150 - 450 10e3/uL    % Neutrophils 79 %    % Lymphocytes 7 %    % Monocytes 8 %    % Eosinophils 5 %    % Basophils 1 %    % Immature Granulocytes 0 %    NRBCs per 100 WBC 0 <1 /100    Absolute Neutrophils 6.6 1.6 - 8.3 10e3/uL    Absolute Lymphocytes 0.6 (L) 0.8 - 5.3 10e3/uL    Absolute Monocytes 0.7 0.0 - 1.3 10e3/uL    Absolute Eosinophils 0.4 0.0 - 0.7 10e3/uL    Absolute Basophils 0.0 0.0 - 0.2 10e3/uL    Absolute Immature Granulocytes 0.0 <=0.4 10e3/uL    Absolute NRBCs 0.0 10e3/uL   T4 free   Result Value Ref Range    Free T4 1.12 0.90 - 1.70 ng/dL           Imaging    IR Gastro Jejunostomy Tube Change  Result Date: 7/17/2025  [place] [facility] LOCATION: [facility] DATE: 7/17/2025 PROCEDURE: PERCUTANEOUS GASTROJEJUNOSTOMY EXCHANGE INTERVENTIONAL RADIOLOGIST: Wero Osman MD INDICATION: Routine gastrojejunostomy tube exchange. CONTRAST: See EMR FLUOROSCOPIC TIME: 0.9 minutes. RADIATION DOSE: Air Kerma: 26 mGy. COMPLICATIONS: No immediate complications. UNIVERSAL PROTOCOL: The operative site was marked and any prior imaging was reviewed. Required items including blood products, implants, devices and special equipment was made available. Patient identity was confirmed either verbally, with demographic information, hospital assigned identification or other identification markers. A timeout was performed immediately prior to the procedure. STERILE BARRIER TECHNIQUE: Maximum sterile barrier technique was used.  Cutaneous antisepsis was performed at the operative site with application of 2% chlorhexidine and large sterile drape. Prior to the procedure, the  and assistant performed hand hygiene and wore hat, mask, sterile gown, and sterile gloves during the entire procedure. PROCEDURE/TECHNIQUE: The indwelling 18 Martiniquais gastrojejunostomy tube's gastric and jejunal ports were injected and images obtained. The tube was then exchanged over a wire for a new 18 Martiniquais, 45cm length gastrojejunostomy tube which was positioned with distal tip in the proximal jejunum. The retention balloon was inflated.  A post placement injection of both the gastric and jejunal ports was performed. FINDINGS: The initial injection shows the gastric and jejunal lumens to be patent and in appropriate position. After exchange, the new gastrojejunostomy is in appropriate position with the gastric port within the stomach and distal jejunal port near jejunal origin.     IMPRESSION:  Gastrojejunostomy tube change as discussed above.    IR Gastro Jejunostomy Tube Change  Result Date: 7/6/2025  [place] [facility] LOCATION: [facility] DATE: 7/6/2025 PROCEDURE: PERCUTANEOUS GASTROJEJUNOSTOMY EXCHANGE INTERVENTIONAL RADIOLOGIST: Mk Mcallister MD INDICATION: Patient is an 84-year-old male with a history of feeding difficulties and a chronic gastrojejunostomy tube utilized for feeding/medications which has clogged. The patient presents to Interventional Radiology for exchange of this gastrojejunostomy tube for continued feeding and oral medication administration. MODERATE SEDATION: None. CONTRAST: 15 mL Omnipaque intraenteric. ANTIBIOTICS: None. ADDITIONAL MEDICATIONS: None. FLUOROSCOPIC TIME: 0.7 minutes. RADIATION DOSE: Air Kerma: 13 mGy. COMPLICATIONS: No immediate complications. STERILE BARRIER TECHNIQUE: Maximum sterile barrier technique was used. Cutaneous antisepsis was performed at the operative site with application of 2%  chlorhexidine and large sterile drape. Prior to the procedure, the  and assistant performed hand hygiene and wore hat, mask, sterile gown, and sterile gloves during the entire procedure. PROCEDURE/TECHNIQUE: The indwelling 18 Albanian gastrojejunostomy tube's gastric and jejunal ports were injected and images obtained. The tube was then exchanged over a wire for a new 18 Albanian, 45cm, gastrojejunostomy tube which was positioned with distal tip in the proximal jejunum. The retention balloon was inflated.  A post placement injection of both the gastric and jejunal ports was performed. FINDINGS: The initial injection shows the gastric and jejunal lumens to be patent and in appropriate position. After exchange, the new gastrojejunostomy is in appropriate position with the gastric port within the stomach and distal jejunal port near the ligament of Treitz.     IMPRESSION:  Gastrojejunostomy tube change as discussed above.     IR Gastro Jejunostomy Tube Change  Result Date: 7/2/2025  [place] [facility] LOCATION: [facility] DATE: 7/2/2025 PROCEDURE: GASTROSTOMY TO GASTROJEJUNOSTOMY CONVERSION INTERVENTIONAL RADIOLOGIST: Mk Mcallister MD INDICATION: Patient is an 84-year-old male with a prior gastrojejunostomy tube which had fallen out was replaced with a Topete catheter. Patient presents for conversion of this Topete gastrostomy tube to a new gastrojejunostomy tube. MODERATE SEDATION: None. CONTRAST: 15 cc Omnipaque 350 ANTIBIOTICS: None. ADDITIONAL MEDICATIONS: None. FLUOROSCOPIC TIME: 1.9 minutes. RADIATION DOSE: Air Kerma: 53 mGy. COMPLICATIONS: No immediate complications. STERILE BARRIER TECHNIQUE: Maximum sterile barrier technique was used. Cutaneous antisepsis was performed at the operative site with application of 2% chlorhexidine and large sterile drape. Prior to the procedure, the  and assistant performed hand hygiene and wore hat, mask, sterile gown, and sterile gloves during  the entire procedure. COMPLICATIONS: No immediate complications. PROCEDURE/TECHNIQUE: The indwelling gastrostomy tube's gastric port was injected and images obtained. The tube was removed over a wire. Utilizing a 0.035 stiff angled Glidewire and a Kumpe catheter, access into the proximal jejunum was obtained. The Kumpe catheter was then exchanged over a wire for a new 18 Indonesian, 45cm, gastrojejunostomy tube which was positioned with distal tip in the proximal jejunum. The retention balloon was inflated.  A post placement injection of both the gastric and jejunal ports was performed. FINDINGS: The initial injection shows the gastric lumen to be patent and in appropriate position. After exchange, the new gastrojejunostomy is in appropriate position with the gastric port within the stomach and distal jejunal port near the ligament of Treitz.     IMPRESSION:  Gastrostomy to gastrojejunostomy conversion as discussed above.       Billing  Total time 30 minutes, to include face to face visit, review of EMR, ordering, documentation and coordination of care on date of service   complexity modifier for longitudinal care.       Signed by: Noris Lovelace NP

## 2025-07-17 NOTE — PROGRESS NOTES
"Oncology Rooming Note    July 17, 2025 9:51 AM   Hieu Hughes is a 84 year old male who presents for:    Chief Complaint   Patient presents with    Oncology Clinic Visit     Cancer of base of tongue - labs, provider, infusion     Initial Vitals: /54 (BP Location: Right arm, Patient Position: Sitting, Cuff Size: Adult Regular)   Pulse 63   Resp 18   Ht 1.778 m (5' 10\")   Wt 76.3 kg (168 lb 3.2 oz)   SpO2 94%   BMI 24.13 kg/m   Estimated body mass index is 24.13 kg/m  as calculated from the following:    Height as of this encounter: 1.778 m (5' 10\").    Weight as of this encounter: 76.3 kg (168 lb 3.2 oz). Body surface area is 1.94 meters squared.  No Pain (0) Comment: Data Unavailable   No LMP for male patient.  Allergies reviewed: Yes  Medications reviewed: Yes    Medications: Medication refills not needed today.  Pharmacy name entered into [employer]:    [id number] - [place], [place] - [address] [facility] - [place], [place] - [address] [facility] - [place], [place] - [address] [facility] - [place], [place] - [address]    PHQ9:  Did this patient require a PHQ9?: No      Clinical concerns: Would like J tube looked at        Mariana Rodriguez MA              "

## 2025-07-17 NOTE — PROGRESS NOTES
Infusion Nursing Note:  Hieu Hughes presents today for Keytruda and IVF.    Patient seen by provider today: Yes: NP Noris Lovelace therefore assessment deferred   present during visit today: Not Applicable.    Note: Per NP Noris Lovelace, pt's thyroid level came back elevated today and medication dose will be adjusted and sent to pharmacy with new script. Updated pt. He nodded head yes for understanding.      Intravenous Access:  Peripheral IV placed.    Treatment Conditions:  Lab Results   Component Value Date     07/17/2025    POTASSIUM 4.6 07/17/2025    MAG 2.2 12/15/2024    CR 0.64 (L) 07/17/2025    MANPREET 8.9 07/17/2025    BILITOTAL 0.2 07/17/2025    ALBUMIN 3.3 (L) 07/17/2025    ALT 12 07/17/2025    AST 19 07/17/2025       Results reviewed, labs MET treatment parameters, ok to proceed with treatment.      Post Infusion Assessment:  Patient tolerated infusion without incident.  Blood return noted pre and post infusion.  Site patent and intact, free from redness, edema or discomfort.  No evidence of extravasations.  Access discontinued per protocol.       Discharge Plan:   Copy of AVS reviewed with patient and/or family.  Patient will return 8/28/25 for next appointment.  Patient discharged in stable condition accompanied by: self.  Departure Mode: Ambulatory.      RENU SHAW RN

## 2025-07-17 NOTE — TELEPHONE ENCOUNTER
Received call from patient's family regarding patient's GJ tube. Tube has become very loose and they think the balloon has possibly ruptured. They confirm tube did not fall out and they have it taped/secured well. They request a check/change. Order available in chart, family directed to IR scheduling to make appointment.      Janae Ovalle RN  Interventional Radiology

## 2025-07-17 NOTE — LETTER
7/17/2025      Hieu uHghes  [address] [address]      Dear Colleague,    Thank you for referring your patient, Hieu Hughes, to the [facility]. Please see a copy of my visit note below.    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 Bhatia ([facility])      Assessment/Plan  Base of tongue SCC ([id number]-[id number]-fH9)  Not candidate for definitive RT or surgery; on palliative systemic treatment  Chemo-induced anemia, improving  He completed 4 cycles of carbo/taxol/pembro - required multiple interruptions and dosing modifications. Resulted in AR on recent CT.      Has been on maintenance pembrolizumab since 2/2025. Currently, every 6-week dosing.     Tolerating this very well. Feels much better since off chemo with improved PS/QOL.    Labs: Hgb 10.8 (stable), rest CBC WNL. CMP unremarkable/stable.     Cancer related symptoms are stable. Chronic dysphagia, unchanged. No pain. No hemoptysis.    CT 4/2025 was stable.     Plan:  -Continue maintenance pembrolizumab 400 mg every 6 weeks. Will proceed today  -Repeat CT neck, chest/abd in 6 weeks with Dr. Bhatia is scheduled to occur  in 2 weeks.   -Return in 6 weeks to see me for next cycle, assuming plan is to continue after his visit with Dr. Bhatia  -Palliative Care following     Bilateral LE venous stasis/lymphedema dermatitis - improved/resolved  Earlier in year, had significant complications. DVT was ruled out. Required  Keflex for possible secondary cellulitis and Derm recommended topical betamethasone. Lymphedema provided compression mgmt with improvement.     No recurrent issues.    Plan:  -Elevate legs at rest  -Stay active  -Optimize nutrition/protein  -Continue Derm recommendations with Vanicream and  betamethasone PRN.   -Continue Lymphedema mgmt for compression wraps PRN    Mitral valve regurgitation (mod severe)  Noted on ECHO that was done for eval of his LE edema. EF 55-60%.    Plan:  -Cardiology following    Nutrition/hydration  Aspiration pneumonia risk  Gastritis/reflux/emesis  Diarrhea, related to jejunal feeding adjustment + Reglan use  Chronic dysphagia and aspiration, feeding tube dependent.    Doing better since G-tube converted to GJ tube and formula changes made. No longer needing Pepcid nor Reglan for reflux.    Occasional/mild diarrhea post-feedings.     Regaining weight. Feels well-hydrated.     He had his GJ tube exchanged a few weeks ago with IR. He notes it has come out a bit and is loose, he's keeping it in place with tape. Feedings are going well and no leaking nor pain.     Plan:  -Continue mgmt through IR ([facility]). I recommended he notify them of the tube displacement for their assessment/recs - he plans to do so today.   -Will need routine exchanges every 3 mths  -Continue working with Dietician PRN  -Pepcid BID PRN for reflux (is not needing routinely anymore)  -Imodium as needed.     Chronic dyspnea  Increased mucus secretions  LLL infiltrate/aspiration pneumonitis  Trach in place.     He has difficult-to clear secretions. Scheduled saline nebs has been beneficial. He notes if he does not maintain these prophylactically, he has more trouble.     Persistent LLL infiltrate/mucus plugging from prior aspiration pneumonia noted on CT in April but he's been asymptomatic.     Plan:  -Continue saline nebs BID  -Suction/lavage as needed  -Reassess and consider antibiotic for aspiration pneumonia if increased cough, fever, dyspnea    B carotid artery stenosis  Met with Vascular in December for recommendations on this. No intervention recommended in setting of cancer and on chemo, ideally would need to be on ASA and Plavix.     Follow-up with Neuro last month showed progressive L sided  stenosis to severe.   He's asymptomatic. ASA 81 mg daily recommended now that his anemia is improved and no further hemoptysis, but pt reluctant. If stent placed, would require dual-antiplatelet therapy x 1 mth.     Plan:  -Neuro following  -Favored continued observation for now with repeat US in 6 mths  -Is now low risk to start baby ASA since his cancer is better controlled and no hemoptysis. No risk for anemia on his current immunotherapy maintenance     Macrocytic anemia, mild  Mildly macrocytic anemia: B12 in 2023 was in the 700s. Hypothyroidism well-managaed. Monitor and further evaluation if worsens.     Hypothyroidism, on replacement  On Synthroid 100 mcg. TSH slightly elevated 6.3, free T4 WNL.     Plan:  -Increase Synthroid 112 mcg, new Rx sent. Allow 6-8 weeks to assess response.   -Monitor on immunotherapy; adjust dose as needed. If TSH continues rising next check, will increse dose at that time      ______________________________________________________________________________    History of Present Illness/ Interval History    Mr. Hieu Hughes  is a 84 year old with unresectable locally recurrent head/neck cancer. Given prior RT to head/neck, not a candidate for re-irradiation.    He completed 2 cycles of single-agent pembro through September, with some progression on CT. Therefore, carbo (day 1)/Taxol (d 1+8) was added. He had multiple interruptions/delays due to various complications/hospitalizations. Cycles 3+4 were modified to weekly chemo dosing (days 1 + 8) through 1/2025, resulting in [place]. He then transitioned to maintenance pembrolizumab 2/2025 and is now getting every 6 weeks. Returns for next cycle.    Tolerating pembro well. Overall stamina and well-being has been notably better since off chemo. 1-2 loose stools each day, no progressive diarrhea. No increased dyspnea, rash, arthralgias.    He's had prior problems with LE extremity edema + dermatitis. Has worked with Lymphedema for compression  mgmt in past. No recent issues. Works on keeping legs elevated.    He's dependent on GJ tube for all nutrition, hydration, meds. He's had a few issues with J tube functioning/falling out earlier this month, last exchanged with IR 7/6. He's concerned the tube is loose and has come out a bit, he is securing it with tape for now. Tolerating feedings well, weight is stable.    Has trach. Uses routine saline nebs to help keep his thick respiratory secretions thin. Prior hemoptysis at diagnosis has resolved. No fevers/chills or interval respiratory infections.       ECOG Performance    1      Oncology History/Treatment  Diagnosis/Stage:   2009: Head/neck cancer    4/2024: gW3-zL1n-dG3 R base of tongue SCC, PDL1 TPS 10%, CPS 15%  -4/14/24CT neck (HP). 1.1 x 1.2 x 2.2 cm R lateral BOT ulceration and enhancement. Mod-sev R TOM, 50% stenosis mid R cervical ICA, 65% stenosis L ICA with adjacent ulcerated atheromatous plaque.  -7/22/24DL with bx (Dr. Ospina). R BOT mass extending to midline, R lateral pharyngeal wall, submucosal extension to the posterior oral tongue. Path: SCC, TPS 10%, CPS 12%  -8/1/24PET/CT. FDG avid R tongue base mass extending to R oral tongue, hyoid bone, mylohyoid muscle, lingual mucosal surface of the suprahyoid epiglottis, R lateral oropharyngeal wall, overall 2.8 x 1.8 cm (SUV 21.5). 1.5 x 0.7 cm lesion deep to R thyroid cartilate in the R parapharyngeal fat of the false cord extending to the submucosal surface of the true cord (SUV 10.1). R common carotid calcified plaque extending to the carotid bifurcation resulting in residual lumen measuring 3 mm.     Treatment:  2009: Initial diagnosis  -Chemoradiation with weekly carboplatin paclitaxel, 7400 cGy (Dr. Bolton, Dr. Arun Jerez at [facility]), 37 fractions. Hearing loss precluded cisplatin    2024: 2nd occurrence  -Repeat definitive RT not feasible  -Extensive surgery not desired by patient  -Palliative systemic therapy was decided on.     8/27/2024 -  9/17/2024: pembrolizumab x2 cycles  -CT showed progression with lingual artery involvement. Indeterminate for pseudoprogression/inadequate time on immunotherapy vs true progression.     11/1/2024 - 1/31/2025: carbo/taxol/pembro x 4 cycles (resulted in AK)  -C1 +2: carbo AUC 5 (day 1) and Taxol 100 mgm2 days 1, 8 added to pembro  -C2 delayed x 1 week for aspiration pneumonia hospitalization  ---C2D8 delayed x 1 week for admission for feeding tube conversion.   -C3 delayed a few weeks for failure to thrive  ---C3 + C4: modified to carbo AUC 2 (days 1 + 8) (Taxol and pembro remained same dosing/freq)  ---C4 day 8 omitted for LE cellulitis    2/21/2025 - present: pembrolizumab, maintenance   --3/13/2025: transition to every 6 weeks       Physical Exam    GENERAL: Alert and oriented to time place and person. Seated comfortably. In no distress. Son accompanied. Pt communicated by writing.   HEAD: Atraumatic and normocephalic. No alopecia.  EYES: PARISH, EOMI. No erythema. No icterus.  ORAL CAVITY: Moist. No mucosal lesion or tonsillar enlargement.  NECK: supple. No thyroid enlargement. Trach.  LYMPH NODES: No palpable supraclavicular, cervical lymphadenopathy.  CHEST: clear to auscultation bilaterally. Resonant to percussion throughout bilaterally. Symmetrical breath movements bilaterally.  CVS: RRR  ABDOMEN: Soft. Not tender. Not distended. Bowel sounds present. Feeding tube in place, no surrounding redness/swelling..  EXTREMITIES: No further edema. Compression wraps on.   SKIN: no rash  NEURO: No gross deficit noted. Ambulatory      Lab Results    Recent Results (from the past week)   Comprehensive metabolic panel   Result Value Ref Range    Sodium 137 135 - 145 mmol/L    Potassium 4.6 3.4 - 5.3 mmol/L    Carbon Dioxide (CO2) 32 (H) 22 - 29 mmol/L    Anion Gap 7 7 - 15 mmol/L    Urea Nitrogen 18.7 8.0 - 23.0 mg/dL    Creatinine 0.64 (L) 0.67 - 1.17 mg/dL    GFR Estimate >90 >60 mL/min/1.73m2    Calcium 8.9 8.8 - 10.4  mg/dL    Chloride 98 98 - 107 mmol/L    Glucose 113 (H) 70 - 99 mg/dL    Alkaline Phosphatase 113 40 - 150 U/L    AST 19 0 - 45 U/L    ALT 12 0 - 70 U/L    Protein Total 6.9 6.4 - 8.3 g/dL    Albumin 3.3 (L) 3.5 - 5.2 g/dL    Bilirubin Total 0.2 <=1.2 mg/dL   TSH with free T4 reflex   Result Value Ref Range    TSH 6.33 (H) 0.30 - 4.20 uIU/mL   CBC with platelets and differential   Result Value Ref Range    WBC Count 8.3 4.0 - 11.0 10e3/uL    RBC Count 3.46 (L) 4.40 - 5.90 10e6/uL    Hemoglobin 10.8 (L) 13.3 - 17.7 g/dL    Hematocrit 34.5 (L) 40.0 - 53.0 %     78 - 100 fL    MCH 31.2 26.5 - 33.0 pg    MCHC 31.3 (L) 31.5 - 36.5 g/dL    RDW 14.3 10.0 - 15.0 %    Platelet Count 201 150 - 450 10e3/uL    % Neutrophils 79 %    % Lymphocytes 7 %    % Monocytes 8 %    % Eosinophils 5 %    % Basophils 1 %    % Immature Granulocytes 0 %    NRBCs per 100 WBC 0 <1 /100    Absolute Neutrophils 6.6 1.6 - 8.3 10e3/uL    Absolute Lymphocytes 0.6 (L) 0.8 - 5.3 10e3/uL    Absolute Monocytes 0.7 0.0 - 1.3 10e3/uL    Absolute Eosinophils 0.4 0.0 - 0.7 10e3/uL    Absolute Basophils 0.0 0.0 - 0.2 10e3/uL    Absolute Immature Granulocytes 0.0 <=0.4 10e3/uL    Absolute NRBCs 0.0 10e3/uL   T4 free   Result Value Ref Range    Free T4 1.12 0.90 - 1.70 ng/dL           Imaging    IR Gastro Jejunostomy Tube Change  Result Date: 7/17/2025  [place] [facility] LOCATION: [facility] DATE: 7/17/2025 PROCEDURE: PERCUTANEOUS GASTROJEJUNOSTOMY EXCHANGE INTERVENTIONAL RADIOLOGIST: Wero Osman MD INDICATION: Routine gastrojejunostomy tube exchange. CONTRAST: See EMR FLUOROSCOPIC TIME: 0.9 minutes. RADIATION DOSE: Air Kerma: 26 mGy. COMPLICATIONS: No immediate complications. UNIVERSAL PROTOCOL: The operative site was marked and any prior imaging was reviewed. Required items including blood products, implants, devices and special equipment was made available. Patient identity was confirmed either verbally, with demographic  information, hospital assigned identification or other identification markers. A timeout was performed immediately prior to the procedure. STERILE BARRIER TECHNIQUE: Maximum sterile barrier technique was used. Cutaneous antisepsis was performed at the operative site with application of 2% chlorhexidine and large sterile drape. Prior to the procedure, the  and assistant performed hand hygiene and wore hat, mask, sterile gown, and sterile gloves during the entire procedure. PROCEDURE/TECHNIQUE: The indwelling 18 Uzbek gastrojejunostomy tube's gastric and jejunal ports were injected and images obtained. The tube was then exchanged over a wire for a new 18 Uzbek, 45cm length gastrojejunostomy tube which was positioned with distal tip in the proximal jejunum. The retention balloon was inflated.  A post placement injection of both the gastric and jejunal ports was performed. FINDINGS: The initial injection shows the gastric and jejunal lumens to be patent and in appropriate position. After exchange, the new gastrojejunostomy is in appropriate position with the gastric port within the stomach and distal jejunal port near jejunal origin.     IMPRESSION:  Gastrojejunostomy tube change as discussed above.    IR Gastro Jejunostomy Tube Change  Result Date: 7/6/2025  [place] [facility] LOCATION: [facility] DATE: 7/6/2025 PROCEDURE: PERCUTANEOUS GASTROJEJUNOSTOMY EXCHANGE INTERVENTIONAL RADIOLOGIST: Mk Mcallister MD INDICATION: Patient is an 84-year-old male with a history of feeding difficulties and a chronic gastrojejunostomy tube utilized for feeding/medications which has clogged. The patient presents to Interventional Radiology for exchange of this gastrojejunostomy tube for continued feeding and oral medication administration. MODERATE SEDATION: None. CONTRAST: 15 mL Omnipaque intraenteric. ANTIBIOTICS: None. ADDITIONAL MEDICATIONS: None. FLUOROSCOPIC TIME: 0.7 minutes. RADIATION DOSE: Air  Kerma: 13 mGy. COMPLICATIONS: No immediate complications. STERILE BARRIER TECHNIQUE: Maximum sterile barrier technique was used. Cutaneous antisepsis was performed at the operative site with application of 2% chlorhexidine and large sterile drape. Prior to the procedure, the  and assistant performed hand hygiene and wore hat, mask, sterile gown, and sterile gloves during the entire procedure. PROCEDURE/TECHNIQUE: The indwelling 18 Namibian gastrojejunostomy tube's gastric and jejunal ports were injected and images obtained. The tube was then exchanged over a wire for a new 18 Namibian, 45cm, gastrojejunostomy tube which was positioned with distal tip in the proximal jejunum. The retention balloon was inflated.  A post placement injection of both the gastric and jejunal ports was performed. FINDINGS: The initial injection shows the gastric and jejunal lumens to be patent and in appropriate position. After exchange, the new gastrojejunostomy is in appropriate position with the gastric port within the stomach and distal jejunal port near the ligament of Treitz.     IMPRESSION:  Gastrojejunostomy tube change as discussed above.     IR Gastro Jejunostomy Tube Change  Result Date: 7/2/2025  [place] [facility] LOCATION: [facility] DATE: 7/2/2025 PROCEDURE: GASTROSTOMY TO GASTROJEJUNOSTOMY CONVERSION INTERVENTIONAL RADIOLOGIST: Mk Mcallister MD INDICATION: Patient is an 84-year-old male with a prior gastrojejunostomy tube which had fallen out was replaced with a Topete catheter. Patient presents for conversion of this Topete gastrostomy tube to a new gastrojejunostomy tube. MODERATE SEDATION: None. CONTRAST: 15 cc Omnipaque 350 ANTIBIOTICS: None. ADDITIONAL MEDICATIONS: None. FLUOROSCOPIC TIME: 1.9 minutes. RADIATION DOSE: Air Kerma: 53 mGy. COMPLICATIONS: No immediate complications. STERILE BARRIER TECHNIQUE: Maximum sterile barrier technique was used. Cutaneous antisepsis was performed at the  "operative site with application of 2% chlorhexidine and large sterile drape. Prior to the procedure, the  and assistant performed hand hygiene and wore hat, mask, sterile gown, and sterile gloves during the entire procedure. COMPLICATIONS: No immediate complications. PROCEDURE/TECHNIQUE: The indwelling gastrostomy tube's gastric port was injected and images obtained. The tube was removed over a wire. Utilizing a 0.035 stiff angled Glidewire and a Kumpe catheter, access into the proximal jejunum was obtained. The Kumpe catheter was then exchanged over a wire for a new 18 German, 45cm, gastrojejunostomy tube which was positioned with distal tip in the proximal jejunum. The retention balloon was inflated.  A post placement injection of both the gastric and jejunal ports was performed. FINDINGS: The initial injection shows the gastric lumen to be patent and in appropriate position. After exchange, the new gastrojejunostomy is in appropriate position with the gastric port within the stomach and distal jejunal port near the ligament of Treitz.     IMPRESSION:  Gastrostomy to gastrojejunostomy conversion as discussed above.       Billing  Total time 30 minutes, to include face to face visit, review of EMR, ordering, documentation and coordination of care on date of service   complexity modifier for longitudinal care.       Signed by: Noris Lovelace NP      Oncology Rooming Note    July 17, 2025 9:51 AM   Hieu Hughes is a 84 year old male who presents for:    Chief Complaint   Patient presents with     Oncology Clinic Visit     Cancer of base of tongue - labs, provider, infusion     Initial Vitals: /54 (BP Location: Right arm, Patient Position: Sitting, Cuff Size: Adult Regular)   Pulse 63   Resp 18   Ht 1.778 m (5' 10\")   Wt 76.3 kg (168 lb 3.2 oz)   SpO2 94%   BMI 24.13 kg/m   Estimated body mass index is 24.13 kg/m  as calculated from the following:    Height as of this encounter: 1.778 m (5' " "10\").    Weight as of this encounter: 76.3 kg (168 lb 3.2 oz). Body surface area is 1.94 meters squared.  No Pain (0) Comment: Data Unavailable   No LMP for male patient.  Allergies reviewed: Yes  Medications reviewed: Yes    Medications: Medication refills not needed today.  Pharmacy name entered into EPIC:    [id number] - [place], [place] - [address] [facility] - [place], [place] - [address] [facility] - [place], [place] - [address] [facility] - [place], [place] - [address]    PHQ9:  Did this patient require a PHQ9?: No      Clinical concerns: Would like J tube looked at        Mariana Rodriguez MA                Again, thank you for allowing me to participate in the care of your patient.        Sincerely,        Noris Lovelace NP    Electronically signed"

## 2025-07-27 ENCOUNTER — HOSPITAL ENCOUNTER (OUTPATIENT)
Facility: CLINIC | Age: 85
Setting detail: OBSERVATION
Discharge: HOME | End: 2025-07-28
Attending: STUDENT IN AN ORGANIZED HEALTH CARE EDUCATION/TRAINING PROGRAM | Admitting: OTOLARYNGOLOGY
Payer: MEDICARE

## 2025-07-27 DIAGNOSIS — Z93.0 TRACHEOSTOMY IN PLACE (H): ICD-10-CM

## 2025-07-27 DIAGNOSIS — R04.2 BLOOD-TINGED SPUTUM: Primary | ICD-10-CM

## 2025-07-27 PROCEDURE — 99285 EMERGENCY DEPT VISIT HI MDM: CPT | Performed by: STUDENT IN AN ORGANIZED HEALTH CARE EDUCATION/TRAINING PROGRAM

## 2025-07-27 PROCEDURE — G0378 HOSPITAL OBSERVATION PER HR: HCPCS

## 2025-07-27 PROCEDURE — 999N000157 HC STATISTIC RCP TIME EA 10 MIN

## 2025-07-27 RX ORDER — OXYCODONE HYDROCHLORIDE 5 MG/1
5 TABLET ORAL EVERY 4 HOURS PRN
Status: DISCONTINUED | OUTPATIENT
Start: 2025-07-27 | End: 2025-07-28 | Stop reason: HOSPADM

## 2025-07-27 RX ORDER — ONDANSETRON 4 MG/1
4 TABLET, ORALLY DISINTEGRATING ORAL EVERY 6 HOURS PRN
Status: DISCONTINUED | OUTPATIENT
Start: 2025-07-27 | End: 2025-07-28 | Stop reason: HOSPADM

## 2025-07-27 RX ORDER — AMOXICILLIN 250 MG
1 CAPSULE ORAL 2 TIMES DAILY
Status: DISCONTINUED | OUTPATIENT
Start: 2025-07-27 | End: 2025-07-28 | Stop reason: HOSPADM

## 2025-07-27 RX ORDER — ACETAMINOPHEN 325 MG/1
650 TABLET ORAL EVERY 4 HOURS PRN
Status: DISCONTINUED | OUTPATIENT
Start: 2025-07-27 | End: 2025-07-28 | Stop reason: HOSPADM

## 2025-07-27 RX ORDER — LEVOTHYROXINE SODIUM 112 UG/1
112 TABLET ORAL DAILY
Status: DISCONTINUED | OUTPATIENT
Start: 2025-07-28 | End: 2025-07-28 | Stop reason: HOSPADM

## 2025-07-27 RX ORDER — ONDANSETRON 2 MG/ML
4 INJECTION INTRAMUSCULAR; INTRAVENOUS EVERY 6 HOURS PRN
Status: DISCONTINUED | OUTPATIENT
Start: 2025-07-27 | End: 2025-07-28 | Stop reason: HOSPADM

## 2025-07-27 RX ORDER — AMOXICILLIN 250 MG
2 CAPSULE ORAL 2 TIMES DAILY
Status: DISCONTINUED | OUTPATIENT
Start: 2025-07-27 | End: 2025-07-28 | Stop reason: HOSPADM

## 2025-07-27 RX ORDER — PROCHLORPERAZINE MALEATE 5 MG/1
5 TABLET ORAL EVERY 6 HOURS PRN
Status: DISCONTINUED | OUTPATIENT
Start: 2025-07-27 | End: 2025-07-28 | Stop reason: HOSPADM

## 2025-07-27 ASSESSMENT — ACTIVITIES OF DAILY LIVING (ADL)
ADLS_ACUITY_SCORE: 58

## 2025-07-28 VITALS
SYSTOLIC BLOOD PRESSURE: 116 MMHG | HEART RATE: 82 BPM | DIASTOLIC BLOOD PRESSURE: 65 MMHG | OXYGEN SATURATION: 96 % | TEMPERATURE: 98.6 F | RESPIRATION RATE: 16 BRPM

## 2025-07-28 LAB
ANION GAP SERPL CALCULATED.3IONS-SCNC: 10 MMOL/L (ref 7–15)
BUN SERPL-MCNC: 17.2 MG/DL (ref 8–23)
CALCIUM SERPL-MCNC: 9.2 MG/DL (ref 8.8–10.4)
CHLORIDE SERPL-SCNC: 99 MMOL/L (ref 98–107)
CREAT SERPL-MCNC: 0.63 MG/DL (ref 0.67–1.17)
EGFRCR SERPLBLD CKD-EPI 2021: >90 ML/MIN/1.73M2
ERYTHROCYTE [DISTWIDTH] IN BLOOD BY AUTOMATED COUNT: 14.4 % (ref 10–15)
GLUCOSE BLDC GLUCOMTR-MCNC: 99 MG/DL (ref 70–99)
GLUCOSE SERPL-MCNC: 99 MG/DL (ref 70–99)
HCO3 SERPL-SCNC: 29 MMOL/L (ref 22–29)
HCT VFR BLD AUTO: 34.2 % (ref 40–53)
HGB BLD-MCNC: 10.8 G/DL (ref 13.3–17.7)
HOLD SPECIMEN: NORMAL
MCH RBC QN AUTO: 31.3 PG (ref 26.5–33)
MCHC RBC AUTO-ENTMCNC: 31.6 G/DL (ref 31.5–36.5)
MCV RBC AUTO: 99 FL (ref 78–100)
PLATELET # BLD AUTO: 216 10E3/UL (ref 150–450)
POTASSIUM SERPL-SCNC: 4.4 MMOL/L (ref 3.4–5.3)
RBC # BLD AUTO: 3.45 10E6/UL (ref 4.4–5.9)
SODIUM SERPL-SCNC: 138 MMOL/L (ref 135–145)
WBC # BLD AUTO: 6.8 10E3/UL (ref 4–11)

## 2025-07-28 PROCEDURE — G0378 HOSPITAL OBSERVATION PER HR: HCPCS

## 2025-07-28 PROCEDURE — 82962 GLUCOSE BLOOD TEST: CPT

## 2025-07-28 PROCEDURE — 80048 BASIC METABOLIC PNL TOTAL CA: CPT

## 2025-07-28 PROCEDURE — 36415 COLL VENOUS BLD VENIPUNCTURE: CPT

## 2025-07-28 PROCEDURE — 85027 COMPLETE CBC AUTOMATED: CPT

## 2025-07-28 PROCEDURE — 999N000157 HC STATISTIC RCP TIME EA 10 MIN

## 2025-07-28 PROCEDURE — 31575 DIAGNOSTIC LARYNGOSCOPY: CPT

## 2025-07-28 PROCEDURE — 31615 TRCHEOBRNCHSC EST TRACHS INC: CPT

## 2025-07-28 PROCEDURE — 250N000013 HC RX MED GY IP 250 OP 250 PS 637

## 2025-07-28 PROCEDURE — 999N000009 HC STATISTIC AIRWAY CARE

## 2025-07-28 PROCEDURE — 31502 CHANGE OF WINDPIPE AIRWAY: CPT

## 2025-07-28 RX ORDER — DEXTROSE MONOHYDRATE 100 MG/ML
INJECTION, SOLUTION INTRAVENOUS CONTINUOUS PRN
Status: DISCONTINUED | OUTPATIENT
Start: 2025-07-28 | End: 2025-07-28 | Stop reason: HOSPADM

## 2025-07-28 RX ADMIN — LEVOTHYROXINE SODIUM 112 MCG: 0.11 TABLET ORAL at 09:47

## 2025-07-28 ASSESSMENT — ACTIVITIES OF DAILY LIVING (ADL)
ADLS_ACUITY_SCORE: 58

## 2025-07-28 NOTE — ED TRIAGE NOTES
Pt BIBA from [facility] where he was originally taken to hospital from home; around 1600 trach began bleeding, per report patient usually has cuff deflated however he inflated the cuff in an attempt to stop bleeding however it was unsuccessful; pt has been producing blood tinged phlegm; per EMS trach placement adjusted & dressing redone at [place];  usually has cuf deflatted and inflated in attempt to stop bleeding; blood phlegm slowed down. Per EMS pt desats to ~88 due to phlegm in airway but is able to clear own secretions.    PEG in place. Small amount of drainage observed on dressing upon arrival      Hx oropharyngeal cancer, nonverbal but utilizes writing pad & hears fine.
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
## 2025-07-28 NOTE — ED NOTES
Bed: ED12  Expected date:   Expected time:   Means of arrival:   Comments:  TRANSFER FROM [facility]

## 2025-07-28 NOTE — PROGRESS NOTES
Pt admitted to ED with #6 cuffed inflated . Pt currently on TD 40% 30L. Skin assessment completed.    Ambu bag,  mask, and valve present at bedside. .     RT will continue to assess skin to prevent pressure injuries.    Scot Yip, RT on 7/27/2025 at 9:32 PM

## 2025-07-28 NOTE — PROGRESS NOTES
Otolaryngology Progress Note  July 28, 2025    S: No acute events. Patient showed us some blood-tinged sputum he coughed up. Denied any large volume bleeding.     O: /64   Pulse 82   Temp 98.6  F (37  C) (Axillary)   Resp 16   SpO2 94%    General: Alert and oriented x 3, No acute distress   HEENT: EOMI. HB 1/6. 6-0 cuffed Shiley secured with trach ties, some bloody secretions in inner cannula but no signs of active hemorrhage.    Pulmonary: Breathing non-labored, no stridor, no accessory muscle use.    FIBEROPTIC ENDOSCOPY:  Due to concern for bleeding, fiberoptic laryngoscopy was indicated. After obtaining consent, the fiberoptic laryngoscope was passed under endoscopic vision through the L nasal passage. The nasal cavity was patent, the turbinates were normal. The nasopharynx was clear. The eustachian tubes and fossa of Rosenmueller were clear. Some blood-tinged secretions noted on the BOT tumor but no signs of active bleeding. The scope was also passed through his stoma. His trachea appeared healthy with no signs of bleeding. The patient tolerated the procedure well.     Type of trach removed: 6-0 cuffed Shiley  Type of trach placed: 6-0 cuffed Shiley    Procedure note: Patient was suctioned via trach and secretions were removed.  Patient was appropriately positioned flat and supine. Previous trach was removed without difficulty and scope was passed through stoma to visualize trachea and any areas of potential bleeding. Trachea appeared clear with no signs of bleeding.  New trach was inserted with obturator without difficulty or resistance. Obturator was removed and inner cannula locked in place. Correct positioning of trach was confirmed by easily passing a suction through the trach and obvious air movement was noted with good oxygen saturations. Trach ties were placed and secured. Patient tolerated the trach change well and without complication.     Intake/Output Summary (Last 24 hours) at 7/28/2025  0815  Last data filed at 7/28/2025 0542  Gross per 24 hour   Intake --   Output 425 ml   Net -425 ml     LABS:  ROUTINE IP LABS (Last four results)  BMP  Recent Labs   Lab 07/28/25  0556      POTASSIUM 4.4   CHLORIDE 99   MANPREET 9.2   CO2 29   BUN 17.2   CR 0.63*   GLC 99     CBC  Recent Labs   Lab 07/28/25  0556   WBC 6.8   RBC 3.45*   HGB 10.8*   HCT 34.2*   MCV 99   MCH 31.3   MCHC 31.6   RDW 14.4        INRNo lab results found in last 7 days.    A/P: Hieu Hughes is a 85 year old male with a past medical history of right oropharyngeal cancer s/p chemoradiation in 2009 with recurrence in 7/2024 currently on maintenance pembrolizumab since 2/2025 presenting to the ED as a transfer from [facility] for oropharyngeal and tracheostomy bleed. Patient is reassuringly hemostatic on exam today. Flexible laryngoscopy demonstrates two discrete small regions of the tumor that are the likely source of previous bleeding, however there is no active bleeding or clot currently.     Neuro:  - Pain control: Tylenol, PRN oxy    HEENT:  - 6-0 cuffed Shiley in place, cuff currently down  - Inflate cuff if bleeding  - Regular suctioning and trach cares    Respiratory:  - supplemental O2 PRN to keep sats >92%    CV/heme:  - hemodynamically stable, Hgb 11.5   - CTM for bleeding  - Maintain Hgb > 7    FEN/GI:  - Restart tube feeds    :  - voiding independently    -- Patient and above plan discussed with Dr. Derick Ross  PGY-1, Otolaryngology  To contact ENT please dial [phone] and enter job code [phone].

## 2025-07-28 NOTE — DISCHARGE SUMMARY
Discharge Summary  Hieu Hughes  [id number]  1940    Date of Admission: 7/27/2025  Date of Discharge: 7/28/2025    Admission Diagnosis: Blood-tinged sputum [R04.2]  Discharge Diagnosis: Same    Procedures: Flexible laryngoscopy for bleeding observation and trach change  Date: 7/27/25, 7/28/25      HPI: Hieu Hughes is a 85 year old male with past medical history of right oropharyngeal cancer s/p chemoradiation in 2009 with recurrence in 7/2024 currently on maintenance pembrolizumab since 2/2025 presenting to the ED as a transfer from [facility] for oropharyngeal and tracheostomy bleed.     At OSH, patient reportedly insisted on transfer to [place] for further evaluation and management by ENT given he receives his cancer care here. Patient was stable with 6-0 cuffed shiley and some sats into the 80s consistent with his baseline. Prior to transfer, labs were obtained which were notable for hb 11.5, plt 219, INR 1.1. He did not receive any txa nebs or other hemostatic treatments. No imaging was obtained. The patient reported inflating the cuff on his trach when the bleeding started.    On arrival at [facility] ED, exam was reassuringly hemostatic. Flexible laryngoscopy demonstrated two discrete small regions of the tumor that were the likely source of previous bleeding, however there was no active bleeding or clot currently.     Hospital Course: The patient was admitted to the hospital for overnight observation to monitor for bleeding. His hospital course was uneventful. Flexible laryngoscopy demonstrated no active bleeding. 6-0 cuffed Shiley trach was changed for another 6-0 cuffed Shiley without complication. At discharge, there was no active bleeding, the patient's pain was well controlled, the patient was voiding on his own, and was ambulating. He did not receive tube feeds in the hospital but has them at home to have this evening per his usual schedule.     Discharge Exam:  Vitals:    07/28/25  0300 07/28/25 0500 07/28/25 0600 07/28/25 0816   BP: 134/72 127/67 108/64    Pulse:       Resp:   16    Temp:   98.6  F (37  C)    TempSrc:   Axillary    SpO2: 97% 97% 94% 94%       General: sitting in ED bed, no acute distress  HEENT: oral cavity and oroharynx without active bleeding or bloody secretions. Necrotic tumor of right oropharynx appears hemostatic without active bleeding. 6 cuffed Shiley in place with cuff inflated. Stoma with some blood tinged secretions but no active  bleeding    Respiratory: Breathing non-labored via 6-cuffed shiley. Scant blood-tinged secretions from trach.    Discharge Medications:     Medication List        ASK your doctor about these medications      augmented betamethasone dipropionate 0.05 % external cream  Commonly known as: DIPROLENE AF  Apply sparingly to affected area twice daily as needed.  Do not apply to face.     clotrimazole 1 % external cream  Commonly known as: LOTRIMIN  Topical, 2 TIMES DAILY     loperamide 1 MG/5ML solution  Commonly known as: IMODIUM  2 mg, Per GJ tube, 4 TIMES DAILY PRN     metroNIDAZOLE 500 MG/5ML Susp  500 mg, Oral, 3 TIMES DAILY     ondansetron 8 MG tablet  Commonly known as: ZOFRAN  8 mg, Oral, EVERY 8 HOURS PRN     prochlorperazine 10 MG tablet  Commonly known as: COMPAZINE  5 mg, Oral, EVERY 6 HOURS PRN              Discharge Procedure Orders   Reason for your hospital stay   Order Comments: Management of tracheostomy bleed     Activity   Order Comments: Your activity upon discharge: activity as tolerated     Order Specific Question Answer Comments   Is discharge order? Yes      When to contact your care team   Order Comments: Please notify your doctor if you experience wound breakdown, sustained bleeding from the wound site, or increasing redness, swelling, and/or purulent malorodorous discharge from the wound site which may indicate infection. If you feel it is acute, or experience sudden changes in breathing, chest pain, or excessive  "sleepiness/somnolence please return to the emergency department or call 911. If you have questions or concerns during the day please call ENT clinic and [phone]. If at night you can call [facility] at [phone] and ask for the \"ENT resident on call\".       Dispo: To home in good condition. All of the patient's questions/concerns have been addressed at this time. He knows to inflate the cuff and call 911 if bleeding starts again.    Aleida Bejarano MD  Otolaryngology-Head & Neck Surgery  Please contact ENT by dialing * [phone] and entering job code [phone].     I, Ruchi Sepulveda MD, saw and evaluated this patient yesterday, prior to discharge. I personally spent 5 minutes on discharge activities.  Ruchi Sepulveda MD      "

## 2025-07-28 NOTE — PHARMACY-CONSULT NOTE
Pharmacy Tube Feeding Consult    Medication reviewed for administration by feeding tube and for potential food/drug interactions.    Recommendation: No changes are needed at this time.     Pharmacy will continue to follow as new medications are ordered.     Kev Garvey, PharmD  Pharmacy Resident

## 2025-07-28 NOTE — PROGRESS NOTES
Observation Goals    -patient remains hemostatic with no further trach or oral cavity bleeding: Met  -diagnostic tests and consults completed and resulted: Progressing  -vital signs normal or at patient baseline: Met  -dyspnea improved and O2 sats greater than 88% on room air or prior home oxygen levels: Progressing, 30%, 30L humidifcation, tolerating  -returns to baseline functional status: Not Met    /64   Pulse 82   Temp 98.6  F (37  C) (Axillary)   Resp 16   SpO2 94%       Nurse to notify provider when observation goals have been met and patient is ready for discharge.
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
## 2025-07-28 NOTE — MEDICATION SCRIBE - ADMISSION MEDICATION HISTORY
Medication Scribe Admission Medication History    Admission medication history is complete. The information provided in this note is only as accurate as the sources available at the time of the update.    Information Source(s): Patient and [facility] via in-person    Pertinent Information: per pt+[facility], pt reported taking medications on PTA medication list as directed.     Changes made to PTA medication list:  Added: None  Deleted: None  Changed: None    Allergies reviewed with patient and updates made in EHR: yes    Medication History Completed By: Elayne Angulo 7/28/2025 4:35 AM    Current Facility-Administered Medications for the 7/27/25 encounter (Hospital Encounter)   Medication    sodium chloride (PF) 0.9% PF flush 3 mL     PTA Med List   Medication Sig Last Dose/Taking    albuterol (PROVENTIL) (2.5 MG/3ML) 0.083% neb solution 2.5 mg every 4 hours as needed. Past Week    ferrous sulfate 220 (44 Fe) MG/5ML SOLN Place 7.4 mLs (325.6 mg) into G tube every other day. 7/25/2025    levothyroxine (SYNTHROID) 112 MCG tablet Take 1 tablet (112 mcg) by mouth daily. Crushes to take in water 7/27/2025 Morning    Pseudoephedrine-DM-GG (ROBITUSSIN CF PO) Take by mouth daily. Pt reports OTC used for cough Unknown    sodium chloride 0.9 % neb solution Take 3 mLs by nebulization 2 times daily. 7/27/2025    sodium chloride 0.9%, bottle, 0.9 % irrigation Irrigate with 30 mLs as directed 3 times daily. Use for routine tracheostomy care and cleaning 7/27/2025

## 2025-07-28 NOTE — PROVIDER NOTIFICATION
Discharge instructions reviewed with patient and any questions answered.  PIV removed.  Patient will be getting a ride home with family.  Patient discharged.    /65   Pulse 82   Temp 98.6  F (37  C) (Axillary)   Resp 16   SpO2 96%

## 2025-07-28 NOTE — PROGRESS NOTES
CLINICAL NUTRITION SERVICES - ASSESSMENT NOTE    RECOMMENDATIONS FOR MDs/PROVIDERS TO ORDER:  None currently    Registered Dietitian Interventions:  EN access: J portion   Goal TF: Jevity 1.5 @ 108 ml/hr x13 hrs. Provides 1404 ml, 2106 kcals, 90 g protein, 303 g CHO, 70 g fat, 29 g fiber, 1067 ml free water.   60 ml before and after each feed for tube patency. Additional 750 ml throughout the day for hydration. Additional fluids and/or adjustments per MD.      Future/Additional Recommendations:  Monitor nutrition related findings and follow up per protocol     REASON FOR ASSESSMENT  Provider order - tube feeds and Provider order - Registered Dietitian to order TF per Medical Nutrition Therapy Guidelines    SUBJECTIVE INFORMATION  Assessed patient in room.    PERTINENT MEDICAL/CLINICAL HISTORY:   85 year old male with a past medical history of right oropharyngeal cancer s/p chemoradiation in 2009 with recurrence in 7/2024 currently on maintenance pembrolizumab since 2/2025 presenting to the ED as a transfer from [facility] for oropharyngeal and tracheostomy bleed. At OSH, patient reportedly insisted on transfer to [place] for further evaluation and management by ENT given he receives his cancer care here. Patient was stable with 6-0 cuffed shiley and some sats into the 80s consistent with his baseline. Prior to transfer, labs were obtained which were notable for hb 11.5, plt 219, INR 1.1. He did not receive any txa nebs or other hemostatic treatments. No imaging was obtained.     NUTRITION HISTORY  Pt communicated by writing in notebook. Pt was [facility]. Confirmed home TF regimen of Jevity 1.5 @ 108 ml/hr x 11.5 hrs(7:30-7am). Reports good tolerance to TF recently and denied any issues/concerns. Reports last receiving TF Sunday-Monday. Reports wt has been stable for last 3 months between 166-168lbs.     Home nutrition support plan: per [facility]  Formula: JEVITY 1.5 hira  Volume: 6 cartons/day (1422mL, 1080ml free  "water)  MOA: Pump feedings, 105ml/hr x 14 hours  Provisions:  2130kcal (29kcal/kg), 90g protein (1.3g/kg), 306g CHO, 30g fiber    CURRENT NUTRITION ORDERS  Diet: NPO      CURRENT INTAKE/TOLERANCE  No documented intakes to assess.    LABS  Nutrition-relevant labs: cre .63,     MEDICATIONS  Nutrition-relevant medications: synthroid, senna docusate    ANTHROPOMETRICS  Height: 1.778 m (5' 10\")    Admission Weight:     Most Recent Weight:    IBW: 75.5  kg  % IBW: 101%  BMI: There is no height or weight on file to calculate BMI.   Weight History: 1.9 kg (2%) weight loss over 8 months.  Wt Readings from Last 20 Encounters:   07/17/25 76.3 kg (168 lb 3.2 oz)   07/06/25 75.3 kg (166 lb)   07/02/25 75.8 kg (167 lb)   06/05/25 76.1 kg (167 lb 12.8 oz)   04/25/25 74.9 kg (165 lb 3.2 oz)   04/04/25 72.6 kg (160 lb)   03/13/25 74.8 kg (164 lb 12.8 oz)   02/21/25 77.1 kg (170 lb)   02/12/25 76.9 kg (169 lb 9.6 oz)   02/07/25 75.9 kg (167 lb 6.4 oz)   01/31/25 74.4 kg (164 lb)   01/17/25 73.9 kg (163 lb)   01/10/25 73.3 kg (161 lb 8 oz)   12/19/24 74.7 kg (164 lb 9.6 oz)   12/14/24 70.9 kg (156 lb 3.2 oz)   12/12/24 72.6 kg (160 lb)   12/06/24 71.7 kg (158 lb)   11/29/24 76.7 kg (169 lb)   11/22/24 78.2 kg (172 lb 4.8 oz)   11/08/24 79.9 kg (176 lb 3.2 oz)         Dosing Weight: 76.3 kg, based on most recent available weight from 7/17    ASSESSED NUTRITION NEEDS  Estimated Energy Needs: 6395-5912+ kcals/day (25 - 30 kcals/kg)  Justification: Maintenance  Estimated Protein Needs: + grams protein/day (1.2 - 1.5 grams of pro/kg)  Justification: Increased needs  Estimated Fluid Needs: 8371-7176 mL/day (25 - 30 mL/kg)  Justification: Maintenance    SYSTEM FINDINGS    GI symptoms: LBM PTA  Skin/wounds: No issues noted, trach    MALNUTRITION  % Intake: No decreased intake noted  % Weight Loss: Weight loss does not meet criteria   Subcutaneous Fat Loss: Buccal: Mild(potentially age appropriate)  Muscle Loss: Temples (temporalis " muscle): Moderate and Shoulders (deltoids): Mild(likely at least in part age appropriate)  Fluid Accumulation/Edema: None noted  Malnutrition Diagnosis: Patient does not meet two of the established criteria necessary for diagnosing malnutrition(suspect losses age appropriate)  Malnutrition Present on Admission: No    NUTRITION DIAGNOSIS  Inadequate oral intake related to dysphagia as evidenced by reliance on TF to meet nutritional needs    INTERVENTIONS  See nutrition interventions above    Goals  Total avg nutritional intake to meet a minimum of 25 kcal/kg and 1.2 g PRO/kg daily (per dosing wt 76.3 kg).      Monitoring/Evaluation  Progress toward goals will be monitored and evaluated per policy.    India Whittington MS, RD, LD, CNSC    6C (beds 0078-9536) + 7C (beds 8055-4237) + ED + Obs  [facility] by name or unit dietitian

## 2025-07-28 NOTE — ED TRIAGE NOTES
Triage Assessment (Adult)       Row Name 07/27/25 2052          Triage Assessment    Airway WDL WDL        Respiratory WDL    Respiratory WDL X;cough  pt has tracheostomy in place, will desat into 80s when phlegm builds up in throat but is able (and prefers) to clear independently     Cough Frequency infrequent     Cough Type congested        Skin Circulation/Temperature WDL    Skin Circulation/Temperature WDL WDL        Cardiac WDL    Cardiac WDL WDL        Peripheral/Neurovascular WDL    Peripheral Neurovascular WDL WDL        Cognitive/Neuro/Behavioral WDL    Cognitive/Neuro/Behavioral WDL WDL

## 2025-07-29 ENCOUNTER — HOSPITAL ENCOUNTER (OUTPATIENT)
Dept: CT IMAGING | Facility: CLINIC | Age: 85
Discharge: HOME | End: 2025-07-29
Attending: INTERNAL MEDICINE
Payer: MEDICARE

## 2025-07-29 DIAGNOSIS — C01 SQUAMOUS CELL CARCINOMA OF BASE OF TONGUE (H): ICD-10-CM

## 2025-07-29 PROCEDURE — 74170 CT ABD WO CNTRST FLWD CNTRST: CPT

## 2025-07-29 PROCEDURE — 70491 CT SOFT TISSUE NECK W/DYE: CPT

## 2025-07-29 PROCEDURE — 250N000011 HC RX IP 250 OP 636: Performed by: RADIOLOGY

## 2025-07-29 PROCEDURE — 250N000009 HC RX 250: Performed by: RADIOLOGY

## 2025-07-29 RX ORDER — IOPAMIDOL 755 MG/ML
115 INJECTION, SOLUTION INTRAVASCULAR ONCE
Status: COMPLETED | OUTPATIENT
Start: 2025-07-29 | End: 2025-07-29

## 2025-07-29 RX ADMIN — IOPAMIDOL 115 ML: 755 INJECTION, SOLUTION INTRAVENOUS at 15:31

## 2025-07-29 RX ADMIN — SODIUM CHLORIDE 80 ML: 9 INJECTION, SOLUTION INTRAVENOUS at 15:31

## 2025-07-30 ENCOUNTER — PATIENT OUTREACH (OUTPATIENT)
Dept: CARE COORDINATION | Facility: CLINIC | Age: 85
End: 2025-07-30
Payer: MEDICARE

## 2025-07-30 NOTE — PROGRESS NOTES
Connected Care Resource Center:   [facility] Care Resource Center Contact  [facility]/Voicemail     Clinical Data: Post-Discharge Outreach     Outreach attempted x 2.  Left message on patient's voicemail, providing [facility]'s central phone number of [phone] ([phone]) for questions/concerns and/or to schedule an appt with an [facility] provider, if they do not have a PCP.      Plan:  [facility] will do no further outreaches at this time.       CLAYTON De La Rosa  Connected Care Resource Center, [facility]    *Connected Care Resource Team does NOT follow patient ongoing. Referrals are identified based on internal discharge reports and the outreach is to ensure patient has an understanding of their discharge instructions.

## 2025-07-31 ENCOUNTER — ONCOLOGY VISIT (OUTPATIENT)
Dept: ONCOLOGY | Facility: CLINIC | Age: 85
End: 2025-07-31
Attending: INTERNAL MEDICINE
Payer: MEDICARE

## 2025-07-31 VITALS
TEMPERATURE: 98.5 F | HEART RATE: 63 BPM | SYSTOLIC BLOOD PRESSURE: 116 MMHG | DIASTOLIC BLOOD PRESSURE: 61 MMHG | OXYGEN SATURATION: 92 % | RESPIRATION RATE: 20 BRPM

## 2025-07-31 DIAGNOSIS — C01 CANCER OF BASE OF TONGUE (H): Primary | ICD-10-CM

## 2025-07-31 DIAGNOSIS — C01 SQUAMOUS CELL CARCINOMA OF BASE OF TONGUE (H): ICD-10-CM

## 2025-07-31 PROCEDURE — G0463 HOSPITAL OUTPT CLINIC VISIT: HCPCS | Performed by: INTERNAL MEDICINE

## 2025-07-31 PROCEDURE — 99215 OFFICE O/P EST HI 40 MIN: CPT | Performed by: INTERNAL MEDICINE

## 2025-07-31 PROCEDURE — G2211 COMPLEX E/M VISIT ADD ON: HCPCS | Performed by: INTERNAL MEDICINE

## 2025-08-04 ENCOUNTER — HOSPITAL ENCOUNTER (OUTPATIENT)
Facility: CLINIC | Age: 85
Setting detail: OBSERVATION
Discharge: HOME | End: 2025-08-06
Attending: EMERGENCY MEDICINE | Admitting: INTERNAL MEDICINE
Payer: MEDICARE

## 2025-08-04 ENCOUNTER — APPOINTMENT (OUTPATIENT)
Dept: CT IMAGING | Facility: CLINIC | Age: 85
End: 2025-08-04
Attending: EMERGENCY MEDICINE
Payer: MEDICARE

## 2025-08-04 ENCOUNTER — TRANSFERRED RECORDS (OUTPATIENT)
Dept: HEALTH INFORMATION MANAGEMENT | Facility: CLINIC | Age: 85
End: 2025-08-04

## 2025-08-04 PROBLEM — J39.2 OROPHARYNGEAL BLEEDING: Status: ACTIVE | Noted: 2024-08-21

## 2025-08-04 PROCEDURE — 70498 CT ANGIOGRAPHY NECK: CPT | Mod: 26 | Performed by: RADIOLOGY

## 2025-08-04 PROCEDURE — 70498 CT ANGIOGRAPHY NECK: CPT

## 2025-08-04 ASSESSMENT — ACTIVITIES OF DAILY LIVING (ADL)
ADLS_ACUITY_SCORE: 58
ADLS_ACUITY_SCORE: 58
ADLS_ACUITY_SCORE: 61
ADLS_ACUITY_SCORE: 58
ADLS_ACUITY_SCORE: 58
ADLS_ACUITY_SCORE: 61
ADLS_ACUITY_SCORE: 61
ADLS_ACUITY_SCORE: 58
ADLS_ACUITY_SCORE: 61
ADLS_ACUITY_SCORE: 58

## 2025-08-05 RX ORDER — MEPERIDINE HYDROCHLORIDE 25 MG/ML
25 INJECTION INTRAMUSCULAR; INTRAVENOUS; SUBCUTANEOUS
Status: CANCELLED | OUTPATIENT
Start: 2025-08-06

## 2025-08-05 RX ORDER — ALBUTEROL SULFATE 0.83 MG/ML
2.5 SOLUTION RESPIRATORY (INHALATION)
Status: CANCELLED | OUTPATIENT
Start: 2025-08-06

## 2025-08-05 RX ORDER — HEPARIN SODIUM,PORCINE 10 UNIT/ML
5-20 VIAL (ML) INTRAVENOUS DAILY PRN
Status: CANCELLED | OUTPATIENT
Start: 2025-08-06

## 2025-08-05 RX ORDER — EPINEPHRINE 1 MG/ML
0.3 INJECTION, SOLUTION INTRAMUSCULAR; SUBCUTANEOUS EVERY 5 MIN PRN
Status: CANCELLED | OUTPATIENT
Start: 2025-08-06

## 2025-08-05 RX ORDER — DIPHENHYDRAMINE HYDROCHLORIDE 50 MG/ML
50 INJECTION, SOLUTION INTRAMUSCULAR; INTRAVENOUS ONCE
Status: CANCELLED | OUTPATIENT
Start: 2025-08-06

## 2025-08-05 RX ORDER — ALBUTEROL SULFATE 90 UG/1
1-2 INHALANT RESPIRATORY (INHALATION)
Status: CANCELLED
Start: 2025-08-06

## 2025-08-05 RX ORDER — METHYLPREDNISOLONE SODIUM SUCCINATE 40 MG/ML
40 INJECTION INTRAMUSCULAR; INTRAVENOUS
Status: CANCELLED
Start: 2025-08-06

## 2025-08-05 RX ORDER — HEPARIN SODIUM (PORCINE) LOCK FLUSH IV SOLN 100 UNIT/ML 100 UNIT/ML
5 SOLUTION INTRAVENOUS
Status: CANCELLED | OUTPATIENT
Start: 2025-08-06

## 2025-08-05 RX ORDER — DIPHENHYDRAMINE HYDROCHLORIDE 50 MG/ML
25 INJECTION, SOLUTION INTRAMUSCULAR; INTRAVENOUS
Status: CANCELLED
Start: 2025-08-06

## 2025-08-05 RX ORDER — DIPHENHYDRAMINE HYDROCHLORIDE 50 MG/ML
50 INJECTION, SOLUTION INTRAMUSCULAR; INTRAVENOUS
Status: CANCELLED
Start: 2025-08-06

## 2025-08-05 RX ORDER — LORAZEPAM 2 MG/ML
0.5 INJECTION INTRAMUSCULAR EVERY 4 HOURS PRN
Status: CANCELLED | OUTPATIENT
Start: 2025-08-06

## 2025-08-05 ASSESSMENT — ACTIVITIES OF DAILY LIVING (ADL)
ADLS_ACUITY_SCORE: 49
ADLS_ACUITY_SCORE: 61
ADLS_ACUITY_SCORE: 49
ADLS_ACUITY_SCORE: 61
DEPENDENT_IADLS:: INDEPENDENT
ADLS_ACUITY_SCORE: 61

## 2025-08-06 ENCOUNTER — APPOINTMENT (OUTPATIENT)
Dept: GENERAL RADIOLOGY | Facility: CLINIC | Age: 85
End: 2025-08-06
Attending: PEDIATRICS
Payer: MEDICARE

## 2025-08-06 ENCOUNTER — APPOINTMENT (OUTPATIENT)
Dept: INTERVENTIONAL RADIOLOGY/VASCULAR | Facility: CLINIC | Age: 85
End: 2025-08-06
Attending: NURSE PRACTITIONER
Payer: MEDICARE

## 2025-08-06 ENCOUNTER — APPOINTMENT (OUTPATIENT)
Dept: GENERAL RADIOLOGY | Facility: CLINIC | Age: 85
End: 2025-08-06
Payer: MEDICARE

## 2025-08-06 PROCEDURE — 74018 RADEX ABDOMEN 1 VIEW: CPT

## 2025-08-06 PROCEDURE — 74018 RADEX ABDOMEN 1 VIEW: CPT | Mod: 26 | Performed by: RADIOLOGY

## 2025-08-06 PROCEDURE — 49452 REPLACE G-J TUBE PERC: CPT | Mod: GC | Performed by: RADIOLOGY

## 2025-08-06 PROCEDURE — 999N000065 XR ABDOMEN PORT 1 VIEW

## 2025-08-06 PROCEDURE — 74018 RADEX ABDOMEN 1 VIEW: CPT | Mod: 26 | Performed by: STUDENT IN AN ORGANIZED HEALTH CARE EDUCATION/TRAINING PROGRAM

## 2025-08-06 PROCEDURE — 49452 REPLACE G-J TUBE PERC: CPT

## 2025-08-06 ASSESSMENT — ACTIVITIES OF DAILY LIVING (ADL)
ADLS_ACUITY_SCORE: 49

## 2025-08-07 ENCOUNTER — HOSPITAL ENCOUNTER (EMERGENCY)
Facility: CLINIC | Age: 85
DRG: 144 | End: 2025-08-07
Attending: EMERGENCY MEDICINE
Payer: MEDICARE

## 2025-08-07 DIAGNOSIS — C01 SQUAMOUS CELL CARCINOMA OF BASE OF TONGUE (H): ICD-10-CM

## 2025-08-07 DIAGNOSIS — Z93.0 TRACHEOSTOMY PRESENT (H): ICD-10-CM

## 2025-08-07 DIAGNOSIS — R23.1 PALLOR: ICD-10-CM

## 2025-08-07 DIAGNOSIS — J39.2 OROPHARYNGEAL BLEEDING: Primary | ICD-10-CM

## 2025-08-07 DIAGNOSIS — C01 CANCER OF BASE OF TONGUE (H): Primary | ICD-10-CM

## 2025-08-07 DIAGNOSIS — C01 CANCER OF BASE OF TONGUE (H): ICD-10-CM

## 2025-08-07 LAB
ABO + RH BLD: NORMAL
BLD GP AB SCN SERPL QL: NEGATIVE
SPECIMEN EXP DATE BLD: NORMAL

## 2025-08-07 PROCEDURE — 99291 CRITICAL CARE FIRST HOUR: CPT | Performed by: EMERGENCY MEDICINE

## 2025-08-07 PROCEDURE — 999N000157 HC STATISTIC RCP TIME EA 10 MIN

## 2025-08-07 PROCEDURE — 85004 AUTOMATED DIFF WBC COUNT: CPT | Performed by: EMERGENCY MEDICINE

## 2025-08-07 PROCEDURE — 0CJS8ZZ INSPECTION OF LARYNX, VIA NATURAL OR ARTIFICIAL OPENING ENDOSCOPIC: ICD-10-PCS | Performed by: OTOLARYNGOLOGY

## 2025-08-07 PROCEDURE — 36415 COLL VENOUS BLD VENIPUNCTURE: CPT | Performed by: EMERGENCY MEDICINE

## 2025-08-07 PROCEDURE — 99285 EMERGENCY DEPT VISIT HI MDM: CPT | Mod: 25 | Performed by: EMERGENCY MEDICINE

## 2025-08-07 PROCEDURE — 250N000009 HC RX 250: Performed by: EMERGENCY MEDICINE

## 2025-08-07 PROCEDURE — 85730 THROMBOPLASTIN TIME PARTIAL: CPT | Performed by: EMERGENCY MEDICINE

## 2025-08-07 PROCEDURE — 85610 PROTHROMBIN TIME: CPT | Performed by: EMERGENCY MEDICINE

## 2025-08-07 PROCEDURE — 82040 ASSAY OF SERUM ALBUMIN: CPT | Performed by: EMERGENCY MEDICINE

## 2025-08-07 PROCEDURE — 94640 AIRWAY INHALATION TREATMENT: CPT | Mod: 76

## 2025-08-07 PROCEDURE — 86901 BLOOD TYPING SEROLOGIC RH(D): CPT | Performed by: EMERGENCY MEDICINE

## 2025-08-07 RX ORDER — ALBUTEROL SULFATE 0.83 MG/ML
2.5 SOLUTION RESPIRATORY (INHALATION) EVERY 8 HOURS PRN
Status: DISCONTINUED | OUTPATIENT
Start: 2025-08-07 | End: 2025-08-08

## 2025-08-07 RX ADMIN — ALBUTEROL SULFATE 2.5 MG: 2.5 SOLUTION RESPIRATORY (INHALATION) at 23:45

## 2025-08-07 RX ADMIN — TRANEXAMIC ACID 500 MG: 1 INJECTION, SOLUTION INTRAVENOUS at 23:45

## 2025-08-08 ENCOUNTER — APPOINTMENT (OUTPATIENT)
Dept: CT IMAGING | Facility: CLINIC | Age: 85
DRG: 144 | End: 2025-08-08
Attending: EMERGENCY MEDICINE
Payer: MEDICARE

## 2025-08-08 ENCOUNTER — APPOINTMENT (OUTPATIENT)
Dept: INTERVENTIONAL RADIOLOGY/VASCULAR | Facility: CLINIC | Age: 85
DRG: 144 | End: 2025-08-08
Attending: STUDENT IN AN ORGANIZED HEALTH CARE EDUCATION/TRAINING PROGRAM
Payer: MEDICARE

## 2025-08-08 VITALS
SYSTOLIC BLOOD PRESSURE: 140 MMHG | OXYGEN SATURATION: 95 % | TEMPERATURE: 99.2 F | HEART RATE: 62 BPM | RESPIRATION RATE: 16 BRPM | DIASTOLIC BLOOD PRESSURE: 98 MMHG

## 2025-08-08 PROBLEM — J95.01 TRACHEAL HEMORRHAGE (H): Status: ACTIVE | Noted: 2025-08-08

## 2025-08-08 LAB
ALBUMIN SERPL BCG-MCNC: 3.5 G/DL (ref 3.5–5.2)
ALP SERPL-CCNC: 103 U/L (ref 40–150)
ALT SERPL W P-5'-P-CCNC: 11 U/L (ref 0–70)
ANION GAP SERPL CALCULATED.3IONS-SCNC: 12 MMOL/L (ref 7–15)
ANION GAP SERPL CALCULATED.3IONS-SCNC: 8 MMOL/L (ref 7–15)
APTT PPP: 30 SECONDS (ref 22–38)
AST SERPL W P-5'-P-CCNC: 30 U/L (ref 0–45)
BASOPHILS # BLD AUTO: 0 10E3/UL (ref 0–0.2)
BASOPHILS NFR BLD AUTO: 0 %
BILIRUB SERPL-MCNC: 0.3 MG/DL
BUN SERPL-MCNC: 17.9 MG/DL (ref 8–23)
BUN SERPL-MCNC: 22.7 MG/DL (ref 8–23)
CALCIUM SERPL-MCNC: 8.9 MG/DL (ref 8.8–10.4)
CALCIUM SERPL-MCNC: 9.1 MG/DL (ref 8.8–10.4)
CHLORIDE SERPL-SCNC: 99 MMOL/L (ref 98–107)
CHLORIDE SERPL-SCNC: 99 MMOL/L (ref 98–107)
CREAT SERPL-MCNC: 0.57 MG/DL (ref 0.67–1.17)
CREAT SERPL-MCNC: 0.64 MG/DL (ref 0.67–1.17)
EGFRCR SERPLBLD CKD-EPI 2021: >90 ML/MIN/1.73M2
EGFRCR SERPLBLD CKD-EPI 2021: >90 ML/MIN/1.73M2
EOSINOPHIL # BLD AUTO: 0.1 10E3/UL (ref 0–0.7)
EOSINOPHIL NFR BLD AUTO: 1 %
ERYTHROCYTE [DISTWIDTH] IN BLOOD BY AUTOMATED COUNT: 14.3 % (ref 10–15)
ERYTHROCYTE [DISTWIDTH] IN BLOOD BY AUTOMATED COUNT: 14.3 % (ref 10–15)
GLUCOSE SERPL-MCNC: 106 MG/DL (ref 70–99)
GLUCOSE SERPL-MCNC: 80 MG/DL (ref 70–99)
HCO3 SERPL-SCNC: 28 MMOL/L (ref 22–29)
HCO3 SERPL-SCNC: 30 MMOL/L (ref 22–29)
HCT VFR BLD AUTO: 30.3 % (ref 40–53)
HCT VFR BLD AUTO: 33 % (ref 40–53)
HGB BLD-MCNC: 10.5 G/DL (ref 13.3–17.7)
HGB BLD-MCNC: 9.5 G/DL (ref 13.3–17.7)
HOLD SPECIMEN: NORMAL
HOLD SPECIMEN: NORMAL
IMM GRANULOCYTES # BLD: 0 10E3/UL
IMM GRANULOCYTES NFR BLD: 0 %
INR PPP: 1.18 (ref 0.85–1.15)
LYMPHOCYTES # BLD AUTO: 0.5 10E3/UL (ref 0.8–5.3)
LYMPHOCYTES NFR BLD AUTO: 6 %
MCH RBC QN AUTO: 30.4 PG (ref 26.5–33)
MCH RBC QN AUTO: 30.4 PG (ref 26.5–33)
MCHC RBC AUTO-ENTMCNC: 31.4 G/DL (ref 31.5–36.5)
MCHC RBC AUTO-ENTMCNC: 31.8 G/DL (ref 31.5–36.5)
MCV RBC AUTO: 96 FL (ref 78–100)
MCV RBC AUTO: 97 FL (ref 78–100)
MONOCYTES # BLD AUTO: 0.7 10E3/UL (ref 0–1.3)
MONOCYTES NFR BLD AUTO: 10 %
NEUTROPHILS # BLD AUTO: 6.1 10E3/UL (ref 1.6–8.3)
NEUTROPHILS NFR BLD AUTO: 82 %
NRBC # BLD AUTO: 0 10E3/UL
NRBC BLD AUTO-RTO: 0 /100
PLATELET # BLD AUTO: 216 10E3/UL (ref 150–450)
PLATELET # BLD AUTO: 237 10E3/UL (ref 150–450)
POTASSIUM SERPL-SCNC: 4.4 MMOL/L (ref 3.4–5.3)
POTASSIUM SERPL-SCNC: 4.8 MMOL/L (ref 3.4–5.3)
PROT SERPL-MCNC: 7.3 G/DL (ref 6.4–8.3)
PROTHROMBIN TIME: 14.9 SECONDS (ref 11.8–14.8)
RBC # BLD AUTO: 3.12 10E6/UL (ref 4.4–5.9)
RBC # BLD AUTO: 3.45 10E6/UL (ref 4.4–5.9)
SODIUM SERPL-SCNC: 137 MMOL/L (ref 135–145)
SODIUM SERPL-SCNC: 139 MMOL/L (ref 135–145)
WBC # BLD AUTO: 5.8 10E3/UL (ref 4–11)
WBC # BLD AUTO: 7.4 10E3/UL (ref 4–11)

## 2025-08-08 PROCEDURE — 250N000011 HC RX IP 250 OP 636: Performed by: STUDENT IN AN ORGANIZED HEALTH CARE EDUCATION/TRAINING PROGRAM

## 2025-08-08 PROCEDURE — B3191ZZ FLUOROSCOPY OF RIGHT EXTERNAL CAROTID ARTERY USING LOW OSMOLAR CONTRAST: ICD-10-PCS | Performed by: NEUROLOGICAL SURGERY

## 2025-08-08 PROCEDURE — 272N000506 HC NEEDLE CR6

## 2025-08-08 PROCEDURE — 272N000192 HC ACCESSORY CR2

## 2025-08-08 PROCEDURE — C1769 GUIDE WIRE: HCPCS

## 2025-08-08 PROCEDURE — 250N000011 HC RX IP 250 OP 636: Performed by: EMERGENCY MEDICINE

## 2025-08-08 PROCEDURE — C1760 CLOSURE DEV, VASC: HCPCS

## 2025-08-08 PROCEDURE — 120N000005 HC R&B MS OVERFLOW UMMC

## 2025-08-08 PROCEDURE — 85027 COMPLETE CBC AUTOMATED: CPT

## 2025-08-08 PROCEDURE — 258N000003 HC RX IP 258 OP 636: Performed by: STUDENT IN AN ORGANIZED HEALTH CARE EDUCATION/TRAINING PROGRAM

## 2025-08-08 PROCEDURE — 255N000002 HC RX 255 OP 636: Performed by: STUDENT IN AN ORGANIZED HEALTH CARE EDUCATION/TRAINING PROGRAM

## 2025-08-08 PROCEDURE — 250N000009 HC RX 250

## 2025-08-08 PROCEDURE — 272N000566 HC SHEATH CR3

## 2025-08-08 PROCEDURE — 94640 AIRWAY INHALATION TREATMENT: CPT

## 2025-08-08 PROCEDURE — G0378 HOSPITAL OBSERVATION PER HR: HCPCS

## 2025-08-08 PROCEDURE — 272N000194 HC ACCESSORY CR3

## 2025-08-08 PROCEDURE — 99153 MOD SED SAME PHYS/QHP EA: CPT

## 2025-08-08 PROCEDURE — 36415 COLL VENOUS BLD VENIPUNCTURE: CPT

## 2025-08-08 PROCEDURE — 36227 PLACE CATH XTRNL CAROTID: CPT | Mod: RT | Performed by: NEUROLOGICAL SURGERY

## 2025-08-08 PROCEDURE — 272N000116 HC CATH CR1

## 2025-08-08 PROCEDURE — 80048 BASIC METABOLIC PNL TOTAL CA: CPT

## 2025-08-08 PROCEDURE — 99232 SBSQ HOSP IP/OBS MODERATE 35: CPT | Mod: FS | Performed by: PHYSICIAN ASSISTANT

## 2025-08-08 PROCEDURE — 999N000157 HC STATISTIC RCP TIME EA 10 MIN

## 2025-08-08 PROCEDURE — 36223 PLACE CATH CAROTID/INOM ART: CPT | Mod: RT | Performed by: NEUROLOGICAL SURGERY

## 2025-08-08 PROCEDURE — 36227 PLACE CATH XTRNL CAROTID: CPT

## 2025-08-08 PROCEDURE — 250N000013 HC RX MED GY IP 250 OP 250 PS 637

## 2025-08-08 PROCEDURE — 70498 CT ANGIOGRAPHY NECK: CPT

## 2025-08-08 PROCEDURE — 250N000009 HC RX 250: Performed by: EMERGENCY MEDICINE

## 2025-08-08 PROCEDURE — 70498 CT ANGIOGRAPHY NECK: CPT | Mod: 26 | Performed by: RADIOLOGY

## 2025-08-08 PROCEDURE — 99223 1ST HOSP IP/OBS HIGH 75: CPT | Mod: GC | Performed by: ORTHOPAEDIC SURGERY

## 2025-08-08 PROCEDURE — 272N000613 HC CATH NEURO CR21

## 2025-08-08 PROCEDURE — 36223 PLACE CATH CAROTID/INOM ART: CPT | Mod: RT

## 2025-08-08 PROCEDURE — C1887 CATHETER, GUIDING: HCPCS

## 2025-08-08 PROCEDURE — B3131ZZ FLUOROSCOPY OF RIGHT COMMON CAROTID ARTERY USING LOW OSMOLAR CONTRAST: ICD-10-PCS | Performed by: NEUROLOGICAL SURGERY

## 2025-08-08 PROCEDURE — 76937 US GUIDE VASCULAR ACCESS: CPT | Mod: GC | Performed by: NEUROLOGICAL SURGERY

## 2025-08-08 RX ORDER — AMOXICILLIN 250 MG
1 CAPSULE ORAL 2 TIMES DAILY PRN
Status: DISCONTINUED | OUTPATIENT
Start: 2025-08-08 | End: 2025-08-13 | Stop reason: HOSPADM

## 2025-08-08 RX ORDER — SODIUM CHLORIDE 9 MG/ML
INJECTION, SOLUTION INTRAVENOUS CONTINUOUS
Status: DISCONTINUED | OUTPATIENT
Start: 2025-08-08 | End: 2025-08-11

## 2025-08-08 RX ORDER — LEVOTHYROXINE SODIUM 112 UG/1
112 TABLET ORAL DAILY
Status: DISCONTINUED | OUTPATIENT
Start: 2025-08-08 | End: 2025-08-12

## 2025-08-08 RX ORDER — ONDANSETRON 4 MG/1
4 TABLET, ORALLY DISINTEGRATING ORAL EVERY 6 HOURS PRN
Status: DISCONTINUED | OUTPATIENT
Start: 2025-08-08 | End: 2025-08-13 | Stop reason: HOSPADM

## 2025-08-08 RX ORDER — PROCHLORPERAZINE MALEATE 5 MG/1
5 TABLET ORAL EVERY 6 HOURS PRN
Status: DISCONTINUED | OUTPATIENT
Start: 2025-08-08 | End: 2025-08-13 | Stop reason: HOSPADM

## 2025-08-08 RX ORDER — FENTANYL CITRATE 50 UG/ML
25-50 INJECTION, SOLUTION INTRAMUSCULAR; INTRAVENOUS EVERY 5 MIN PRN
Refills: 0 | Status: DISCONTINUED | OUTPATIENT
Start: 2025-08-08 | End: 2025-08-08

## 2025-08-08 RX ORDER — NALOXONE HYDROCHLORIDE 0.4 MG/ML
0.2 INJECTION, SOLUTION INTRAMUSCULAR; INTRAVENOUS; SUBCUTANEOUS
Status: DISCONTINUED | OUTPATIENT
Start: 2025-08-08 | End: 2025-08-08

## 2025-08-08 RX ORDER — HEPARIN SODIUM 200 [USP'U]/100ML
1 INJECTION, SOLUTION INTRAVENOUS EVERY 5 MIN PRN
Status: DISCONTINUED | OUTPATIENT
Start: 2025-08-08 | End: 2025-08-08

## 2025-08-08 RX ORDER — ALCOHOL 1 ML/ML
2 INJECTION, SOLUTION PERCUTANEOUS ONCE
Status: DISCONTINUED | OUTPATIENT
Start: 2025-08-08 | End: 2025-08-13

## 2025-08-08 RX ORDER — SODIUM CHLORIDE FOR INHALATION 0.9 %
3 VIAL, NEBULIZER (ML) INHALATION 2 TIMES DAILY
Status: DISCONTINUED | OUTPATIENT
Start: 2025-08-08 | End: 2025-08-13 | Stop reason: HOSPADM

## 2025-08-08 RX ORDER — IOPAMIDOL 755 MG/ML
67 INJECTION, SOLUTION INTRAVASCULAR ONCE
Status: COMPLETED | OUTPATIENT
Start: 2025-08-08 | End: 2025-08-08

## 2025-08-08 RX ORDER — ALBUTEROL SULFATE 0.83 MG/ML
2.5 SOLUTION RESPIRATORY (INHALATION) EVERY 8 HOURS PRN
Status: DISCONTINUED | OUTPATIENT
Start: 2025-08-08 | End: 2025-08-08

## 2025-08-08 RX ORDER — ACETAMINOPHEN 325 MG/1
325 TABLET ORAL EVERY 4 HOURS PRN
Status: DISCONTINUED | OUTPATIENT
Start: 2025-08-08 | End: 2025-08-08

## 2025-08-08 RX ORDER — ALBUTEROL SULFATE 0.83 MG/ML
2.5 SOLUTION RESPIRATORY (INHALATION)
Status: DISCONTINUED | OUTPATIENT
Start: 2025-08-08 | End: 2025-08-10

## 2025-08-08 RX ORDER — ACETAMINOPHEN 650 MG/1
650 SUPPOSITORY RECTAL EVERY 4 HOURS PRN
Status: DISCONTINUED | OUTPATIENT
Start: 2025-08-08 | End: 2025-08-13 | Stop reason: HOSPADM

## 2025-08-08 RX ORDER — LIDOCAINE 40 MG/G
CREAM TOPICAL
Status: CANCELLED | OUTPATIENT
Start: 2025-08-08

## 2025-08-08 RX ORDER — ALBUTEROL SULFATE 0.83 MG/ML
2.5 SOLUTION RESPIRATORY (INHALATION) EVERY 4 HOURS PRN
Status: DISCONTINUED | OUTPATIENT
Start: 2025-08-08 | End: 2025-08-13 | Stop reason: HOSPADM

## 2025-08-08 RX ORDER — AMOXICILLIN 250 MG
2 CAPSULE ORAL 2 TIMES DAILY PRN
Status: DISCONTINUED | OUTPATIENT
Start: 2025-08-08 | End: 2025-08-13 | Stop reason: HOSPADM

## 2025-08-08 RX ORDER — NALOXONE HYDROCHLORIDE 0.4 MG/ML
0.4 INJECTION, SOLUTION INTRAMUSCULAR; INTRAVENOUS; SUBCUTANEOUS
Status: DISCONTINUED | OUTPATIENT
Start: 2025-08-08 | End: 2025-08-08

## 2025-08-08 RX ORDER — POLYETHYLENE GLYCOL 3350 17 G/17G
17 POWDER, FOR SOLUTION ORAL 2 TIMES DAILY PRN
Status: DISCONTINUED | OUTPATIENT
Start: 2025-08-08 | End: 2025-08-13 | Stop reason: HOSPADM

## 2025-08-08 RX ORDER — FLUMAZENIL 0.1 MG/ML
0.2 INJECTION, SOLUTION INTRAVENOUS
Status: DISCONTINUED | OUTPATIENT
Start: 2025-08-08 | End: 2025-08-08

## 2025-08-08 RX ORDER — HEPARIN SODIUM 1000 [USP'U]/ML
5000 INJECTION, SOLUTION INTRAVENOUS; SUBCUTANEOUS
Status: COMPLETED | OUTPATIENT
Start: 2025-08-08 | End: 2025-08-08

## 2025-08-08 RX ORDER — ACETAMINOPHEN 325 MG/1
650 TABLET ORAL EVERY 4 HOURS PRN
Status: DISCONTINUED | OUTPATIENT
Start: 2025-08-08 | End: 2025-08-13 | Stop reason: HOSPADM

## 2025-08-08 RX ORDER — FERROUS SULFATE 220 (44)/5
325.6 ELIXIR ORAL EVERY OTHER DAY
Status: DISCONTINUED | OUTPATIENT
Start: 2025-08-08 | End: 2025-08-13 | Stop reason: HOSPADM

## 2025-08-08 RX ORDER — SODIUM CHLORIDE 9 MG/ML
INJECTION, SOLUTION INTRAVENOUS CONTINUOUS
Status: CANCELLED | OUTPATIENT
Start: 2025-08-08

## 2025-08-08 RX ORDER — IODIXANOL 320 MG/ML
150 INJECTION, SOLUTION INTRAVASCULAR ONCE
Status: COMPLETED | OUTPATIENT
Start: 2025-08-08 | End: 2025-08-08

## 2025-08-08 RX ORDER — ONDANSETRON 2 MG/ML
4 INJECTION INTRAMUSCULAR; INTRAVENOUS EVERY 6 HOURS PRN
Status: DISCONTINUED | OUTPATIENT
Start: 2025-08-08 | End: 2025-08-13 | Stop reason: HOSPADM

## 2025-08-08 RX ORDER — LIDOCAINE HYDROCHLORIDE 40 MG/ML
SOLUTION TOPICAL
Status: DISCONTINUED
Start: 2025-08-08 | End: 2025-08-08 | Stop reason: HOSPADM

## 2025-08-08 RX ADMIN — MIDAZOLAM 0.5 MG: 1 INJECTION INTRAMUSCULAR; INTRAVENOUS at 15:35

## 2025-08-08 RX ADMIN — SODIUM CHLORIDE: 0.9 INJECTION, SOLUTION INTRAVENOUS at 18:55

## 2025-08-08 RX ADMIN — FENTANYL CITRATE 25 MCG: 50 INJECTION INTRAMUSCULAR; INTRAVENOUS at 15:36

## 2025-08-08 RX ADMIN — Medication 325.6 MG: at 08:55

## 2025-08-08 RX ADMIN — IODIXANOL 50 ML: 320 INJECTION, SOLUTION INTRAVASCULAR at 16:24

## 2025-08-08 RX ADMIN — SODIUM CHLORIDE 90 ML: 9 INJECTION, SOLUTION INTRAVENOUS at 02:09

## 2025-08-08 RX ADMIN — TRANEXAMIC ACID 500 MG: 1 INJECTION, SOLUTION INTRAVENOUS at 06:16

## 2025-08-08 RX ADMIN — LEVOTHYROXINE SODIUM 112 MCG: 0.11 TABLET ORAL at 08:55

## 2025-08-08 RX ADMIN — ALBUTEROL SULFATE 2.5 MG: 2.5 SOLUTION RESPIRATORY (INHALATION) at 06:17

## 2025-08-08 RX ADMIN — FENTANYL CITRATE 25 MCG: 50 INJECTION INTRAMUSCULAR; INTRAVENOUS at 15:03

## 2025-08-08 RX ADMIN — MIDAZOLAM 0.5 MG: 1 INJECTION INTRAMUSCULAR; INTRAVENOUS at 15:03

## 2025-08-08 RX ADMIN — HEPARIN SODIUM 5000 UNITS: 1000 INJECTION, SOLUTION INTRAVENOUS; SUBCUTANEOUS at 15:20

## 2025-08-08 RX ADMIN — IOPAMIDOL 67 ML: 755 INJECTION, SOLUTION INTRAVENOUS at 02:09

## 2025-08-08 ASSESSMENT — ACTIVITIES OF DAILY LIVING (ADL)
ADLS_ACUITY_SCORE: 49
ADLS_ACUITY_SCORE: 59
DRESSING/BATHING_DIFFICULTY: NO
VISION_MANAGEMENT: GLASSES
WERE_AUXILIARY_AIDS_OFFERED?: NO
ADLS_ACUITY_SCORE: 59
COMMUNICATION: UNABLE TO SPEAK
FALL_HISTORY_WITHIN_LAST_SIX_MONTHS: NO
WALKING_OR_CLIMBING_STAIRS_DIFFICULTY: NO
ADLS_ACUITY_SCORE: 59
ADLS_ACUITY_SCORE: 59
ADLS_ACUITY_SCORE: 61
EATING/SWALLOWING: OTHER (SEE COMMENTS)
ADLS_ACUITY_SCORE: 59
ADLS_ACUITY_SCORE: 59
DIFFICULTY_COMMUNICATING: YES
PATIENT'S_PREFERRED_MEANS_OF_COMMUNICATION: OTHER
DOING_ERRANDS_INDEPENDENTLY_DIFFICULTY: NO
DESCRIBE_HEARING_LOSS: BILATERAL HEARING LOSS
ADLS_ACUITY_SCORE: 59
ADLS_ACUITY_SCORE: 59
DIFFICULTY_EATING/SWALLOWING: YES
ADLS_ACUITY_SCORE: 59
ADLS_ACUITY_SCORE: 49
ADLS_ACUITY_SCORE: 59
ADLS_ACUITY_SCORE: 59
ADLS_ACUITY_SCORE: 61
CHANGE_IN_FUNCTIONAL_STATUS_SINCE_ONSET_OF_CURRENT_ILLNESS/INJURY: NO
HEARING_DIFFICULTY_OR_DEAF: YES
CONCENTRATING,_REMEMBERING_OR_MAKING_DECISIONS_DIFFICULTY: NO
ADLS_ACUITY_SCORE: 59
ADLS_ACUITY_SCORE: 59
ADLS_ACUITY_SCORE: 49
TOILETING_ISSUES: NO
WEAR_GLASSES_OR_BLIND: YES
THE_FOLLOWING_AIDS_WERE_PROVIDED;: PICTURE OR WORD COMMUNICATION BOARD
ADLS_ACUITY_SCORE: 49

## 2025-08-09 LAB
ANION GAP SERPL CALCULATED.3IONS-SCNC: 9 MMOL/L (ref 7–15)
BUN SERPL-MCNC: 17.8 MG/DL (ref 8–23)
CALCIUM SERPL-MCNC: 8.4 MG/DL (ref 8.8–10.4)
CHLORIDE SERPL-SCNC: 101 MMOL/L (ref 98–107)
CREAT SERPL-MCNC: 0.53 MG/DL (ref 0.67–1.17)
EGFRCR SERPLBLD CKD-EPI 2021: >90 ML/MIN/1.73M2
ERYTHROCYTE [DISTWIDTH] IN BLOOD BY AUTOMATED COUNT: 13.9 % (ref 10–15)
GLUCOSE SERPL-MCNC: 143 MG/DL (ref 70–99)
HCO3 SERPL-SCNC: 30 MMOL/L (ref 22–29)
HCT VFR BLD AUTO: 31.3 % (ref 40–53)
HGB BLD-MCNC: 10 G/DL (ref 13.3–17.7)
MCH RBC QN AUTO: 30.3 PG (ref 26.5–33)
MCHC RBC AUTO-ENTMCNC: 31.9 G/DL (ref 31.5–36.5)
MCV RBC AUTO: 95 FL (ref 78–100)
PLATELET # BLD AUTO: 223 10E3/UL (ref 150–450)
POTASSIUM SERPL-SCNC: 4.2 MMOL/L (ref 3.4–5.3)
RBC # BLD AUTO: 3.3 10E6/UL (ref 4.4–5.9)
SODIUM SERPL-SCNC: 140 MMOL/L (ref 135–145)
WBC # BLD AUTO: 7.4 10E3/UL (ref 4–11)

## 2025-08-09 PROCEDURE — 120N000005 HC R&B MS OVERFLOW UMMC

## 2025-08-09 PROCEDURE — 999N000215 HC STATISTIC HFNC ADULT NON-CPAP

## 2025-08-09 PROCEDURE — 99223 1ST HOSP IP/OBS HIGH 75: CPT | Mod: GC | Performed by: INTERNAL MEDICINE

## 2025-08-09 PROCEDURE — 258N000003 HC RX IP 258 OP 636: Performed by: STUDENT IN AN ORGANIZED HEALTH CARE EDUCATION/TRAINING PROGRAM

## 2025-08-09 PROCEDURE — 36415 COLL VENOUS BLD VENIPUNCTURE: CPT | Performed by: PHYSICIAN ASSISTANT

## 2025-08-09 PROCEDURE — 250N000013 HC RX MED GY IP 250 OP 250 PS 637

## 2025-08-09 PROCEDURE — 999N000248 HC STATISTIC IV INSERT WITH US BY RN

## 2025-08-09 PROCEDURE — 99233 SBSQ HOSP IP/OBS HIGH 50: CPT | Performed by: INTERNAL MEDICINE

## 2025-08-09 PROCEDURE — 80048 BASIC METABOLIC PNL TOTAL CA: CPT | Performed by: PHYSICIAN ASSISTANT

## 2025-08-09 PROCEDURE — 85018 HEMOGLOBIN: CPT | Performed by: PHYSICIAN ASSISTANT

## 2025-08-09 PROCEDURE — 999N000157 HC STATISTIC RCP TIME EA 10 MIN

## 2025-08-09 RX ADMIN — SODIUM CHLORIDE: 0.9 INJECTION, SOLUTION INTRAVENOUS at 08:08

## 2025-08-09 RX ADMIN — LEVOTHYROXINE SODIUM 112 MCG: 0.11 TABLET ORAL at 07:56

## 2025-08-09 ASSESSMENT — ACTIVITIES OF DAILY LIVING (ADL)
ADLS_ACUITY_SCORE: 47
ADLS_ACUITY_SCORE: 49
ADLS_ACUITY_SCORE: 47
ADLS_ACUITY_SCORE: 49
ADLS_ACUITY_SCORE: 47
ADLS_ACUITY_SCORE: 49
ADLS_ACUITY_SCORE: 47
ADLS_ACUITY_SCORE: 49
ADLS_ACUITY_SCORE: 47
ADLS_ACUITY_SCORE: 47
ADLS_ACUITY_SCORE: 49
ADLS_ACUITY_SCORE: 47
ADLS_ACUITY_SCORE: 47
ADLS_ACUITY_SCORE: 49
ADLS_ACUITY_SCORE: 47
ADLS_ACUITY_SCORE: 49
ADLS_ACUITY_SCORE: 47

## 2025-08-10 PROCEDURE — 999N000157 HC STATISTIC RCP TIME EA 10 MIN

## 2025-08-10 PROCEDURE — 99233 SBSQ HOSP IP/OBS HIGH 50: CPT | Performed by: STUDENT IN AN ORGANIZED HEALTH CARE EDUCATION/TRAINING PROGRAM

## 2025-08-10 PROCEDURE — 120N000005 HC R&B MS OVERFLOW UMMC

## 2025-08-10 PROCEDURE — 999N000215 HC STATISTIC HFNC ADULT NON-CPAP

## 2025-08-10 PROCEDURE — 250N000013 HC RX MED GY IP 250 OP 250 PS 637

## 2025-08-10 PROCEDURE — 258N000003 HC RX IP 258 OP 636: Performed by: STUDENT IN AN ORGANIZED HEALTH CARE EDUCATION/TRAINING PROGRAM

## 2025-08-10 RX ORDER — ALBUTEROL SULFATE 0.83 MG/ML
2.5 SOLUTION RESPIRATORY (INHALATION)
Status: DISCONTINUED | OUTPATIENT
Start: 2025-08-11 | End: 2025-08-13 | Stop reason: HOSPADM

## 2025-08-10 RX ADMIN — Medication 325.6 MG: at 08:41

## 2025-08-10 RX ADMIN — LEVOTHYROXINE SODIUM 112 MCG: 0.11 TABLET ORAL at 08:41

## 2025-08-10 RX ADMIN — SODIUM CHLORIDE: 0.9 INJECTION, SOLUTION INTRAVENOUS at 16:02

## 2025-08-10 RX ADMIN — SODIUM CHLORIDE: 0.9 INJECTION, SOLUTION INTRAVENOUS at 04:14

## 2025-08-10 ASSESSMENT — ACTIVITIES OF DAILY LIVING (ADL)
ADLS_ACUITY_SCORE: 49
ADLS_ACUITY_SCORE: 49
ADLS_ACUITY_SCORE: 47
ADLS_ACUITY_SCORE: 49
ADLS_ACUITY_SCORE: 49
DEPENDENT_IADLS:: INDEPENDENT
ADLS_ACUITY_SCORE: 47
ADLS_ACUITY_SCORE: 47
ADLS_ACUITY_SCORE: 49
ADLS_ACUITY_SCORE: 49
ADLS_ACUITY_SCORE: 47
ADLS_ACUITY_SCORE: 47
ADLS_ACUITY_SCORE: 49
ADLS_ACUITY_SCORE: 47
ADLS_ACUITY_SCORE: 49
ADLS_ACUITY_SCORE: 47
ADLS_ACUITY_SCORE: 49
ADLS_ACUITY_SCORE: 47
ADLS_ACUITY_SCORE: 49

## 2025-08-11 ENCOUNTER — ANESTHESIA EVENT (OUTPATIENT)
Dept: SURGERY | Facility: CLINIC | Age: 85
End: 2025-08-11
Payer: MEDICARE

## 2025-08-11 PROCEDURE — 94640 AIRWAY INHALATION TREATMENT: CPT | Mod: 76

## 2025-08-11 PROCEDURE — 94640 AIRWAY INHALATION TREATMENT: CPT

## 2025-08-11 PROCEDURE — 250N000013 HC RX MED GY IP 250 OP 250 PS 637

## 2025-08-11 PROCEDURE — 258N000003 HC RX IP 258 OP 636: Performed by: STUDENT IN AN ORGANIZED HEALTH CARE EDUCATION/TRAINING PROGRAM

## 2025-08-11 PROCEDURE — 99232 SBSQ HOSP IP/OBS MODERATE 35: CPT | Performed by: STUDENT IN AN ORGANIZED HEALTH CARE EDUCATION/TRAINING PROGRAM

## 2025-08-11 PROCEDURE — 120N000005 HC R&B MS OVERFLOW UMMC

## 2025-08-11 PROCEDURE — 250N000009 HC RX 250: Performed by: INTERNAL MEDICINE

## 2025-08-11 PROCEDURE — 999N000157 HC STATISTIC RCP TIME EA 10 MIN

## 2025-08-11 RX ADMIN — ISODIUM CHLORIDE 3 ML: 0.03 SOLUTION RESPIRATORY (INHALATION) at 09:55

## 2025-08-11 RX ADMIN — LEVOTHYROXINE SODIUM 112 MCG: 0.11 TABLET ORAL at 08:39

## 2025-08-11 RX ADMIN — SODIUM CHLORIDE: 0.9 INJECTION, SOLUTION INTRAVENOUS at 03:52

## 2025-08-11 RX ADMIN — ALBUTEROL SULFATE 2.5 MG: 2.5 SOLUTION RESPIRATORY (INHALATION) at 09:55

## 2025-08-11 RX ADMIN — ALBUTEROL SULFATE 2.5 MG: 2.5 SOLUTION RESPIRATORY (INHALATION) at 20:55

## 2025-08-11 ASSESSMENT — ACTIVITIES OF DAILY LIVING (ADL)
ADLS_ACUITY_SCORE: 49
ADLS_ACUITY_SCORE: 49
ADLS_ACUITY_SCORE: 52
ADLS_ACUITY_SCORE: 49
ADLS_ACUITY_SCORE: 52
ADLS_ACUITY_SCORE: 56
ADLS_ACUITY_SCORE: 52
ADLS_ACUITY_SCORE: 49
ADLS_ACUITY_SCORE: 56
ADLS_ACUITY_SCORE: 52
ADLS_ACUITY_SCORE: 49
ADLS_ACUITY_SCORE: 49
ADLS_ACUITY_SCORE: 56
ADLS_ACUITY_SCORE: 49
ADLS_ACUITY_SCORE: 56
ADLS_ACUITY_SCORE: 49

## 2025-08-12 ENCOUNTER — ANESTHESIA (OUTPATIENT)
Dept: SURGERY | Facility: CLINIC | Age: 85
End: 2025-08-12
Payer: MEDICARE

## 2025-08-12 LAB
GLUCOSE BLDC GLUCOMTR-MCNC: 115 MG/DL (ref 70–99)
GLUCOSE BLDC GLUCOMTR-MCNC: 121 MG/DL (ref 70–99)

## 2025-08-12 PROCEDURE — 999N000157 HC STATISTIC RCP TIME EA 10 MIN

## 2025-08-12 PROCEDURE — 0C5M8ZZ DESTRUCTION OF PHARYNX, VIA NATURAL OR ARTIFICIAL OPENING ENDOSCOPIC: ICD-10-PCS | Performed by: OTOLARYNGOLOGY

## 2025-08-12 PROCEDURE — 250N000009 HC RX 250: Performed by: ANESTHESIOLOGY

## 2025-08-12 PROCEDURE — 250N000013 HC RX MED GY IP 250 OP 250 PS 637

## 2025-08-12 PROCEDURE — 272N000004 HC RX 272: Performed by: OTOLARYNGOLOGY

## 2025-08-12 PROCEDURE — 710N000010 HC RECOVERY PHASE 1, LEVEL 2, PER MIN: Performed by: OTOLARYNGOLOGY

## 2025-08-12 PROCEDURE — 360N000083 HC SURGERY LEVEL 3 W/ FLUORO, PER MIN: Performed by: OTOLARYNGOLOGY

## 2025-08-12 PROCEDURE — 250N000025 HC SEVOFLURANE, PER MIN: Performed by: OTOLARYNGOLOGY

## 2025-08-12 PROCEDURE — 31599 UNLISTED PROCEDURE LARYNX: CPT | Mod: GC | Performed by: OTOLARYNGOLOGY

## 2025-08-12 PROCEDURE — 120N000011 HC R&B TRANSPLANT UMMC

## 2025-08-12 PROCEDURE — 272N000001 HC OR GENERAL SUPPLY STERILE: Performed by: OTOLARYNGOLOGY

## 2025-08-12 PROCEDURE — 250N000013 HC RX MED GY IP 250 OP 250 PS 637: Performed by: INTERNAL MEDICINE

## 2025-08-12 PROCEDURE — 250N000009 HC RX 250: Performed by: OTOLARYNGOLOGY

## 2025-08-12 PROCEDURE — 999N000141 HC STATISTIC PRE-PROCEDURE NURSING ASSESSMENT: Performed by: OTOLARYNGOLOGY

## 2025-08-12 PROCEDURE — 88305 TISSUE EXAM BY PATHOLOGIST: CPT | Mod: TC | Performed by: OTOLARYNGOLOGY

## 2025-08-12 PROCEDURE — 88305 TISSUE EXAM BY PATHOLOGIST: CPT | Mod: 26 | Performed by: PATHOLOGY

## 2025-08-12 PROCEDURE — 99233 SBSQ HOSP IP/OBS HIGH 50: CPT | Performed by: INTERNAL MEDICINE

## 2025-08-12 PROCEDURE — 250N000011 HC RX IP 250 OP 636: Performed by: ANESTHESIOLOGY

## 2025-08-12 PROCEDURE — 258N000003 HC RX IP 258 OP 636: Performed by: ANESTHESIOLOGY

## 2025-08-12 PROCEDURE — 370N000017 HC ANESTHESIA TECHNICAL FEE, PER MIN: Performed by: OTOLARYNGOLOGY

## 2025-08-12 RX ORDER — LEVOTHYROXINE SODIUM 112 UG/1
112 TABLET ORAL DAILY
Status: DISCONTINUED | OUTPATIENT
Start: 2025-08-12 | End: 2025-08-13 | Stop reason: HOSPADM

## 2025-08-12 RX ORDER — OXYCODONE HCL 5 MG/5 ML
5 SOLUTION, ORAL ORAL EVERY 4 HOURS PRN
Refills: 0 | Status: DISCONTINUED | OUTPATIENT
Start: 2025-08-12 | End: 2025-08-13 | Stop reason: HOSPADM

## 2025-08-12 RX ORDER — ONDANSETRON 2 MG/ML
4 INJECTION INTRAMUSCULAR; INTRAVENOUS EVERY 30 MIN PRN
Status: DISCONTINUED | OUTPATIENT
Start: 2025-08-12 | End: 2025-08-12 | Stop reason: HOSPADM

## 2025-08-12 RX ORDER — ONDANSETRON 4 MG/1
4 TABLET, ORALLY DISINTEGRATING ORAL EVERY 30 MIN PRN
Status: DISCONTINUED | OUTPATIENT
Start: 2025-08-12 | End: 2025-08-12 | Stop reason: HOSPADM

## 2025-08-12 RX ORDER — LABETALOL HYDROCHLORIDE 5 MG/ML
10 INJECTION, SOLUTION INTRAVENOUS
Status: DISCONTINUED | OUTPATIENT
Start: 2025-08-12 | End: 2025-08-12 | Stop reason: HOSPADM

## 2025-08-12 RX ORDER — SODIUM CHLORIDE, SODIUM LACTATE, POTASSIUM CHLORIDE, CALCIUM CHLORIDE 600; 310; 30; 20 MG/100ML; MG/100ML; MG/100ML; MG/100ML
INJECTION, SOLUTION INTRAVENOUS CONTINUOUS PRN
Status: DISCONTINUED | OUTPATIENT
Start: 2025-08-12 | End: 2025-08-12

## 2025-08-12 RX ORDER — NALOXONE HYDROCHLORIDE 0.4 MG/ML
0.2 INJECTION, SOLUTION INTRAMUSCULAR; INTRAVENOUS; SUBCUTANEOUS
Status: DISCONTINUED | OUTPATIENT
Start: 2025-08-12 | End: 2025-08-13 | Stop reason: HOSPADM

## 2025-08-12 RX ORDER — ALCOHOL 1 ML/ML
2 INJECTION, SOLUTION PERCUTANEOUS ONCE
Status: DISCONTINUED | OUTPATIENT
Start: 2025-08-12 | End: 2025-08-13

## 2025-08-12 RX ORDER — SODIUM CHLORIDE, SODIUM LACTATE, POTASSIUM CHLORIDE, CALCIUM CHLORIDE 600; 310; 30; 20 MG/100ML; MG/100ML; MG/100ML; MG/100ML
INJECTION, SOLUTION INTRAVENOUS CONTINUOUS
Status: DISCONTINUED | OUTPATIENT
Start: 2025-08-12 | End: 2025-08-12 | Stop reason: HOSPADM

## 2025-08-12 RX ORDER — PROPOFOL 10 MG/ML
INJECTION, EMULSION INTRAVENOUS PRN
Status: DISCONTINUED | OUTPATIENT
Start: 2025-08-12 | End: 2025-08-12

## 2025-08-12 RX ORDER — DEXAMETHASONE SODIUM PHOSPHATE 4 MG/ML
4 INJECTION, SOLUTION INTRA-ARTICULAR; INTRALESIONAL; INTRAMUSCULAR; INTRAVENOUS; SOFT TISSUE
Status: DISCONTINUED | OUTPATIENT
Start: 2025-08-12 | End: 2025-08-12 | Stop reason: HOSPADM

## 2025-08-12 RX ORDER — HYDROMORPHONE HCL IN WATER/PF 6 MG/30 ML
0.2 PATIENT CONTROLLED ANALGESIA SYRINGE INTRAVENOUS EVERY 5 MIN PRN
Refills: 0 | Status: DISCONTINUED | OUTPATIENT
Start: 2025-08-12 | End: 2025-08-12 | Stop reason: HOSPADM

## 2025-08-12 RX ORDER — CHLORHEXIDINE GLUCONATE ORAL RINSE 1.2 MG/ML
15 SOLUTION DENTAL 2 TIMES DAILY
Status: DISCONTINUED | OUTPATIENT
Start: 2025-08-12 | End: 2025-08-13 | Stop reason: HOSPADM

## 2025-08-12 RX ORDER — ALCOHOL 1 ML/ML
INJECTION, SOLUTION PERCUTANEOUS PRN
Status: DISCONTINUED | OUTPATIENT
Start: 2025-08-12 | End: 2025-08-12 | Stop reason: HOSPADM

## 2025-08-12 RX ORDER — ONDANSETRON 2 MG/ML
INJECTION INTRAMUSCULAR; INTRAVENOUS PRN
Status: DISCONTINUED | OUTPATIENT
Start: 2025-08-12 | End: 2025-08-12

## 2025-08-12 RX ORDER — DEXAMETHASONE SODIUM PHOSPHATE 4 MG/ML
INJECTION, SOLUTION INTRA-ARTICULAR; INTRALESIONAL; INTRAMUSCULAR; INTRAVENOUS; SOFT TISSUE PRN
Status: DISCONTINUED | OUTPATIENT
Start: 2025-08-12 | End: 2025-08-12

## 2025-08-12 RX ORDER — VASOPRESSIN IN 0.9 % NACL 2 UNIT/2ML
SYRINGE (ML) INTRAVENOUS PRN
Status: DISCONTINUED | OUTPATIENT
Start: 2025-08-12 | End: 2025-08-12

## 2025-08-12 RX ORDER — FENTANYL CITRATE 50 UG/ML
25 INJECTION, SOLUTION INTRAMUSCULAR; INTRAVENOUS EVERY 5 MIN PRN
Refills: 0 | Status: DISCONTINUED | OUTPATIENT
Start: 2025-08-12 | End: 2025-08-12 | Stop reason: HOSPADM

## 2025-08-12 RX ORDER — FENTANYL CITRATE 50 UG/ML
INJECTION, SOLUTION INTRAMUSCULAR; INTRAVENOUS PRN
Status: DISCONTINUED | OUTPATIENT
Start: 2025-08-12 | End: 2025-08-12

## 2025-08-12 RX ORDER — NALOXONE HYDROCHLORIDE 0.4 MG/ML
0.4 INJECTION, SOLUTION INTRAMUSCULAR; INTRAVENOUS; SUBCUTANEOUS
Status: DISCONTINUED | OUTPATIENT
Start: 2025-08-12 | End: 2025-08-13 | Stop reason: HOSPADM

## 2025-08-12 RX ORDER — NALOXONE HYDROCHLORIDE 0.4 MG/ML
0.1 INJECTION, SOLUTION INTRAMUSCULAR; INTRAVENOUS; SUBCUTANEOUS
Status: DISCONTINUED | OUTPATIENT
Start: 2025-08-12 | End: 2025-08-12 | Stop reason: HOSPADM

## 2025-08-12 RX ORDER — OXYMETAZOLINE HYDROCHLORIDE 0.05 G/100ML
SPRAY NASAL PRN
Status: DISCONTINUED | OUTPATIENT
Start: 2025-08-12 | End: 2025-08-12 | Stop reason: HOSPADM

## 2025-08-12 RX ADMIN — CHLORHEXIDINE GLUCONATE 15 ML: 1.2 SOLUTION ORAL at 20:01

## 2025-08-12 RX ADMIN — FENTANYL CITRATE 50 MCG: 50 INJECTION INTRAMUSCULAR; INTRAVENOUS at 11:06

## 2025-08-12 RX ADMIN — Medication 100 MG: at 11:28

## 2025-08-12 RX ADMIN — Medication 100 MG: at 11:30

## 2025-08-12 RX ADMIN — PHENYLEPHRINE HYDROCHLORIDE 100 MCG: 10 INJECTION INTRAVENOUS at 10:57

## 2025-08-12 RX ADMIN — DEXAMETHASONE SODIUM PHOSPHATE 10 MG: 4 INJECTION, SOLUTION INTRAMUSCULAR; INTRAVENOUS at 10:56

## 2025-08-12 RX ADMIN — LEVOTHYROXINE SODIUM 112 MCG: 0.11 TABLET ORAL at 08:41

## 2025-08-12 RX ADMIN — PHENYLEPHRINE HYDROCHLORIDE 100 MCG: 10 INJECTION INTRAVENOUS at 10:54

## 2025-08-12 RX ADMIN — SODIUM CHLORIDE, SODIUM LACTATE, POTASSIUM CHLORIDE, AND CALCIUM CHLORIDE: .6; .31; .03; .02 INJECTION, SOLUTION INTRAVENOUS at 10:38

## 2025-08-12 RX ADMIN — PROPOFOL 120 MG: 10 INJECTION, EMULSION INTRAVENOUS at 10:46

## 2025-08-12 RX ADMIN — FENTANYL CITRATE 50 MCG: 50 INJECTION INTRAMUSCULAR; INTRAVENOUS at 11:13

## 2025-08-12 RX ADMIN — ONDANSETRON 4 MG: 2 INJECTION INTRAMUSCULAR; INTRAVENOUS at 11:14

## 2025-08-12 RX ADMIN — Medication 0.25 UNITS: at 11:30

## 2025-08-12 RX ADMIN — Medication 20 MG: at 11:05

## 2025-08-12 RX ADMIN — Medication 20 MG: at 10:54

## 2025-08-12 RX ADMIN — Medication 325.6 MG: at 08:46

## 2025-08-12 RX ADMIN — Medication 0.25 UNITS: at 11:04

## 2025-08-12 ASSESSMENT — ACTIVITIES OF DAILY LIVING (ADL)
ADLS_ACUITY_SCORE: 56
ADLS_ACUITY_SCORE: 51
ADLS_ACUITY_SCORE: 56
ADLS_ACUITY_SCORE: 52
ADLS_ACUITY_SCORE: 51
ADLS_ACUITY_SCORE: 56
ADLS_ACUITY_SCORE: 55
ADLS_ACUITY_SCORE: 56
ADLS_ACUITY_SCORE: 52
ADLS_ACUITY_SCORE: 52
ADLS_ACUITY_SCORE: 51
ADLS_ACUITY_SCORE: 56

## 2025-08-13 VITALS
OXYGEN SATURATION: 96 % | DIASTOLIC BLOOD PRESSURE: 59 MMHG | HEART RATE: 64 BPM | BODY MASS INDEX: 23.63 KG/M2 | TEMPERATURE: 99.1 F | SYSTOLIC BLOOD PRESSURE: 108 MMHG | WEIGHT: 164.68 LBS | RESPIRATION RATE: 16 BRPM

## 2025-08-13 LAB
PATH REPORT.COMMENTS IMP SPEC: ABNORMAL
PATH REPORT.COMMENTS IMP SPEC: ABNORMAL
PATH REPORT.COMMENTS IMP SPEC: YES
PATH REPORT.FINAL DX SPEC: ABNORMAL
PATH REPORT.GROSS SPEC: ABNORMAL
PATH REPORT.MICROSCOPIC SPEC OTHER STN: ABNORMAL
PATH REPORT.RELEVANT HX SPEC: ABNORMAL
PHOTO IMAGE: ABNORMAL

## 2025-08-13 PROCEDURE — 99239 HOSP IP/OBS DSCHRG MGMT >30: CPT | Performed by: INTERNAL MEDICINE

## 2025-08-13 PROCEDURE — 250N000013 HC RX MED GY IP 250 OP 250 PS 637: Performed by: INTERNAL MEDICINE

## 2025-08-13 PROCEDURE — 99231 SBSQ HOSP IP/OBS SF/LOW 25: CPT | Mod: GC | Performed by: OTOLARYNGOLOGY

## 2025-08-13 PROCEDURE — 999N000157 HC STATISTIC RCP TIME EA 10 MIN

## 2025-08-13 PROCEDURE — 99231 SBSQ HOSP IP/OBS SF/LOW 25: CPT | Performed by: PHYSICIAN ASSISTANT

## 2025-08-13 RX ORDER — CHLORHEXIDINE GLUCONATE ORAL RINSE 1.2 MG/ML
15 SOLUTION DENTAL 2 TIMES DAILY
Qty: 420 ML | Refills: 0 | Status: SHIPPED | OUTPATIENT
Start: 2025-08-13 | End: 2025-08-27

## 2025-08-13 RX ADMIN — LEVOTHYROXINE SODIUM 112 MCG: 0.11 TABLET ORAL at 08:52

## 2025-08-13 ASSESSMENT — ACTIVITIES OF DAILY LIVING (ADL)
ADLS_ACUITY_SCORE: 51

## 2025-08-19 ENCOUNTER — NURSE TRIAGE (OUTPATIENT)
Dept: NURSING | Facility: CLINIC | Age: 85
End: 2025-08-19
Payer: MEDICARE

## 2025-08-19 ENCOUNTER — APPOINTMENT (OUTPATIENT)
Dept: INTERVENTIONAL RADIOLOGY/VASCULAR | Facility: CLINIC | Age: 85
End: 2025-08-19
Attending: NURSE PRACTITIONER
Payer: MEDICARE

## 2025-08-19 ENCOUNTER — HOSPITAL ENCOUNTER (EMERGENCY)
Facility: CLINIC | Age: 85
Discharge: HOME | End: 2025-08-19
Attending: EMERGENCY MEDICINE | Admitting: EMERGENCY MEDICINE
Payer: MEDICARE

## 2025-08-19 VITALS
SYSTOLIC BLOOD PRESSURE: 146 MMHG | HEART RATE: 73 BPM | TEMPERATURE: 98.8 F | DIASTOLIC BLOOD PRESSURE: 106 MMHG | RESPIRATION RATE: 20 BRPM | OXYGEN SATURATION: 95 %

## 2025-08-19 DIAGNOSIS — T85.598A FEEDING TUBE DYSFUNCTION, INITIAL ENCOUNTER: Primary | ICD-10-CM

## 2025-08-19 DIAGNOSIS — J38.02 BILATERAL VOCAL FOLD PARALYSIS: ICD-10-CM

## 2025-08-19 DIAGNOSIS — C01 SQUAMOUS CELL CARCINOMA OF BASE OF TONGUE (H): ICD-10-CM

## 2025-08-19 LAB — GLUCOSE BLDC GLUCOMTR-MCNC: 122 MG/DL (ref 70–99)

## 2025-08-19 PROCEDURE — 250N000009 HC RX 250: Performed by: RADIOLOGY

## 2025-08-19 PROCEDURE — 49452 REPLACE G-J TUBE PERC: CPT

## 2025-08-19 PROCEDURE — C1769 GUIDE WIRE: HCPCS

## 2025-08-19 PROCEDURE — 999N000157 HC STATISTIC RCP TIME EA 10 MIN

## 2025-08-19 PROCEDURE — C1887 CATHETER, GUIDING: HCPCS

## 2025-08-19 PROCEDURE — 255N000002 HC RX 255 OP 636: Performed by: NURSE PRACTITIONER

## 2025-08-19 PROCEDURE — 82962 GLUCOSE BLOOD TEST: CPT

## 2025-08-19 PROCEDURE — 49452 REPLACE G-J TUBE PERC: CPT | Performed by: STUDENT IN AN ORGANIZED HEALTH CARE EDUCATION/TRAINING PROGRAM

## 2025-08-19 PROCEDURE — 99285 EMERGENCY DEPT VISIT HI MDM: CPT | Mod: 25 | Performed by: EMERGENCY MEDICINE

## 2025-08-19 PROCEDURE — 99283 EMERGENCY DEPT VISIT LOW MDM: CPT | Performed by: EMERGENCY MEDICINE

## 2025-08-19 RX ORDER — IODIXANOL 320 MG/ML
50 INJECTION, SOLUTION INTRAVASCULAR ONCE
Status: COMPLETED | OUTPATIENT
Start: 2025-08-19 | End: 2025-08-19

## 2025-08-19 RX ORDER — LIDOCAINE HYDROCHLORIDE 20 MG/ML
10 JELLY TOPICAL ONCE
Status: COMPLETED | OUTPATIENT
Start: 2025-08-19 | End: 2025-08-19

## 2025-08-19 RX ORDER — LIDOCAINE HYDROCHLORIDE 10 MG/ML
1-30 INJECTION, SOLUTION EPIDURAL; INFILTRATION; INTRACAUDAL; PERINEURAL
Status: DISCONTINUED | OUTPATIENT
Start: 2025-08-19 | End: 2025-08-19 | Stop reason: HOSPADM

## 2025-08-19 RX ADMIN — IODIXANOL 20 ML: 320 INJECTION, SOLUTION INTRAVASCULAR at 12:37

## 2025-08-19 RX ADMIN — LIDOCAINE HYDROCHLORIDE 10 ML: 20 JELLY TOPICAL at 12:28

## 2025-08-19 ASSESSMENT — ACTIVITIES OF DAILY LIVING (ADL)
ADLS_ACUITY_SCORE: 57

## 2025-08-20 ENCOUNTER — ONCOLOGY VISIT (OUTPATIENT)
Dept: ONCOLOGY | Facility: CLINIC | Age: 85
End: 2025-08-20
Attending: NURSE PRACTITIONER
Payer: MEDICARE

## 2025-08-20 VITALS
DIASTOLIC BLOOD PRESSURE: 64 MMHG | WEIGHT: 162.2 LBS | RESPIRATION RATE: 18 BRPM | TEMPERATURE: 99.1 F | HEART RATE: 76 BPM | SYSTOLIC BLOOD PRESSURE: 114 MMHG | BODY MASS INDEX: 23.27 KG/M2 | OXYGEN SATURATION: 94 %

## 2025-08-20 DIAGNOSIS — C01 SQUAMOUS CELL CARCINOMA OF BASE OF TONGUE (H): Primary | ICD-10-CM

## 2025-08-20 DIAGNOSIS — C01 CANCER OF BASE OF TONGUE (H): ICD-10-CM

## 2025-08-20 DIAGNOSIS — E03.9 HYPOTHYROIDISM, UNSPECIFIED TYPE: ICD-10-CM

## 2025-08-20 LAB
ALBUMIN SERPL BCG-MCNC: 3.4 G/DL (ref 3.5–5.2)
ALP SERPL-CCNC: 98 U/L (ref 40–150)
ALT SERPL W P-5'-P-CCNC: 17 U/L (ref 0–70)
ANION GAP SERPL CALCULATED.3IONS-SCNC: 12 MMOL/L (ref 7–15)
AST SERPL W P-5'-P-CCNC: 18 U/L (ref 0–45)
BASOPHILS # BLD AUTO: 0.05 10E3/UL (ref 0–0.2)
BASOPHILS NFR BLD AUTO: 0.5 %
BILIRUB SERPL-MCNC: 0.3 MG/DL
BUN SERPL-MCNC: 20.5 MG/DL (ref 8–23)
CALCIUM SERPL-MCNC: 9.3 MG/DL (ref 8.8–10.4)
CHLORIDE SERPL-SCNC: 97 MMOL/L (ref 98–107)
CREAT SERPL-MCNC: 0.68 MG/DL (ref 0.67–1.17)
EGFRCR SERPLBLD CKD-EPI 2021: >90 ML/MIN/1.73M2
EOSINOPHIL # BLD AUTO: 0.2 10E3/UL (ref 0–0.7)
EOSINOPHIL NFR BLD AUTO: 2.2 %
ERYTHROCYTE [DISTWIDTH] IN BLOOD BY AUTOMATED COUNT: 14.1 % (ref 10–15)
GLUCOSE SERPL-MCNC: 114 MG/DL (ref 70–99)
HCO3 SERPL-SCNC: 32 MMOL/L (ref 22–29)
HCT VFR BLD AUTO: 35.1 % (ref 40–53)
HGB BLD-MCNC: 10.8 G/DL (ref 13.3–17.7)
IMM GRANULOCYTES # BLD: <0.03 10E3/UL
IMM GRANULOCYTES NFR BLD: 0.2 %
LYMPHOCYTES # BLD AUTO: 0.5 10E3/UL (ref 0.8–5.3)
LYMPHOCYTES NFR BLD AUTO: 5.5 %
MCH RBC QN AUTO: 29.9 PG (ref 26.5–33)
MCHC RBC AUTO-ENTMCNC: 30.8 G/DL (ref 31.5–36.5)
MCV RBC AUTO: 97.2 FL (ref 78–100)
MONOCYTES # BLD AUTO: 0.65 10E3/UL (ref 0–1.3)
MONOCYTES NFR BLD AUTO: 7.1 %
NEUTROPHILS # BLD AUTO: 7.69 10E3/UL (ref 1.6–8.3)
NEUTROPHILS NFR BLD AUTO: 84.5 %
NRBC # BLD AUTO: <0.03 10E3/UL
NRBC BLD AUTO-RTO: 0 /100
PLATELET # BLD AUTO: 302 10E3/UL (ref 150–450)
POTASSIUM SERPL-SCNC: 4.1 MMOL/L (ref 3.4–5.3)
PROT SERPL-MCNC: 7.1 G/DL (ref 6.4–8.3)
RBC # BLD AUTO: 3.61 10E6/UL (ref 4.4–5.9)
SODIUM SERPL-SCNC: 141 MMOL/L (ref 135–145)
T4 FREE SERPL-MCNC: 1.23 NG/DL (ref 0.9–1.7)
TSH SERPL DL<=0.005 MIU/L-ACNC: 3.56 UIU/ML (ref 0.3–4.2)
WBC # BLD AUTO: 9.11 10E3/UL (ref 4–11)

## 2025-08-20 PROCEDURE — 36415 COLL VENOUS BLD VENIPUNCTURE: CPT | Performed by: NURSE PRACTITIONER

## 2025-08-20 PROCEDURE — G2211 COMPLEX E/M VISIT ADD ON: HCPCS | Performed by: NURSE PRACTITIONER

## 2025-08-20 PROCEDURE — 99214 OFFICE O/P EST MOD 30 MIN: CPT | Performed by: NURSE PRACTITIONER

## 2025-08-20 PROCEDURE — 85004 AUTOMATED DIFF WBC COUNT: CPT | Performed by: NURSE PRACTITIONER

## 2025-08-20 PROCEDURE — 84155 ASSAY OF PROTEIN SERUM: CPT | Performed by: NURSE PRACTITIONER

## 2025-08-20 PROCEDURE — 84443 ASSAY THYROID STIM HORMONE: CPT | Performed by: NURSE PRACTITIONER

## 2025-08-20 PROCEDURE — G0463 HOSPITAL OUTPT CLINIC VISIT: HCPCS | Performed by: NURSE PRACTITIONER

## 2025-08-20 PROCEDURE — 84439 ASSAY OF FREE THYROXINE: CPT | Performed by: NURSE PRACTITIONER

## 2025-08-20 RX ORDER — LORAZEPAM 2 MG/ML
0.5 INJECTION INTRAMUSCULAR EVERY 4 HOURS PRN
Status: CANCELLED | OUTPATIENT
Start: 2025-08-20

## 2025-08-20 RX ORDER — DIPHENHYDRAMINE HYDROCHLORIDE 50 MG/ML
50 INJECTION, SOLUTION INTRAMUSCULAR; INTRAVENOUS
Status: CANCELLED | OUTPATIENT
Start: 2025-08-20

## 2025-08-20 RX ORDER — MEPERIDINE HYDROCHLORIDE 25 MG/ML
25 INJECTION INTRAMUSCULAR; INTRAVENOUS; SUBCUTANEOUS
Status: CANCELLED | OUTPATIENT
Start: 2025-08-20

## 2025-08-20 RX ORDER — HEPARIN SODIUM,PORCINE 10 UNIT/ML
5-20 VIAL (ML) INTRAVENOUS DAILY PRN
Status: CANCELLED | OUTPATIENT
Start: 2025-08-20

## 2025-08-20 RX ORDER — DIPHENHYDRAMINE HYDROCHLORIDE 50 MG/ML
50 INJECTION, SOLUTION INTRAMUSCULAR; INTRAVENOUS
Status: CANCELLED
Start: 2025-08-20

## 2025-08-20 RX ORDER — ALBUTEROL SULFATE 0.83 MG/ML
2.5 SOLUTION RESPIRATORY (INHALATION)
Status: CANCELLED | OUTPATIENT
Start: 2025-08-20

## 2025-08-20 RX ORDER — HEPARIN SODIUM (PORCINE) LOCK FLUSH IV SOLN 100 UNIT/ML 100 UNIT/ML
5 SOLUTION INTRAVENOUS
Status: CANCELLED | OUTPATIENT
Start: 2025-08-20

## 2025-08-20 RX ORDER — ALBUTEROL SULFATE 90 UG/1
1-2 INHALANT RESPIRATORY (INHALATION)
Status: CANCELLED
Start: 2025-08-20

## 2025-08-20 RX ORDER — METHYLPREDNISOLONE SODIUM SUCCINATE 40 MG/ML
40 INJECTION INTRAMUSCULAR; INTRAVENOUS
Status: CANCELLED
Start: 2025-08-20

## 2025-08-20 RX ORDER — EPINEPHRINE 1 MG/ML
0.3 INJECTION, SOLUTION INTRAMUSCULAR; SUBCUTANEOUS EVERY 5 MIN PRN
Status: CANCELLED | OUTPATIENT
Start: 2025-08-20

## 2025-08-20 RX ORDER — DIPHENHYDRAMINE HYDROCHLORIDE 50 MG/ML
25 INJECTION, SOLUTION INTRAMUSCULAR; INTRAVENOUS
Status: CANCELLED
Start: 2025-08-20

## 2025-08-20 ASSESSMENT — PAIN SCALES - GENERAL: PAINLEVEL_OUTOF10: NO PAIN (0)

## 2025-08-21 ENCOUNTER — INFUSION THERAPY VISIT (OUTPATIENT)
Dept: INFUSION THERAPY | Facility: CLINIC | Age: 85
End: 2025-08-21
Attending: INTERNAL MEDICINE
Payer: MEDICARE

## 2025-08-21 VITALS
OXYGEN SATURATION: 93 % | DIASTOLIC BLOOD PRESSURE: 59 MMHG | HEART RATE: 60 BPM | RESPIRATION RATE: 18 BRPM | TEMPERATURE: 98.8 F | SYSTOLIC BLOOD PRESSURE: 101 MMHG

## 2025-08-21 DIAGNOSIS — C01 CANCER OF BASE OF TONGUE (H): ICD-10-CM

## 2025-08-21 DIAGNOSIS — C01 SQUAMOUS CELL CARCINOMA OF BASE OF TONGUE (H): Primary | ICD-10-CM

## 2025-08-21 PROCEDURE — 258N000003 HC RX IP 258 OP 636: Performed by: INTERNAL MEDICINE

## 2025-08-21 PROCEDURE — 258N000003 HC RX IP 258 OP 636: Performed by: NURSE PRACTITIONER

## 2025-08-21 RX ADMIN — SODIUM CHLORIDE 240 MG: 9 INJECTION, SOLUTION INTRAVENOUS at 11:57

## 2025-08-21 RX ADMIN — SODIUM CHLORIDE 250 ML: 0.9 INJECTION, SOLUTION INTRAVENOUS at 09:30

## 2025-08-21 ASSESSMENT — PAIN SCALES - GENERAL: PAINLEVEL_OUTOF10: NO PAIN (0)

## 2025-09-02 DIAGNOSIS — C01 CANCER OF BASE OF TONGUE (H): Primary | ICD-10-CM

## 2025-09-02 DIAGNOSIS — C01 SQUAMOUS CELL CARCINOMA OF BASE OF TONGUE (H): ICD-10-CM

## 2025-09-02 RX ORDER — ALBUTEROL SULFATE 90 UG/1
1-2 INHALANT RESPIRATORY (INHALATION)
Status: CANCELLED
Start: 2025-09-03

## 2025-09-02 RX ORDER — LORAZEPAM 2 MG/ML
0.5 INJECTION INTRAMUSCULAR EVERY 4 HOURS PRN
Status: CANCELLED | OUTPATIENT
Start: 2025-09-03

## 2025-09-02 RX ORDER — EPINEPHRINE 1 MG/ML
0.3 INJECTION, SOLUTION INTRAMUSCULAR; SUBCUTANEOUS EVERY 5 MIN PRN
Status: CANCELLED | OUTPATIENT
Start: 2025-09-03

## 2025-09-02 RX ORDER — HEPARIN SODIUM,PORCINE 10 UNIT/ML
5-20 VIAL (ML) INTRAVENOUS DAILY PRN
Status: CANCELLED | OUTPATIENT
Start: 2025-09-03

## 2025-09-02 RX ORDER — HEPARIN SODIUM (PORCINE) LOCK FLUSH IV SOLN 100 UNIT/ML 100 UNIT/ML
5 SOLUTION INTRAVENOUS
Status: CANCELLED | OUTPATIENT
Start: 2025-09-03

## 2025-09-02 RX ORDER — ALBUTEROL SULFATE 0.83 MG/ML
2.5 SOLUTION RESPIRATORY (INHALATION)
Status: CANCELLED | OUTPATIENT
Start: 2025-09-03

## 2025-09-02 RX ORDER — DIPHENHYDRAMINE HYDROCHLORIDE 50 MG/ML
25 INJECTION INTRAMUSCULAR; INTRAVENOUS
Status: CANCELLED
Start: 2025-09-03

## 2025-09-02 RX ORDER — METHYLPREDNISOLONE SODIUM SUCCINATE 40 MG/ML
40 INJECTION INTRAMUSCULAR; INTRAVENOUS
Status: CANCELLED
Start: 2025-09-03

## 2025-09-02 RX ORDER — MEPERIDINE HYDROCHLORIDE 25 MG/ML
25 INJECTION INTRAMUSCULAR; INTRAVENOUS; SUBCUTANEOUS
Status: CANCELLED | OUTPATIENT
Start: 2025-09-03

## 2025-09-02 RX ORDER — DIPHENHYDRAMINE HYDROCHLORIDE 50 MG/ML
50 INJECTION INTRAMUSCULAR; INTRAVENOUS
Status: CANCELLED | OUTPATIENT
Start: 2025-09-03

## 2025-09-02 RX ORDER — DIPHENHYDRAMINE HYDROCHLORIDE 50 MG/ML
50 INJECTION INTRAMUSCULAR; INTRAVENOUS
Status: CANCELLED
Start: 2025-09-03

## 2025-09-04 ENCOUNTER — INFUSION THERAPY VISIT (OUTPATIENT)
Dept: INFUSION THERAPY | Facility: CLINIC | Age: 85
End: 2025-09-04
Attending: INTERNAL MEDICINE
Payer: MEDICARE

## 2025-09-04 VITALS
TEMPERATURE: 99.1 F | HEART RATE: 62 BPM | SYSTOLIC BLOOD PRESSURE: 107 MMHG | WEIGHT: 165.6 LBS | BODY MASS INDEX: 23.76 KG/M2 | DIASTOLIC BLOOD PRESSURE: 61 MMHG

## 2025-09-04 DIAGNOSIS — C01 CANCER OF BASE OF TONGUE (H): ICD-10-CM

## 2025-09-04 DIAGNOSIS — C01 SQUAMOUS CELL CARCINOMA OF BASE OF TONGUE (H): Primary | ICD-10-CM

## 2025-09-04 LAB
ALBUMIN SERPL BCG-MCNC: 3.4 G/DL (ref 3.5–5.2)
ALP SERPL-CCNC: 111 U/L (ref 40–150)
ALT SERPL W P-5'-P-CCNC: 14 U/L (ref 0–70)
ANION GAP SERPL CALCULATED.3IONS-SCNC: 9 MMOL/L (ref 7–15)
AST SERPL W P-5'-P-CCNC: 21 U/L (ref 0–45)
BASOPHILS # BLD AUTO: 0.04 10E3/UL (ref 0–0.2)
BASOPHILS NFR BLD AUTO: 0.4 %
BILIRUB SERPL-MCNC: 0.2 MG/DL
BUN SERPL-MCNC: 18 MG/DL (ref 8–23)
CALCIUM SERPL-MCNC: 9 MG/DL (ref 8.8–10.4)
CHLORIDE SERPL-SCNC: 97 MMOL/L (ref 98–107)
CREAT SERPL-MCNC: 0.62 MG/DL (ref 0.67–1.17)
EGFRCR SERPLBLD CKD-EPI 2021: >90 ML/MIN/1.73M2
EOSINOPHIL # BLD AUTO: 0.42 10E3/UL (ref 0–0.7)
EOSINOPHIL NFR BLD AUTO: 4.7 %
ERYTHROCYTE [DISTWIDTH] IN BLOOD BY AUTOMATED COUNT: 14.7 % (ref 10–15)
GLUCOSE SERPL-MCNC: 108 MG/DL (ref 70–99)
HCO3 SERPL-SCNC: 32 MMOL/L (ref 22–29)
HCT VFR BLD AUTO: 34.1 % (ref 40–53)
HGB BLD-MCNC: 10.6 G/DL (ref 13.3–17.7)
IMM GRANULOCYTES # BLD: 0.04 10E3/UL
IMM GRANULOCYTES NFR BLD: 0.4 %
LYMPHOCYTES # BLD AUTO: 0.51 10E3/UL (ref 0.8–5.3)
LYMPHOCYTES NFR BLD AUTO: 5.7 %
MAGNESIUM SERPL-MCNC: 2.4 MG/DL (ref 1.7–2.3)
MCH RBC QN AUTO: 30.6 PG (ref 26.5–33)
MCHC RBC AUTO-ENTMCNC: 31.1 G/DL (ref 31.5–36.5)
MCV RBC AUTO: 98.6 FL (ref 78–100)
MONOCYTES # BLD AUTO: 0.72 10E3/UL (ref 0–1.3)
MONOCYTES NFR BLD AUTO: 8.1 %
NEUTROPHILS # BLD AUTO: 7.18 10E3/UL (ref 1.6–8.3)
NEUTROPHILS NFR BLD AUTO: 80.7 %
NRBC # BLD AUTO: <0.03 10E3/UL
NRBC BLD AUTO-RTO: 0 /100
PLATELET # BLD AUTO: 307 10E3/UL (ref 150–450)
POTASSIUM SERPL-SCNC: 4.7 MMOL/L (ref 3.4–5.3)
PROT SERPL-MCNC: 6.9 G/DL (ref 6.4–8.3)
RBC # BLD AUTO: 3.46 10E6/UL (ref 4.4–5.9)
SODIUM SERPL-SCNC: 138 MMOL/L (ref 135–145)
WBC # BLD AUTO: 8.91 10E3/UL (ref 4–11)

## 2025-09-04 PROCEDURE — 36415 COLL VENOUS BLD VENIPUNCTURE: CPT | Performed by: INTERNAL MEDICINE

## 2025-09-04 PROCEDURE — 83735 ASSAY OF MAGNESIUM: CPT | Performed by: INTERNAL MEDICINE

## 2025-09-04 PROCEDURE — 258N000003 HC RX IP 258 OP 636: Performed by: INTERNAL MEDICINE

## 2025-09-04 PROCEDURE — 82247 BILIRUBIN TOTAL: CPT | Performed by: INTERNAL MEDICINE

## 2025-09-04 PROCEDURE — 85004 AUTOMATED DIFF WBC COUNT: CPT | Performed by: INTERNAL MEDICINE

## 2025-09-04 RX ADMIN — SODIUM CHLORIDE 240 MG: 9 INJECTION, SOLUTION INTRAVENOUS at 13:04

## 2025-09-04 RX ADMIN — SODIUM CHLORIDE 250 ML: 0.9 INJECTION, SOLUTION INTRAVENOUS at 10:05

## (undated) DEVICE — LINEN TOWEL PACK X5 5464

## (undated) DEVICE — PACK NEURO MINOR UMMC SNE32MNMU4

## (undated) DEVICE — SYR 10ML SLIP TIP W/O NDL 303134

## (undated) DEVICE — CONNECTOR OMNI-FLEX 3222

## (undated) DEVICE — SPONGE COTTONOID 1/2X3" 80-1407

## (undated) DEVICE — SU SILK 2-0 SH CR 5X18" C0125

## (undated) DEVICE — SUCTION MANIFOLD NEPTUNE 2 SYS 4 PORT 0702-020-000

## (undated) DEVICE — ENDO VALVE BX EVIS MAJ-210

## (undated) DEVICE — JELLY LUBRICATING SURGILUBE 2OZ TUBE

## (undated) DEVICE — SU PROLENE 2-0 SHDA 36" 8523H

## (undated) DEVICE — Device

## (undated) DEVICE — ANTIFOG SOLUTION W/FOAM PAD 31142527

## (undated) DEVICE — TUBING SUCTION 10'X3/16" N510

## (undated) DEVICE — PREP SKIN SCRUB TRAY 4461A

## (undated) DEVICE — PACK ENT ENDOSCOPY UMMC

## (undated) DEVICE — DRSG TELFA 3X8" 1238

## (undated) DEVICE — EYE DRSG PAD OVAL

## (undated) DEVICE — DRAPE SHEET MED 44X70" 9355

## (undated) DEVICE — SPONGE COTTONOID 1/2X3" 20-07S

## (undated) DEVICE — SOL WATER IRRIG 1000ML BOTTLE 2F7114

## (undated) DEVICE — NDL BLUNT 18GA 1.5" FILTER 305211

## (undated) DEVICE — ENDO TOOTH QUICK GUARD CONFORMING PROTECTION

## (undated) DEVICE — RX SURGIFLO HEMOSTATIC MATRIX W/THROMBIN 8ML 2994

## (undated) DEVICE — DRSG DRAIN 4X4" 7086

## (undated) DEVICE — SU VICRYL 3-0 SH 8X18" UND J864D

## (undated) DEVICE — GOWN IMPERVIOUS BREATHABLE SMART XLG 89045

## (undated) DEVICE — GLOVE PROTEXIS MICRO 6.5 LT BLUE 2D73PM65

## (undated) DEVICE — SOL NACL 0.9% IRRIG 1000ML BOTTLE 2F7124

## (undated) DEVICE — GLOVE BIOGEL PI ULTRATOUCH SZ 8.0 41180

## (undated) DEVICE — BLADE KNIFE SURG 11 371111

## (undated) DEVICE — LINEN TOWEL PACK X6 WHITE 5487

## (undated) DEVICE — ENDO TOOTH GUARD SAC2001

## (undated) DEVICE — SURGICEL HEMOSTAT 4X8" 1952

## (undated) DEVICE — ESU ELEC BLADE 2.75" COATED/INSULATED E1455

## (undated) DEVICE — ENDO ADPT BRONCH SWIVEL Y A1002

## (undated) DEVICE — STRAP UNIVERSAL POSITIONING 2-PIECE 4X47X76" 91-287

## (undated) DEVICE — ENDO VALVE SUCTION BRONCH EVIS MAJ-209

## (undated) DEVICE — ENDO APPLICATOR SURGIFLO PLASMA COBLATION MS1995

## (undated) DEVICE — PACKING NUGAUZE 1/4" PLAIN 7631

## (undated) DEVICE — SYR PISTON URETHRAL 60ML 68000

## (undated) DEVICE — PREP POVIDONE IODINE SOLUTION 10% 4OZ

## (undated) DEVICE — PREP POVIDONE IODINE SCRUB 7.5% 4OZ APL82212

## (undated) DEVICE — PEN MARKING W/RULER DYNJSM04

## (undated) DEVICE — GLOVE BIOGEL PI MICRO SZ 6.0 48560

## (undated) DEVICE — GLOVE PROTEXIS MICRO 6.0 LT BLUE 2D73PM60

## (undated) DEVICE — GLOVE BIOGEL PI MICRO SZ 6.5 48565

## (undated) DEVICE — SYR 10ML LL W/O NDL 302995

## (undated) DEVICE — LINEN GOWN XLG 5407

## (undated) RX ORDER — ACETAMINOPHEN 325 MG/1
TABLET ORAL
Status: DISPENSED
Start: 2024-07-22

## (undated) RX ORDER — LIDOCAINE HYDROCHLORIDE 10 MG/ML
INJECTION, SOLUTION EPIDURAL; INFILTRATION; INTRACAUDAL; PERINEURAL
Status: DISPENSED
Start: 2025-08-19

## (undated) RX ORDER — IPRATROPIUM BROMIDE AND ALBUTEROL SULFATE 2.5; .5 MG/3ML; MG/3ML
SOLUTION RESPIRATORY (INHALATION)
Status: DISPENSED
Start: 2024-07-22

## (undated) RX ORDER — PROPOFOL 10 MG/ML
INJECTION, EMULSION INTRAVENOUS
Status: DISPENSED
Start: 2020-01-07

## (undated) RX ORDER — CLINDAMYCIN PHOSPHATE 900 MG/50ML
INJECTION, SOLUTION INTRAVENOUS
Status: DISPENSED
Start: 2021-06-04

## (undated) RX ORDER — ONDANSETRON 2 MG/ML
INJECTION INTRAMUSCULAR; INTRAVENOUS
Status: DISPENSED
Start: 2024-07-22

## (undated) RX ORDER — LIDOCAINE HYDROCHLORIDE 20 MG/ML
SOLUTION OROPHARYNGEAL
Status: DISPENSED
Start: 2020-01-09

## (undated) RX ORDER — LIDOCAINE HYDROCHLORIDE 40 MG/ML
SOLUTION TOPICAL
Status: DISPENSED
Start: 2020-01-17

## (undated) RX ORDER — FENTANYL CITRATE 50 UG/ML
INJECTION, SOLUTION INTRAMUSCULAR; INTRAVENOUS
Status: DISPENSED
Start: 2021-06-04

## (undated) RX ORDER — LIDOCAINE HYDROCHLORIDE 20 MG/ML
SOLUTION OROPHARYNGEAL
Status: DISPENSED
Start: 2021-06-24

## (undated) RX ORDER — EPHEDRINE SULFATE 50 MG/ML
INJECTION, SOLUTION INTRAMUSCULAR; INTRAVENOUS; SUBCUTANEOUS
Status: DISPENSED
Start: 2021-06-05

## (undated) RX ORDER — LIDOCAINE HYDROCHLORIDE 40 MG/ML
INJECTION, SOLUTION RETROBULBAR
Status: DISPENSED
Start: 2021-06-04

## (undated) RX ORDER — LIDOCAINE HYDROCHLORIDE 20 MG/ML
INJECTION, SOLUTION EPIDURAL; INFILTRATION; INTRACAUDAL; PERINEURAL
Status: DISPENSED
Start: 2021-06-05

## (undated) RX ORDER — LIDOCAINE HYDROCHLORIDE 20 MG/ML
SOLUTION OROPHARYNGEAL
Status: DISPENSED
Start: 2021-09-10

## (undated) RX ORDER — LIDOCAINE HYDROCHLORIDE 20 MG/ML
SOLUTION OROPHARYNGEAL
Status: DISPENSED
Start: 2020-02-28

## (undated) RX ORDER — SODIUM CHLORIDE, SODIUM LACTATE, POTASSIUM CHLORIDE, CALCIUM CHLORIDE 600; 310; 30; 20 MG/100ML; MG/100ML; MG/100ML; MG/100ML
INJECTION, SOLUTION INTRAVENOUS
Status: DISPENSED
Start: 2021-06-04

## (undated) RX ORDER — LIDOCAINE HYDROCHLORIDE 10 MG/ML
INJECTION, SOLUTION EPIDURAL; INFILTRATION; INTRACAUDAL; PERINEURAL
Status: DISPENSED
Start: 2025-08-08

## (undated) RX ORDER — LIDOCAINE HYDROCHLORIDE 20 MG/ML
SOLUTION OROPHARYNGEAL
Status: DISPENSED
Start: 2021-04-29

## (undated) RX ORDER — FENTANYL CITRATE 50 UG/ML
INJECTION, SOLUTION INTRAMUSCULAR; INTRAVENOUS
Status: DISPENSED
Start: 2025-08-08

## (undated) RX ORDER — FENTANYL CITRATE 50 UG/ML
INJECTION, SOLUTION INTRAMUSCULAR; INTRAVENOUS
Status: DISPENSED
Start: 2024-09-01

## (undated) RX ORDER — LIDOCAINE HYDROCHLORIDE 20 MG/ML
SOLUTION OROPHARYNGEAL
Status: DISPENSED
Start: 2020-01-17

## (undated) RX ORDER — AMPICILLIN AND SULBACTAM 2; 1 G/1; G/1
INJECTION, POWDER, FOR SOLUTION INTRAMUSCULAR; INTRAVENOUS
Status: DISPENSED
Start: 2024-07-22

## (undated) RX ORDER — LIDOCAINE HYDROCHLORIDE 20 MG/ML
JELLY TOPICAL
Status: DISPENSED
Start: 2025-06-04

## (undated) RX ORDER — OXYMETAZOLINE HYDROCHLORIDE 0.05 G/100ML
SPRAY NASAL
Status: DISPENSED
Start: 2025-08-12

## (undated) RX ORDER — LIDOCAINE HYDROCHLORIDE 20 MG/ML
SOLUTION OROPHARYNGEAL
Status: DISPENSED
Start: 2023-03-14

## (undated) RX ORDER — PROTAMINE SULFATE 10 MG/ML
INJECTION, SOLUTION INTRAVENOUS
Status: DISPENSED
Start: 2020-01-08

## (undated) RX ORDER — ALCOHOL 1 ML/ML
INJECTION, SOLUTION PERCUTANEOUS
Status: DISPENSED
Start: 2025-08-12

## (undated) RX ORDER — PROPOFOL 10 MG/ML
INJECTION, EMULSION INTRAVENOUS
Status: DISPENSED
Start: 2021-06-05

## (undated) RX ORDER — SODIUM TETRADECYL SULFATE 30 MG/ML
INJECTION, SOLUTION INTRAVENOUS
Status: DISPENSED
Start: 2025-08-12

## (undated) RX ORDER — LIDOCAINE HYDROCHLORIDE 20 MG/ML
SOLUTION OROPHARYNGEAL
Status: DISPENSED
Start: 2021-07-15

## (undated) RX ORDER — DEXAMETHASONE SODIUM PHOSPHATE 4 MG/ML
INJECTION, SOLUTION INTRA-ARTICULAR; INTRALESIONAL; INTRAMUSCULAR; INTRAVENOUS; SOFT TISSUE
Status: DISPENSED
Start: 2024-07-22

## (undated) RX ORDER — LIDOCAINE HYDROCHLORIDE 20 MG/ML
JELLY TOPICAL
Status: DISPENSED
Start: 2025-08-19

## (undated) RX ORDER — OXYMETAZOLINE HYDROCHLORIDE 0.05 G/100ML
SPRAY NASAL
Status: DISPENSED
Start: 2024-09-01

## (undated) RX ORDER — LIDOCAINE HYDROCHLORIDE AND EPINEPHRINE 10; 10 MG/ML; UG/ML
INJECTION, SOLUTION INFILTRATION; PERINEURAL
Status: DISPENSED
Start: 2024-09-01

## (undated) RX ORDER — HEPARIN SODIUM 200 [USP'U]/100ML
INJECTION, SOLUTION INTRAVENOUS
Status: DISPENSED
Start: 2025-08-08

## (undated) RX ORDER — FENTANYL CITRATE-0.9 % NACL/PF 10 MCG/ML
PLASTIC BAG, INJECTION (ML) INTRAVENOUS
Status: DISPENSED
Start: 2021-06-05

## (undated) RX ORDER — LIDOCAINE HYDROCHLORIDE 20 MG/ML
SOLUTION OROPHARYNGEAL
Status: DISPENSED
Start: 2020-09-17

## (undated) RX ORDER — HEPARIN SODIUM 1000 [USP'U]/ML
INJECTION, SOLUTION INTRAVENOUS; SUBCUTANEOUS
Status: DISPENSED
Start: 2020-01-07

## (undated) RX ORDER — FUROSEMIDE 10 MG/ML
INJECTION INTRAMUSCULAR; INTRAVENOUS
Status: DISPENSED
Start: 2020-01-07

## (undated) RX ORDER — FENTANYL CITRATE 50 UG/ML
INJECTION, SOLUTION INTRAMUSCULAR; INTRAVENOUS
Status: DISPENSED
Start: 2024-07-22

## (undated) RX ORDER — HEPARIN SODIUM 1000 [USP'U]/ML
INJECTION, SOLUTION INTRAVENOUS; SUBCUTANEOUS
Status: DISPENSED
Start: 2025-08-08

## (undated) RX ORDER — BUPIVACAINE HYDROCHLORIDE 2.5 MG/ML
INJECTION, SOLUTION EPIDURAL; INFILTRATION; INTRACAUDAL
Status: DISPENSED
Start: 2021-06-04

## (undated) RX ORDER — FENTANYL CITRATE 50 UG/ML
INJECTION, SOLUTION INTRAMUSCULAR; INTRAVENOUS
Status: DISPENSED
Start: 2025-08-12

## (undated) RX ORDER — PROPOFOL 10 MG/ML
INJECTION, EMULSION INTRAVENOUS
Status: DISPENSED
Start: 2024-07-22

## (undated) RX ORDER — FENTANYL CITRATE-0.9 % NACL/PF 10 MCG/ML
PLASTIC BAG, INJECTION (ML) INTRAVENOUS
Status: DISPENSED
Start: 2024-07-22

## (undated) RX ORDER — LIDOCAINE HYDROCHLORIDE 20 MG/ML
SOLUTION OROPHARYNGEAL
Status: DISPENSED
Start: 2022-08-09